# Patient Record
Sex: FEMALE | Race: WHITE | Employment: UNEMPLOYED | URBAN - METROPOLITAN AREA
[De-identification: names, ages, dates, MRNs, and addresses within clinical notes are randomized per-mention and may not be internally consistent; named-entity substitution may affect disease eponyms.]

---

## 2017-02-21 ENCOUNTER — OFFICE VISIT (OUTPATIENT)
Dept: FAMILY MEDICINE CLINIC | Age: 82
End: 2017-02-21

## 2017-02-21 VITALS
OXYGEN SATURATION: 97 % | HEART RATE: 76 BPM | WEIGHT: 184.8 LBS | HEIGHT: 59 IN | RESPIRATION RATE: 16 BRPM | SYSTOLIC BLOOD PRESSURE: 128 MMHG | BODY MASS INDEX: 37.25 KG/M2 | DIASTOLIC BLOOD PRESSURE: 76 MMHG

## 2017-02-21 DIAGNOSIS — N18.30 CHRONIC KIDNEY DISEASE, STAGE III (MODERATE) (HCC): Chronic | ICD-10-CM

## 2017-02-21 DIAGNOSIS — E78.00 PURE HYPERCHOLESTEROLEMIA: Chronic | ICD-10-CM

## 2017-02-21 DIAGNOSIS — E03.9 ACQUIRED HYPOTHYROIDISM: Chronic | ICD-10-CM

## 2017-02-21 DIAGNOSIS — M1A.0720 CHRONIC GOUT OF LEFT ANKLE, UNSPECIFIED CAUSE: Chronic | ICD-10-CM

## 2017-02-21 DIAGNOSIS — S06.5X0S: ICD-10-CM

## 2017-02-21 DIAGNOSIS — I10 HTN (HYPERTENSION), BENIGN: Primary | ICD-10-CM

## 2017-02-21 LAB
CHOLESTEROL, TOTAL: 143 MG/DL (ref 0–199)
CREATININE URINE: 80.1 MG/DL (ref 28–259)
HDLC SERPL-MCNC: 59 MG/DL (ref 40–60)
HOMOCYSTEINE: 14 UMOL/L (ref 0–10)
LDL CHOLESTEROL CALCULATED: 71 MG/DL
MICROALBUMIN UR-MCNC: <1.2 MG/DL
MICROALBUMIN/CREAT UR-RTO: NORMAL MG/G (ref 0–30)
T4 FREE: 1.2 NG/DL (ref 0.9–1.8)
TRIGL SERPL-MCNC: 67 MG/DL (ref 0–150)
TSH REFLEX: 4.91 UIU/ML (ref 0.27–4.2)
VLDLC SERPL CALC-MCNC: 13 MG/DL

## 2017-02-21 PROCEDURE — 36415 COLL VENOUS BLD VENIPUNCTURE: CPT | Performed by: FAMILY MEDICINE

## 2017-02-21 PROCEDURE — 99214 OFFICE O/P EST MOD 30 MIN: CPT | Performed by: FAMILY MEDICINE

## 2017-02-21 RX ORDER — LEVOTHYROXINE SODIUM 0.05 MG/1
TABLET ORAL
Qty: 90 TABLET | Refills: 3 | Status: SHIPPED | OUTPATIENT
Start: 2017-02-21 | End: 2018-02-27 | Stop reason: SDUPTHER

## 2017-04-06 RX ORDER — BUPROPION HYDROCHLORIDE 150 MG/1
TABLET, EXTENDED RELEASE ORAL
Qty: 180 TABLET | Refills: 3 | Status: SHIPPED | OUTPATIENT
Start: 2017-04-06 | End: 2018-02-27 | Stop reason: SDUPTHER

## 2017-05-10 ENCOUNTER — CARE COORDINATOR VISIT (OUTPATIENT)
Dept: CASE MANAGEMENT | Age: 82
End: 2017-05-10

## 2017-05-12 ENCOUNTER — OFFICE VISIT (OUTPATIENT)
Dept: ORTHOPEDIC SURGERY | Age: 82
End: 2017-05-12

## 2017-05-12 VITALS
SYSTOLIC BLOOD PRESSURE: 134 MMHG | BODY MASS INDEX: 35.28 KG/M2 | WEIGHT: 175 LBS | HEART RATE: 74 BPM | DIASTOLIC BLOOD PRESSURE: 72 MMHG | HEIGHT: 59 IN

## 2017-05-12 DIAGNOSIS — S83.92XA SPRAIN OF LEFT KNEE, UNSPECIFIED LIGAMENT, INITIAL ENCOUNTER: Primary | ICD-10-CM

## 2017-05-12 DIAGNOSIS — M70.62 GREATER TROCHANTERIC BURSITIS, LEFT: ICD-10-CM

## 2017-05-12 PROCEDURE — 99203 OFFICE O/P NEW LOW 30 MIN: CPT | Performed by: ORTHOPAEDIC SURGERY

## 2017-05-12 RX ORDER — METHYLPREDNISOLONE 4 MG/1
TABLET ORAL
Qty: 1 KIT | Refills: 0 | Status: SHIPPED | OUTPATIENT
Start: 2017-05-12 | End: 2017-05-18

## 2017-05-22 ENCOUNTER — TELEPHONE (OUTPATIENT)
Dept: ORTHOPEDIC SURGERY | Age: 82
End: 2017-05-22

## 2017-05-23 ENCOUNTER — TELEPHONE (OUTPATIENT)
Dept: FAMILY MEDICINE CLINIC | Age: 82
End: 2017-05-23

## 2017-05-24 ENCOUNTER — TELEPHONE (OUTPATIENT)
Dept: FAMILY MEDICINE CLINIC | Age: 82
End: 2017-05-24

## 2017-06-07 ENCOUNTER — OFFICE VISIT (OUTPATIENT)
Dept: FAMILY MEDICINE CLINIC | Age: 82
End: 2017-06-07

## 2017-06-07 ENCOUNTER — TELEPHONE (OUTPATIENT)
Dept: FAMILY MEDICINE CLINIC | Age: 82
End: 2017-06-07

## 2017-06-07 VITALS
DIASTOLIC BLOOD PRESSURE: 80 MMHG | HEART RATE: 75 BPM | BODY MASS INDEX: 37.13 KG/M2 | HEIGHT: 59 IN | OXYGEN SATURATION: 96 % | SYSTOLIC BLOOD PRESSURE: 120 MMHG | RESPIRATION RATE: 16 BRPM | WEIGHT: 184.2 LBS

## 2017-06-07 DIAGNOSIS — R26.81 GAIT INSTABILITY: Primary | ICD-10-CM

## 2017-06-07 DIAGNOSIS — M17.12 PRIMARY OSTEOARTHRITIS OF LEFT KNEE: Chronic | ICD-10-CM

## 2017-06-07 DIAGNOSIS — R26.81 UNSTEADY GAIT: Chronic | ICD-10-CM

## 2017-06-07 DIAGNOSIS — I10 HTN (HYPERTENSION), BENIGN: Primary | ICD-10-CM

## 2017-06-07 DIAGNOSIS — E03.9 ACQUIRED HYPOTHYROIDISM: Chronic | ICD-10-CM

## 2017-06-07 DIAGNOSIS — E78.00 PURE HYPERCHOLESTEROLEMIA: Chronic | ICD-10-CM

## 2017-06-07 DIAGNOSIS — Z91.81 HISTORY OF FALL: ICD-10-CM

## 2017-06-07 DIAGNOSIS — K14.9 TONGUE DYSPLASIA: Chronic | ICD-10-CM

## 2017-06-07 LAB — TSH REFLEX: 4.19 UIU/ML (ref 0.27–4.2)

## 2017-06-07 PROCEDURE — 99214 OFFICE O/P EST MOD 30 MIN: CPT | Performed by: FAMILY MEDICINE

## 2017-06-07 PROCEDURE — 36415 COLL VENOUS BLD VENIPUNCTURE: CPT | Performed by: FAMILY MEDICINE

## 2017-06-07 ASSESSMENT — PATIENT HEALTH QUESTIONNAIRE - PHQ9
1. LITTLE INTEREST OR PLEASURE IN DOING THINGS: 0
SUM OF ALL RESPONSES TO PHQ QUESTIONS 1-9: 0
2. FEELING DOWN, DEPRESSED OR HOPELESS: 0
SUM OF ALL RESPONSES TO PHQ9 QUESTIONS 1 & 2: 0

## 2017-06-07 ASSESSMENT — ENCOUNTER SYMPTOMS
CHEST TIGHTNESS: 0
CHOKING: 0
WHEEZING: 0
SHORTNESS OF BREATH: 0
COUGH: 0

## 2017-07-05 RX ORDER — ATORVASTATIN CALCIUM 80 MG/1
TABLET, FILM COATED ORAL
Qty: 90 TABLET | Refills: 2 | Status: SHIPPED | OUTPATIENT
Start: 2017-07-05 | End: 2017-12-29 | Stop reason: SDUPTHER

## 2017-09-19 ENCOUNTER — OFFICE VISIT (OUTPATIENT)
Dept: FAMILY MEDICINE CLINIC | Age: 82
End: 2017-09-19

## 2017-09-19 VITALS
WEIGHT: 189.4 LBS | HEART RATE: 72 BPM | RESPIRATION RATE: 16 BRPM | OXYGEN SATURATION: 97 % | HEIGHT: 59 IN | SYSTOLIC BLOOD PRESSURE: 108 MMHG | DIASTOLIC BLOOD PRESSURE: 56 MMHG | BODY MASS INDEX: 38.18 KG/M2

## 2017-09-19 DIAGNOSIS — I10 HTN (HYPERTENSION), BENIGN: Primary | Chronic | ICD-10-CM

## 2017-09-19 DIAGNOSIS — M1A.0720 CHRONIC GOUT OF LEFT ANKLE, UNSPECIFIED CAUSE: Chronic | ICD-10-CM

## 2017-09-19 DIAGNOSIS — D50.8 IRON DEFICIENCY ANEMIA SECONDARY TO INADEQUATE DIETARY IRON INTAKE: ICD-10-CM

## 2017-09-19 DIAGNOSIS — E78.00 PURE HYPERCHOLESTEROLEMIA: ICD-10-CM

## 2017-09-19 DIAGNOSIS — E03.9 ACQUIRED HYPOTHYROIDISM: Chronic | ICD-10-CM

## 2017-09-19 DIAGNOSIS — Z23 NEED FOR INFLUENZA VACCINATION: ICD-10-CM

## 2017-09-19 DIAGNOSIS — F33.42 RECURRENT MAJOR DEPRESSIVE EPISODES, IN FULL REMISSION (HCC): ICD-10-CM

## 2017-09-19 DIAGNOSIS — N18.30 CHRONIC KIDNEY DISEASE, STAGE III (MODERATE) (HCC): Chronic | ICD-10-CM

## 2017-09-19 LAB
A/G RATIO: 2.4 (ref 1.1–2.2)
ALBUMIN SERPL-MCNC: 4.3 G/DL (ref 3.4–5)
ALP BLD-CCNC: 111 U/L (ref 40–129)
ALT SERPL-CCNC: 18 U/L (ref 10–40)
ANION GAP SERPL CALCULATED.3IONS-SCNC: 16 MMOL/L (ref 3–16)
AST SERPL-CCNC: 23 U/L (ref 15–37)
BILIRUB SERPL-MCNC: 0.8 MG/DL (ref 0–1)
BUN BLDV-MCNC: 16 MG/DL (ref 7–20)
CALCIUM SERPL-MCNC: 9.4 MG/DL (ref 8.3–10.6)
CHLORIDE BLD-SCNC: 103 MMOL/L (ref 99–110)
CO2: 26 MMOL/L (ref 21–32)
CREAT SERPL-MCNC: 0.8 MG/DL (ref 0.6–1.2)
GFR AFRICAN AMERICAN: >60
GFR NON-AFRICAN AMERICAN: >60
GLOBULIN: 1.8 G/DL
GLUCOSE BLD-MCNC: 84 MG/DL (ref 70–99)
HCT VFR BLD CALC: 41.4 % (ref 36–48)
HEMOGLOBIN: 13.7 G/DL (ref 12–16)
MAGNESIUM: 2 MG/DL (ref 1.8–2.4)
MCH RBC QN AUTO: 32.4 PG (ref 26–34)
MCHC RBC AUTO-ENTMCNC: 33 G/DL (ref 31–36)
MCV RBC AUTO: 98.2 FL (ref 80–100)
PDW BLD-RTO: 13.5 % (ref 12.4–15.4)
PLATELET # BLD: 185 K/UL (ref 135–450)
PMV BLD AUTO: 9 FL (ref 5–10.5)
POTASSIUM SERPL-SCNC: 4.6 MMOL/L (ref 3.5–5.1)
RBC # BLD: 4.21 M/UL (ref 4–5.2)
SODIUM BLD-SCNC: 145 MMOL/L (ref 136–145)
TOTAL PROTEIN: 6.1 G/DL (ref 6.4–8.2)
TSH REFLEX: 4.15 UIU/ML (ref 0.27–4.2)
URIC ACID, SERUM: 3.6 MG/DL (ref 2.6–6)
WBC # BLD: 7 K/UL (ref 4–11)

## 2017-09-19 PROCEDURE — 99214 OFFICE O/P EST MOD 30 MIN: CPT | Performed by: FAMILY MEDICINE

## 2017-09-19 PROCEDURE — 36415 COLL VENOUS BLD VENIPUNCTURE: CPT | Performed by: FAMILY MEDICINE

## 2017-09-19 PROCEDURE — G0008 ADMIN INFLUENZA VIRUS VAC: HCPCS | Performed by: FAMILY MEDICINE

## 2017-09-19 PROCEDURE — 90662 IIV NO PRSV INCREASED AG IM: CPT | Performed by: FAMILY MEDICINE

## 2017-09-19 ASSESSMENT — ENCOUNTER SYMPTOMS
SHORTNESS OF BREATH: 1
COUGH: 0
CHOKING: 0
CHEST TIGHTNESS: 0
WHEEZING: 0

## 2017-09-21 DIAGNOSIS — E87.6 HYPOKALEMIA: ICD-10-CM

## 2017-09-21 RX ORDER — POTASSIUM CHLORIDE 750 MG/1
TABLET, EXTENDED RELEASE ORAL
Qty: 90 TABLET | Refills: 3 | Status: SHIPPED | OUTPATIENT
Start: 2017-09-21 | End: 2018-09-24 | Stop reason: SDUPTHER

## 2017-11-09 DIAGNOSIS — M1A.0720 CHRONIC GOUT OF LEFT ANKLE, UNSPECIFIED CAUSE: Chronic | ICD-10-CM

## 2017-11-09 RX ORDER — PROBENECID 500 MG/1
TABLET, FILM COATED ORAL
Qty: 90 TABLET | Refills: 0 | Status: SHIPPED | OUTPATIENT
Start: 2017-11-09 | End: 2018-02-27 | Stop reason: SDUPTHER

## 2017-11-30 DIAGNOSIS — I10 HTN (HYPERTENSION), BENIGN: ICD-10-CM

## 2017-11-30 DIAGNOSIS — N18.30 CHRONIC KIDNEY DISEASE, STAGE III (MODERATE) (HCC): Chronic | ICD-10-CM

## 2017-11-30 RX ORDER — LISINOPRIL 2.5 MG/1
TABLET ORAL
Qty: 90 TABLET | Refills: 1 | Status: SHIPPED | OUTPATIENT
Start: 2017-11-30 | End: 2018-05-21 | Stop reason: SDUPTHER

## 2017-12-05 ENCOUNTER — OFFICE VISIT (OUTPATIENT)
Dept: FAMILY MEDICINE CLINIC | Age: 82
End: 2017-12-05

## 2017-12-05 VITALS
RESPIRATION RATE: 16 BRPM | HEIGHT: 59 IN | BODY MASS INDEX: 38.38 KG/M2 | SYSTOLIC BLOOD PRESSURE: 124 MMHG | WEIGHT: 190.4 LBS | HEART RATE: 76 BPM | OXYGEN SATURATION: 97 % | DIASTOLIC BLOOD PRESSURE: 76 MMHG

## 2017-12-05 DIAGNOSIS — S70.12XS HEMATOMA OF LEFT THIGH, SEQUELA: Primary | ICD-10-CM
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
## 2017-12-05 DIAGNOSIS — J04.0 LARYNGITIS: ICD-10-CM

## 2017-12-05 PROCEDURE — 99214 OFFICE O/P EST MOD 30 MIN: CPT | Performed by: FAMILY MEDICINE

## 2017-12-05 PROCEDURE — 1123F ACP DISCUSS/DSCN MKR DOCD: CPT | Performed by: FAMILY MEDICINE

## 2017-12-05 PROCEDURE — G8417 CALC BMI ABV UP PARAM F/U: HCPCS | Performed by: FAMILY MEDICINE

## 2017-12-05 PROCEDURE — 4040F PNEUMOC VAC/ADMIN/RCVD: CPT | Performed by: FAMILY MEDICINE

## 2017-12-05 PROCEDURE — G8399 PT W/DXA RESULTS DOCUMENT: HCPCS | Performed by: FAMILY MEDICINE

## 2017-12-05 PROCEDURE — G8427 DOCREV CUR MEDS BY ELIG CLIN: HCPCS | Performed by: FAMILY MEDICINE

## 2017-12-05 PROCEDURE — 1090F PRES/ABSN URINE INCON ASSESS: CPT | Performed by: FAMILY MEDICINE

## 2017-12-05 PROCEDURE — G8484 FLU IMMUNIZE NO ADMIN: HCPCS | Performed by: FAMILY MEDICINE

## 2017-12-05 PROCEDURE — 1036F TOBACCO NON-USER: CPT | Performed by: FAMILY MEDICINE

## 2017-12-05 ASSESSMENT — ENCOUNTER SYMPTOMS
VOICE CHANGE: 1
RHINORRHEA: 1
TROUBLE SWALLOWING: 0
SORE THROAT: 0
SINUS PAIN: 0
SINUS PRESSURE: 1

## 2017-12-05 NOTE — PATIENT INSTRUCTIONS
Rebeka Wilcox was seen today for follow-up from hospital.    Diagnoses and all orders for this visit:    Hematoma of left thigh, sequela  Continue Tylenol as prior. Moist heat. More you move the better. Laryngitis                                         Instructions for Respiratory Infections (SAVE THIS SHEET)    For the first 7-14 days of symptoms follow instructions below, even before being seen in the office or even during treatment with antibiotics, until symptom free. 1. Water: Drink 1 ounce of water for every 2 pounds of body weight for adults,  86 Ounces of water per day. This will loosen mucus in the head and chest & improve the weak feeling of dehydration, allow the body to get germ fighting resources to the infection. Half can be juice or sugar free Crystal Light. Don't count drinks with caffeine or carbonation. Infants can have Pedialyte liquid or freezer pops. Avoid salt if you have high Blood Pressure, swelling in the feet or ankles or have heart problems. 2. Humidity: Humidify the air to 35-50% ( or until the windows fog over slightly). Can use a humidifier, vaporizer, boil water on the stove or put a coffee can full of water on the heater vents. This will loosen mucus from infections and allergies. 3. Sleep: Get 8-10 hours a night and rest during the evening after work or school. If you have trouble sleeping, adults can take Melatonin 5mg up to 2 tabs at bedtime ( not for children or pregnant women). If Mono is suspected then sleep during 9PM to 9AM time span (if possible.)  4. Cough: Take cough medicines with Guaifenesin ( to loosen chest or head congestion) and Dextromethorphan ( to decrease excess cough). Robitussin D.M. Syrup every 4-6 hrs or Mucinex D. M. pills twice a day. Use the pediatric formulations for children over 6 months making sure they are alcohol & sugar free for children, pregnant women, and diabetics. 5. Pain And Fevers:  Take Acetaminophen ( Tylenol) for fevers, aches, and headaches. 2-500 mg every 8 hours for adults. Appropriate doses at bedtime for children may help them sleep better. If pregnant take 1 -500 mg (Tylenol) every 8 hours as needed. Ibuprofen may be used if not pregnant, but should be given with food to avoid nausea. Avoid Ibuprofen if you have high blood pressure, CHF, or kidney problems. 6.Gargle: (DAY ONE OF SYMPTOMS) Gargle in the back of the throat with the head tilted back and to the sides with a strong mouthwash  ( Listerine or Scope) after meals and at bedtime at least 4 -5 times a day. This helps kill bacteria and viruses in the back of the throat and will shorten the duration and decrease the severity of your symptoms: sore throat, cough, ear popping,/ear pain, and possibly dizziness. 7. Smoking: Avoid smoking or exposure to second hand smoke. 8. Zinc: (DAY ONE OF SYMPTOMS)  Zinc lozenges such as Cold Mati (available most stores), or Basic ([employer] brand) will help shorten the duration and lessen symptoms such as sore throat, cough, nasal congestion, runny nose, and post nasal drip. Use 1 lozenge every 2-4 hours ( after meals if stomach is sensitive). Children can use 10-15 mg or less 3-4 times a day or Zinc lollypops. In pregnancy limit to 50-60 mg a day for 7 days as prenatals have Zinc also. With diarrhea use zinc pills 50 mg 1/2 to 1 pill 2x/day. 9. Vitamins: Vitamin C 500 mg with breakfast and dinner. Children and pregnant women should drink citrus juices. This speeds healing and strengthens immune system. 10. Chest Symptoms: Vicks Vapor rub to the chest at bedtime. 11. Decongestants: Avoid all decongestants and antihistamine cold preparations in children. Try all of the above starting with day 1 of symptoms. If Strep throat symptoms appear call to be seen in the office as soon as possible and don't gargle on that day. Newborns, infants, or anyone with earaches or influenza may need to be seen quickly.  Adults with fevers over 103 degrees or shortness of breath should call the office immediately.

## 2017-12-05 NOTE — PROGRESS NOTES
Subjective:      Patient ID: Roxana Delatorre is a 80 y.o. female. Chief Complaint   Patient presents with    Follow-Up from [address] [facility] /DX HEMATOMA     HPI  Fall /Hematoma of the right thigh  Was sitting in chair and went to get her I pad. Did not use her walker. She bent over to unplug the I pad. Had to turn around on a hardwood floor. Slipped and fell on her right side. Couldn't get up even with help of 2 others. Crawled to an automun and sat up till someone could help her up. Was able to walk. Had pain immediately. Was getting a lump that was harder and harder. Used ice for 2 days. Went to the ER had Xray then CT and no fx but had a large hematoma. No new meds. Only taking Tylenol 2 every 4 hours. Able to walk with a walker now. No pain unless she bumps it. She is comfortable if she is just sitting  Laryngitis/ sinus drainage x 5 -6days. No fever. Slight cough. Current Outpatient Prescriptions   Medication Sig Dispense Refill    lisinopril (PRINIVIL;ZESTRIL) 2.5 MG tablet TAKE 1 TABLET BY MOUTH  DAILY 90 tablet 1    probenecid (BENEMID) 500 MG tablet TAKE 1 TABLET BY MOUTH  DAILY TO PREVENT GOUT 90 tablet 0    potassium chloride (KLOR-CON M) 10 MEQ extended release tablet TAKE 1 TABLET BY MOUTH  DAILY FOR POTASSIUM  REPLACEMENT 90 tablet 3    atorvastatin (LIPITOR) 80 MG tablet TAKE ONE TABLET BY MOUTH EVERY EVENING FOR CHOLESTEROL 90 tablet 2    buPROPion (WELLBUTRIN SR) 150 MG extended release tablet Take 1 tablet by mouth  twice a day Indications:  Depression 180 tablet 3    levothyroxine (SYNTHROID) 50 MCG tablet Take 1 tablet by mouth  daily on an empty stomach  for thyroid 90 tablet 3    aspirin 81 MG tablet Take 81 mg by mouth daily      Cholecalciferol (VITAMIN D3) 2000 UNITS CAPS One daily for Vit D supplement 30 capsule     Melatonin 3 MG TABS Take 3 mg by mouth daily.  One at bedtime for sleep assist      ammonium lactate (AMLACTIN) 12 % cream Apply  topically as needed. Apply topically as needed.  magnesium chloride (MAG DELAY) 535 (64 MG) MG TBCR CR tablet Take 64 mg by mouth daily.  acetaminophen (TYLENOL) 500 MG tablet Take 500 mg by mouth daily.  Multiple Vitamin (MULTIVITAMIN PO) Take 1 tablet by mouth daily.  niacin 100 MG tablet 300 mg 2 times daily (with meals)        No current facility-administered medications for this visit. Review of Systems   HENT: Positive for congestion, ear discharge, ear pain, postnasal drip, rhinorrhea, sinus pressure and voice change. Negative for nosebleeds, sinus pain, sneezing, sore throat and trouble swallowing. Review of systems in history of present illness or scanned in under media tab. Objective:   Physical Exam   Constitutional: She appears well-developed. No distress. HENT:   Right Ear: Tympanic membrane, external ear and ear canal normal. Tympanic membrane is not injected, not erythematous, not retracted and not bulging. No middle ear effusion. Left Ear: External ear and ear canal normal. Tympanic membrane is not injected, not erythematous, not retracted and not bulging. No middle ear effusion. Nose: Mucosal edema present. No rhinorrhea. Right sinus exhibits no maxillary sinus tenderness and no frontal sinus tenderness. Left sinus exhibits no maxillary sinus tenderness and no frontal sinus tenderness. Mouth/Throat: Uvula is midline, oropharynx is clear and moist and mucous membranes are normal. Mucous membranes are not pale, not dry and not cyanotic. No uvula swelling. No oropharyngeal exudate, posterior oropharyngeal edema, posterior oropharyngeal erythema or tonsillar abscesses. Eyes: Conjunctivae are normal. Right eye exhibits no discharge and no exudate. Left eye exhibits no discharge and no exudate. Right conjunctiva is not injected. Left conjunctiva is not injected. No scleral icterus. Neck: Neck supple.  No thyroid mass and no thyromegaly present. Cardiovascular: Normal rate, regular rhythm and normal heart sounds. Exam reveals no gallop and no friction rub. No murmur heard. Pulmonary/Chest: Effort normal and breath sounds normal. No respiratory distress. She has no decreased breath sounds. She has no wheezes. She has no rhonchi. She has no rales. Musculoskeletal:        Legs:  Lymphadenopathy:        Head (right side): No submandibular and no tonsillar adenopathy present. Head (left side): No submandibular and no tonsillar adenopathy present. She has no cervical adenopathy. Right cervical: No superficial cervical, no deep cervical and no posterior cervical adenopathy present. Left cervical: No superficial cervical, no deep cervical and no posterior cervical adenopathy present. Skin: Skin is warm and dry. She is not diaphoretic. No pallor. BP Readings from Last 3 Encounters:   12/05/17 124/76   09/19/17 (!) 108/56   06/07/17 120/80     Pulse Readings from Last 3 Encounters:   12/05/17 76   09/19/17 72   06/07/17 75     Wt Readings from Last 3 Encounters:   12/05/17 190 lb 6.4 oz (86.4 kg)   09/19/17 189 lb 6.4 oz (85.9 kg)   06/07/17 184 lb 3.2 oz (83.6 kg)     Body mass index is 38.46 kg/m². 9/19/2017 11:41   Sodium 145   Potassium 4.6   Chloride 103   CO2 26   BUN 16   Creatinine 0.8   Anion Gap 16   GFR Non-African American >60   Magnesium 2.00   Glucose 84   Calcium 9.4   Total Protein 6.1 (L)   Uric Acid, Serum 3.6   Albumin 4.3   Globulin 1.8   Albumin/Globulin Ratio 2.4 (H)   Alk Phos 111   ALT 18   AST 23   Bilirubin 0.8   TSH 4.15   WBC 7.0   RBC 4.21   Hemoglobin Quant 13.7   Hematocrit 41.4   MCV 98.2   MCH 32.4   MCHC 33.0   MPV 9.0   RDW 13.5   Platelet Count 085     CT scan of the right leg 12/2/2017  Mild degenerative changes of the right hip. No fracture dislocation. Superficial hematoma along lateral aspect of right proximal by measuring 8.1 x 6.1 x 5.3 cm.   A few diverticula of the visualized colon. Assessment:      1. Hematoma of left thigh, sequela    2. Laryngitis    3. Fall with injury, sequela          Plan:      - Counseling More Than 50% of the 25 min Appointment Time     Rebeka Wilcox was seen today for follow-up from hospital.    Diagnoses and all orders for this visit:    Hematoma of left thigh, sequela  Continue Tylenol as prior. Moist heat. The more you move the better. It will help the hematoma to resolve. Explained that it could be 6-9 months for the hematoma resolves. 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 Increasing exercise will help tremendously to prevent future falls by strengthening the muscles of the core body and lower extremities. Use the muscle conditioning exercises couldn't previously swell as the materials given by physical and occupational therapy in the past as much as tolerated with the hematoma. Encouraged patient to do everything in a safe manner. Encouraged calf pumps to keep swelling down. Encouraged lifting off the chair with her hands on the seat of the chair and not the arms to build strength in the arms. Encouraged her to do these things every time she saw a commercial on television. Laryngitis                                         Instructions for Respiratory Infections (SAVE THIS SHEET)  For the first 7-14 days of symptoms follow instructions below, even before being seen in the office or even during treatment with antibiotics, until symptom free. 1. Water: Drink 1 ounce of water for every 2 pounds of body weight for adults,  86 Ounces of water per day. This will loosen mucus in the head and chest & improve the weak feeling of dehydration, allow the body to get germ fighting resources to the infection. Half can be juice or sugar free Crystal Light. Don't count drinks with caffeine or carbonation.  Infants can have Pedialyte liquid or

## 2017-12-12 ENCOUNTER — TELEPHONE (OUTPATIENT)
Dept: FAMILY MEDICINE CLINIC | Age: 82
End: 2017-12-12

## 2017-12-12 NOTE — TELEPHONE ENCOUNTER
[facility]ARE [facility]CTIONS  - CLAUDIA @   [phone]          SHE HAD A FALL - HOME CARE ORDERED      2X A WEEK FOR 4 WEEKS  - STRENGTHENING, BALANCE AND ENDURANCE     NEEDS VERBAL ORDERS

## 2017-12-19 ENCOUNTER — OFFICE VISIT (OUTPATIENT)
Dept: FAMILY MEDICINE CLINIC | Age: 82
End: 2017-12-19

## 2017-12-19 VITALS
WEIGHT: 194.2 LBS | SYSTOLIC BLOOD PRESSURE: 124 MMHG | DIASTOLIC BLOOD PRESSURE: 78 MMHG | RESPIRATION RATE: 16 BRPM | BODY MASS INDEX: 39.15 KG/M2 | OXYGEN SATURATION: 96 % | HEART RATE: 78 BPM | HEIGHT: 59 IN

## 2017-12-19 DIAGNOSIS — I10 HTN (HYPERTENSION), BENIGN: Primary | Chronic | ICD-10-CM

## 2017-12-19 DIAGNOSIS — R60.0 EDEMA OF BOTH LEGS: ICD-10-CM

## 2017-12-19 DIAGNOSIS — S70.11XS HEMATOMA OF RIGHT THIGH, SEQUELA: ICD-10-CM

## 2017-12-19 PROCEDURE — 99214 OFFICE O/P EST MOD 30 MIN: CPT | Performed by: FAMILY MEDICINE

## 2017-12-19 PROCEDURE — 1090F PRES/ABSN URINE INCON ASSESS: CPT | Performed by: FAMILY MEDICINE

## 2017-12-19 PROCEDURE — 4040F PNEUMOC VAC/ADMIN/RCVD: CPT | Performed by: FAMILY MEDICINE

## 2017-12-19 PROCEDURE — G8417 CALC BMI ABV UP PARAM F/U: HCPCS | Performed by: FAMILY MEDICINE

## 2017-12-19 PROCEDURE — 1123F ACP DISCUSS/DSCN MKR DOCD: CPT | Performed by: FAMILY MEDICINE

## 2017-12-19 PROCEDURE — G8484 FLU IMMUNIZE NO ADMIN: HCPCS | Performed by: FAMILY MEDICINE

## 2017-12-19 PROCEDURE — G8427 DOCREV CUR MEDS BY ELIG CLIN: HCPCS | Performed by: FAMILY MEDICINE

## 2017-12-19 PROCEDURE — G8399 PT W/DXA RESULTS DOCUMENT: HCPCS | Performed by: FAMILY MEDICINE

## 2017-12-19 PROCEDURE — 1036F TOBACCO NON-USER: CPT | Performed by: FAMILY MEDICINE

## 2017-12-19 ASSESSMENT — ENCOUNTER SYMPTOMS
SHORTNESS OF BREATH: 1
WHEEZING: 0
COUGH: 0
CHEST TIGHTNESS: 0
CHOKING: 0

## 2017-12-19 NOTE — PROGRESS NOTES
Subjective:      Patient ID: Hoa Le is a 80 y.o. female. Chief Complaint   Patient presents with    Hypertension     3 MONTH ROUTINE FOLLOW UP    Hematoma     RIGHT THIGH, STILL PAINFUL, CAN'T SLEEP ON THAT SIDE/STILL HARD     HPI    HTN (hypertension), benign  Is not checking at home. No cuff. Is watching salt. Occasional chips. Hematoma of right thigh, sequela  Feels like it is tight but not as tight. Still touchy. Still feels hard in 1 spot and cant sleep on that side. Edema of both legs  Swelling is worse since hematoma. Stockings tight at the top and hurts More on the right because of the hematoma. Tried stockings that went up to her knees in the past but were too painful to tolerate. Current Outpatient Prescriptions   Medication Sig Dispense Refill    Misc Natural Products (GLUCOSAMINE-CHONDROITIN SULF PO) Take 1 tablet by mouth daily      lisinopril (PRINIVIL;ZESTRIL) 2.5 MG tablet TAKE 1 TABLET BY MOUTH  DAILY 90 tablet 1    probenecid (BENEMID) 500 MG tablet TAKE 1 TABLET BY MOUTH  DAILY TO PREVENT GOUT 90 tablet 0    potassium chloride (KLOR-CON M) 10 MEQ extended release tablet TAKE 1 TABLET BY MOUTH  DAILY FOR POTASSIUM  REPLACEMENT 90 tablet 3    atorvastatin (LIPITOR) 80 MG tablet TAKE ONE TABLET BY MOUTH EVERY EVENING FOR CHOLESTEROL 90 tablet 2    buPROPion (WELLBUTRIN SR) 150 MG extended release tablet Take 1 tablet by mouth  twice a day Indications:  Depression 180 tablet 3    levothyroxine (SYNTHROID) 50 MCG tablet Take 1 tablet by mouth  daily on an empty stomach  for thyroid 90 tablet 3    aspirin 81 MG tablet Take 81 mg by mouth daily      Cholecalciferol (VITAMIN D3) 2000 UNITS CAPS One daily for Vit D supplement 30 capsule     Melatonin 3 MG TABS Take 3 mg by mouth daily. One at bedtime for sleep assist      ammonium lactate (AMLACTIN) 12 % cream Apply  topically as needed. Apply topically as needed.        magnesium chloride (MAG DELAY) 535 (64 MG) MG cervical: No superficial cervical, no deep cervical and no posterior cervical adenopathy present. Neurological: She is alert. Coordination and gait abnormal.   She is less unsteady with short transfers from the chair to the exam table. She feels more secure getting on table now that it is lower. Skin: Skin is warm and dry. She is not diaphoretic. No cyanosis. No pallor. Nails show no clubbing. Psychiatric: She has a normal mood and affect. Her speech is normal and behavior is normal. Judgment normal. Cognition and memory are normal.   Nursing note and vitals reviewed. BP Readings from Last 3 Encounters:   12/19/17 124/78   12/05/17 124/76   09/19/17 (!) 108/56     Pulse Readings from Last 3 Encounters:   12/19/17 78   12/05/17 76   09/19/17 72     Wt Readings from Last 3 Encounters:   12/19/17 194 lb 3.2 oz (88.1 kg)   12/05/17 190 lb 6.4 oz (86.4 kg)   09/19/17 189 lb 6.4 oz (85.9 kg)     Body mass index is 39.22 kg/m². Assessment:      1. HTN (hypertension), benign    2. Hematoma of right thigh, sequela    3. Edema of both legs          Plan:      Brianna Abernathy was seen today for hypertension and hematoma. Diagnoses and all orders for this visit:    HTN (hypertension), benign  -     Good control. Get a home cuff. -     Continue meds and lifestyle control. Hematoma of right thigh, sequela  Will take 6-9 months to resolve. Continue Tylenol as needed for pain. Moist heat to the area will speed resolution. Movement pressure and helps break up clots making up the hematoma so it will resolve more rapidly. Edema of both legs  Keep leg elevated when sitting. Wear compression stockings as much as possible. If feet are down while sitting press toes and front of foot down then lift them firmly with heel on the ground. When standing gently rock from heels to standing on toe tips.   These are calf pumps that move fluid out of the calf and back to the central circulation and prevent worsened swelling in

## 2017-12-19 NOTE — PATIENT INSTRUCTIONS
Rishi Stewart was seen today for hypertension and hematoma. Diagnoses and all orders for this visit:    HTN (hypertension), benign  -     Good control. Get a home cuff. -     Continue meds and lifestyle control. Hematoma of right thigh, sequela  Will take 6-9 months to resolve. Edema of both legs  Keep leg elevated when sitting. Wear compression stockings as much as possible. If feet are down while sitting press toes and front of foot down then lift them firmly with heel on the ground. When standing gently rock from heels to standing on toe tips. These are calf pumps that move fluid out of the calf and back to the central circulation and prevent worsened swelling in the foot and calf. Lay on couch and elevate feet on arm rest 30 min before dinner. Can try 10-15 mm pressure stocking.

## 2017-12-29 DIAGNOSIS — E78.00 PURE HYPERCHOLESTEROLEMIA: Primary | Chronic | ICD-10-CM

## 2017-12-29 RX ORDER — ATORVASTATIN CALCIUM 80 MG/1
80 TABLET, FILM COATED ORAL DAILY
Qty: 90 TABLET | Refills: 1 | Status: SHIPPED | OUTPATIENT
Start: 2017-12-29 | End: 2018-07-03 | Stop reason: SDUPTHER

## 2017-12-29 NOTE — TELEPHONE ENCOUNTER
Sheila Stevenson- she was only given one bottle of her lipitor from [place], and they won't fill again until her 90 pills would have been gone. She has 3 pills left. Please sign the rx to [facility] for her atorvastatin. Thanks!

## 2018-01-04 ENCOUNTER — OFFICE VISIT (OUTPATIENT)
Dept: FAMILY MEDICINE CLINIC | Age: 83
End: 2018-01-04

## 2018-01-04 VITALS
SYSTOLIC BLOOD PRESSURE: 116 MMHG | HEART RATE: 80 BPM | DIASTOLIC BLOOD PRESSURE: 76 MMHG | WEIGHT: 189 LBS | RESPIRATION RATE: 16 BRPM | HEIGHT: 59 IN | BODY MASS INDEX: 38.1 KG/M2

## 2018-01-04 DIAGNOSIS — B02.9 HERPES ZOSTER WITHOUT COMPLICATION: Primary | ICD-10-CM

## 2018-01-04 PROCEDURE — 99214 OFFICE O/P EST MOD 30 MIN: CPT | Performed by: NURSE PRACTITIONER

## 2018-01-04 RX ORDER — PREDNISONE 10 MG/1
TABLET ORAL
Qty: 30 TABLET | Refills: 0 | Status: SHIPPED | OUTPATIENT
Start: 2018-01-04 | End: 2018-01-14

## 2018-01-04 RX ORDER — VALACYCLOVIR HYDROCHLORIDE 500 MG/1
1000 TABLET, FILM COATED ORAL 3 TIMES DAILY
Qty: 30 TABLET | Refills: 0 | Status: SHIPPED | OUTPATIENT
Start: 2018-01-04 | End: 2018-01-14

## 2018-01-04 NOTE — PATIENT INSTRUCTIONS
Patient Education      if you develop fever greater that 102 please call the office  If you develop sever headache go to ED  Shingles: Care Instructions  Your Care Instructions    Shingles (herpes zoster) causes pain and a blistered rash. The rash can appear anywhere on the body but will be on only one side of the body, the left or right. It will be in a band, a strip, or a small area. The pain can be very severe. Shingles can also cause tingling or itching in the area of the rash. The blisters scab over after a few days and heal in 2 to 4 weeks. Medicines can help you feel better and may help prevent more serious problems caused by shingles. Shingles is caused by the same virus that causes chickenpox. When you have chickenpox, the virus gets into your nerve roots and stays there (becomes dormant) long after you get over the chickenpox. If the virus becomes active again, it can cause shingles. Follow-up care is a key part of your treatment and safety. Be sure to make and go to all appointments, and call your doctor if you are having problems. It's also a good idea to know your test results and keep a list of the medicines you take. How can you care for yourself at home? · Be safe with medicines. Take your medicines exactly as prescribed. Call your doctor if you think you are having a problem with your medicine. Antiviral medicine helps you get better faster. · Try not to scratch or pick at the blisters. They will crust over and fall off on their own if you leave them alone. · Put cool, wet cloths on the area to relieve pain and itching. You can also use calamine lotion. Try not to use so much lotion that it cakes and is hard to get off. · Put cornstarch or baking soda on the sores to help dry them out so they heal faster. · Do not use thick ointment, such as petroleum jelly, on the sores. This will keep them from drying and healing. · To help remove loose crusts, soak them in tap water.  This can help decrease oozing, and dry and soothe the skin. · Take an over-the-counter pain medicine, such as acetaminophen (Tylenol), ibuprofen (Advil, Motrin), or naproxen (Aleve). Read and follow all instructions on the label. · Avoid close contact with people until the blisters have healed. It is very important for you to avoid contact with anyone who has never had chickenpox or the chickenpox vaccine. Pregnant women, young babies, and anyone else who has a hard time fighting infection (such as someone with HIV, diabetes, or cancer) is especially at risk. When should you call for help? Call your doctor now or seek immediate medical care if:  ? · You have a new or higher fever. ? · You have a severe headache and a stiff neck. ? · You lose the ability to think clearly. ? · The rash spreads to your forehead, nose, eyes, or eyelids. ? · You have eye pain, or your vision gets worse. ? · You have new pain in your face, or you cannot move the muscles in your face. ? · Blisters spread to new parts of your body. ? Watch closely for changes in your health, and be sure to contact your doctor if:  ? · The rash has not healed after 2 to 4 weeks. ? · You still have pain after the rash has healed. Where can you learn more? Go to [email or web address] and sign in to your Present account. Shea Lang in the [facility] box to learn more about \"Shingles: Care Instructions. \"     If you do not have an account, please click on the \"Sign Up Now\" link. Current as of: March 3, 2017  Content Version: 11.5  © 9202-0576 [employer]. Care instructions adapted under license by [facility] ([facility]). If you have questions about a medical condition or this instruction, always ask your healthcare professional. [address] any warranty or liability for your use of this information.

## 2018-02-02 ENCOUNTER — TELEPHONE (OUTPATIENT)
Dept: FAMILY MEDICINE CLINIC | Age: 83
End: 2018-02-02

## 2018-02-27 DIAGNOSIS — E03.9 ACQUIRED HYPOTHYROIDISM: Chronic | ICD-10-CM

## 2018-02-27 DIAGNOSIS — M1A.0720 CHRONIC GOUT OF LEFT ANKLE, UNSPECIFIED CAUSE: Chronic | ICD-10-CM

## 2018-02-27 RX ORDER — BUPROPION HYDROCHLORIDE 150 MG/1
TABLET, EXTENDED RELEASE ORAL
Qty: 180 TABLET | Refills: 1 | Status: SHIPPED | OUTPATIENT
Start: 2018-02-27 | End: 2018-07-03 | Stop reason: SDUPTHER

## 2018-02-27 RX ORDER — PROBENECID 500 MG/1
TABLET, FILM COATED ORAL
Qty: 90 TABLET | Refills: 3 | Status: SHIPPED | OUTPATIENT
Start: 2018-02-27 | End: 2018-10-02 | Stop reason: SDUPTHER

## 2018-02-27 RX ORDER — LEVOTHYROXINE SODIUM 0.05 MG/1
TABLET ORAL
Qty: 90 TABLET | Refills: 1 | Status: SHIPPED | OUTPATIENT
Start: 2018-02-27 | End: 2018-07-03 | Stop reason: SDUPTHER

## 2018-04-03 ENCOUNTER — OFFICE VISIT (OUTPATIENT)
Dept: FAMILY MEDICINE CLINIC | Age: 83
End: 2018-04-03

## 2018-04-03 VITALS
WEIGHT: 187 LBS | OXYGEN SATURATION: 98 % | DIASTOLIC BLOOD PRESSURE: 78 MMHG | HEART RATE: 64 BPM | SYSTOLIC BLOOD PRESSURE: 126 MMHG | RESPIRATION RATE: 20 BRPM | HEIGHT: 59 IN | BODY MASS INDEX: 37.7 KG/M2

## 2018-04-03 DIAGNOSIS — M79.661 PAIN IN RIGHT SHIN: ICD-10-CM

## 2018-04-03 DIAGNOSIS — F33.41 MAJOR DEPRESSIVE DISORDER, RECURRENT, IN PARTIAL REMISSION (HCC): ICD-10-CM

## 2018-04-03 DIAGNOSIS — I10 HTN (HYPERTENSION), BENIGN: Primary | Chronic | ICD-10-CM

## 2018-04-03 DIAGNOSIS — N18.30 CHRONIC KIDNEY DISEASE, STAGE III (MODERATE) (HCC): Chronic | ICD-10-CM

## 2018-04-03 DIAGNOSIS — E03.9 ACQUIRED HYPOTHYROIDISM: Chronic | ICD-10-CM

## 2018-04-03 DIAGNOSIS — H91.93 BILATERAL HEARING LOSS, UNSPECIFIED HEARING LOSS TYPE: ICD-10-CM

## 2018-04-03 DIAGNOSIS — R26.81 UNSTEADY GAIT: Chronic | ICD-10-CM

## 2018-04-03 DIAGNOSIS — Z23 NEED FOR PROPHYLACTIC VACCINATION AGAINST DIPHTHERIA-TETANUS-PERTUSSIS (DTP): ICD-10-CM

## 2018-04-03 DIAGNOSIS — E78.00 PURE HYPERCHOLESTEROLEMIA: Chronic | ICD-10-CM

## 2018-04-03 LAB
A/G RATIO: 2.3 (ref 1.1–2.2)
ALBUMIN SERPL-MCNC: 4.1 G/DL (ref 3.4–5)
ALP BLD-CCNC: 110 U/L (ref 40–129)
ALT SERPL-CCNC: 15 U/L (ref 10–40)
ANION GAP SERPL CALCULATED.3IONS-SCNC: 12 MMOL/L (ref 3–16)
AST SERPL-CCNC: 24 U/L (ref 15–37)
BILIRUB SERPL-MCNC: 0.6 MG/DL (ref 0–1)
BUN BLDV-MCNC: 12 MG/DL (ref 7–20)
CALCIUM SERPL-MCNC: 9.2 MG/DL (ref 8.3–10.6)
CHLORIDE BLD-SCNC: 103 MMOL/L (ref 99–110)
CHOLESTEROL, TOTAL: 137 MG/DL (ref 0–199)
CO2: 29 MMOL/L (ref 21–32)
CREAT SERPL-MCNC: 0.7 MG/DL (ref 0.6–1.2)
GFR AFRICAN AMERICAN: >60
GFR NON-AFRICAN AMERICAN: >60
GLOBULIN: 1.8 G/DL
GLUCOSE BLD-MCNC: 79 MG/DL (ref 70–99)
HDLC SERPL-MCNC: 64 MG/DL (ref 40–60)
LDL CHOLESTEROL CALCULATED: 62 MG/DL
POTASSIUM SERPL-SCNC: 4.7 MMOL/L (ref 3.5–5.1)
SODIUM BLD-SCNC: 144 MMOL/L (ref 136–145)
TOTAL PROTEIN: 5.9 G/DL (ref 6.4–8.2)
TRIGL SERPL-MCNC: 56 MG/DL (ref 0–150)
TSH REFLEX: 3.82 UIU/ML (ref 0.27–4.2)
VLDLC SERPL CALC-MCNC: 11 MG/DL

## 2018-04-03 PROCEDURE — G8399 PT W/DXA RESULTS DOCUMENT: HCPCS | Performed by: FAMILY MEDICINE

## 2018-04-03 PROCEDURE — G8417 CALC BMI ABV UP PARAM F/U: HCPCS | Performed by: FAMILY MEDICINE

## 2018-04-03 PROCEDURE — 99215 OFFICE O/P EST HI 40 MIN: CPT | Performed by: FAMILY MEDICINE

## 2018-04-03 PROCEDURE — 1090F PRES/ABSN URINE INCON ASSESS: CPT | Performed by: FAMILY MEDICINE

## 2018-04-03 PROCEDURE — 4040F PNEUMOC VAC/ADMIN/RCVD: CPT | Performed by: FAMILY MEDICINE

## 2018-04-03 PROCEDURE — 1036F TOBACCO NON-USER: CPT | Performed by: FAMILY MEDICINE

## 2018-04-03 PROCEDURE — G8427 DOCREV CUR MEDS BY ELIG CLIN: HCPCS | Performed by: FAMILY MEDICINE

## 2018-04-03 PROCEDURE — 36415 COLL VENOUS BLD VENIPUNCTURE: CPT | Performed by: FAMILY MEDICINE

## 2018-04-03 PROCEDURE — 1123F ACP DISCUSS/DSCN MKR DOCD: CPT | Performed by: FAMILY MEDICINE

## 2018-05-21 DIAGNOSIS — I10 HTN (HYPERTENSION), BENIGN: ICD-10-CM

## 2018-05-21 DIAGNOSIS — N18.30 CHRONIC KIDNEY DISEASE, STAGE III (MODERATE) (HCC): Chronic | ICD-10-CM

## 2018-05-21 RX ORDER — LISINOPRIL 2.5 MG/1
TABLET ORAL
Qty: 90 TABLET | Refills: 0 | Status: SHIPPED | OUTPATIENT
Start: 2018-05-21 | End: 2018-07-03 | Stop reason: SDUPTHER

## 2018-06-13 ENCOUNTER — TELEPHONE (OUTPATIENT)
Dept: FAMILY MEDICINE CLINIC | Age: 83
End: 2018-06-13

## 2018-07-03 ENCOUNTER — OFFICE VISIT (OUTPATIENT)
Dept: FAMILY MEDICINE CLINIC | Age: 83
End: 2018-07-03

## 2018-07-03 VITALS
OXYGEN SATURATION: 98 % | BODY MASS INDEX: 37.78 KG/M2 | WEIGHT: 187.4 LBS | HEIGHT: 59 IN | DIASTOLIC BLOOD PRESSURE: 72 MMHG | SYSTOLIC BLOOD PRESSURE: 130 MMHG | RESPIRATION RATE: 20 BRPM | HEART RATE: 76 BPM

## 2018-07-03 DIAGNOSIS — I10 HTN (HYPERTENSION), BENIGN: Primary | ICD-10-CM

## 2018-07-03 DIAGNOSIS — E78.00 PURE HYPERCHOLESTEROLEMIA: Chronic | ICD-10-CM

## 2018-07-03 DIAGNOSIS — M19.031 PRIMARY OSTEOARTHRITIS OF RIGHT WRIST: ICD-10-CM

## 2018-07-03 DIAGNOSIS — L03.119 CELLULITIS OF WRIST: ICD-10-CM

## 2018-07-03 DIAGNOSIS — F33.41 RECURRENT MAJOR DEPRESSIVE DISORDER, IN PARTIAL REMISSION (HCC): Chronic | ICD-10-CM

## 2018-07-03 DIAGNOSIS — N18.30 CHRONIC KIDNEY DISEASE, STAGE III (MODERATE) (HCC): Chronic | ICD-10-CM

## 2018-07-03 DIAGNOSIS — E03.9 ACQUIRED HYPOTHYROIDISM: Chronic | ICD-10-CM

## 2018-07-03 PROCEDURE — 1090F PRES/ABSN URINE INCON ASSESS: CPT | Performed by: FAMILY MEDICINE

## 2018-07-03 PROCEDURE — 4040F PNEUMOC VAC/ADMIN/RCVD: CPT | Performed by: FAMILY MEDICINE

## 2018-07-03 PROCEDURE — 1123F ACP DISCUSS/DSCN MKR DOCD: CPT | Performed by: FAMILY MEDICINE

## 2018-07-03 PROCEDURE — G8417 CALC BMI ABV UP PARAM F/U: HCPCS | Performed by: FAMILY MEDICINE

## 2018-07-03 PROCEDURE — G8399 PT W/DXA RESULTS DOCUMENT: HCPCS | Performed by: FAMILY MEDICINE

## 2018-07-03 PROCEDURE — G8427 DOCREV CUR MEDS BY ELIG CLIN: HCPCS | Performed by: FAMILY MEDICINE

## 2018-07-03 PROCEDURE — 99215 OFFICE O/P EST HI 40 MIN: CPT | Performed by: FAMILY MEDICINE

## 2018-07-03 PROCEDURE — 1036F TOBACCO NON-USER: CPT | Performed by: FAMILY MEDICINE

## 2018-07-03 RX ORDER — LEVOTHYROXINE SODIUM 0.05 MG/1
TABLET ORAL
Qty: 90 TABLET | Refills: 1 | Status: SHIPPED | OUTPATIENT
Start: 2018-07-03 | End: 2019-01-08 | Stop reason: SDUPTHER

## 2018-07-03 RX ORDER — BUPROPION HYDROCHLORIDE 150 MG/1
TABLET, EXTENDED RELEASE ORAL
Qty: 180 TABLET | Refills: 3 | Status: SHIPPED | OUTPATIENT
Start: 2018-07-03 | End: 2019-08-09 | Stop reason: SDUPTHER

## 2018-07-03 RX ORDER — ATORVASTATIN CALCIUM 80 MG/1
80 TABLET, FILM COATED ORAL DAILY
Qty: 90 TABLET | Refills: 3 | Status: SHIPPED | OUTPATIENT
Start: 2018-07-03 | End: 2019-08-09 | Stop reason: SDUPTHER

## 2018-07-03 RX ORDER — LISINOPRIL 2.5 MG/1
TABLET ORAL
Qty: 90 TABLET | Refills: 3 | Status: SHIPPED | OUTPATIENT
Start: 2018-07-03 | End: 2019-05-30 | Stop reason: SDUPTHER

## 2018-07-03 ASSESSMENT — PATIENT HEALTH QUESTIONNAIRE - PHQ9
1. LITTLE INTEREST OR PLEASURE IN DOING THINGS: 0
2. FEELING DOWN, DEPRESSED OR HOPELESS: 0
SUM OF ALL RESPONSES TO PHQ QUESTIONS 1-9: 0
SUM OF ALL RESPONSES TO PHQ9 QUESTIONS 1 & 2: 0

## 2018-07-03 NOTE — PATIENT INSTRUCTIONS
[facility] was seen today for hypertension, follow-up from hospital and cough. Diagnoses and all orders for this visit:    HTN (hypertension), benign  -     lisinopril (PRINIVIL;ZESTRIL) 2.5 MG tablet; TAKE 1 TABLET BY MOUTH  DAILY  -     Good control. Your cuff reads about 15 high on the bottom and 5 high on the top.   -     Continue meds and lifestyle control. Acquired hypothyroidism  -     levothyroxine (SYNTHROID) 50 MCG tablet; TAKE 1 TABLET BY MOUTH  DAILY ON AN EMPTY STOMACH  FOR THYROID    Pure hypercholesterolemia  -     atorvastatin (LIPITOR) 80 MG tablet; Take 1 tablet by mouth daily    Chronic kidney disease, stage III (moderate)  -     lisinopril (PRINIVIL;ZESTRIL) 2.5 MG tablet; TAKE 1 TABLET BY MOUTH  DAILY    Cellulitis of wrist  Resolved. Recurrent major depressive disorder, in partial remission (HCC)  -     buPROPion (WELLBUTRIN SR) 150 MG extended release tablet; TAKE 1 TABLET BY MOUTH  TWICE A DAY                                           Instructions for Respiratory Infections (SAVE THIS SHEET)    For the first 7-14 days of symptoms follow instructions below, even before being seen in the office or even during treatment with antibiotics, until symptom free. 1. Water: Drink 1 ounce of water for every 2 pounds of body weight for adults, 85 Ounces of water/fluids per day. This will loosen mucus in the head and chest & improve the weak feeling of dehydration, allow the body to get germ fighting resources to the infection. Half can be juice or sugar free Crystal Light. Don't count drinks with caffeine or carbonation. Infants can have Pedialyte liquid or freezer pops. Avoid salt if you have high Blood Pressure, swelling in the feet or ankles or have heart problems. 2. Humidity: Humidify the air to 35-50% ( or until the windows fog over slightly). Can use a humidifier, vaporizer, boil water on the stove or put a coffee can full of water on the heater vents.  This will loosen mucus from infections and allergies. 3. Sleep: Get 8-10 hours a night and rest during the evening after work or school. If you have trouble sleeping, adults can take Melatonin 5mg up to 2 tabs at bedtime ( not for children or pregnant women). If Mono is suspected then sleep during 9PM to 9AM time span (if possible.)  4. Cough: Take cough medicines with Guaifenesin ( to loosen chest or head congestion) and Dextromethorphan ( to decrease excess cough). Robitussin D.M. Syrup every 4-6 hrs or Mucinex D. M. pills twice a day. Use the pediatric formulations for children over 6 months making sure they are alcohol & sugar free for children, pregnant women, and diabetics. 5. Pain And Fevers: Take Acetaminophen ( Tylenol) for fevers, aches, and headaches. 2-500 mg every 8 hours for adults. Appropriate doses at bedtime for children may help them sleep better. If pregnant take 1 -500 mg (Tylenol) every 8 hours as needed. Ibuprofen may be used if not pregnant, but should be given with food to avoid nausea. Avoid Ibuprofen if you have high blood pressure, CHF, or kidney problems. 6.Gargle: (DAY ONE OF SYMPTOMS) Gargle in the back of the throat with the head tilted back and to the sides with a strong mouthwash  ( Listerine or Scope) after meals and at bedtime at least 4 -5 times a day. This helps kill bacteria and viruses in the back of the throat and will shorten the duration and decrease the severity of your symptoms: sore throat, cough, ear popping,/ear pain, and possibly dizziness. 7. Smoking: Avoid smoking or exposure to second hand smoke. 8. Zinc: (DAY ONE OF SYMPTOMS)  Zinc lozenges such as Cold Mati (available most stores), or Basic ([employer] brand) will help shorten the duration and lessen symptoms such as sore throat, cough, nasal congestion, runny nose, and post nasal drip. Use 1 lozenge every 2-4 hours ( after meals if stomach is sensitive). Children can use 10-15 mg or less 3-4 times a day or Zinc lollypops.  In pregnancy limit

## 2018-07-03 NOTE — PROGRESS NOTES
Subjective:      Patient ID: Benny Scott is a 80 y.o. female. Chief Complaint   Patient presents with    Hypertension     3 MONTH ROUTINE FOLLOW UP    Follow-Up from Hospital     CELLULITIS OF WRIST    Cough     NOSE RUNNING, PRODUCTIVE COUGH X 3 DAYS     HPI    HTN (hypertension), benign  -     lisinopril (PRINIVIL;ZESTRIL) 2.5 MG tablet; TAKE 1 TABLET BY MOUTH  DAILY  Checking about 1x/wk when thinks of it. Not taking by sx. Is watching salt. Seldom eats salty snacks and no fries. BP range on home cuff:  156-125/76-99. Bottom number looks like 15 pts or more higher than ours. Acquired hypothyroidism  -     levothyroxine (SYNTHROID) 50 MCG tablet; TAKE 1 TABLET BY MOUTH  DAILY ON AN EMPTY STOMACH  FOR THYROID  Current symptoms include fatigue, swelling. Patient denies weight gain, feeling cold and cold intolerance, constipation, anxiousness, feeling excessive energy, tremulousness, palpitations, sweating, weight loss, diarrhea, change in skin,  nails, or hair, heat intolerance, depression, goiter, ocular symptoms. Pure hypercholesterolemia  -     atorvastatin (LIPITOR) 80 MG tablet; Take 1 tablet by mouth daily  No SE's with med. Is she watching dietary cholesterol? Not sure. Not eating TV dinners. No meals on wheels. Eats more chicken and salads. Chronic kidney disease, stage III (moderate)  -     lisinopril (PRINIVIL;ZESTRIL) 2.5 MG tablet; TAKE 1 TABLET BY MOUTH  DAILY  Drinking water. Keeps by her chair and drinks at meals. Cellulitis of wrist  6/6/18 - not sure how she got. No cuts/ no animal scratches/bites. Treated in the ER and sent home on antibiotics. White blood cell count, sedimentation rate, and CRP were all normal.  Blood cultures came back negative after 5 days. Recurrent major depressive disorder, in partial remission (HCC)  -     buPROPion (WELLBUTRIN SR) 150 MG extended release tablet; TAKE 1 TABLET BY MOUTH  TWICE A DAY  Better.   Right calf pain  Worse when stockings within the  radial carpal joint and to a lesser extent ulnar carpal joint with  chondrocalcinosis present.  No definite acute fracture.  Soft tissue  calcifications noted along the ulnar aspect of the wrist and distal forearm-  likely representing vascular calcifications. Impression   Soft tissue swelling with possible soft tissue gas formation ulnar aspect right wrist suggesting cellulitis and gas producing infection. Severe arthritic change within the carpal - 1st metacarpal joint     CT OF THE RIGHT WRIST WITH CONTRAST 6/6/2018 1:39 pm  COMPARISON:  Right wrist radiographs 06/06/2018. HISTORY  ORDERING PHYSICIAN PROVIDED HISTORY: possible gaseous infection/cellulitis  TECHNOLOGIST PROVIDED HISTORY:  Technologist Provided Reason for Exam: possible gaseous infection/cellulitis  Acuity: Unknown  Type of Encounter: Unknown  Initial encounter. FINDINGS:  Bones: Diffuse osseous demineralization.  No fracture or dislocation.  No suspicious lytic or blastic osseous lesion. Soft Tissue:  Subcutaneous fat stranding about the wrist most pronounced along the dorsal aspect.  No soft tissue gas or radiopaque foreign body.  No  drainable fluid collection.  The visualized musculature is unremarkable.  The visualized tendons are grossly intact. Faint calcifications associated with the extensor carpi ulnaris tendon.  The carpal tunnel is unremarkable.  The neurovascular structures are unremarkable. Joint:  Extensive chondrocalcinosis throughout the wrist.  The scapholunate interval is upper limits of normal.  Normal carpal alignment is grossly maintained.  Severe 1st carpometacarpal degenerative changes.  Moderate  triscaphe degenerative changes.  No definite osseous erosion. Impression   1. Subcutaneous fat stranding about the wrist most pronounced along the dorsum compatible with cellulitis.  No soft tissue gas or drainable fluid collection. 2. No acute osseous abnormality.   3. Extensive chondrocalcinosis throughout the wrist with degenerative changes most pronounced at the 1st [facility] joint to a severe degree. Assessment:      1. HTN (hypertension), benign    2. Acquired hypothyroidism    3. Pure hypercholesterolemia    4. Chronic kidney disease, stage III (moderate)    5. Cellulitis of wrist    6. Recurrent major depressive disorder, in partial remission ([facility]          Plan:      - Counseling More Than 50% of the 40 min Appointment Time     Gloria Madrigal was seen today for hypertension, follow-up from hospital and cough. Diagnoses and all orders for this visit:    HTN (hypertension), benign  -     lisinopril (PRINIVIL;ZESTRIL) 2.5 MG tablet; TAKE 1 TABLET BY MOUTH  DAILY  -     Good control. Your cuff reads about 15 high on the bottom and 5 high on the top.   -     Continue meds and lifestyle control. Acquired hypothyroidism  -     levothyroxine (SYNTHROID) 50 MCG tablet; TAKE 1 TABLET BY MOUTH  DAILY ON AN EMPTY STOMACH  FOR THYROID  -     Good control.  -     Continue meds and lifestyle control. Pure hypercholesterolemia  -     atorvastatin (LIPITOR) 80 MG tablet; Take 1 tablet by mouth daily  -     Good control.  -     Continue meds and lifestyle control. In the future we may want to decrease her medicine dose. She is treated for primary prevention and has some muscle weakness and aches and may have a lower risk of falls and injuries with a lower dose. Chronic kidney disease, stage III (moderate)         - now in stage II if age is not factored in.  -     lisinopril (PRINIVIL;ZESTRIL) 2.5 MG tablet; TAKE 1 TABLET BY MOUTH  DAILY  -     Good control. Continue good water intake. -     Continue meds and lifestyle control. Cellulitis of wrist  Resolved. Primary osteoarthritis of right wrist  Not having significant problems at this point.     Recurrent major depressive disorder, in partial remission ([facility])  -     buPROPion (WELLBUTRIN SR) 150 MG extended release tablet; TAKE 1 TABLET BY MOUTH  TWICE A DAY  Stable control. Instructions for Respiratory Infections (SAVE THIS SHEET)    For the first 7-14 days of symptoms follow instructions below, even before being seen in the office or even during treatment with antibiotics, until symptom free. 1. Water: Drink 1 ounce of water for every 2 pounds of body weight for adults, 85 Ounces of water/fluids per day. This will loosen mucus in the head and chest & improve the weak feeling of dehydration, allow the body to get germ fighting resources to the infection. Half can be juice or sugar free Crystal Light. Don't count drinks with caffeine or carbonation. Infants can have Pedialyte liquid or freezer pops. Avoid salt if you have high Blood Pressure, swelling in the feet or ankles or have heart problems. 2. Humidity: Humidify the air to 35-50% ( or until the windows fog over slightly). Can use a humidifier, vaporizer, boil water on the stove or put a coffee can full of water on the heater vents. This will loosen mucus from infections and allergies. 3. Sleep: Get 8-10 hours a night and rest during the evening after work or school. If you have trouble sleeping, adults can take Melatonin 5mg up to 2 tabs at bedtime ( not for children or pregnant women). If Mono is suspected then sleep during 9PM to 9AM time span (if possible.)  4. Cough: Take cough medicines with Guaifenesin ( to loosen chest or head congestion) and Dextromethorphan ( to decrease excess cough). Robitussin D.M. Syrup every 4-6 hrs or Mucinex D. M. pills twice a day. Use the pediatric formulations for children over 6 months making sure they are alcohol & sugar free for children, pregnant women, and diabetics. 5. Pain And Fevers: Take Acetaminophen ( Tylenol) for fevers, aches, and headaches. 2-500 mg every 8 hours for adults. Appropriate doses at bedtime for children may help them sleep better. If pregnant take 1 -500 mg (Tylenol) every 8 hours as needed.  Ibuprofen may be used if not pregnant, but should be given with food to avoid nausea. Avoid Ibuprofen if you have high blood pressure, CHF, or kidney problems. 6.Gargle: (DAY ONE OF SYMPTOMS) Gargle in the back of the throat with the head tilted back and to the sides with a strong mouthwash  ( Listerine or Scope) after meals and at bedtime at least 4 -5 times a day. This helps kill bacteria and viruses in the back of the throat and will shorten the duration and decrease the severity of your symptoms: sore throat, cough, ear popping,/ear pain, and possibly dizziness. 7. Smoking: Avoid smoking or exposure to second hand smoke. 8. Zinc: (DAY ONE OF SYMPTOMS)  Zinc lozenges such as Cold Mati (available most stores), or Basic ([employer] brand) will help shorten the duration and lessen symptoms such as sore throat, cough, nasal congestion, runny nose, and post nasal drip. Use 1 lozenge every 2-4 hours ( after meals if stomach is sensitive). Children can use 10-15 mg or less 3-4 times a day or Zinc lollypops. In pregnancy limit to 50-60 mg a day for 7 days as prenatals have Zinc also. With diarrhea use zinc pills 50 mg 1/2 to 1 pill 2x/day. 9. Vitamins: Vitamin C 500 mg with breakfast and dinner. Children and pregnant women should drink citrus juices. This speeds healing and strengthens immune system. 10. Chest Symptoms: Vicks Vapor rub to the chest at bedtime. 11. Decongestants: Avoid all decongestants and antihistamine cold preparations in children. Try all of the above starting with day 1 of symptoms. If Strep throat symptoms appear call to be seen in the office as soon as possible and don't gargle on that day. Newborns, infants, or anyone with earaches or influenza may need to be seen quickly. Adults with fevers over 103 degrees or shortness of breath should call the office immediately. Claritin 10 mg if persistent allergy symptoms.

## 2018-07-07 PROBLEM — F33.41 RECURRENT MAJOR DEPRESSIVE DISORDER, IN PARTIAL REMISSION (HCC): Chronic | Status: ACTIVE | Noted: 2018-07-07

## 2018-07-09 ENCOUNTER — TELEPHONE (OUTPATIENT)
Dept: FAMILY MEDICINE CLINIC | Age: 83
End: 2018-07-09

## 2018-07-09 DIAGNOSIS — J40 BRONCHITIS: Primary | ICD-10-CM

## 2018-07-09 RX ORDER — SULFAMETHOXAZOLE AND TRIMETHOPRIM 800; 160 MG/1; MG/1
1 TABLET ORAL 2 TIMES DAILY
Qty: 14 TABLET | Refills: 0 | Status: SHIPPED | OUTPATIENT
Start: 2018-07-09 | End: 2018-07-16

## 2018-09-24 DIAGNOSIS — E87.6 HYPOKALEMIA: ICD-10-CM

## 2018-09-27 ENCOUNTER — TELEPHONE (OUTPATIENT)
Dept: FAMILY MEDICINE CLINIC | Age: 83
End: 2018-09-27

## 2018-09-27 RX ORDER — POTASSIUM CHLORIDE 750 MG/1
TABLET, EXTENDED RELEASE ORAL
Qty: 90 TABLET | Refills: 0 | Status: SHIPPED | OUTPATIENT
Start: 2018-09-27 | End: 2018-10-02 | Stop reason: SDUPTHER

## 2018-10-02 ENCOUNTER — OFFICE VISIT (OUTPATIENT)
Dept: FAMILY MEDICINE CLINIC | Age: 83
End: 2018-10-02
Payer: MEDICARE

## 2018-10-02 VITALS
RESPIRATION RATE: 17 BRPM | BODY MASS INDEX: 37.54 KG/M2 | HEART RATE: 59 BPM | SYSTOLIC BLOOD PRESSURE: 130 MMHG | DIASTOLIC BLOOD PRESSURE: 72 MMHG | OXYGEN SATURATION: 97 % | WEIGHT: 186.2 LBS | HEIGHT: 59 IN

## 2018-10-02 DIAGNOSIS — I10 HTN (HYPERTENSION), BENIGN: Primary | Chronic | ICD-10-CM

## 2018-10-02 DIAGNOSIS — Z23 NEED FOR INFLUENZA VACCINATION: ICD-10-CM

## 2018-10-02 DIAGNOSIS — F33.41 RECURRENT MAJOR DEPRESSIVE DISORDER, IN PARTIAL REMISSION (HCC): Chronic | ICD-10-CM

## 2018-10-02 DIAGNOSIS — Z91.81 AT HIGH RISK FOR FALLS: ICD-10-CM

## 2018-10-02 DIAGNOSIS — E03.9 ACQUIRED HYPOTHYROIDISM: Chronic | ICD-10-CM

## 2018-10-02 DIAGNOSIS — M1A.0720 CHRONIC GOUT OF LEFT ANKLE, UNSPECIFIED CAUSE: Chronic | ICD-10-CM

## 2018-10-02 DIAGNOSIS — E87.6 HYPOKALEMIA: ICD-10-CM

## 2018-10-02 DIAGNOSIS — I49.9 IRREGULAR HEART RHYTHM: ICD-10-CM

## 2018-10-02 DIAGNOSIS — R26.81 UNSTEADY GAIT: Chronic | ICD-10-CM

## 2018-10-02 LAB
ANION GAP SERPL CALCULATED.3IONS-SCNC: 11 MMOL/L (ref 3–16)
BUN BLDV-MCNC: 18 MG/DL (ref 7–20)
CALCIUM SERPL-MCNC: 9.5 MG/DL (ref 8.3–10.6)
CHLORIDE BLD-SCNC: 104 MMOL/L (ref 99–110)
CO2: 29 MMOL/L (ref 21–32)
CREAT SERPL-MCNC: 0.8 MG/DL (ref 0.6–1.2)
GFR AFRICAN AMERICAN: >60
GFR NON-AFRICAN AMERICAN: >60
GLUCOSE BLD-MCNC: 90 MG/DL (ref 70–99)
MAGNESIUM: 1.9 MG/DL (ref 1.8–2.4)
POTASSIUM SERPL-SCNC: 5.1 MMOL/L (ref 3.5–5.1)
SODIUM BLD-SCNC: 144 MMOL/L (ref 136–145)
T4 FREE: 1.3 NG/DL (ref 0.9–1.8)
TSH REFLEX: 4.85 UIU/ML (ref 0.27–4.2)

## 2018-10-02 PROCEDURE — 36415 COLL VENOUS BLD VENIPUNCTURE: CPT | Performed by: FAMILY MEDICINE

## 2018-10-02 PROCEDURE — G8427 DOCREV CUR MEDS BY ELIG CLIN: HCPCS | Performed by: FAMILY MEDICINE

## 2018-10-02 PROCEDURE — G0008 ADMIN INFLUENZA VIRUS VAC: HCPCS | Performed by: FAMILY MEDICINE

## 2018-10-02 PROCEDURE — 4040F PNEUMOC VAC/ADMIN/RCVD: CPT | Performed by: FAMILY MEDICINE

## 2018-10-02 PROCEDURE — G8399 PT W/DXA RESULTS DOCUMENT: HCPCS | Performed by: FAMILY MEDICINE

## 2018-10-02 PROCEDURE — G8482 FLU IMMUNIZE ORDER/ADMIN: HCPCS | Performed by: FAMILY MEDICINE

## 2018-10-02 PROCEDURE — 1090F PRES/ABSN URINE INCON ASSESS: CPT | Performed by: FAMILY MEDICINE

## 2018-10-02 PROCEDURE — 99215 OFFICE O/P EST HI 40 MIN: CPT | Performed by: FAMILY MEDICINE

## 2018-10-02 PROCEDURE — 1123F ACP DISCUSS/DSCN MKR DOCD: CPT | Performed by: FAMILY MEDICINE

## 2018-10-02 PROCEDURE — G8417 CALC BMI ABV UP PARAM F/U: HCPCS | Performed by: FAMILY MEDICINE

## 2018-10-02 PROCEDURE — 1101F PT FALLS ASSESS-DOCD LE1/YR: CPT | Performed by: FAMILY MEDICINE

## 2018-10-02 PROCEDURE — 1036F TOBACCO NON-USER: CPT | Performed by: FAMILY MEDICINE

## 2018-10-02 PROCEDURE — 90662 IIV NO PRSV INCREASED AG IM: CPT | Performed by: FAMILY MEDICINE

## 2018-10-02 PROCEDURE — 93000 ELECTROCARDIOGRAM COMPLETE: CPT | Performed by: FAMILY MEDICINE

## 2018-10-02 RX ORDER — PROBENECID 500 MG/1
TABLET, FILM COATED ORAL
Qty: 90 TABLET | Refills: 3 | Status: SHIPPED | OUTPATIENT
Start: 2018-10-02 | End: 2019-01-08 | Stop reason: SDUPTHER

## 2018-10-02 RX ORDER — POTASSIUM CHLORIDE 750 MG/1
TABLET, EXTENDED RELEASE ORAL
Qty: 90 TABLET | Refills: 1 | Status: SHIPPED | OUTPATIENT
Start: 2018-10-02 | End: 2019-01-02 | Stop reason: SDUPTHER

## 2018-10-02 ASSESSMENT — ENCOUNTER SYMPTOMS
SHORTNESS OF BREATH: 0
VOMITING: 0
DIARRHEA: 0
ABDOMINAL PAIN: 0
CHEST TIGHTNESS: 0
COUGH: 0
CHOKING: 0
NAUSEA: 0
WHEEZING: 0
CONSTIPATION: 0

## 2018-10-02 NOTE — PROGRESS NOTES
Vaccine Information Sheet, \"Influenza - Inactivated\"  given to Herman Ivey, or parent/legal guardian of  Herman Ivey and verbalized understanding. Patient responses:    Have you ever had a reaction to a flu vaccine? No  Are you able to eat eggs without adverse effects? Yes  Do you have any current illness? No  Have you ever had [employer] Syndrome? No    Flu vaccine given per order. Please see immunization tab.
Objective:   Physical Exam   Constitutional: She appears well-developed. No distress. Eyes: Conjunctivae are normal. Right eye exhibits no discharge. Left eye exhibits no discharge. No scleral icterus. Neck: Trachea normal and phonation normal. Neck supple. No JVD present. Carotid bruit is not present. No tracheal deviation present. No thyroid mass and no thyromegaly present. Cardiovascular: Normal rate, S1 normal and S2 normal.  An irregular rhythm present. Frequent extrasystoles are present. Exam reveals no gallop, no S3, no S4, no distant heart sounds and no friction rub. Murmur heard. Systolic murmur is present with a grade of 1/6   Pulmonary/Chest: Effort normal and breath sounds normal. No respiratory distress. She has no decreased breath sounds. She has no wheezes. She has no rhonchi. She has no rales. Musculoskeletal: She exhibits no edema. Lymphadenopathy:        Head (right side): No submandibular and no tonsillar adenopathy present. Head (left side): No submandibular and no tonsillar adenopathy present. She has no cervical adenopathy. Right cervical: No superficial cervical, no deep cervical and no posterior cervical adenopathy present. Left cervical: No superficial cervical, no deep cervical and no posterior cervical adenopathy present. Neurological: She is alert. Reflex Scores:       Bicep reflexes are 2+ on the right side and 2+ on the left side. She is able to transfer on her own but is somewhat weak. She rocks slightly to get off the chair. Skin: Skin is warm and dry. She is not diaphoretic. No cyanosis. No pallor. Nails show no clubbing. Psychiatric: She has a normal mood and affect. Her speech is normal and behavior is normal. Judgment normal. Cognition and memory are normal.   Nursing note and vitals reviewed.      Vitals:    10/02/18 0927   BP: 130/72   Site: Left Upper Arm   Position: Sitting   Cuff Size: Large Adult   Pulse: 59   Resp: 17

## 2018-10-02 NOTE — PATIENT INSTRUCTIONS
side, with your legs extended. Keep your hips straight up and down during this exercise. Do not let your top hip rock toward the back. Support your head with your hand, and place the other hand on the floor near your waist.  2. Slowly raise your upper leg until it is about in line with your shoulder. Keep your toes pointed forward. 3. Slowly lower your leg to the starting position. 4. Repeat 8 to 12 times. 5. Rest for a minute, and repeat the exercise. 6. Turn to your other side and do the same exercise with your other leg. Shallow standing knee bends    1. Stand with your hands lightly resting on a counter or chair in front of you with your feet shoulder-width apart. 2. Slowly bend your knees so that you squat down just like you were going to sit in a chair. Make sure your knees do not go in front of your toes. 3. Lower yourself about 6 inches. Your heels should remain on the floor at all times. 4. Rise slowly to a standing position. 5. Repeat 8 to 12 times. 6. Rest for a minute, and repeat the exercise. Follow-up care is a key part of your treatment and safety. Be sure to make and go to all appointments, and call your doctor if you are having problems. It's also a good idea to know your test results and keep a list of the medicines you take. Where can you learn more? Go to [email or web address] and sign in to your [employer] account. Enter Q266 in the [facility] box to learn more about \"Muscle Conditioning: Exercises. \"     If you do not have an account, please click on the \"Sign Up Now\" link. Current as of: December 7, 2017  Content Version: 11.7  © 9256-7696 [employer]. Care instructions adapted under license by [facility] ([facility]). If you have questions about a medical condition or this instruction, always ask your healthcare professional. [address] any warranty or liability for your use of this information.

## 2019-01-02 DIAGNOSIS — E87.6 HYPOKALEMIA: ICD-10-CM

## 2019-01-02 RX ORDER — POTASSIUM CHLORIDE 750 MG/1
TABLET, EXTENDED RELEASE ORAL
Qty: 90 TABLET | Refills: 1 | Status: SHIPPED | OUTPATIENT
Start: 2019-01-02 | End: 2019-01-08 | Stop reason: SDUPTHER

## 2019-01-08 ENCOUNTER — OFFICE VISIT (OUTPATIENT)
Dept: FAMILY MEDICINE CLINIC | Age: 84
End: 2019-01-08
Payer: MEDICARE

## 2019-01-08 VITALS
HEIGHT: 59 IN | BODY MASS INDEX: 37.42 KG/M2 | RESPIRATION RATE: 20 BRPM | SYSTOLIC BLOOD PRESSURE: 132 MMHG | DIASTOLIC BLOOD PRESSURE: 84 MMHG | OXYGEN SATURATION: 97 % | HEART RATE: 72 BPM | WEIGHT: 185.6 LBS

## 2019-01-08 DIAGNOSIS — E03.9 ACQUIRED HYPOTHYROIDISM: Chronic | ICD-10-CM

## 2019-01-08 DIAGNOSIS — M1A.0720 CHRONIC GOUT OF LEFT ANKLE, UNSPECIFIED CAUSE: Chronic | ICD-10-CM

## 2019-01-08 DIAGNOSIS — E87.6 HYPOKALEMIA: ICD-10-CM

## 2019-01-08 DIAGNOSIS — E55.9 VITAMIN D DEFICIENCY: ICD-10-CM

## 2019-01-08 DIAGNOSIS — I10 HTN (HYPERTENSION), BENIGN: Primary | Chronic | ICD-10-CM

## 2019-01-08 DIAGNOSIS — F33.41 RECURRENT MAJOR DEPRESSIVE DISORDER, IN PARTIAL REMISSION (HCC): Chronic | ICD-10-CM

## 2019-01-08 PROBLEM — L03.119 CELLULITIS OF WRIST: Status: RESOLVED | Noted: 2018-06-06 | Resolved: 2019-01-08

## 2019-01-08 LAB
ANION GAP SERPL CALCULATED.3IONS-SCNC: 12 MMOL/L (ref 3–16)
BUN BLDV-MCNC: 15 MG/DL (ref 7–20)
CALCIUM SERPL-MCNC: 9.2 MG/DL (ref 8.3–10.6)
CHLORIDE BLD-SCNC: 107 MMOL/L (ref 99–110)
CO2: 27 MMOL/L (ref 21–32)
CREAT SERPL-MCNC: 0.7 MG/DL (ref 0.6–1.2)
GFR AFRICAN AMERICAN: >60
GFR NON-AFRICAN AMERICAN: >60
GLUCOSE BLD-MCNC: 101 MG/DL (ref 70–99)
POTASSIUM SERPL-SCNC: 4.2 MMOL/L (ref 3.5–5.1)
SODIUM BLD-SCNC: 146 MMOL/L (ref 136–145)
T4 FREE: 1.3 NG/DL (ref 0.9–1.8)
TSH REFLEX: 4.96 UIU/ML (ref 0.27–4.2)
URIC ACID, SERUM: 3.4 MG/DL (ref 2.6–6)
VITAMIN D 25-HYDROXY: 47.6 NG/ML

## 2019-01-08 PROCEDURE — G8427 DOCREV CUR MEDS BY ELIG CLIN: HCPCS | Performed by: FAMILY MEDICINE

## 2019-01-08 PROCEDURE — 1101F PT FALLS ASSESS-DOCD LE1/YR: CPT | Performed by: FAMILY MEDICINE

## 2019-01-08 PROCEDURE — G8417 CALC BMI ABV UP PARAM F/U: HCPCS | Performed by: FAMILY MEDICINE

## 2019-01-08 PROCEDURE — 1090F PRES/ABSN URINE INCON ASSESS: CPT | Performed by: FAMILY MEDICINE

## 2019-01-08 PROCEDURE — G8399 PT W/DXA RESULTS DOCUMENT: HCPCS | Performed by: FAMILY MEDICINE

## 2019-01-08 PROCEDURE — 4040F PNEUMOC VAC/ADMIN/RCVD: CPT | Performed by: FAMILY MEDICINE

## 2019-01-08 PROCEDURE — 1123F ACP DISCUSS/DSCN MKR DOCD: CPT | Performed by: FAMILY MEDICINE

## 2019-01-08 PROCEDURE — 99214 OFFICE O/P EST MOD 30 MIN: CPT | Performed by: FAMILY MEDICINE

## 2019-01-08 PROCEDURE — 36415 COLL VENOUS BLD VENIPUNCTURE: CPT | Performed by: FAMILY MEDICINE

## 2019-01-08 PROCEDURE — G8482 FLU IMMUNIZE ORDER/ADMIN: HCPCS | Performed by: FAMILY MEDICINE

## 2019-01-08 PROCEDURE — 1036F TOBACCO NON-USER: CPT | Performed by: FAMILY MEDICINE

## 2019-01-08 RX ORDER — POTASSIUM CHLORIDE 750 MG/1
TABLET, EXTENDED RELEASE ORAL
Qty: 90 TABLET | Refills: 1 | Status: SHIPPED | OUTPATIENT
Start: 2019-01-08 | End: 2019-05-30 | Stop reason: SDUPTHER

## 2019-01-08 RX ORDER — LEVOTHYROXINE SODIUM 0.05 MG/1
TABLET ORAL
Qty: 90 TABLET | Refills: 1 | Status: SHIPPED | OUTPATIENT
Start: 2019-01-08 | End: 2019-08-09 | Stop reason: SDUPTHER

## 2019-01-08 RX ORDER — PROBENECID 500 MG/1
TABLET, FILM COATED ORAL
Qty: 45 TABLET | Refills: 3 | Status: SHIPPED | OUTPATIENT
Start: 2019-01-08 | End: 2020-02-26

## 2019-01-08 ASSESSMENT — ENCOUNTER SYMPTOMS
COUGH: 0
CHOKING: 0
SHORTNESS OF BREATH: 0
WHEEZING: 0
CHEST TIGHTNESS: 0

## 2019-02-28 ENCOUNTER — OFFICE VISIT (OUTPATIENT)
Dept: FAMILY MEDICINE CLINIC | Age: 84
End: 2019-02-28
Payer: MEDICARE

## 2019-02-28 ENCOUNTER — TELEPHONE (OUTPATIENT)
Dept: FAMILY MEDICINE CLINIC | Age: 84
End: 2019-02-28

## 2019-02-28 VITALS
DIASTOLIC BLOOD PRESSURE: 88 MMHG | HEART RATE: 76 BPM | RESPIRATION RATE: 30 BRPM | TEMPERATURE: 98.3 F | SYSTOLIC BLOOD PRESSURE: 130 MMHG | OXYGEN SATURATION: 96 % | WEIGHT: 175.2 LBS | BODY MASS INDEX: 35.32 KG/M2 | HEIGHT: 59 IN

## 2019-02-28 DIAGNOSIS — R05.9 COUGH: ICD-10-CM

## 2019-02-28 DIAGNOSIS — N30.00 ACUTE CYSTITIS WITHOUT HEMATURIA: Primary | ICD-10-CM

## 2019-02-28 LAB
BILIRUBIN, POC: ABNORMAL
BLOOD URINE, POC: ABNORMAL
CLARITY, POC: ABNORMAL
COLOR, POC: YELLOW
GLUCOSE URINE, POC: ABNORMAL
KETONES, POC: ABNORMAL
LEUKOCYTE EST, POC: ABNORMAL
NITRITE, POC: ABNORMAL
PH, POC: 6.5
PROTEIN, POC: ABNORMAL
SPECIFIC GRAVITY, POC: 1.01
UROBILINOGEN, POC: 0.2

## 2019-02-28 PROCEDURE — G8417 CALC BMI ABV UP PARAM F/U: HCPCS | Performed by: REGISTERED NURSE

## 2019-02-28 PROCEDURE — 1036F TOBACCO NON-USER: CPT | Performed by: REGISTERED NURSE

## 2019-02-28 PROCEDURE — G8399 PT W/DXA RESULTS DOCUMENT: HCPCS | Performed by: REGISTERED NURSE

## 2019-02-28 PROCEDURE — 4040F PNEUMOC VAC/ADMIN/RCVD: CPT | Performed by: REGISTERED NURSE

## 2019-02-28 PROCEDURE — 1090F PRES/ABSN URINE INCON ASSESS: CPT | Performed by: REGISTERED NURSE

## 2019-02-28 PROCEDURE — 1123F ACP DISCUSS/DSCN MKR DOCD: CPT | Performed by: REGISTERED NURSE

## 2019-02-28 PROCEDURE — 81002 URINALYSIS NONAUTO W/O SCOPE: CPT | Performed by: REGISTERED NURSE

## 2019-02-28 PROCEDURE — 1101F PT FALLS ASSESS-DOCD LE1/YR: CPT | Performed by: REGISTERED NURSE

## 2019-02-28 PROCEDURE — G8427 DOCREV CUR MEDS BY ELIG CLIN: HCPCS | Performed by: REGISTERED NURSE

## 2019-02-28 PROCEDURE — G8482 FLU IMMUNIZE ORDER/ADMIN: HCPCS | Performed by: REGISTERED NURSE

## 2019-02-28 PROCEDURE — 99214 OFFICE O/P EST MOD 30 MIN: CPT | Performed by: REGISTERED NURSE

## 2019-02-28 RX ORDER — SULFAMETHOXAZOLE AND TRIMETHOPRIM 800; 160 MG/1; MG/1
1 TABLET ORAL 2 TIMES DAILY
Qty: 14 TABLET | Refills: 0 | Status: SHIPPED | OUTPATIENT
Start: 2019-02-28 | End: 2019-03-07

## 2019-02-28 RX ORDER — IPRATROPIUM BROMIDE 42 UG/1
2 SPRAY, METERED NASAL 4 TIMES DAILY
Qty: 1 BOTTLE | Refills: 3 | Status: ON HOLD | OUTPATIENT
Start: 2019-02-28 | End: 2022-07-22

## 2019-02-28 ASSESSMENT — ENCOUNTER SYMPTOMS
SORE THROAT: 0
SINUS PAIN: 0
ABDOMINAL PAIN: 0
ABDOMINAL DISTENTION: 0
DIARRHEA: 0
WHEEZING: 0
CHEST TIGHTNESS: 0
SINUS PRESSURE: 0
ANAL BLEEDING: 0
VOMITING: 0
SHORTNESS OF BREATH: 0
CONSTIPATION: 0
RHINORRHEA: 0
COUGH: 1
NAUSEA: 0
TROUBLE SWALLOWING: 0
RECTAL PAIN: 0
BLOOD IN STOOL: 0

## 2019-03-02 LAB — URINE CULTURE, ROUTINE: NORMAL

## 2019-04-16 ENCOUNTER — OFFICE VISIT (OUTPATIENT)
Dept: FAMILY MEDICINE CLINIC | Age: 84
End: 2019-04-16
Payer: MEDICARE

## 2019-04-16 VITALS
HEIGHT: 59 IN | SYSTOLIC BLOOD PRESSURE: 126 MMHG | WEIGHT: 177.8 LBS | RESPIRATION RATE: 16 BRPM | BODY MASS INDEX: 35.84 KG/M2 | HEART RATE: 65 BPM | DIASTOLIC BLOOD PRESSURE: 78 MMHG | OXYGEN SATURATION: 97 %

## 2019-04-16 DIAGNOSIS — I10 HTN (HYPERTENSION), BENIGN: Primary | Chronic | ICD-10-CM

## 2019-04-16 DIAGNOSIS — H91.93 BILATERAL HEARING LOSS, UNSPECIFIED HEARING LOSS TYPE: ICD-10-CM

## 2019-04-16 DIAGNOSIS — E03.9 ACQUIRED HYPOTHYROIDISM: Chronic | ICD-10-CM

## 2019-04-16 DIAGNOSIS — E78.00 PURE HYPERCHOLESTEROLEMIA: Chronic | ICD-10-CM

## 2019-04-16 DIAGNOSIS — F33.41 RECURRENT MAJOR DEPRESSIVE DISORDER, IN PARTIAL REMISSION (HCC): Chronic | ICD-10-CM

## 2019-04-16 PROCEDURE — 1090F PRES/ABSN URINE INCON ASSESS: CPT | Performed by: FAMILY MEDICINE

## 2019-04-16 PROCEDURE — 1123F ACP DISCUSS/DSCN MKR DOCD: CPT | Performed by: FAMILY MEDICINE

## 2019-04-16 PROCEDURE — 99214 OFFICE O/P EST MOD 30 MIN: CPT | Performed by: FAMILY MEDICINE

## 2019-04-16 PROCEDURE — G8427 DOCREV CUR MEDS BY ELIG CLIN: HCPCS | Performed by: FAMILY MEDICINE

## 2019-04-16 PROCEDURE — G8399 PT W/DXA RESULTS DOCUMENT: HCPCS | Performed by: FAMILY MEDICINE

## 2019-04-16 PROCEDURE — 4040F PNEUMOC VAC/ADMIN/RCVD: CPT | Performed by: FAMILY MEDICINE

## 2019-04-16 PROCEDURE — G8417 CALC BMI ABV UP PARAM F/U: HCPCS | Performed by: FAMILY MEDICINE

## 2019-04-16 PROCEDURE — 1036F TOBACCO NON-USER: CPT | Performed by: FAMILY MEDICINE

## 2019-04-16 SDOH — ECONOMIC STABILITY: TRANSPORTATION INSECURITY
IN THE PAST 12 MONTHS, HAS LACK OF TRANSPORTATION KEPT YOU FROM MEETINGS, WORK, OR FROM GETTING THINGS NEEDED FOR DAILY LIVING?: NO

## 2019-04-16 SDOH — ECONOMIC STABILITY: TRANSPORTATION INSECURITY
IN THE PAST 12 MONTHS, HAS THE LACK OF TRANSPORTATION KEPT YOU FROM MEDICAL APPOINTMENTS OR FROM GETTING MEDICATIONS?: NO

## 2019-04-16 ASSESSMENT — ENCOUNTER SYMPTOMS
COUGH: 0
WHEEZING: 0
CHEST TIGHTNESS: 0
SHORTNESS OF BREATH: 0
CHOKING: 0

## 2019-04-16 ASSESSMENT — PATIENT HEALTH QUESTIONNAIRE - PHQ9
1. LITTLE INTEREST OR PLEASURE IN DOING THINGS: 0
SUM OF ALL RESPONSES TO PHQ QUESTIONS 1-9: 0
SUM OF ALL RESPONSES TO PHQ9 QUESTIONS 1 & 2: 0
2. FEELING DOWN, DEPRESSED OR HOPELESS: 0
SUM OF ALL RESPONSES TO PHQ QUESTIONS 1-9: 0

## 2019-04-16 NOTE — PROGRESS NOTES
Subjective:      Patient ID: Mary Valentin is a 80 y.o. female. Chief Complaint   Patient presents with    Hypertension     PT HERE FOR ROUTINE FOLLOW UP ON HYPERTENSION   46  HPI    HTN (hypertension), benign  Not checking as often. Is watching salt. Not wearing compression stockings causing bunion deformity. No SEs with BP med. Acquired hypothyroidism  Current symptoms: Patient denies fatigue, weight gain, feeling cold and cold intolerance, constipation, swelling, anxiousness, feeling excessive energy, tremulousness, palpitations, sweating, weight loss, diarrhea, change in skin,  nails, or hair, heat intolerance, depression, goiter, ocular symptoms. Pure hypercholesterolemia  Is watching diet. No SE's with med. Still on Niacin. Recurrent major depressive disorder, in partial remission (HCC)  Feeling good. No SE with med. Sleeping good.   Bilateral hearing loss, unspecified hearing loss type    Current Outpatient Medications   Medication Sig Dispense Refill    ipratropium (ATROVENT) 0.06 % nasal spray 2 sprays by Nasal route 4 times daily 1 Bottle 3    potassium chloride (KLOR-CON M) 10 MEQ extended release tablet TAKE 1 TABLET BY MOUTH  DAILY FOR POTASSIUM  REPLACEMENT 90 tablet 1    probenecid (BENEMID) 500 MG tablet TAKE 1/2 TABLET BY MOUTH  DAILY TO PREVENT GOUT 45 tablet 3    levothyroxine (SYNTHROID) 50 MCG tablet TAKE 1 TABLET BY MOUTH  DAILY ON AN EMPTY STOMACH  FOR THYROID 90 tablet 1    lisinopril (PRINIVIL;ZESTRIL) 2.5 MG tablet TAKE 1 TABLET BY MOUTH  DAILY 90 tablet 3    buPROPion (WELLBUTRIN SR) 150 MG extended release tablet TAKE 1 TABLET BY MOUTH  TWICE A  tablet 3    atorvastatin (LIPITOR) 80 MG tablet Take 1 tablet by mouth daily 90 tablet 3    Misc Natural Products (GLUCOSAMINE-CHONDROITIN SULF PO) Take 1 tablet by mouth daily      aspirin 81 MG tablet Take 81 mg by mouth daily      Cholecalciferol (VITAMIN D3) 2000 UNITS CAPS One daily for Vit D supplement 30 capsule     Melatonin 3 MG TABS Take 3 mg by mouth daily. One at bedtime for sleep assist      ammonium lactate (AMLACTIN) 12 % cream Apply  topically as needed. Apply topically as needed.  magnesium chloride (MAG DELAY) 535 (64 MG) MG TBCR CR tablet Take 64 mg by mouth daily.  acetaminophen (TYLENOL) 500 MG tablet Take 500 mg by mouth daily.  Multiple Vitamin (MULTIVITAMIN PO) Take 1 tablet by mouth daily.  niacin 100 MG tablet 300 mg 2 times daily (with meals)        No current facility-administered medications for this visit. Review of Systems   Respiratory: Negative for cough, choking, chest tightness, shortness of breath and wheezing. Cardiovascular: Negative for chest pain, palpitations and leg swelling. Neurological: Negative for dizziness, light-headedness and headaches. Objective:   Physical Exam   Constitutional: She appears well-developed. No distress. Eyes: Conjunctivae are normal. Right eye exhibits no discharge. Left eye exhibits no discharge. No scleral icterus. Neck: Trachea normal and phonation normal. Neck supple. No JVD present. Carotid bruit is not present. No tracheal deviation present. No thyroid mass and no thyromegaly present. Cardiovascular: Normal rate, S1 normal and S2 normal. An irregular rhythm present. No extrasystoles are present. Exam reveals no gallop, no S3, no S4, no distant heart sounds and no friction rub. Murmur heard. Systolic murmur is present with a grade of 1/6. Pulmonary/Chest: Effort normal and breath sounds normal. No respiratory distress. She has no decreased breath sounds. She has no wheezes. She has no rhonchi. She has no rales. Musculoskeletal: She exhibits no edema. Lymphadenopathy:        Head (right side): No submandibular and no tonsillar adenopathy present. Head (left side): No submandibular and no tonsillar adenopathy present. She has no cervical adenopathy.         Right cervical: No superficial cervical, no deep cervical and no posterior cervical adenopathy present. Left cervical: No superficial cervical, no deep cervical and no posterior cervical adenopathy present. Neurological: She is alert. Reflex Scores:       Bicep reflexes are 2+ on the right side and 2+ on the left side. She is able to transfer on her own but is somewhat weak. She rocks slightly to get off the chair. Skin: Skin is warm and dry. She is not diaphoretic. No cyanosis. No pallor. Nails show no clubbing. Psychiatric: She has a normal mood and affect. Her speech is normal and behavior is normal. Judgment normal. Cognition and memory are normal.   Nursing note and vitals reviewed. Vitals:    04/16/19 0925   BP: 126/78   Site: Left Upper Arm   Position: Sitting   Pulse: 65   Resp: 16   SpO2: 97%   Weight: 177 lb 12.8 oz (80.6 kg)  Comment: SHOES ON   Height: 4' 11\" (1.499 m)     BP Readings from Last 3 Encounters:   04/16/19 126/78   02/28/19 130/88   01/08/19 132/84     Pulse Readings from Last 3 Encounters:   04/16/19 65   02/28/19 76   01/08/19 72     Wt Readings from Last 3 Encounters:   04/16/19 177 lb 12.8 oz (80.6 kg)   02/28/19 175 lb 3.2 oz (79.5 kg)   01/08/19 185 lb 9.6 oz (84.2 kg)     Body mass index is 35.91 kg/m². 1/8/2019 09:46   Sodium 146 (H)   Potassium 4.2   Chloride 107   CO2 27   BUN 15   Creatinine 0.7   Anion Gap 12   GFR Non-African American >60   Glucose 101 (H)   Calcium 9.2   Uric Acid, Serum 3.4   Vit D, 25-Hydroxy 47.6      8/17/2016 14:41 11/15/2016 12:38 2/21/2017 10:33 6/7/2017 16:29 9/19/2017 11:41 4/3/2018 09:58 10/2/2018 11:13 1/8/2019 09:46   TSH 5.18 (H) 5.34 (H) 4.91 (H) 4.19 4.15 3.82 4.85 (H) 4.96 (H)   T4 Free 1.0 1.1 1.2    1.3 1.3     Assessment:      1. HTN (hypertension), benign    2. Acquired hypothyroidism    3. Pure hypercholesterolemia    4. Recurrent major depressive disorder, in partial remission ([address]    5.  Bilateral hearing loss, unspecified hearing loss type Plan:      Araceli Mcdonnell was seen today for hypertension. Diagnoses and all orders for this visit:    HTN (hypertension), benign  -     Good control.  -     Continue meds and lifestyle control. Acquired hypothyroidism  -     TSH with Reflex; Future  Slightly off. Pure hypercholesterolemia  -     Hepatic Function Panel; Future  -     Homocysteine, Serum; Standing   Fasting lipid profile. Recurrent major depressive disorder, in partial remission (HCC)  -     Good control.  -     Continue meds and lifestyle control. Bilateral hearing loss, unspecified hearing loss type  Follow with audiology. Muscle strength  Do pedals. Build a step but don't use stairs.        Orval Bookbinder, DO

## 2019-04-16 NOTE — LETTER
[address] [facility] [address]  Phone: [phone]  Fax: [address] [facility],  [address], DO        April 16, 2019     Patient: Sultana Villanueva   YOB: 1933   Date of Visit: 4/16/2019       To Whom It May Concern: It is my medical opinion that Delon Staples requires a disability parking placard for the following reasons:  She cannot walk without assistance from another person or the use of an assistance device (cane, crutch, prosthetic device, wheelchair, etc.). Duration of need: 5 years    If you have any questions or concerns, please don't hesitate to call.     Sincerely,        Shannan Bang, DO

## 2019-04-16 NOTE — PATIENT INSTRUCTIONS
Saira Burris was seen today for hypertension. Diagnoses and all orders for this visit:    HTN (hypertension), benign  -     Good control.  -     Continue meds and lifestyle control. Acquired hypothyroidism  -     TSH with Reflex; Future  Slightly off. Pure hypercholesterolemia  -     Hepatic Function Panel; Future  -     Homocysteine, Serum; Standing   Fasting lipid profile. Recurrent major depressive disorder, in partial remission (HCC)  -     Good control.  -     Continue meds and lifestyle control. Bilateral hearing loss, unspecified hearing loss type  Follow with audiology. Muscle strength  Do pedals. Build a step but don't use stairs.

## 2019-04-23 ENCOUNTER — NURSE ONLY (OUTPATIENT)
Dept: FAMILY MEDICINE CLINIC | Age: 84
End: 2019-04-23
Payer: MEDICARE

## 2019-04-23 DIAGNOSIS — E78.00 PURE HYPERCHOLESTEROLEMIA: Chronic | ICD-10-CM

## 2019-04-23 DIAGNOSIS — E03.9 ACQUIRED HYPOTHYROIDISM: Chronic | ICD-10-CM

## 2019-04-23 LAB
ALBUMIN SERPL-MCNC: 4.2 G/DL (ref 3.4–5)
ALP BLD-CCNC: 106 U/L (ref 40–129)
ALT SERPL-CCNC: 16 U/L (ref 10–40)
AST SERPL-CCNC: 22 U/L (ref 15–37)
BILIRUB SERPL-MCNC: 0.8 MG/DL (ref 0–1)
BILIRUBIN DIRECT: <0.2 MG/DL (ref 0–0.3)
BILIRUBIN, INDIRECT: ABNORMAL MG/DL (ref 0–1)
CHOLESTEROL, TOTAL: 136 MG/DL (ref 0–199)
HDLC SERPL-MCNC: 49 MG/DL (ref 40–60)
HOMOCYSTEINE: 13 UMOL/L (ref 0–10)
LDL CHOLESTEROL CALCULATED: 75 MG/DL
TOTAL PROTEIN: 6 G/DL (ref 6.4–8.2)
TRIGL SERPL-MCNC: 61 MG/DL (ref 0–150)
TSH REFLEX: 3.78 UIU/ML (ref 0.27–4.2)
VLDLC SERPL CALC-MCNC: 12 MG/DL

## 2019-04-23 PROCEDURE — 36415 COLL VENOUS BLD VENIPUNCTURE: CPT | Performed by: FAMILY MEDICINE

## 2019-05-30 DIAGNOSIS — N18.30 CHRONIC KIDNEY DISEASE, STAGE III (MODERATE) (HCC): Chronic | ICD-10-CM

## 2019-05-30 DIAGNOSIS — E87.6 HYPOKALEMIA: ICD-10-CM

## 2019-05-30 DIAGNOSIS — I10 HTN (HYPERTENSION), BENIGN: ICD-10-CM

## 2019-05-30 RX ORDER — POTASSIUM CHLORIDE 750 MG/1
TABLET, EXTENDED RELEASE ORAL
Qty: 90 TABLET | Refills: 0 | Status: SHIPPED | OUTPATIENT
Start: 2019-05-30 | End: 2019-08-09 | Stop reason: SDUPTHER

## 2019-05-30 RX ORDER — LISINOPRIL 2.5 MG/1
TABLET ORAL
Qty: 90 TABLET | Refills: 3 | Status: SHIPPED | OUTPATIENT
Start: 2019-05-30 | End: 2020-05-26

## 2019-06-29 ENCOUNTER — TELEPHONE (OUTPATIENT)
Dept: FAMILY MEDICINE CLINIC | Age: 84
End: 2019-06-29

## 2019-06-29 RX ORDER — AMOXICILLIN AND CLAVULANATE POTASSIUM 875; 125 MG/1; MG/1
1 TABLET, FILM COATED ORAL 2 TIMES DAILY WITH MEALS
Qty: 20 TABLET | Refills: 0 | Status: SHIPPED | OUTPATIENT
Start: 2019-06-29 | End: 2019-09-09 | Stop reason: SDUPTHER

## 2019-06-29 NOTE — TELEPHONE ENCOUNTER
Weekend call   Patient coughing up brown phlegm - thick  Coughing for over 10 days and has been worsening in past few days. Concerned that it is worsening. Recommended mucinex DM and sent in augmentin. To com in this week if not improving for visit.

## 2019-07-16 ENCOUNTER — OFFICE VISIT (OUTPATIENT)
Dept: FAMILY MEDICINE CLINIC | Age: 84
End: 2019-07-16
Payer: MEDICARE

## 2019-07-16 VITALS
DIASTOLIC BLOOD PRESSURE: 80 MMHG | RESPIRATION RATE: 20 BRPM | SYSTOLIC BLOOD PRESSURE: 118 MMHG | HEART RATE: 68 BPM | BODY MASS INDEX: 35.28 KG/M2 | HEIGHT: 59 IN | OXYGEN SATURATION: 93 % | WEIGHT: 175 LBS

## 2019-07-16 DIAGNOSIS — E77.8 HYPOPROTEINEMIA (HCC): ICD-10-CM

## 2019-07-16 DIAGNOSIS — E78.00 PURE HYPERCHOLESTEROLEMIA: Chronic | ICD-10-CM

## 2019-07-16 DIAGNOSIS — N18.30 CHRONIC KIDNEY DISEASE, STAGE III (MODERATE) (HCC): Chronic | ICD-10-CM

## 2019-07-16 DIAGNOSIS — Z23 NEED FOR SHINGLES VACCINE: ICD-10-CM

## 2019-07-16 DIAGNOSIS — E03.9 ACQUIRED HYPOTHYROIDISM: Chronic | ICD-10-CM

## 2019-07-16 DIAGNOSIS — I10 HTN (HYPERTENSION), BENIGN: Chronic | ICD-10-CM

## 2019-07-16 DIAGNOSIS — R26.81 UNSTEADY GAIT: Chronic | ICD-10-CM

## 2019-07-16 DIAGNOSIS — Z00.00 ROUTINE GENERAL MEDICAL EXAMINATION AT A HEALTH CARE FACILITY: Primary | ICD-10-CM

## 2019-07-16 LAB
ALBUMIN SERPL-MCNC: 4.4 G/DL (ref 3.4–5)
TOTAL PROTEIN: 6.2 G/DL (ref 6.4–8.2)

## 2019-07-16 PROCEDURE — 36415 COLL VENOUS BLD VENIPUNCTURE: CPT | Performed by: FAMILY MEDICINE

## 2019-07-16 PROCEDURE — G0439 PPPS, SUBSEQ VISIT: HCPCS | Performed by: FAMILY MEDICINE

## 2019-07-16 PROCEDURE — 4040F PNEUMOC VAC/ADMIN/RCVD: CPT | Performed by: FAMILY MEDICINE

## 2019-07-16 PROCEDURE — 1123F ACP DISCUSS/DSCN MKR DOCD: CPT | Performed by: FAMILY MEDICINE

## 2019-07-16 SDOH — ECONOMIC STABILITY: FOOD INSECURITY: WITHIN THE PAST 12 MONTHS, YOU WORRIED THAT YOUR FOOD WOULD RUN OUT BEFORE YOU GOT MONEY TO BUY MORE.: NEVER TRUE

## 2019-07-16 SDOH — ECONOMIC STABILITY: FOOD INSECURITY: WITHIN THE PAST 12 MONTHS, THE FOOD YOU BOUGHT JUST DIDN'T LAST AND YOU DIDN'T HAVE MONEY TO GET MORE.: NEVER TRUE

## 2019-07-16 SDOH — ECONOMIC STABILITY: INCOME INSECURITY: HOW HARD IS IT FOR YOU TO PAY FOR THE VERY BASICS LIKE FOOD, HOUSING, MEDICAL CARE, AND HEATING?: NOT HARD AT ALL

## 2019-07-16 ASSESSMENT — LIFESTYLE VARIABLES: HOW OFTEN DO YOU HAVE A DRINK CONTAINING ALCOHOL: 0

## 2019-07-16 ASSESSMENT — PATIENT HEALTH QUESTIONNAIRE - PHQ9
SUM OF ALL RESPONSES TO PHQ QUESTIONS 1-9: 0
SUM OF ALL RESPONSES TO PHQ QUESTIONS 1-9: 0

## 2019-07-16 NOTE — PROGRESS NOTES
Provider, MD   ammonium lactate (AMLACTIN) 12 % cream Apply  topically as needed. Apply topically as needed. Yes Historical Provider, MD   magnesium chloride (MAG DELAY) 535 (64 MG) MG TBCR CR tablet Take 64 mg by mouth daily. Yes Historical Provider, MD   acetaminophen (TYLENOL) 500 MG tablet Take 500 mg by mouth daily. Yes Historical Provider, MD   Multiple Vitamin (MULTIVITAMIN PO) Take 1 tablet by mouth daily.    Yes Historical Provider, MD   niacin 100 MG tablet 300 mg 2 times daily (with meals)  Yes Historical Provider, MD     Past Medical History:   Diagnosis Date    Breast cancer ([facility] [address]     ductal vs lobular, not clear from pathologist    Cellulitis of left leg     Cellulitis of wrist 2018    RIGHT    Chronic kidney disease, stage III (moderate) ([facility] [address]     Depression     Diverticulitis of colon 1979    DVT (deep venous thrombosis) ([facility] [address]     after first knee surgery    Gout 2011    Hematoma of left thigh 2017    Herpes zoster 2018    right ant shoulder    Hypertension     Hypothyroidism 2011    Intervertebral lumbar disc disorder with myelopathy, lumbar region     OA (osteoarthritis)     severe in knees, left ankle, left >right hip    Primary osteoarthritis of right wrist     Severe arthritic change within carpal - 1st metacarpal joint, Extensive chondrocalcinosis throughout wrist    Pure hypercholesterolemia     Seborrheic dermatitis 2014    Subdural hematoma, post-traumatic ([facility] [address] 2016    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     Past Surgical History:   Procedure Laterality Date    BREAST SURGERY      right mastectomy     SECTION  1970    COLON SURGERY  1970    right partial colectomy for ruptured diverticulitis    INCISIONAL HERNIA REPAIR  2016    with mesh, laparoscopic, was incarcerated    JOINT REPLACEMENT      right knee and left knee in 2007    MOUTH SURGERY  7/8/2015    verrous hyperplasia left ventral tongue, [facility] [id number] [facility], Dr Hood Mendoza  07/01/1970     Family History   Problem Relation Age of Onset   Owen Mano Cancer Mother         bone    Heart Disease Father         CAD    Diabetes Father     Diabetes Sister     Cancer Sister 54        leukemia    Arrhythmia Brother         A fib    No Known Problems Son     No Known Problems Daughter     High Blood Pressure Daughter     High Blood Pressure Son        CareTeam (Including outside providers/suppliers regularly involved in providing care):   Patient Care Team:  Violeta Live DO as PCP - General  Violeta Live DO as PCP - [facility] Empaneled Provider  Georgie Nash MD as Surgeon (General Surgery)      Physical Exam   Constitutional: She is oriented to person, place, and time. Vital signs are normal. She appears well-developed. She is cooperative. Non-toxic appearance. She does not have a sickly appearance. She does not appear ill. No distress. Uses a walker or furniture walks. Obese. HENT:   Right Ear: External ear and ear canal normal. No drainage or swelling. Tympanic membrane is not retracted. No middle ear effusion. Left Ear: Tympanic membrane, external ear and ear canal normal. No drainage or swelling. Tympanic membrane is not retracted. No middle ear effusion. Nose: Nose normal. No mucosal edema or rhinorrhea. Eyes: Pupils are equal, round, and reactive to light. Conjunctivae and EOM are normal. Right eye exhibits no chemosis, no discharge and no exudate. Left eye exhibits no chemosis, no discharge and no exudate. Right conjunctiva has no hemorrhage. Left conjunctiva has no hemorrhage. No scleral icterus. Right eye exhibits normal extraocular motion and no nystagmus. Left eye exhibits normal extraocular motion and no nystagmus. Right pupil is round and reactive. Left pupil is round and reactive.  Pupils are equal.   Neck: Trachea

## 2019-07-16 NOTE — PATIENT INSTRUCTIONS
other eye disorders. · A preventive dental visit is recommended every 6 months. · Try to get at least 150 minutes of exercise per week or 10,000 steps per day on a pedometer . · Order or download the FREE \"Exercise & Physical Activity: Your Everyday Guide\" from [employer]. Call 5-380.580.2351 or search [employer] online. · You need 3033-7757 mg of calcium and 6931-7761 IU of vitamin D per day. It is possible to meet your calcium requirement with diet alone, but a vitamin D supplement is usually necessary to meet this goal.  · When exposed to the sun, use a sunscreen that protects against both UVA and UVB radiation with an SPF of 30 or greater. Reapply every 2 to 3 hours or after sweating, drying off with a towel, or swimming. · Always wear a seat belt when traveling in a car. Always wear a helmet when riding a bicycle or motorcycle. High-Fiber Diet     What Is Fiber? Dietary fiber is a form of carbohydrate found in plants that cannot be digested by humans. All plants contain fiber, including fruits, vegetables, grains, and legumes. Fiber is often classified into two categories: soluble and insoluble. Soluble fiber draws water into the bowel and can help slow digestion. Examples of foods that are high in soluble fiber include oatmeal, oat bran, barley, legumes (eg, beans and peas), apples, and strawberries. Insoluble fiber speeds digestion and can add bulk to the stool. Examples of foods that are high in insoluble fiber include whole-wheat products, wheat bran, cauliflower, green beans, and potatoes. Why Follow a High-Fiber Diet? A high-fiber diet is often recommended to prevent and treat constipation , hemorrhoids , diverticulitis , and irritable bowel syndrome . Eating a high-fiber diet can also help improve your cholesterol levels, lower your risk of coronary heart disease , reduce your risk of type 2 diabetes , and lower your weight.  For people with type sprouts, broccoli, Ignacio sprouts, cabbage, carrots, cauliflower, celery, corn, greens, green beans, green pepper, onions, peas, potatoes (with skin), snow peas, spinach, squash, sweet potatoes, tomatoes, zucchini   For maximum fiber intake, eat the peels of fruits and vegetablesjust be sure to wash them well first.   Fruits   All fruits, especially apples, berries, grapefruits, mangoes, nectarines, oranges, peaches, pears, dried fruits (figs, dates, prunes, raisins)   Choose raw fruits and vegetables over juice, cooked, or cannedraw fruit has more fiber. Dried fruit is also a good source of fiber. Milk   With the exception of yogurt containing inulin (a type of fiber), dairy foods provide little fiber. Add more fiber by topping your yogurt or cottage cheese with fresh fruit, whole grain or bran cereals, nuts, or seeds. Meats and Beans   All beans and peas, especially Garbanzo beans, kidney beans, lentils, lima beans, split peas, and saucedo beans All nuts and seeds, especially almonds, peanuts, [place] nuts, cashews, peanut butter, walnuts, sesame and sunflower seeds All meat, poultry, fish, and eggs   Increase fiber in meat dishes by adding saucedo beans, kidney beans, black-eyed peas, bran, or oatmeal. If you are following a low-fat diet, use nuts and seeds only in moderation. Fats and Oils   All in moderation   Fats and oils do not provide fiber   Snacks, Sweets, and Condiments   Fruit Nuts Popcorn, whole-wheat pretzels, or trail mix made with dried fruits, nuts, and seeds Cakes, breads, and cookies made with oatmeal or whole-wheat flour   Most snack foods do not provide much fiber. Choose snacks with at least 2 grams of fiber per serving. Last Reviewed: March 2011 Silverio Sen MS, MPH, RD   Updated: 3/29/2011   ·     Keep Your Memory [place]       Many factors can affect your ability to remembera hectic lifestyle, aging, stress, chronic disease, and certain medicines.  But, there are steps you can take to benefit? Researchers have investigated a range of natural remedies, such as ginkgo , ginseng , and the supplement phosphatidylserine (PS). So far, though, the evidence is inconsistent as to whether these products can improve memory or thinking. If you are interested in taking herbs and supplements, talk to your doctor first because they may interact with other medicines that you are taking. Exercise Regularly    Among the many benefits of regular exercise are increased blood flow to the brain and decreased risk of certain diseases that can interfere with memory function. One study found that even moderate exercise has a beneficial effect. Examples of \"moderate\" exercise include:   Playing 18 holes of golf once a week, without a cart   Playing tennis twice a week   Walking one mile per day   Manage Stress    It can be tough to remember what is important when your mind is cluttered. Make time for relaxation. Choose activities that calm you down, and make it routine. Manage Chronic Conditions    Side effects of high blood pressure , diabetes, and heart disease can interfere with mental function. Many of the lifestyle steps discussed here can help manage these conditions. Strive to eat a healthy diet, exercise regularly, get stress under control, and follow your doctor's advice for your condition. Minimize Medications    Talk to your doctor about the medicines that you take. Some may be unnecessary. Also, healthy lifestyle habits may lower the need for certain drugs. Last Reviewed: April 2010 Arthur Petty MD   Updated: 4/13/2010   ·   Smoking Cessation  This document explains the best ways for you to quit smoking and new treatments to help. It lists new medicines that can double or triple your chances of quitting and quitting for good. It also considers ways to avoid relapses and concerns you may have about quitting, including weight gain.   NICOTINE: A POWERFUL ADDICTION  If you have tried to quit smoking, you know how hard it can be. It is hard because nicotine is a very addictive drug. For some people, it can be as addictive as heroin or cocaine. Usually, people make 2 or 3 tries, or more, before finally being able to quit. Each time you try to quit, you can learn about what helps and what hurts. Quitting takes hard work and a lot of effort, but you can quit smoking. QUITTING SMOKING IS ONE OF THE MOST IMPORTANT THINGS YOU WILL EVER DO. You will live longer, feel better, and live better. The impact on your body of quitting smoking is felt almost immediately:  Within 20 minutes, blood pressure decreases. Pulse returns to its normal level. After 8 hours, carbon monoxide levels in the blood return to normal. Oxygen level increases. After 24 hours, chance of heart attack starts to decrease. Breath, hair, and body stop smelling like smoke. After 48 hours, damaged nerve endings begin to recover. Sense of taste and smell improve. After 72 hours, the body is virtually free of nicotine. Bronchial tubes relax and breathing becomes easier. After 2 to 12 weeks, lungs can hold more air. Exercise becomes easier and circulation improves. Quitting will reduce your risk of having a heart attack, stroke, cancer, or lung disease:  After 1 year, the risk of coronary heart disease is cut in half. After 5 years, the risk of stroke falls to the same as a nonsmoker. After 10 years, the risk of lung cancer is cut in half and the risk of other cancers decreases significantly. After 15 years, the risk of coronary heart disease drops, usually to the level of a nonsmoker. If you are pregnant, quitting smoking will improve your chances of having a healthy baby. The people you live with, especially your children, will be healthier. You will have extra money to spend on things other than cigarettes. FIVE KEYS TO QUITTING  Studies have shown that these 5 steps will help you quit smoking and quit for good.  You have the best chances of quitting if you use them together:  Get ready. Get support and encouragement. Learn new skills and behaviors. Get medicine to reduce your nicotine addiction and use it correctly. Be prepared for relapse or difficult situations. Be determined to continue trying to quit, even if you do not succeed at first.  1. GET READY  Set a quit date. Change your environment. Get rid of ALL cigarettes, ashtrays, matches, and lighters in your home, car, and place of work. Do not let people smoke in your home. Review your past attempts to quit. Think about what worked and what did not. Once you quit, do not smoke. NOT EVEN A PUFF! 2. GET SUPPORT AND ENCOURAGEMENT  Studies have shown that you have a better chance of being successful if you have help. You can get support in many ways. Tell your family, friends, and coworkers that you are going to quit and need their support. Ask them not to smoke around you. Talk to your caregivers (doctor, dentist, nurse, pharmacist, psychologist, and/or smoking counselor). Get individual, group, or telephone counseling and support. The more counseling you have, the better your chances are of quitting. Programs are available at [facility] [employer] [facility]. Call your local health department for information about programs in your area. Spiritual beliefs and practices may help some smokers quit. Quit meters are small computer programs online or downloadable that keep track of quit statistics, such as amount of \"quit-time,\" cigarettes not smoked, and money saved. Many smokers find one or more of the many self-help books available useful in helping them quit and stay off tobacco.  3. LEARN NEW SKILLS AND BEHAVIORS  Try to distract yourself from urges to smoke. Talk to someone, go for a walk, or occupy your time with a task. When you first try to quit, change your routine. Take a different route to work. Drink tea instead of coffee. Eat breakfast in a different place.   Do something to reduce your stress. Take a hot bath, exercise, or read a book. Plan something enjoyable to do every day. Reward yourself for not smoking. Explore interactive web-based programs that specialize in helping you quit. 4. GET MEDICINE AND USE IT CORRECTLY  Medicines can help you stop smoking and decrease the urge to smoke. Combining medicine with the above behavioral methods and support can quadruple your chances of successfully quitting smoking. The [place] Food and Drug Administration (FDA) has approved 7 medicines to help you quit smoking. These medicines fall into 3 categories. Nicotine replacement therapy (delivers nicotine to your body without the negative effects and risks of smoking):  Nicotine gum: Available over-the-counter. Nicotine lozenges: Available over-the-counter. Nicotine inhaler: Available by prescription. Nicotine nasal spray: Available by prescription. Nicotine skin patches (transdermal): Available by prescription and over-the-counter. Antidepressant medicine (helps people abstain from smoking, but how this works is unknown): Bupropion sustained-release (SR) tablets: Available by prescription. Nicotinic receptor partial agonist (simulates the effect of nicotine in your brain):  Varenicline tartrate tablets: Available by prescription. Ask your caregiver for advice about which medicines to use and how to use them. Carefully read the information on the package. Everyone who is trying to quit may benefit from using a medicine. If you are pregnant or trying to become pregnant, nursing an infant, you are under age 25, or you smoke fewer than 10 cigarettes per day, talk to your caregiver before taking any nicotine replacement medicines.   You should stop using a nicotine replacement product and call your caregiver if you experience nausea, dizziness, weakness, vomiting, fast or irregular heartbeat, mouth problems with the lozenge or gum, or redness or swelling of the skin around the patch that does not go away. Do not use any other product containing nicotine while using a nicotine replacement product. Talk to your caregiver before using these products if you have diabetes, heart disease, asthma, stomach ulcers, you had a recent heart attack, you have high blood pressure that is not controlled with medicine, a history of irregular heartbeat, or you have been prescribed medicine to help you quit smoking. 5. BE PREPARED FOR RELAPSE OR DIFFICULT SITUATIONS  Most relapses occur within the first 3 months after quitting. Do not be discouraged if you start smoking again. Remember, most people try several times before they finally quit. You may have symptoms of withdrawal because your body is used to nicotine. You may crave cigarettes, be irritable, feel very hungry, cough often, get headaches, or have difficulty concentrating. The withdrawal symptoms are only temporary. They are strongest when you first quit, but they will go away within 10 to 14 days. Here are some difficult situations to watch for:  Alcohol. Avoid drinking alcohol. Drinking lowers your chances of successfully quitting. Caffeine. Try to reduce the amount of caffeine you consume. It also lowers your chances of successfully quitting. Other smokers. Being around smoking can make you want to smoke. Avoid smokers. Weight gain. Many smokers will gain weight when they quit, usually less than 10 pounds. Eat a healthy diet and stay active. Do not let weight gain distract you from your main goal, quitting smoking. Some medicines that help you quit smoking may also help delay weight gain. You can always lose the weight gained after you quit. Bad mood or depression. There are a lot of ways to improve your mood other than smoking. If you are having problems with any of these situations, talk to your caregiver. SPECIAL SITUATIONS AND CONDITIONS  Studies suggest that everyone can quit smoking.  Your situation or condition can give you a special reason to quit. Pregnant women/new mothers: By quitting, you protect your baby's health and your own. Hospitalized patients: By quitting, you reduce health problems and help healing. Heart attack patients: By quitting, you reduce your risk of a second heart attack. Lung, head, and neck cancer patients: By quitting, you reduce your chance of a second cancer. Parents of children and adolescents: By quitting, you protect your children from illnesses caused by secondhand smoke. QUESTIONS TO THINK ABOUT  Think about the following questions before you try to stop smoking. You may want to talk about your answers with your caregiver. Why do you want to quit? If you tried to quit in the past, what helped and what did not? What will be the most difficult situations for you after you quit? How will you plan to handle them? Who can help you through the tough times? Your family? Friends? Caregiver? What pleasures do you get from smoking? What ways can you still get pleasure if you quit? Here are some questions to ask your caregiver: How can you help me to be successful at quitting? What medicine do you think would be best for me and how should I take it? What should I do if I need more help? What is smoking withdrawal like? How can I get information on withdrawal?  Quitting takes hard work and a lot of effort, but you can quit smoking. FOR MORE INFORMATION   Smokefree. gov ([employer]) provides free, accurate, evidence-based information and professional assistance to help support the immediate and long-term needs of people trying to quit smoking. Document Released: 12/12/2002 Document Revised: 12/06/2012 Document Reviewed: 10/04/2010  ARELIS FISHER [facility] Patient Information ©2012 Jane Santos.     ·

## 2019-07-27 PROBLEM — E77.8 HYPOPROTEINEMIA (HCC): Status: ACTIVE | Noted: 2019-07-27

## 2019-08-09 DIAGNOSIS — E78.00 PURE HYPERCHOLESTEROLEMIA: Chronic | ICD-10-CM

## 2019-08-09 DIAGNOSIS — E03.9 ACQUIRED HYPOTHYROIDISM: Chronic | ICD-10-CM

## 2019-08-09 DIAGNOSIS — E87.6 HYPOKALEMIA: ICD-10-CM

## 2019-08-09 DIAGNOSIS — F33.41 RECURRENT MAJOR DEPRESSIVE DISORDER, IN PARTIAL REMISSION (HCC): Chronic | ICD-10-CM

## 2019-08-09 RX ORDER — BUPROPION HYDROCHLORIDE 150 MG/1
TABLET, EXTENDED RELEASE ORAL
Qty: 180 TABLET | Refills: 3 | Status: SHIPPED | OUTPATIENT
Start: 2019-08-09 | End: 2020-09-21

## 2019-08-09 RX ORDER — LEVOTHYROXINE SODIUM 0.05 MG/1
TABLET ORAL
Qty: 90 TABLET | Refills: 1 | Status: SHIPPED | OUTPATIENT
Start: 2019-08-09 | End: 2019-10-31 | Stop reason: SDUPTHER

## 2019-08-09 RX ORDER — ATORVASTATIN CALCIUM 80 MG/1
80 TABLET, FILM COATED ORAL DAILY
Qty: 90 TABLET | Refills: 2 | Status: SHIPPED | OUTPATIENT
Start: 2019-08-09 | End: 2020-07-27

## 2019-08-09 RX ORDER — POTASSIUM CHLORIDE 750 MG/1
TABLET, EXTENDED RELEASE ORAL
Qty: 90 TABLET | Refills: 0 | Status: SHIPPED | OUTPATIENT
Start: 2019-08-09 | End: 2019-10-22 | Stop reason: SDUPTHER

## 2019-09-03 ENCOUNTER — TELEPHONE (OUTPATIENT)
Dept: FAMILY MEDICINE CLINIC | Age: 84
End: 2019-09-03

## 2019-09-09 ENCOUNTER — TELEPHONE (OUTPATIENT)
Dept: FAMILY MEDICINE CLINIC | Age: 84
End: 2019-09-09

## 2019-09-09 RX ORDER — AMOXICILLIN AND CLAVULANATE POTASSIUM 875; 125 MG/1; MG/1
1 TABLET, FILM COATED ORAL 2 TIMES DAILY WITH MEALS
Qty: 20 TABLET | Refills: 0 | Status: SHIPPED | OUTPATIENT
Start: 2019-09-09 | End: 2019-09-19

## 2019-10-22 ENCOUNTER — OFFICE VISIT (OUTPATIENT)
Dept: FAMILY MEDICINE CLINIC | Age: 84
End: 2019-10-22
Payer: MEDICARE

## 2019-10-22 VITALS
WEIGHT: 171 LBS | SYSTOLIC BLOOD PRESSURE: 128 MMHG | DIASTOLIC BLOOD PRESSURE: 82 MMHG | OXYGEN SATURATION: 96 % | BODY MASS INDEX: 34.47 KG/M2 | HEIGHT: 59 IN | HEART RATE: 72 BPM | RESPIRATION RATE: 16 BRPM

## 2019-10-22 DIAGNOSIS — E77.8 HYPOPROTEINEMIA (HCC): ICD-10-CM

## 2019-10-22 DIAGNOSIS — R26.81 UNSTEADY GAIT: Chronic | ICD-10-CM

## 2019-10-22 DIAGNOSIS — E03.9 ACQUIRED HYPOTHYROIDISM: Chronic | ICD-10-CM

## 2019-10-22 DIAGNOSIS — I10 HTN (HYPERTENSION), BENIGN: Primary | Chronic | ICD-10-CM

## 2019-10-22 DIAGNOSIS — E87.6 HYPOKALEMIA: ICD-10-CM

## 2019-10-22 DIAGNOSIS — Z23 NEEDS FLU SHOT: ICD-10-CM

## 2019-10-22 DIAGNOSIS — F33.41 RECURRENT MAJOR DEPRESSIVE DISORDER, IN PARTIAL REMISSION (HCC): Chronic | ICD-10-CM

## 2019-10-22 LAB
MAGNESIUM: 1.8 MG/DL (ref 1.8–2.4)
POTASSIUM SERPL-SCNC: 4.2 MMOL/L (ref 3.5–5.1)
T4 FREE: 1.1 NG/DL (ref 0.9–1.8)
TOTAL PROTEIN: 5.8 G/DL (ref 6.4–8.2)
TSH REFLEX: 5.66 UIU/ML (ref 0.27–4.2)

## 2019-10-22 PROCEDURE — G0008 ADMIN INFLUENZA VIRUS VAC: HCPCS | Performed by: FAMILY MEDICINE

## 2019-10-22 PROCEDURE — 1123F ACP DISCUSS/DSCN MKR DOCD: CPT | Performed by: FAMILY MEDICINE

## 2019-10-22 PROCEDURE — G8482 FLU IMMUNIZE ORDER/ADMIN: HCPCS | Performed by: FAMILY MEDICINE

## 2019-10-22 PROCEDURE — G8417 CALC BMI ABV UP PARAM F/U: HCPCS | Performed by: FAMILY MEDICINE

## 2019-10-22 PROCEDURE — 36415 COLL VENOUS BLD VENIPUNCTURE: CPT | Performed by: FAMILY MEDICINE

## 2019-10-22 PROCEDURE — 99214 OFFICE O/P EST MOD 30 MIN: CPT | Performed by: FAMILY MEDICINE

## 2019-10-22 PROCEDURE — 90653 IIV ADJUVANT VACCINE IM: CPT | Performed by: FAMILY MEDICINE

## 2019-10-22 PROCEDURE — 1090F PRES/ABSN URINE INCON ASSESS: CPT | Performed by: FAMILY MEDICINE

## 2019-10-22 PROCEDURE — 1036F TOBACCO NON-USER: CPT | Performed by: FAMILY MEDICINE

## 2019-10-22 PROCEDURE — 4040F PNEUMOC VAC/ADMIN/RCVD: CPT | Performed by: FAMILY MEDICINE

## 2019-10-22 PROCEDURE — G8427 DOCREV CUR MEDS BY ELIG CLIN: HCPCS | Performed by: FAMILY MEDICINE

## 2019-10-22 RX ORDER — POTASSIUM CHLORIDE 750 MG/1
TABLET, EXTENDED RELEASE ORAL
Qty: 90 TABLET | Refills: 0 | Status: SHIPPED | OUTPATIENT
Start: 2019-10-22 | End: 2019-12-10 | Stop reason: SDUPTHER

## 2019-10-22 ASSESSMENT — ENCOUNTER SYMPTOMS
WHEEZING: 0
CHOKING: 0
COUGH: 1
CHEST TIGHTNESS: 0
SHORTNESS OF BREATH: 0

## 2019-10-31 ENCOUNTER — TELEPHONE (OUTPATIENT)
Dept: FAMILY MEDICINE CLINIC | Age: 84
End: 2019-10-31

## 2019-10-31 DIAGNOSIS — E03.9 ACQUIRED HYPOTHYROIDISM: Chronic | ICD-10-CM

## 2019-10-31 RX ORDER — LEVOTHYROXINE SODIUM 0.05 MG/1
TABLET ORAL
Qty: 90 TABLET | Refills: 0 | Status: SHIPPED | OUTPATIENT
Start: 2019-10-31 | End: 2020-02-26

## 2019-11-01 ENCOUNTER — TELEPHONE (OUTPATIENT)
Dept: FAMILY MEDICINE CLINIC | Age: 84
End: 2019-11-01

## 2019-12-04 ENCOUNTER — OFFICE VISIT (OUTPATIENT)
Dept: FAMILY MEDICINE CLINIC | Age: 84
End: 2019-12-04
Payer: MEDICARE

## 2019-12-04 ENCOUNTER — TELEPHONE (OUTPATIENT)
Dept: FAMILY MEDICINE CLINIC | Age: 84
End: 2019-12-04

## 2019-12-04 VITALS
OXYGEN SATURATION: 99 % | TEMPERATURE: 98.4 F | HEIGHT: 59 IN | BODY MASS INDEX: 34.54 KG/M2 | DIASTOLIC BLOOD PRESSURE: 82 MMHG | RESPIRATION RATE: 20 BRPM | SYSTOLIC BLOOD PRESSURE: 158 MMHG | HEART RATE: 80 BPM

## 2019-12-04 DIAGNOSIS — R35.0 URINARY FREQUENCY: ICD-10-CM

## 2019-12-04 DIAGNOSIS — N30.01 ACUTE CYSTITIS WITH HEMATURIA: Primary | ICD-10-CM

## 2019-12-04 LAB
BILIRUBIN, POC: ABNORMAL
BLOOD URINE, POC: ABNORMAL
CLARITY, POC: CLEAR
COLOR, POC: YELLOW
GLUCOSE URINE, POC: ABNORMAL
KETONES, POC: ABNORMAL
LEUKOCYTE EST, POC: ABNORMAL
NITRITE, POC: ABNORMAL
PH, POC: 7
PROTEIN, POC: ABNORMAL
SPECIFIC GRAVITY, POC: 1.02
UROBILINOGEN, POC: 0.2

## 2019-12-04 PROCEDURE — 81002 URINALYSIS NONAUTO W/O SCOPE: CPT | Performed by: NURSE PRACTITIONER

## 2019-12-04 PROCEDURE — 1090F PRES/ABSN URINE INCON ASSESS: CPT | Performed by: NURSE PRACTITIONER

## 2019-12-04 PROCEDURE — 1123F ACP DISCUSS/DSCN MKR DOCD: CPT | Performed by: NURSE PRACTITIONER

## 2019-12-04 PROCEDURE — 4040F PNEUMOC VAC/ADMIN/RCVD: CPT | Performed by: NURSE PRACTITIONER

## 2019-12-04 PROCEDURE — G8427 DOCREV CUR MEDS BY ELIG CLIN: HCPCS | Performed by: NURSE PRACTITIONER

## 2019-12-04 PROCEDURE — 1036F TOBACCO NON-USER: CPT | Performed by: NURSE PRACTITIONER

## 2019-12-04 PROCEDURE — 99213 OFFICE O/P EST LOW 20 MIN: CPT | Performed by: NURSE PRACTITIONER

## 2019-12-04 PROCEDURE — G8417 CALC BMI ABV UP PARAM F/U: HCPCS | Performed by: NURSE PRACTITIONER

## 2019-12-04 PROCEDURE — G8482 FLU IMMUNIZE ORDER/ADMIN: HCPCS | Performed by: NURSE PRACTITIONER

## 2019-12-04 RX ORDER — SULFAMETHOXAZOLE AND TRIMETHOPRIM 800; 160 MG/1; MG/1
1 TABLET ORAL 2 TIMES DAILY
Qty: 14 TABLET | Refills: 0 | Status: SHIPPED | OUTPATIENT
Start: 2019-12-04 | End: 2019-12-11

## 2019-12-06 LAB — URINE CULTURE, ROUTINE: NORMAL

## 2019-12-10 DIAGNOSIS — E87.6 HYPOKALEMIA: ICD-10-CM

## 2019-12-10 RX ORDER — POTASSIUM CHLORIDE 750 MG/1
TABLET, EXTENDED RELEASE ORAL
Qty: 90 TABLET | Refills: 0 | Status: SHIPPED | OUTPATIENT
Start: 2019-12-10 | End: 2020-01-21 | Stop reason: SDUPTHER

## 2020-01-05 ENCOUNTER — APPOINTMENT (OUTPATIENT)
Dept: CT IMAGING | Age: 85
End: 2020-01-05
Payer: MEDICARE

## 2020-01-05 ENCOUNTER — HOSPITAL ENCOUNTER (EMERGENCY)
Age: 85
Discharge: HOME | End: 2020-01-05
Payer: MEDICARE

## 2020-01-05 VITALS
TEMPERATURE: 98.1 F | DIASTOLIC BLOOD PRESSURE: 74 MMHG | SYSTOLIC BLOOD PRESSURE: 116 MMHG | WEIGHT: 171 LBS | RESPIRATION RATE: 16 BRPM | BODY MASS INDEX: 34.54 KG/M2 | OXYGEN SATURATION: 93 % | HEART RATE: 64 BPM

## 2020-01-05 PROCEDURE — 72125 CT NECK SPINE W/O DYE: CPT

## 2020-01-05 PROCEDURE — 70486 CT MAXILLOFACIAL W/O DYE: CPT

## 2020-01-05 PROCEDURE — 4500000022 HC ED LEVEL 2 PROCEDURE

## 2020-01-05 PROCEDURE — 70450 CT HEAD/BRAIN W/O DYE: CPT

## 2020-01-05 PROCEDURE — 99283 EMERGENCY DEPT VISIT LOW MDM: CPT

## 2020-01-05 ASSESSMENT — ENCOUNTER SYMPTOMS
DIARRHEA: 0
SHORTNESS OF BREATH: 0
ABDOMINAL PAIN: 0
NAUSEA: 0
CHEST TIGHTNESS: 0
VOMITING: 0

## 2020-01-05 NOTE — ED NOTES
Nursing Discharge Notes:    -Patient discharged at this time in no acute distress after verbalizing understanding of discharge instructions and need for follow up with PCP and/or specialist if one was referred.  -A copy of the AVS was reviewed with pt and/or family.  -Pt received applicable scripts which were reviewed with pt and/or family by this RN. -Pt was given the opportunity to ask questions before signing for discharge. Patient Mobility at Discharge:    -Pt left ambulatory to lobby / discharge area. Patient Education:    Learner - Patient and/or family / caregiver. Motivation and Readiness To Learn - Medium to High  Barriers To Learning - None  Learning Preference / Provided Instructions - Both written and verbal discharge instructions.      ARTURO Moser  01/05/20 [id number]

## 2020-01-05 NOTE — ED PROVIDER NOTES
[address] [facility]        Pt Name: Margarete Cowden  MRN: [id number]  [place] 9/14/1933  Date of evaluation: 1/5/2020  Provider: SANYA Bell CNP  PCP: Meredith Caicedo,     This patient was not seen and evaluated by the attending physician No att. providers found. CHIEF COMPLAINT       Chief Complaint   Patient presents with    Fall     mechanical fall at home. glasses caused laceration on nose. denies LOC or blood thinnners. Denies additional injuries       HISTORY OF PRESENT ILLNESS   (Location/Symptom, Timing/Onset, Context/Setting, Quality, Duration, Modifying Factors, Severity)  Note limiting factors. Margarete Cowden is a 80 y.o. female presents to the emergency department with right-sided nasal laceration. The patient reports that she had a mechanical fall outside of her car today, striking her glasses on her nose, causing laceration. She does remember the entire event. She denies any other injury. She is ambulatory with walker at baseline. Denies any fever, lightheadedness, dizziness, visual disturbances. No chest pain or pressure. No neck or back pain. No shortness of breath, cough, or congestion. No abdominal pain, nausea, vomiting, diarrhea, constipation, or dysuria. No rash. Nursing Notes were all reviewed and agreed with or any disagreements were addressed in the HPI. REVIEW OF SYSTEMS    (2-9 systems for level 4, 10 or more for level 5)     Review of Systems   Constitutional: Negative for activity change, chills and fever. Respiratory: Negative for chest tightness and shortness of breath. Cardiovascular: Negative for chest pain. Gastrointestinal: Negative for abdominal pain, diarrhea, nausea and vomiting. Genitourinary: Negative for dysuria. Skin: Positive for wound. All other systems reviewed and are negative. Positives and Pertinent negatives as per HPI.   Except as noted above in the ROS, all other systems were reviewed and negative.        PAST MEDICAL HISTORY     Past Medical History:   Diagnosis Date    Breast cancer ([facility] 2004    ductal vs lobular, not clear from pathologist    Cellulitis of left leg     Cellulitis of wrist 2018    RIGHT    Chronic kidney disease, stage III (moderate) ([address]     Depression     Diverticulitis of colon 1979    DVT (deep venous thrombosis) ([facility] [address] [facility]     after first knee surgery    Gout 2011    Hematoma of left thigh 2017    Herpes zoster 2018    right ant shoulder    Hypertension     Hypothyroidism 2011    Intervertebral lumbar disc disorder with myelopathy, lumbar region     OA (osteoarthritis)     severe in knees, left ankle, left >right hip    Primary osteoarthritis of right wrist     Severe arthritic change within carpal - 1st metacarpal joint, Extensive chondrocalcinosis throughout wrist    Pure hypercholesterolemia     Seborrheic dermatitis 2014    Subdural hematoma, post-traumatic ([facility] [address] 2016    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         SURGICAL HISTORY     Past Surgical History:   Procedure Laterality Date    BREAST SURGERY      right mastectomy     SECTION  1970    COLON SURGERY  1970    right partial colectomy for ruptured diverticulitis    INCISIONAL HERNIA REPAIR  2016    with mesh, laparoscopic, was incarcerated    JOINT REPLACEMENT      right knee and left knee in    [address]  2015    verrous hyperplasia left ventral tongue, [facility]LE [id number] [facility], Dr Josie Lennox  1970         [address]       Discharge Medication List as of 2020  3:20 PM      CONTINUE these medications which have NOT CHANGED    Details   potassium chloride (KLOR-CON M) 10 MEQ extended release tablet TAKE 1 TABLET BY MOUTH  DAILY, Disp-90 1/5/2020  EXAMINATION: CT OF THE CERVICAL SPINE WITHOUT CONTRAST 1/5/2020 11:51 am TECHNIQUE: CT of the cervical spine was performed without the administration of intravenous contrast. Multiplanar reformatted images are provided for review. Dose modulation, iterative reconstruction, and/or weight based adjustment of the mA/kV was utilized to reduce the radiation dose to as low as reasonably achievable. COMPARISON: None. HISTORY: ORDERING SYSTEM PROVIDED HISTORY: injury TECHNOLOGIST PROVIDED HISTORY: Reason for exam:->injury Reason for Exam: fall Acuity: Unknown Type of Exam: Unknown Mechanism of Injury: fall FINDINGS: BONES/ALIGNMENT: There is no acute fracture or traumatic malalignment. Mild anterolisthesis C on C3 and C3 on C4 appear degenerative in etiology. Mild anterolisthesis of C5 on C6 and C6 on C7 also appear degenerative in etiology. DEGENERATIVE CHANGES: Multilevel degenerative changes. SOFT TISSUES: There is no prevertebral soft tissue swelling. No acute abnormality of the cervical spine. PROCEDURES   Unless otherwise noted below, none     Lac Repair  Date/Time: 1/5/2020 7:01 PM  Performed by: SANYA Go CNP  Authorized by: SANYA Go CNP     Consent:     Consent obtained:  Verbal    Consent given by:  Patient    Risks discussed:  Infection, pain, retained foreign body, tendon damage, vascular damage, poor wound healing, poor cosmetic result, need for additional repair and nerve damage  Anesthesia (see MAR for exact dosages):      Anesthesia method:  Local infiltration    Local anesthetic:  Lidocaine 2% w/o epi  Laceration details:     Location:  Face    Face location:  Nose    Length (cm):  1  Repair type:     Repair type:  Simple  Treatment:     Area cleansed with:  Hibiclens    Amount of cleaning:  Extensive    Irrigation solution:  Sterile saline  Skin repair:     Repair method:  Sutures    Suture size:  6-0    Suture material:  Prolene    Suture technique:  Simple interrupted    Number of sutures:  5  Approximation:     Approximation:  Close  Post-procedure details:     Dressing:  Open (no dressing)    Patient tolerance of procedure: Tolerated well, no immediate complications        CRITICAL CARE TIME   N/A    CONSULTS:  None      EMERGENCY DEPARTMENT COURSE and DIFFERENTIAL DIAGNOSIS/MDM:   Vitals:    Vitals:    01/05/20 1043 01/05/20 1309   BP: (!) 151/74 116/74   Pulse: 77 64   Resp: 18 16   Temp: 97 °F (36.1 °C) 98.1 °F (36.7 °C)   TempSrc: Infrared Oral   SpO2: 100% 93%   Weight: 171 lb (77.6 kg)        Patient was given thefollowing medications:  Medications - No data to display    Briefly, this is a 80year old female that  presents to the emergency department with right-sided nasal laceration. The patient reports that she had a mechanical fall outside of her car today, striking her glasses on her nose, causing laceration. She does remember the entire event. She denies any other injury. She is ambulatory with walker at baseline. CT CERVICAL SPINE WO CONTRAST (Final result)   Result time 01/05/20 12:38:45   Final result by Naz Diego MD (01/05/20 12:38:45)                Impression:    No acute abnormality of the cervical spine. CT FACIAL BONES WO CONTRAST (Final result)   Result time 01/05/20 12:41:15   Final result by Alen Gastelum MD (01/05/20 12:41:15)                Impression:    No acute traumatic injury of the facial bones. Mucosal thickening and fluid within the left maxillary sinus. CT Head WO Contrast (Final result)   Result time 01/05/20 12:34:56   Final result by Alen Gastelum MD (01/05/20 12:34:56)                Impression:    No acute intracranial abnormality. Mucosal thickening left maxillary sinus which may be chronic. Laceration was repaired as described above. Please remove 5 sutures in 6 to 7 days. Apply ice.     I estimate there is LOW risk for SKULL FRACTURE, INTRACRANIAL HEMORRHAGE, CERVICAL SPINE INJURY, SUBDURAL OR EPIDURAL HEMATOMA,  thus I consider the discharge disposition reasonable. FINAL IMPRESSION      1. Facial laceration, initial encounter    2.  Closed head injury, initial encounter          DISPOSITION/PLAN   DISPOSITION Decision To Discharge 01/05/2020 03:14:28 PM      PATIENT REFERREDTO:  Sangeeta Billy, [place] [address]  [phone]    Schedule an appointment as soon as possible for a visit in 1 week  For suture removal    [facility] Emergency Department  [place] [address]  [phone]    If symptoms worsen      DISCHARGE MEDICATIONS:  Discharge Medication List as of 1/5/2020  3:20 PM          DISCONTINUED MEDICATIONS:  Discharge Medication List as of 1/5/2020  3:20 PM                 (Please note that portions of this note were completed with a voice recognition program.  Efforts were made to edit the dictations but occasionally words are mis-transcribed.)    SANYA Garcias CNP (electronically signed)           SANYA Garcias CNP  01/05/20 2363

## 2020-01-10 ENCOUNTER — OFFICE VISIT (OUTPATIENT)
Dept: FAMILY MEDICINE CLINIC | Age: 85
End: 2020-01-10
Payer: MEDICARE

## 2020-01-10 VITALS
HEART RATE: 72 BPM | SYSTOLIC BLOOD PRESSURE: 120 MMHG | DIASTOLIC BLOOD PRESSURE: 78 MMHG | WEIGHT: 174 LBS | BODY MASS INDEX: 32.02 KG/M2 | RESPIRATION RATE: 18 BRPM | TEMPERATURE: 98.6 F | HEIGHT: 62 IN

## 2020-01-10 PROCEDURE — 1036F TOBACCO NON-USER: CPT | Performed by: NURSE PRACTITIONER

## 2020-01-10 PROCEDURE — G8427 DOCREV CUR MEDS BY ELIG CLIN: HCPCS | Performed by: NURSE PRACTITIONER

## 2020-01-10 PROCEDURE — 4040F PNEUMOC VAC/ADMIN/RCVD: CPT | Performed by: NURSE PRACTITIONER

## 2020-01-10 PROCEDURE — G8417 CALC BMI ABV UP PARAM F/U: HCPCS | Performed by: NURSE PRACTITIONER

## 2020-01-10 PROCEDURE — 99212 OFFICE O/P EST SF 10 MIN: CPT | Performed by: NURSE PRACTITIONER

## 2020-01-10 PROCEDURE — 1123F ACP DISCUSS/DSCN MKR DOCD: CPT | Performed by: NURSE PRACTITIONER

## 2020-01-10 PROCEDURE — 1090F PRES/ABSN URINE INCON ASSESS: CPT | Performed by: NURSE PRACTITIONER

## 2020-01-10 PROCEDURE — G8482 FLU IMMUNIZE ORDER/ADMIN: HCPCS | Performed by: NURSE PRACTITIONER

## 2020-01-10 NOTE — PROGRESS NOTES
SUBJECTIVE:    Patient ID: Caryn López is a 80 y.o. y.o. female. HPI  Pt fell getting in to car 1/5 went to ER CT - normal - 5 sutures placed in ER- she has not had any issues the  Site looks good just some bruising    Review of Systems   Skin:        Laceration to right side of nose        OBJECTIVE:    Vitals:    01/10/20 1534   BP: 120/78   Pulse: 72   Resp: 18   Temp: 98.6 °F (37 °C)       Physical Exam  Constitutional:       General: She is awake. Appearance: She is not ill-appearing, toxic-appearing or diaphoretic. HENT:      Head:      Comments: Bruising noted to right eye - suture intact ( 5)  Neurological:      Mental Status: She is alert. Psychiatric:         Behavior: Behavior is cooperative. ASSESSMENT:   Diagnosis Orders   1. Laceration of nose, subsequent encounter     2.  Visit for suture removal           PLAN:  Kenneth was seen today for suture / staple removal.    Diagnoses and all orders for this visit:    Laceration of nose, subsequent encounter    Visit for suture removal  5 sutures removed without difficulty

## 2020-01-21 ENCOUNTER — OFFICE VISIT (OUTPATIENT)
Dept: FAMILY MEDICINE CLINIC | Age: 85
End: 2020-01-21
Payer: MEDICARE

## 2020-01-21 VITALS
HEIGHT: 62 IN | DIASTOLIC BLOOD PRESSURE: 78 MMHG | RESPIRATION RATE: 16 BRPM | HEART RATE: 80 BPM | WEIGHT: 171 LBS | BODY MASS INDEX: 31.47 KG/M2 | SYSTOLIC BLOOD PRESSURE: 124 MMHG | OXYGEN SATURATION: 96 %

## 2020-01-21 LAB — TOTAL PROTEIN: 6.1 G/DL (ref 6.4–8.2)

## 2020-01-21 PROCEDURE — 1123F ACP DISCUSS/DSCN MKR DOCD: CPT | Performed by: FAMILY MEDICINE

## 2020-01-21 PROCEDURE — 99214 OFFICE O/P EST MOD 30 MIN: CPT | Performed by: FAMILY MEDICINE

## 2020-01-21 PROCEDURE — G8417 CALC BMI ABV UP PARAM F/U: HCPCS | Performed by: FAMILY MEDICINE

## 2020-01-21 PROCEDURE — G8482 FLU IMMUNIZE ORDER/ADMIN: HCPCS | Performed by: FAMILY MEDICINE

## 2020-01-21 PROCEDURE — 1090F PRES/ABSN URINE INCON ASSESS: CPT | Performed by: FAMILY MEDICINE

## 2020-01-21 PROCEDURE — 1036F TOBACCO NON-USER: CPT | Performed by: FAMILY MEDICINE

## 2020-01-21 PROCEDURE — G8427 DOCREV CUR MEDS BY ELIG CLIN: HCPCS | Performed by: FAMILY MEDICINE

## 2020-01-21 PROCEDURE — 36415 COLL VENOUS BLD VENIPUNCTURE: CPT | Performed by: FAMILY MEDICINE

## 2020-01-21 PROCEDURE — 4040F PNEUMOC VAC/ADMIN/RCVD: CPT | Performed by: FAMILY MEDICINE

## 2020-01-21 RX ORDER — POTASSIUM CHLORIDE 750 MG/1
TABLET, EXTENDED RELEASE ORAL
Qty: 90 TABLET | Refills: 0 | Status: SHIPPED | OUTPATIENT
Start: 2020-01-21 | End: 2020-03-23

## 2020-01-21 ASSESSMENT — ENCOUNTER SYMPTOMS
COUGH: 1
WHEEZING: 0
CHEST TIGHTNESS: 0
SHORTNESS OF BREATH: 0
CHOKING: 0

## 2020-01-21 NOTE — PROGRESS NOTES
Subjective:      Patient ID: Kari Vela is a 80 y.o. female. Chief Complaint   Patient presents with    Hypertension    Hypothyroidism   0  HPI     Hypoproteinemia ([address]  To get protein up: does not recall. Cereal breakfast. Lunch - if sleeps late skips. Dinner some prepackaged TV dinner or scrambled eggs. HTN (hypertension), benign  HAS A CUFF BUT NOT CHECKING. No SE's with BP. Not light headed when fell. Bent over to  keys. Acquired hypothyroidism  Current symptoms include denies fatigue, weight changes, heat/cold intolerance, bowel/skin changes or CVS symptoms. Patient denies anxiousness, feeling excessive energy, tremulousness, sweating, depression, goiter, ocular symptoms. Unsteady gait  Using walker in the house and outdoors. Puts the walker in back seat of car and uses door and walker. Has a grabber to pick things up. Recurrent major depressive disorder, in partial remission ([address]  Not feeling depressed. Appetite good for the most part. Skips lunch at times. Sleeps well for most part. Trouble if something on her mind. Gets up and reads if she cannot sleep.      Current Outpatient Medications   Medication Sig Dispense Refill    potassium chloride (KLOR-CON M) 10 MEQ extended release tablet TAKE 1 TABLET BY MOUTH  DAILY 90 tablet 0    levothyroxine (SYNTHROID) 50 MCG tablet TAKE 1 TABLET BY MOUTH DAILY ON AN EMPTY STOMACH FOR THYROID 90 tablet 0    atorvastatin (LIPITOR) 80 MG tablet TAKE 1 TABLET BY MOUTH  DAILY 90 tablet 2    buPROPion (WELLBUTRIN SR) 150 MG extended release tablet TAKE 1 TABLET BY MOUTH  TWICE A  tablet 3    zoster recombinant adjuvanted vaccine (SHINGRIX) 50 MCG/0.5ML SUSR injection Inject 0.5 mLs into the muscle See Admin Instructions 1 dose now and repeat in 2-6 months 0.5 mL 0    lisinopril (PRINIVIL;ZESTRIL) 2.5 MG tablet TAKE 1 TABLET BY MOUTH  DAILY 90 tablet 3    ipratropium (ATROVENT) 0.06 % nasal spray 2 sprays by Nasal route 4 times 5. Recurrent major depressive disorder, in partial remission ([facility] [address]    6. Hypokalemia          Plan:    65    Virgil Torres was seen today for hypertension and hypothyroidism. Diagnoses and all orders for this visit:    Hypoproteinemia ([facility] [address]  -     PROTEIN, TOTAL; Future  You can increase your protein using egg substitutes, eating small portions of red meat and using more skinless poultry and fish to meet your protein needs. Cottage cheese and greek yogurt are also good sources of protein. Try to get some of your proteins from plant sources such as beans, nuts, peas and / or soy products such as tofu or soy milk. Whey protein powders 1 scoop daily can add 15-25% of your daily need when mixed in with food or as a smoothie blended with vegetable or fruit juice, yogurt or milk. You can also substitute soy or alfalfa powder which is cholesterol free. HTN (hypertension), benign  -     Good control. Check if feeling light headed or just \"not feeling right\". -     Continue meds and lifestyle control. Acquired hypothyroidism  -     Good control.  -     Continue meds and lifestyle control. Unsteady gait  Continue to stand up during TV commercials and breaks and do a few deep knee bends. The more active you stay the more independent you stay. Recurrent major depressive disorder, in partial remission (HCC)  -     Good control.  -     Continue meds and lifestyle control.      Hypokalemia  -     potassium chloride (KLOR-CON M) 10 MEQ extended release tablet; TAKE 1 TABLET BY MOUTH DAILY    Patient Education   Movement Disorders: Exercises      Khushboo Larsen, DO

## 2020-01-21 NOTE — PATIENT INSTRUCTIONS
Kenneth was seen today for hypertension and hypothyroidism. Diagnoses and all orders for this visit:    Hypoproteinemia ([address]  -     PROTEIN, TOTAL; Future  You can increase your protein using egg substitutes, eating small portions of red meat and using more skinless poultry and fish to meet your protein needs. Cottage cheese and greek yogurt are also good sources of protein. Try to get some of your proteins from plant sources such as beans, nuts, peas and / or soy products such as tofu or soy milk. Whey protein powders 1 scoop daily can add 15-25% of your daily need when mixed in with food or as a smoothie blended with vegetable or fruit juice, yogurt or milk. You can also substitute soy or alfalfa powder which is cholesterol free. HTN (hypertension), benign  -     Good control. Check if feeling light headed or just \"not feeling right\". -     Continue meds and lifestyle control. Acquired hypothyroidism  -     Good control.  -     Continue meds and lifestyle control. Unsteady gait  Continue to stand up during TV commercials and breaks and do a few deep knee bends. The more active you stay the more independent you stay. Recurrent major depressive disorder, in partial remission (HCC)  -     Good control.  -     Continue meds and lifestyle control. Hypokalemia  -     potassium chloride (KLOR-CON M) 10 MEQ extended release tablet; TAKE 1 TABLET BY MOUTH DAILY        Patient Education        Movement Disorders: Exercises  Introduction  Here are some examples of exercises for problems with movement. Your doctor or physical therapist will tell you when you can start these exercises and which ones will work best for you. Problems with movement can happen along with many conditions, such as multiple sclerosis, Parkinson's disease, or damage from a stroke. No matter what is making movement hard for you, these exercises can help you be more flexible, strong, and steady on your feet.   Start each Single knee-to-chest stretch   1. Lie on your back with your knees bent and your feet flat on the floor. You can put a small pillow under your head and neck if it is more comfortable. 2. Bring one knee to your chest, keeping the other foot flat on the floor. 3. Keep your lower back pressed to the floor. Hold for 15 to 30 seconds. 4. Relax, and lower the knee to the starting position. 5. Repeat with the other leg. Repeat 2 to 4 times with each leg. 6. To get more stretch, keep your other leg flat on the floor while pulling your knee to your chest.    Hip rotator stretch   1. Lie on your back with both knees bent and your feet flat on the floor. 2. Put the ankle of one leg on your opposite thigh near your knee. 3. Use your hand to gently push the raised knee away from your body until you feel a gentle stretch around your hip. 4. Hold the stretch for 15 to 30 seconds. 5. Repeat 2 to 4 times with each leg. Hamstring wall stretch   1. Lie on your back in a doorway, with your lower legs through the open door. 2. Slide the leg next to the doorway up the wall to straighten your knee. You should feel a gentle stretch down the back of your leg. 1. Do not arch your back. 2. Do not bend either knee. 3. Keep one heel touching the floor and the other heel touching the wall. Do not point your toes. 3. Hold the stretch for at least 1 minute to start. As you get used to it, work toward holding the stretch for as long as 6 minutes. 4. Do this exercise 2 to 4 times with one leg. Then move to the other side of the doorway to stretch the other leg. Hand flips for coordination   1. While seated, place your forearm and wrist on your thigh, palm down. 2. Flip your hand over so the back of your hand rests on your thigh and your palm is up. Alternate between palm up and palm down while keeping your forearm on your thigh. 3. Repeat 8 to 12 times with each hand. Finger opposition for coordination   1.  With one hand, point your fingers and thumb straight up. Your wrist should be relaxed, following the line of your fingers and thumb. 2. Touch your thumb to each finger, one finger at a time. This will look like an \"okay\" sign, but try to keep your other fingers straight and pointing upward as much as you can. 3. Repeat 8 to 12 times with each hand. Finger extension for coordination   1. Place your hand flat on a table. 2. Lift and then lower one finger at a time off the table. 3. Repeat 8 to 12 times with each hand. Shoulder blade squeeze for strength   1. Stand with your arms at your sides, and squeeze your shoulder blades together. Do not raise your shoulders up as you squeeze. 2. Hold 6 seconds. 3. Repeat 8 to 12 times. Bridging for strength   1. Lie on your back with both knees bent. Your knees should be bent about 90 degrees. 2. Then push your feet into the floor, squeeze your buttocks, and lift your hips off the floor until your shoulders, hips, and knees are all in a straight line. 3. Hold for about 6 seconds as you continue to breathe normally. 4. Slowly lower your hips back down to the floor. Rest for up to 10 seconds. 5. Repeat 8 to 12 times. Single-leg balance   1. Stand on a flat surface with your arms stretched out to your sides like you are making the letter \"T. \" Then lift one leg off the floor, bending it at the knee. If you are not steady on your feet, use one hand to hold on to a chair, counter, or wall. 2. Keep your standing knee straight. Try to balance on that leg for up to 30 seconds. Then rest for up to 10 seconds. 3. Repeat 6 to 8 times with each leg. 4. When you can balance on one leg for 30 seconds with your eyes open, try to balance on it with your eyes closed. 5. When you can do this exercise with your eyes closed for 30 seconds and with ease and no pain, try it while standing on a pillow or piece of foam.    Alternate arm and leg (bird dog) balance   1.  Start on the floor, on license by [facility] ([facility] If you have questions about a medical condition or this instruction, always ask your healthcare professional. [place] [address] any warranty or liability for your use of this information.

## 2020-02-26 RX ORDER — LEVOTHYROXINE SODIUM 0.05 MG/1
TABLET ORAL
Qty: 90 TABLET | Refills: 0 | Status: SHIPPED | OUTPATIENT
Start: 2020-02-26 | End: 2020-06-02

## 2020-02-26 RX ORDER — PROBENECID 500 MG/1
TABLET, FILM COATED ORAL
Qty: 45 TABLET | Refills: 0 | Status: SHIPPED | OUTPATIENT
Start: 2020-02-26 | End: 2020-05-26

## 2020-03-23 RX ORDER — POTASSIUM CHLORIDE 750 MG/1
TABLET, EXTENDED RELEASE ORAL
Qty: 90 TABLET | Refills: 0 | Status: SHIPPED | OUTPATIENT
Start: 2020-03-23 | End: 2020-04-27

## 2020-04-21 ENCOUNTER — VIRTUAL VISIT (OUTPATIENT)
Dept: FAMILY MEDICINE CLINIC | Age: 85
End: 2020-04-21
Payer: MEDICARE

## 2020-04-21 VITALS — BODY MASS INDEX: 31.47 KG/M2 | WEIGHT: 171 LBS | HEIGHT: 62 IN

## 2020-04-21 PROCEDURE — G8427 DOCREV CUR MEDS BY ELIG CLIN: HCPCS | Performed by: FAMILY MEDICINE

## 2020-04-21 PROCEDURE — 1123F ACP DISCUSS/DSCN MKR DOCD: CPT | Performed by: FAMILY MEDICINE

## 2020-04-21 PROCEDURE — G8417 CALC BMI ABV UP PARAM F/U: HCPCS | Performed by: FAMILY MEDICINE

## 2020-04-21 PROCEDURE — 4040F PNEUMOC VAC/ADMIN/RCVD: CPT | Performed by: FAMILY MEDICINE

## 2020-04-21 PROCEDURE — 1090F PRES/ABSN URINE INCON ASSESS: CPT | Performed by: FAMILY MEDICINE

## 2020-04-21 PROCEDURE — 99443 PR PHYS/QHP TELEPHONE EVALUATION 21-30 MIN: CPT | Performed by: FAMILY MEDICINE

## 2020-04-21 PROCEDURE — 1036F TOBACCO NON-USER: CPT | Performed by: FAMILY MEDICINE

## 2020-04-21 ASSESSMENT — PATIENT HEALTH QUESTIONNAIRE - PHQ9
2. FEELING DOWN, DEPRESSED OR HOPELESS: 0
1. LITTLE INTEREST OR PLEASURE IN DOING THINGS: 0
SUM OF ALL RESPONSES TO PHQ9 QUESTIONS 1 & 2: 0
SUM OF ALL RESPONSES TO PHQ QUESTIONS 1-9: 0
SUM OF ALL RESPONSES TO PHQ QUESTIONS 1-9: 0

## 2020-04-21 ASSESSMENT — ENCOUNTER SYMPTOMS
COUGH: 0
SHORTNESS OF BREATH: 0
CHEST TIGHTNESS: 0
CHOKING: 0
WHEEZING: 0

## 2020-04-21 NOTE — PATIENT INSTRUCTIONS
sick, wash your hands before and after you interact with pets and wear a facemask. Call ahead before visiting your doctor  If you have a medical appointment, call the healthcare provider and tell them that you have or may have COVID-19. This will help the healthcare providers office take steps to keep other people from getting infected or exposed. Wear a facemask  You should wear a facemask when you are around other people (e.g., sharing a room or vehicle) or pets and before you enter a healthcare providers office. If you are not able to wear a facemask (for example, because it causes trouble breathing), then people who live with you should not stay in the same room with you, or they should wear a facemask if they enter your room. Cover your coughs and sneezes  Cover your mouth and nose with a tissue when you cough or sneeze. Throw used tissues in a lined trash can. Immediately wash your hands with soap and water for at least 20 seconds or, if soap and water are not available, clean your hands with an alcohol-based hand  that contains at least 60% alcohol. Clean your hands often  Wash your hands often with soap and water for at least 20 seconds, especially after blowing your nose, coughing, or sneezing; going to the bathroom; and before eating or preparing food. If soap and water are not readily available, use an alcohol-based hand  with at least 60% alcohol, covering all surfaces of your hands and rubbing them together until they feel dry. Soap and water are the best option if hands are visibly dirty. Avoid touching your eyes, nose, and mouth with unwashed hands. Avoid sharing personal household items  You should not share dishes, drinking glasses, cups, eating utensils, towels, or bedding with other people or pets in your home. After using these items, they should be washed thoroughly with soap and water.   Clean all high-touch surfaces everyday  High touch surfaces include counters, to fight the virus when you are infected if you are on NSAIDs. The FDA has said that this information is not yet proven. The newest evidence suggest that wearing a mask in public may be beneficial if you remember that the outside of it is contaminated and treat it accordingly. It also helps prevent spread if someone has an asymptomatic or presymptomatic case and does not know it yet. Even cloth masks can be beneficial.    Vitamin D and vitamin C are being used as treatment for people with COVID-19 in some hospitals. Make sure you do not run out of your vitamin D 2000 IU daily. Would also recommend taking at least vitamin C 500 mg once or twice daily to strengthen your immune system and prevent the COVID-19 infection during the moderate to high risk duration of the pandemic which is likely to be approximately 1 year. Also keeping your proteins up will help keep your immune system strong to help prevent or recover from a COVID-19 infection. IF you develop ANY respiratory infection symptoms (not just allergy symptoms) suggestive of COVID-19 start the recommendations below: Instructions for Respiratory Infections (SAVE THIS SHEET)    For the first 7-14 days of symptoms follow instructions below, even before being seen in the office or even during treatment with antibiotics, until symptom free. 1. Water: Drink 1 ounce of water for every 2 pounds of body weight for adults, you need 78 ounces of water/fluids per day. This will loosen mucus in the head and chest & improve the weak feeling of dehydration, allow the body to get germ fighting resources to the infection. Half of fluids can be juice or sugar free Crystal Light. Don't count drinks with caffeine, alcohol or carbonation. Infants can have Pedialyte liquid or freezer pops. Avoid salt and sports drinks if you have high Blood Pressure, swelling in the feet or ankles or have heart problems.    2. Humidity: the duration and lessen symptoms such as sore throat, cough, nasal congestion, runny nose, and post nasal drip. Use 1 lozenge every 2-4 hours ( after meals if stomach is sensitive). Children can use 10-15 mg or less 3-4 times a day or Zinc lollypops. In pregnancy limit to 50-60 mg a day for 7 days as prenatals have Zinc also. With diarrhea use zinc pills 50 mg 1/2 to 1 pill 2x/day. 9. Vitamins: Vitamin C 500 mg with breakfast and dinner (with suspected or diagnosed COVID-19 infection take vitamin C 1000 mg 2-3 times a day). Children and pregnant women should drink citrus juices. This speeds healing and strengthens immune system. 10. Chest Symptoms: Vicks Vapor rub to the chest at bedtime. 11. Decongestants: Avoid all decongestants and antihistamine cold preparations in children. Decongestants should always be avoided in people with high blood pressure, palpitations, heart disease and those on stimulant medications. Antihistamines may impede the body's ability to fight COVID-19.  12. Frequent hand washing and/or hand gel especially after coughing or sneezing into the hands or blowing the nose to help prevent spreading to others. Use kleenex and NOT handkerchiefs. Try all of the above starting with day 1 of symptoms. If Strep throat symptoms appear call to be seen in the office as soon as possible and don't gargle on that day. Newborns, infants, or anyone with earaches or influenza may need to be seen quickly. Adults with fevers over 103 degrees or shortness of breath should call the office immediately. Patient Education        Learning About High-Protein Foods  What foods are high in protein? The foods you eat contain nutrients, such as vitamins and minerals. Protein is a nutrient. Your body needs the right amount to stay healthy and work as it should. You can use the list below to help you make choices about which foods to eat. Here are some examples of foods that are high in protein.   Dairy and that most adults eat 5 to 7 ounces of protein foods a day. Sometimes you may need to eat more protein. Your doctor may advise you on the right amount of protein you need. What foods contain protein? Protein is found in a variety of foods. High-protein foods include lean meat, poultry, and fish. A serving of these foods is 2 to 3 ounces. (3 ounces is about the size and thickness of a deck of cards.)  Protein isn't just found in meat. If you are looking for other types of protein, the following foods are equal to about 1 ounce of meat:  · ¼ cup of cooked beans, peas, or lentils  · ¼ cup of tofu (about 2 ounces)  · 2 Tbsp of hummus  · ½ ounce of nuts or seeds (for example, 12 almonds or 7 walnut halves)  · 1 egg  · 1 Tbsp of peanut butter or other nut or seed butter  Other sources of protein include cheese, milk, and other milk products. You can also buy protein bars, drinks, and powders. Check the nutrition label for the amount of protein in each serving. What are some tips for getting more protein? You can get more protein in your food by adding high-protein ingredients. For example, you can:  · Add powdered milk to other foods, such as pudding or soups. · Add powdered protein to fruit smoothies and cooked cereal.  · Add beans to soup and chili. · Add nuts, seeds, or wheat germ to yogurt. You can also:  · Spread peanut butter onto a banana. · Mix cottage cheese into noodle dishes or casseroles. · Sprinkle hard-boiled eggs on a salad. · Grate cheese over vegetables and soups. Where can you learn more? Go to https://Brainomixpepicewisabella.Ulabox. org and sign in to your [facility] account. Enter C142 in the [employer] box to learn more about \"Learning About Getting More Protein. \"     If you do not have an account, please click on the \"Sign Up Now\" link. Current as of: August 21, 2019Content Version: 12.4  © 3190-2529 [employer].   Care instructions adapted under license by Hugo Purvis

## 2020-04-21 NOTE — PROGRESS NOTES
Subjective:      Patient ID: Phan Dey is a 80 y.o. female. Chief Complaint   Patient presents with    Hypertension     ROUTINE FOLLOW UP FOLLOW FOR HTN    Hyperlipidemia     ROUTINE FOLLOW UP CHOL    Thyroid Problem     ROUTINE FOLLOW UP FOR THYROID     HPI    Hypoproteinemia ([facility] [address]  Has protein bars and drinks and has one or the other every day. Does a banana have protein? HTN (hypertension), benign  She is not checking. Her cuff works. She is not lightheaded. No SE's with med. Acquired hypothyroidism  Patient denies fatigue, weight gain, feeling cold and cold intolerance, constipation, swelling, anxiousness, feeling excessive energy, tremulousness, palpitations, sweating, weight loss, diarrhea, change in skin,  nails, or hair, heat intolerance, depression, goiter, ocular symptoms. Takes med 1st thing in am with a full glass of water and waits 30 min to eat or meds. Unsteady gait  Using walker consistently. No falls since last visit. Is using pedals on the floor for 15 min (was 10). Not lifting up out of chair. Still has exercises from last visit. Recurrent major depressive disorder, in partial remission ([facility] [address]  Feels ok. Not crying at times. Talks longer on the telephone. Misses seeing family. She used to work on the computer. COVID-19  Is self isolating. Has a mask. Is staying at home. Prior to Visit Medications    Medication Sig Taking?  Authorizing Provider   potassium chloride (KLOR-CON M) 10 MEQ extended release tablet TAKE 1 TABLET BY MOUTH  DAILY Yes May Antu, DO   levothyroxine (SYNTHROID) 50 MCG tablet TAKE 1 TABLET BY MOUTH  DAILY ON AN EMPTY STOMACH  FOR THYROID Yes May Antu, DO   probenecid (BENEMID) 500 MG tablet TAKE 1/2 TABLET BY MOUTH  DAILY TO PREVENT GOUT Yes Terry Mendoza, DO   atorvastatin (LIPITOR) 80 MG tablet TAKE 1 TABLET BY MOUTH  DAILY Yes Walt Mendoza, DO   buPROPion (WELLBUTRIN SR) 150 MG extended release tablet TAKE 1 TABLET BY MOUTH  TWICE A DAY Yes Yanci Pierce, DO   zoster recombinant adjuvanted vaccine [facility]) 50 MCG/0.5ML SUSR injection Inject 0.5 mLs into the muscle See Admin Instructions 1 dose now and repeat in 2-6 months Yes Yanci Pierce, DO   lisinopril (PRINIVIL;ZESTRIL) 2.5 MG tablet TAKE 1 TABLET BY MOUTH  DAILY Yes Walt Mendoza,    ipratropium (ATROVENT) 0.06 % nasal spray 2 sprays by Nasal route 4 times daily Yes Yanci Pierce, DO   Misc Natural Products (GLUCOSAMINE-CHONDROITIN SULF PO) Take 1 tablet by mouth daily Yes Historical Provider, MD   aspirin 81 MG tablet Take 81 mg by mouth daily Yes Historical Provider, MD   Cholecalciferol (VITAMIN D3) 2000 UNITS CAPS One daily for Vit D supplement Yes Promise Guerrero MD   Melatonin 3 MG TABS Take 3 mg by mouth daily. One at bedtime for sleep assist Yes Historical Provider, MD   ammonium lactate (AMLACTIN) 12 % cream Apply  topically as needed. Apply topically as needed. Yes Historical Provider, MD   magnesium chloride (MAG DELAY) 535 (64 MG) MG TBCR CR tablet Take 64 mg by mouth daily. Yes Historical Provider, MD   acetaminophen (TYLENOL) 500 MG tablet Take 500 mg by mouth daily. Yes Historical Provider, MD   Multiple Vitamin (MULTIVITAMIN PO) Take 1 tablet by mouth daily. Yes Historical Provider, MD     Review of Systems   Constitutional: Negative for appetite change, chills, diaphoresis, fever and unexpected weight change. Respiratory: Negative for cough, choking, chest tightness, shortness of breath and wheezing. Cardiovascular: Negative for chest pain, palpitations and leg swelling. Neurological: Negative for dizziness, light-headedness and headaches.      Objective:   Physical Exam  Vitals:    04/21/20 0822   Weight: 171 lb (77.6 kg)  Comment: FROM LAST VISIT   Height: 5' 2\" (1.575 m)     BP Readings from Last 3 Encounters:   01/21/20 124/78   01/10/20 120/78   01/05/20 116/74     Pulse Readings from Last 3 Encounters:   01/21/20 80   01/10/20 72   01/05/20 64     Wt Readings COVID-19, just like you would around other people. Although there have not been reports of pets or other animals becoming sick with COVID-19, it is still recommended that people sick with COVID-19 limit contact with animals until more information is known about the virus. When possible, have another member of your household care for your animals while you are sick. If you are sick with COVID-19, avoid contact with your pet, including petting, snuggling, being kissed or licked, and sharing food. If you must care for your pet or be around animals while you are sick, wash your hands before and after you interact with pets and wear a facemask. Call ahead before visiting your doctor  If you have a medical appointment, call the healthcare provider and tell them that you have or may have COVID-19. This will help the healthcare providers office take steps to keep other people from getting infected or exposed. Wear a facemask  You should wear a facemask when you are around other people (e.g., sharing a room or vehicle) or pets and before you enter a healthcare providers office. If you are not able to wear a facemask (for example, because it causes trouble breathing), then people who live with you should not stay in the same room with you, or they should wear a facemask if they enter your room. Cover your coughs and sneezes  Cover your mouth and nose with a tissue when you cough or sneeze. Throw used tissues in a lined trash can. Immediately wash your hands with soap and water for at least 20 seconds or, if soap and water are not available, clean your hands with an alcohol-based hand  that contains at least 60% alcohol. Clean your hands often  Wash your hands often with soap and water for at least 20 seconds, especially after blowing your nose, coughing, or sneezing; going to the bathroom; and before eating or preparing food.  If soap and water are not readily available, use an alcohol-based hand  with at least 60% alcohol, covering all surfaces of your hands and rubbing them together until they feel dry. Soap and water are the best option if hands are visibly dirty. Avoid touching your eyes, nose, and mouth with unwashed hands. Avoid sharing personal household items  You should not share dishes, drinking glasses, cups, eating utensils, towels, or bedding with other people or pets in your home. After using these items, they should be washed thoroughly with soap and water. Clean all high-touch surfaces everyday  High touch surfaces include counters, tabletops, doorknobs, bathroom fixtures, toilets, phones, keyboards, tablets, and bedside tables. Also, clean any surfaces that may have blood, stool, or body fluids on them. Use a household cleaning spray or wipe, according to the label instructions. Labels contain instructions for safe and effective use of the cleaning product including precautions you should take when applying the product, such as wearing gloves and making sure you have good ventilation during use of the product. Monitor your symptoms  Seek prompt medical attention if your illness is worsening (e.g., difficulty breathing). Before seeking care, call your healthcare provider and tell them that you have, or are being evaluated for, COVID-19. Put on a facemask before you enter the facility. These steps will help the healthcare providers office to keep other people in the office or waiting room from getting infected or exposed. Ask your healthcare provider to call the local or state health department. Persons who are placed under active monitoring or facilitated self-monitoring should follow instructions provided by their local health department or occupational health professionals, as appropriate. When working with your local health department check their available hours.   If you have a medical emergency and need to call 911, notify the dispatch personnel that you have, or are being evaluated for COVID-19. If possible, put on a facemask before emergency medical services arrive. Discontinuing home isolation  Patients with confirmed COVID-19 should remain under home isolation precautions until the risk of secondary transmission to others is thought to be low. The decision to discontinue home isolation precautions should be made on a case-by-case basis, in consultation with healthcare providers and state and local health departments. Use Tylenol NOT Ibuprofen/Aleve/aspirin for pain or fevers. There is a theoretical decrease in the body's ability to fight the virus when you are infected if you are on NSAIDs. The FDA has said that this information is not yet proven. The newest evidence suggest that wearing a mask in public may be beneficial if you remember that the outside of it is contaminated and treat it accordingly. It also helps prevent spread if someone has an asymptomatic or presymptomatic case and does not know it yet. Even cloth masks can be beneficial.    Vitamin D and vitamin C are being used as treatment for people with COVID-19 in some hospitals. Make sure you do not run out of your vitamin D 2000 IU daily. Would also recommend taking at least vitamin C 500 mg once or twice daily to strengthen your immune system and prevent the COVID-19 infection during the moderate to high risk duration of the pandemic which is likely to be approximately 1 year. Also keeping your proteins up will help keep your immune system strong to help prevent or recover from a COVID-19 infection. IF you develop ANY respiratory infection symptoms (not just allergy symptoms) suggestive of COVID-19 start the recommendations below: Instructions for Respiratory Infections (SAVE THIS SHEET)    For the first 7-14 days of symptoms follow instructions below, even before being seen in the office or even during treatment with antibiotics, until symptom free.     1. Water: Drink 1 ounce of water for every 2 pounds of body weight for adults, you need 78 ounces of water/fluids per day. This will loosen mucus in the head and chest & improve the weak feeling of dehydration, allow the body to get germ fighting resources to the infection. Half of fluids can be juice or sugar free Crystal Light. Don't count drinks with caffeine, alcohol or carbonation. Infants can have Pedialyte liquid or freezer pops. Avoid salt and sports drinks if you have high Blood Pressure, swelling in the feet or ankles or have heart problems. 2. Humidity: Humidify the air to 35-50% ( or until the windows fog over slightly). Can use a humidifier, vaporizer, boil water on the stove or put a coffee can full of water on the heater vents. This will loosen mucus from infections and allergies. 3. Sleep: Get 8-10 hours a night and rest during the evening after work or school. If you have trouble sleeping, adults can take Melatonin 5mg up to 2 tabs at bedtime ( not for children or pregnant women). If Mono is suspected then sleep during 9PM to 9AM time span (if possible.)  4. Cough: Take cough medicines with Guaifenesin ( to loosen chest or head congestion) and Dextromethorphan ( to decrease excess cough). Robitussin D.M. Syrup every 4-6 hrs OR Mucinex D. M. pills OR Delsym DM syrup twice a day. Use the pediatric formulations for children over 6 months making sure they are alcohol & sugar free for children, pregnant women, and diabetics. 5. Pain And Fevers: Take Acetaminophen ( Tylenol) for fevers, aches, and headaches. 2-500 mg every 8 hours for adults. Appropriate doses at bedtime for children may help them sleep better. If pregnant take 1 -500 mg (Tylenol) every 8 hours as needed. Ibuprofen/Aleve/aspirin for pain and fevers SHOULD NOT BE USED IN THE SETTING OF POSSIBLE COVID-19 viral infection NOR if pregnant, if you have acid reflux, high blood pressure, CHF, or kidney problems.   6.Gargle: (DAY ONE OF SYMPTOMS) Gargle in the

## 2020-04-27 RX ORDER — POTASSIUM CHLORIDE 750 MG/1
TABLET, EXTENDED RELEASE ORAL
Qty: 90 TABLET | Refills: 0 | Status: SHIPPED | OUTPATIENT
Start: 2020-04-27 | End: 2020-07-17

## 2020-05-26 RX ORDER — LISINOPRIL 2.5 MG/1
TABLET ORAL
Qty: 90 TABLET | Refills: 3 | Status: SHIPPED | OUTPATIENT
Start: 2020-05-26 | End: 2021-06-22

## 2020-05-26 RX ORDER — PROBENECID 500 MG/1
TABLET, FILM COATED ORAL
Qty: 45 TABLET | Refills: 0 | Status: SHIPPED | OUTPATIENT
Start: 2020-05-26 | End: 2020-08-11

## 2020-06-02 RX ORDER — LEVOTHYROXINE SODIUM 0.05 MG/1
TABLET ORAL
Qty: 90 TABLET | Refills: 0 | Status: SHIPPED | OUTPATIENT
Start: 2020-06-02 | End: 2020-09-04

## 2020-07-17 ENCOUNTER — NURSE ONLY (OUTPATIENT)
Dept: FAMILY MEDICINE CLINIC | Age: 85
End: 2020-07-17
Payer: MEDICARE

## 2020-07-17 VITALS — TEMPERATURE: 97.1 F

## 2020-07-17 LAB
CHOLESTEROL, TOTAL: 119 MG/DL (ref 0–199)
HDLC SERPL-MCNC: 50 MG/DL (ref 40–60)
HOMOCYSTEINE: 12 UMOL/L (ref 0–10)
LDL CHOLESTEROL CALCULATED: 57 MG/DL
TRIGL SERPL-MCNC: 59 MG/DL (ref 0–150)
VLDLC SERPL CALC-MCNC: 12 MG/DL

## 2020-07-17 PROCEDURE — 36415 COLL VENOUS BLD VENIPUNCTURE: CPT | Performed by: FAMILY MEDICINE

## 2020-07-17 RX ORDER — POTASSIUM CHLORIDE 750 MG/1
TABLET, EXTENDED RELEASE ORAL
Qty: 90 TABLET | Refills: 0 | Status: SHIPPED | OUTPATIENT
Start: 2020-07-17 | End: 2020-09-23

## 2020-07-21 ENCOUNTER — OFFICE VISIT (OUTPATIENT)
Dept: FAMILY MEDICINE CLINIC | Age: 85
End: 2020-07-21
Payer: MEDICARE

## 2020-07-21 VITALS
DIASTOLIC BLOOD PRESSURE: 82 MMHG | SYSTOLIC BLOOD PRESSURE: 126 MMHG | WEIGHT: 169 LBS | RESPIRATION RATE: 16 BRPM | OXYGEN SATURATION: 98 % | HEART RATE: 50 BPM | TEMPERATURE: 97.2 F | HEIGHT: 62 IN | BODY MASS INDEX: 31.1 KG/M2

## 2020-07-21 DIAGNOSIS — E77.8 HYPOPROTEINEMIA (HCC): ICD-10-CM

## 2020-07-21 LAB
ALBUMIN SERPL-MCNC: 4 G/DL (ref 3.4–5)
ALP BLD-CCNC: 101 U/L (ref 40–129)
ALT SERPL-CCNC: 16 U/L (ref 10–40)
AST SERPL-CCNC: 23 U/L (ref 15–37)
BILIRUB SERPL-MCNC: 0.5 MG/DL (ref 0–1)
BILIRUBIN DIRECT: <0.2 MG/DL (ref 0–0.3)
BILIRUBIN, INDIRECT: ABNORMAL MG/DL (ref 0–1)
TOTAL PROTEIN: 5.8 G/DL (ref 6.4–8.2)

## 2020-07-21 PROCEDURE — 1090F PRES/ABSN URINE INCON ASSESS: CPT | Performed by: FAMILY MEDICINE

## 2020-07-21 PROCEDURE — 99214 OFFICE O/P EST MOD 30 MIN: CPT | Performed by: FAMILY MEDICINE

## 2020-07-21 PROCEDURE — G8427 DOCREV CUR MEDS BY ELIG CLIN: HCPCS | Performed by: FAMILY MEDICINE

## 2020-07-21 PROCEDURE — G8417 CALC BMI ABV UP PARAM F/U: HCPCS | Performed by: FAMILY MEDICINE

## 2020-07-21 PROCEDURE — 4040F PNEUMOC VAC/ADMIN/RCVD: CPT | Performed by: FAMILY MEDICINE

## 2020-07-21 PROCEDURE — 1123F ACP DISCUSS/DSCN MKR DOCD: CPT | Performed by: FAMILY MEDICINE

## 2020-07-21 PROCEDURE — 1036F TOBACCO NON-USER: CPT | Performed by: FAMILY MEDICINE

## 2020-07-21 ASSESSMENT — ENCOUNTER SYMPTOMS
SHORTNESS OF BREATH: 0
WHEEZING: 0
CHEST TIGHTNESS: 0
COUGH: 0
CHOKING: 0

## 2020-07-21 NOTE — PATIENT INSTRUCTIONS
Raúl Busch was seen today for hypertension. Diagnoses and all orders for this visit:    Hypoproteinemia ([address]  -     Hepatic Function Panel; Future  Likely improved will try to add lab otherwise will recheck in 3 months. Acquired hypothyroidism  -     Good control.  -     Continue meds and lifestyle control. HTN (hypertension), benign  -     Good control.  -     Continue meds and lifestyle control. Unsteady gait  -     Good control.  -     Continue exercises. Recurrent major depressive disorder, in partial remission (HCC)  -     Good control.  -     Continue lifestyle control. Pure hypercholesterolemia  -     Good control.  -     Continue meds and lifestyle control. Homocysteine level above reference range  Improving.

## 2020-07-21 NOTE — PROGRESS NOTES
Subjective:      Patient ID: Rossi Page is a 80 y.o. female. Chief Complaint   Patient presents with    Hypertension     PT HERE FOR ROUTINE FOLLOW UP ON HYPERTENSION   1006  HPI    Hypoproteinemia ([address]  Has increased protein: by 1/2 protein bar (Rodriguez 21 gm) and 1/2 protein drink (premier high protein 30 gm). Taken at variable times. Not replacing a meal. Drinks are done with meal. Bar mid afternoon or after dinner. Has not changed other eating habits. Does healthy choice meal at dinner. Acquired hypothyroidism  Current symptoms include swelling, weight loss. Patient denies fatigue, weight gain, feeling cold and cold intolerance, constipation, anxiousness, feeling excessive energy, tremulousness, palpitations, sweating, diarrhea, change in skin,  nails, or hair, heat intolerance, depression, goiter, ocular symptoms. Takes med 1st thing in am with a full glass of water and waits 30 min to eat or meds. HTN (hypertension), benign  Is not checking routinely. Is watching salt. Seldom adds at table. Eats some crackers. Unsteady gait  Using walker consistently. No falls since last visit. Is using pedals on the floor for 15 min (was 10). She is sewing so getting up and down more. Recurrent major depressive disorder, in partial remission ([address]  Feels ok. Not crying at times. Talks longer on the telephone with son and daughter nightly. Talks with out of town family 1-2x/wk. Pure hypercholesterolemia  All numbers on goal.  No SE's with med. Diet above.   (May need decrease in dose)    Past Medical History:   Diagnosis Date    Breast cancer ([address] 2004    ductal vs lobular, not clear from pathologist    Cellulitis of left leg     Cellulitis of wrist 06/06/2018    RIGHT    Chronic kidney disease, stage III (moderate) ([address] 2005    Depression     Diverticulitis of colon 01/01/1979    DVT (deep venous thrombosis) ([address] 2004    after first knee surgery    Gout 5/13/2011    Hematoma of left thigh 2017    Herpes zoster 2018    right ant shoulder    Hypertension     Hypothyroidism 2011    Intervertebral lumbar disc disorder with myelopathy, lumbar region     OA (osteoarthritis)     severe in knees, left ankle, left >right hip    Primary osteoarthritis of right wrist     Severe arthritic change within carpal - 1st metacarpal joint, Extensive chondrocalcinosis throughout wrist    Pure hypercholesterolemia     Seborrheic dermatitis 2014    Subdural hematoma, post-traumatic ([facility] 2016    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       Past Surgical History:   Procedure Laterality Date    BREAST SURGERY      right mastectomy     SECTION  1970    COLON SURGERY  1970    right partial colectomy for ruptured diverticulitis    INCISIONAL HERNIA REPAIR  2016    with mesh, laparoscopic, was incarcerated    JOINT REPLACEMENT      right knee and left knee in    [address]  2015    verrous hyperplasia left ventral tongue, [facility] [id number] [facility], Dr Carmelo Maldonado  1970       Social History     Socioeconomic History    Marital status:       Spouse name: Benjy Linear -      Number of children: 3    Years of education: 15    Highest education level: Not on file   Occupational History    Occupation: SECRETARIAL WORK - RETIRED    Social Needs    Financial resource strain: Not hard at all   Betsey-Oma insecurity     Worry: Never true     Inability: Never true   [place] needs     Medical: No     Non-medical: No   Tobacco Use    Smoking status: Never Smoker    Smokeless tobacco: Never Used    Tobacco comment: avoid tobacco   Substance and Sexual Activity    Alcohol use: No     Alcohol/week: 0.0 standard drinks    Drug use: No    Sexual activity: Not Currently     Birth control/protection: Post-menopausal   Lifestyle    Physical activity     Days per week: Not on file     Minutes per session: Not on file    Stress: Not on file   Relationships    Social connections     Talks on phone: Not on file     Gets together: Not on file     Attends Jewish service: Not on file     Active member of club or organization: Not on file     Attends meetings of clubs or organizations: Not on file     Relationship status: Not on file    Intimate partner violence     Fear of current or ex partner: Not on file     Emotionally abused: Not on file     Physically abused: Not on file     Forced sexual activity: Not on file   Other Topics Concern    Not on file   Social History Narrative    Lives by herself. Using her walker routinely 5/2016. NO CURRENT EXERCISE PROGRAM BEYOND STRETCH. 11/15/16. 2/20/17. None. Has left knee pain. 6/7/17. Does HEP from PT, calf pumps, wall push ups. 9/19/17. No exercise except leg. 12/5/17 Does leg exercises with pedals. No muscle conditioning. 4/3/18. 7/3/18. Does leg lifts when sitting. Uses floor pedals 3-4 x/wk for 3-5 min. No problems when does. Patient has a health aide who comes for 3 hours 2 times a week. 10/2/18. Floor pedals 3-5 min qd and occasional bid. 1/8/19. 5-10 min qd and occasional 5 min bid. 4/16/19. 5x/wk 1-2x/day. 7/16/19. 10/22/19. 5x/wk 5-10 min. 1/21/20. 7/21/20.        Family History   Problem Relation Age of Onset   Katharine Christyze Cancer Mother         bone    Heart Disease Father         CAD    Diabetes Father     Diabetes Sister     Cancer Sister 54        leukemia    Arrhythmia Brother         A fib    Other Son         Spinal fusion with rods/screws    No Known Problems Daughter     High Blood Pressure Daughter     High Blood Pressure Son      No Known Allergies    Current Outpatient Medications   Medication Sig Dispense Refill    potassium chloride (KLOR-CON M) 10 MEQ extended release tablet TAKE 1 TABLET BY MOUTH  DAILY 90 tablet 0    levothyroxine (SYNTHROID) 50 MCG tablet TAKE 1 TABLET BY MOUTH  DAILY ON AN EMPTY STOMACH  FOR THYROID 90 tablet 0    lisinopril (PRINIVIL;ZESTRIL) 2.5 MG tablet TAKE 1 TABLET BY MOUTH  DAILY 90 tablet 3    probenecid (BENEMID) 500 MG tablet TAKE ONE-HALF TABLET BY  MOUTH DAILY TO PREVENT GOUT 45 tablet 0    atorvastatin (LIPITOR) 80 MG tablet TAKE 1 TABLET BY MOUTH  DAILY 90 tablet 2    buPROPion (WELLBUTRIN SR) 150 MG extended release tablet TAKE 1 TABLET BY MOUTH  TWICE A  tablet 3    zoster recombinant adjuvanted vaccine (SHINGRIX) 50 MCG/0.5ML SUSR injection Inject 0.5 mLs into the muscle See Admin Instructions 1 dose now and repeat in 2-6 months 0.5 mL 0    ipratropium (ATROVENT) 0.06 % nasal spray 2 sprays by Nasal route 4 times daily 1 Bottle 3    Misc Natural Products (GLUCOSAMINE-CHONDROITIN SULF PO) Take 1 tablet by mouth daily      aspirin 81 MG tablet Take 81 mg by mouth daily      Cholecalciferol (VITAMIN D3) 2000 UNITS CAPS One daily for Vit D supplement 30 capsule     Melatonin 3 MG TABS Take 3 mg by mouth daily. One at bedtime for sleep assist      ammonium lactate (AMLACTIN) 12 % cream Apply  topically as needed. Apply topically as needed.  magnesium chloride (MAG DELAY) 535 (64 MG) MG TBCR CR tablet Take 64 mg by mouth daily.  acetaminophen (TYLENOL) 500 MG tablet Take 500 mg by mouth daily.  Multiple Vitamin (MULTIVITAMIN PO) Take 1 tablet by mouth daily.  niacin 100 MG tablet 300 mg 2 times daily (with meals)        No current facility-administered medications for this visit. Review of Systems   Constitutional: Negative for chills, fatigue and fever. Respiratory: Negative for cough, choking, chest tightness, shortness of breath and wheezing. Cardiovascular: Negative for chest pain, palpitations and leg swelling. Neurological: Positive for numbness (finger tips holding iPad. ). Negative for dizziness, light-headedness and headaches.        Objective:   Physical Exam  Vitals signs and nursing note reviewed. Constitutional:       General: She is not in acute distress. Appearance: Normal appearance. She is well-developed. She is not diaphoretic. Eyes:      General: No scleral icterus. Right eye: No discharge. Left eye: No discharge. Conjunctiva/sclera: Conjunctivae normal.   Neck:      Musculoskeletal: Neck supple. Thyroid: No thyroid mass or thyromegaly. Vascular: No carotid bruit or JVD. Trachea: Trachea and phonation normal. No tracheal deviation. Cardiovascular:      Rate and Rhythm: Normal rate and regular rhythm. No extrasystoles are present. Heart sounds: S1 normal and S2 normal. Heart sounds not distant. Murmur present. Systolic murmur present with a grade of 1/6. No friction rub. No gallop. No S3 or S4 sounds. Pulmonary:      Effort: Pulmonary effort is normal. No respiratory distress. Breath sounds: Normal breath sounds. No decreased breath sounds, wheezing, rhonchi or rales. Lymphadenopathy:      Head:      Right side of head: No submandibular or tonsillar adenopathy. Left side of head: No submandibular or tonsillar adenopathy. Cervical: No cervical adenopathy. Right cervical: No superficial, deep or posterior cervical adenopathy. Left cervical: No superficial, deep or posterior cervical adenopathy. Skin:     General: Skin is warm and dry. Coloration: Skin is not pale. Nails: There is no clubbing. Neurological:      Mental Status: She is alert. Deep Tendon Reflexes:      Reflex Scores:       Bicep reflexes are 2+ on the right side and 2+ on the left side. Comments: She is able to transfer on her own but is somewhat weak. She rocks slightly to get off the chair.    Psychiatric:         Speech: Speech normal.         Behavior: Behavior normal.         Judgment: Judgment normal.       Vitals:    07/21/20 0954   BP: 126/82   Site: Left Upper Arm   Position: Sitting   Cuff Size: Large Adult Pulse: 50   Resp: 16   Temp: 97.2 °F (36.2 °C)   TempSrc: Infrared   SpO2: 98%   Weight: 169 lb (76.7 kg)  Comment: shoes on   Height: 5' 2\" (1.575 m)     BP Readings from Last 3 Encounters:   07/21/20 126/82   01/21/20 124/78   01/10/20 120/78     Pulse Readings from Last 3 Encounters:   07/21/20 50   01/21/20 80   01/10/20 72     Wt Readings from Last 3 Encounters:   07/21/20 169 lb (76.7 kg)   04/21/20 171 lb (77.6 kg)   01/21/20 171 lb (77.6 kg)     Body mass index is 30.91 kg/m². 7/17/2020 08:47   Total Protein 5.8 (L)   Cholesterol, Total 119   HDL Cholesterol 50   LDL Calculated 57   Triglycerides 59   VLDL Cholesterol Calculated 12   Homocysteine 12 (H)   Albumin 4.0   Alk Phos 101   ALT 16   AST 23   Bilirubin 0.5   Bilirubin, Direct <0.2   Bilirubin, Indirect see below     Assessment:      1. Hypoproteinemia ([address]    2. Acquired hypothyroidism    3. HTN (hypertension), benign    4. Unsteady gait    5. Recurrent major depressive disorder, in partial remission ([address]    6. Pure hypercholesterolemia    7. Homocysteine level above reference range          Plan:    Asa Mccarty was seen today for hypertension. Diagnoses and all orders for this visit:    Hypoproteinemia ([address]  -     Hepatic Function Panel; Future  Likely improved will try to add lab otherwise will recheck in 3 months. Acquired hypothyroidism  -     Good control.  -     Continue meds and lifestyle control. HTN (hypertension), benign  -     Good control.  -     Continue meds and lifestyle control. Unsteady gait  -     Good control.  -     Continue exercises. Recurrent major depressive disorder, in partial remission (HCC)  -     Good control.  -     Continue lifestyle control. Pure hypercholesterolemia  -     Good control.  -     Continue meds and lifestyle control. Homocysteine level above reference range  Improving.        Jorge Aragon DO

## 2020-07-27 RX ORDER — ATORVASTATIN CALCIUM 80 MG/1
80 TABLET, FILM COATED ORAL DAILY
Qty: 90 TABLET | Refills: 3 | Status: SHIPPED | OUTPATIENT
Start: 2020-07-27 | End: 2021-08-18 | Stop reason: SDUPTHER

## 2020-08-11 RX ORDER — PROBENECID 500 MG/1
TABLET, FILM COATED ORAL
Qty: 45 TABLET | Refills: 3 | Status: SHIPPED | OUTPATIENT
Start: 2020-08-11 | End: 2021-07-26

## 2020-08-19 ENCOUNTER — TELEPHONE (OUTPATIENT)
Dept: FAMILY MEDICINE CLINIC | Age: 85
End: 2020-08-19

## 2020-08-19 NOTE — TELEPHONE ENCOUNTER
Patient is requesting that we not send messages to her via my chart.  She would prefer a phone call please

## 2020-09-04 RX ORDER — LEVOTHYROXINE SODIUM 0.05 MG/1
TABLET ORAL
Qty: 90 TABLET | Refills: 0 | Status: SHIPPED | OUTPATIENT
Start: 2020-09-04 | End: 2020-10-27 | Stop reason: SDUPTHER

## 2020-09-21 RX ORDER — BUPROPION HYDROCHLORIDE 150 MG/1
TABLET, EXTENDED RELEASE ORAL
Qty: 180 TABLET | Refills: 3 | Status: SHIPPED | OUTPATIENT
Start: 2020-09-21 | End: 2020-10-27 | Stop reason: SDUPTHER

## 2020-09-23 RX ORDER — POTASSIUM CHLORIDE 750 MG/1
TABLET, EXTENDED RELEASE ORAL
Qty: 90 TABLET | Refills: 0 | Status: SHIPPED | OUTPATIENT
Start: 2020-09-23 | End: 2020-11-23

## 2020-10-27 ENCOUNTER — OFFICE VISIT (OUTPATIENT)
Dept: FAMILY MEDICINE CLINIC | Age: 85
End: 2020-10-27
Payer: MEDICARE

## 2020-10-27 VITALS
HEIGHT: 62 IN | SYSTOLIC BLOOD PRESSURE: 122 MMHG | TEMPERATURE: 96.8 F | HEART RATE: 65 BPM | WEIGHT: 167.4 LBS | DIASTOLIC BLOOD PRESSURE: 78 MMHG | BODY MASS INDEX: 30.8 KG/M2 | OXYGEN SATURATION: 97 % | RESPIRATION RATE: 16 BRPM

## 2020-10-27 LAB
ALBUMIN SERPL-MCNC: 4.1 G/DL (ref 3.4–5)
ANION GAP SERPL CALCULATED.3IONS-SCNC: 8 MMOL/L (ref 3–16)
BUN BLDV-MCNC: 22 MG/DL (ref 7–20)
CALCIUM SERPL-MCNC: 9.6 MG/DL (ref 8.3–10.6)
CHLORIDE BLD-SCNC: 105 MMOL/L (ref 99–110)
CO2: 28 MMOL/L (ref 21–32)
CREAT SERPL-MCNC: 0.7 MG/DL (ref 0.6–1.2)
GFR AFRICAN AMERICAN: >60
GFR NON-AFRICAN AMERICAN: >60
GLUCOSE BLD-MCNC: 100 MG/DL (ref 70–99)
MAGNESIUM: 1.8 MG/DL (ref 1.8–2.4)
POTASSIUM SERPL-SCNC: 4.3 MMOL/L (ref 3.5–5.1)
SODIUM BLD-SCNC: 141 MMOL/L (ref 136–145)
T4 FREE: 1.1 NG/DL (ref 0.9–1.8)
TOTAL PROTEIN: 5.6 G/DL (ref 6.4–8.2)
TSH REFLEX: 5.82 UIU/ML (ref 0.27–4.2)

## 2020-10-27 PROCEDURE — 1036F TOBACCO NON-USER: CPT | Performed by: FAMILY MEDICINE

## 2020-10-27 PROCEDURE — 1123F ACP DISCUSS/DSCN MKR DOCD: CPT | Performed by: FAMILY MEDICINE

## 2020-10-27 PROCEDURE — G8417 CALC BMI ABV UP PARAM F/U: HCPCS | Performed by: FAMILY MEDICINE

## 2020-10-27 PROCEDURE — 36415 COLL VENOUS BLD VENIPUNCTURE: CPT | Performed by: FAMILY MEDICINE

## 2020-10-27 PROCEDURE — 1090F PRES/ABSN URINE INCON ASSESS: CPT | Performed by: FAMILY MEDICINE

## 2020-10-27 PROCEDURE — G8484 FLU IMMUNIZE NO ADMIN: HCPCS | Performed by: FAMILY MEDICINE

## 2020-10-27 PROCEDURE — 99214 OFFICE O/P EST MOD 30 MIN: CPT | Performed by: FAMILY MEDICINE

## 2020-10-27 PROCEDURE — G8427 DOCREV CUR MEDS BY ELIG CLIN: HCPCS | Performed by: FAMILY MEDICINE

## 2020-10-27 PROCEDURE — 4040F PNEUMOC VAC/ADMIN/RCVD: CPT | Performed by: FAMILY MEDICINE

## 2020-10-27 RX ORDER — LEVOTHYROXINE SODIUM 0.05 MG/1
TABLET ORAL
Qty: 90 TABLET | Refills: 0 | Status: SHIPPED | OUTPATIENT
Start: 2020-10-27 | End: 2020-12-15

## 2020-10-27 RX ORDER — LANOLIN ALCOHOL/MO/W.PET/CERES
500 CREAM (GRAM) TOPICAL NIGHTLY
COMMUNITY

## 2020-10-27 RX ORDER — BUPROPION HYDROCHLORIDE 150 MG/1
TABLET, EXTENDED RELEASE ORAL
Qty: 180 TABLET | Refills: 3 | Status: SHIPPED | OUTPATIENT
Start: 2020-10-27 | End: 2021-10-11

## 2020-10-27 ASSESSMENT — ENCOUNTER SYMPTOMS
CHEST TIGHTNESS: 0
COUGH: 0
WHEEZING: 0
CHOKING: 0
SHORTNESS OF BREATH: 0

## 2020-10-27 NOTE — PATIENT INSTRUCTIONS
Maryanne Can was seen today for hypertension, thyroid problem, other and gait problem. Diagnoses and all orders for this visit:    Hypoproteinemia ([address]  -     ALBUMIN; Future  -     PROTEIN, TOTAL; Future  Likely much improved. HTN (hypertension), benign  -     Basic Metabolic Panel; Future  -     Magnesium; Future  -     Good control. Check BP every 2 weeks or so to make sure cuff is working if you need it. Pure hypercholesterolemia  Recheck next visit. Recurrent major depressive disorder, in partial remission (HCC)  -     buPROPion (WELLBUTRIN SR) 150 MG extended release tablet; TAKE 1 TABLET BY MOUTH TWICE DAILY  -     Good control.  -     Continue meds and lifestyle control. Acquired hypothyroidism  -     levothyroxine (SYNTHROID) 50 MCG tablet; TAKE 1 TABLET BY MOUTH DAILY ON EMPTY STOMACH FOR THYROID  -     TSH with Reflex; Future  -     Good control.  -     Continue meds and lifestyle control. Stage 3a chronic kidney disease  -     Basic Metabolic Panel; Future  -     Magnesium; Future  Water goal 76 oz/day. Insomnia  Melatonin 1/2 tab at bedtime and 1/2 when you get up. Try 5 HTP 50 mg at bedtime.

## 2020-10-27 NOTE — PROGRESS NOTES
Subjective:      Patient ID: Ilan Gomez is a 80 y.o. female. Chief Complaint   Patient presents with    Hypertension     FOLLOW UP ON HTN    Thyroid Problem     FOLLOW UP ON THYROID    Other     FOLLOW UP ON LOW PROTEIN    Gait Problem     FOLLOW UP ON UNSTEADY GAIT   846  HPI    Hypoproteinemia (HCC)  Is drinking a protein drink with 30 grams of protein with dinner 11 oz. Eats meat or chicken or fish daily qd. Has cottage cheese 3-4 days/wk. Greek yogurt 3-4days/wk. HTN (hypertension), benign  Is not checking at home. Her cuff is ~ same as ours. Is watching salt. No falls nor close calls. Pure hypercholesterolemia  Atorvastatin 80 mg with no SE's. Takes with dinner. Low proteins so likely controlled cholesterol. Recurrent major depressive disorder, in partial remission (HCC)  -     buPROPion (WELLBUTRIN SR) 150 MG extended release tablet; TAKE 1 TABLET BY MOUTH TWICE DAILY  Not feeling depressed except because she can't go anywhere due to the pandemic. Sleeps ok usually. Acquired hypothyroidism  -     levothyroxine (SYNTHROID) 50 MCG tablet; TAKE 1 TABLET BY MOUTH DAILY ON EMPTY STOMACH FOR THYROID  Current symptoms include weight loss. Patient denies fatigue, weight gain, feeling cold and cold intolerance, constipation, swelling, anxiousness, feeling excessive energy, tremulousness, palpitations, sweating, diarrhea, change in skin,  nails, or hair, heat intolerance, depression, goiter, ocular symptoms. Takes 1 tab oral prior to breakfast with 8 oz water on empty stomach. Doesn't take food/drinks/meds/supplements for 30 min. Stage 3a chronic kidney disease  2x 16.9.  Milk or protein drink with a meal.      Current Outpatient Medications   Medication Sig Dispense Refill    buPROPion (WELLBUTRIN SR) 150 MG extended release tablet TAKE 1 TABLET BY MOUTH TWICE DAILY 180 tablet 3    levothyroxine (SYNTHROID) 50 MCG tablet TAKE 1 TABLET BY MOUTH DAILY ON EMPTY STOMACH FOR THYROID 90 tablet Appearance: Normal appearance. She is well-developed. She is obese. She is not diaphoretic. Eyes:      General: No scleral icterus. Right eye: No discharge. Left eye: No discharge. Conjunctiva/sclera: Conjunctivae normal.   Neck:      Musculoskeletal: Neck supple. Thyroid: No thyroid mass or thyromegaly. Vascular: No carotid bruit or JVD. Trachea: Trachea and phonation normal. No tracheal deviation. Cardiovascular:      Rate and Rhythm: Normal rate and regular rhythm. No extrasystoles are present. Heart sounds: S1 normal and S2 normal. Heart sounds not distant. Murmur present. Systolic murmur present with a grade of 1/6. No friction rub. No gallop. No S3 or S4 sounds. Pulmonary:      Effort: Pulmonary effort is normal. No respiratory distress. Breath sounds: Normal breath sounds. No decreased breath sounds, wheezing, rhonchi or rales. Lymphadenopathy:      Head:      Right side of head: No submandibular or tonsillar adenopathy. Left side of head: No submandibular or tonsillar adenopathy. Cervical: No cervical adenopathy. Right cervical: No superficial, deep or posterior cervical adenopathy. Left cervical: No superficial, deep or posterior cervical adenopathy. Skin:     General: Skin is warm and dry. Coloration: Skin is not pale. Nails: There is no clubbing. Neurological:      Mental Status: She is alert. Deep Tendon Reflexes:      Reflex Scores:       Bicep reflexes are 2+ on the right side and 2+ on the left side. Comments: She is able to transfer on her own but is somewhat weak. She rocks slightly to get off the chair.    Psychiatric:         Speech: Speech normal.         Behavior: Behavior normal.         Judgment: Judgment normal.       Vitals:    10/27/20 0838   BP: 122/78   Site: Left Upper Arm   Position: Sitting   Cuff Size: Large Adult   Pulse: 65   Resp: 16   Temp: 96.8 °F (36 °C)   TempSrc: Infrared   SpO2: past.  Otherwise it is due 7/17/2021)    Recurrent major depressive disorder, in partial remission (HCC)  -     buPROPion (WELLBUTRIN SR) 150 MG extended release tablet; TAKE 1 TABLET BY MOUTH TWICE DAILY  -     Good control.  -     Continue meds and lifestyle control. Acquired hypothyroidism  -     levothyroxine (SYNTHROID) 50 MCG tablet; TAKE 1 TABLET BY MOUTH DAILY ON EMPTY STOMACH FOR THYROID  -     TSH with Reflex; Future  -     Good control.  -     Continue meds and lifestyle control. Stage 3a chronic kidney disease  -     Basic Metabolic Panel; Future  -     Magnesium; Future  Water goal 76 oz/day. Will be hard to drink more as temperature school. Insomnia  Melatonin 1/2 tab at bedtime and 1/2 when you get up. Try 5 HTP 50 mg at bedtime.        Irena Downey, DO

## 2020-11-23 RX ORDER — POTASSIUM CHLORIDE 750 MG/1
TABLET, EXTENDED RELEASE ORAL
Qty: 90 TABLET | Refills: 0 | Status: SHIPPED | OUTPATIENT
Start: 2020-11-23 | End: 2021-02-22

## 2020-12-15 RX ORDER — LEVOTHYROXINE SODIUM 0.05 MG/1
TABLET ORAL
Qty: 90 TABLET | Refills: 0 | Status: SHIPPED | OUTPATIENT
Start: 2020-12-15 | End: 2021-04-05

## 2021-01-11 ENCOUNTER — TELEPHONE (OUTPATIENT)
Dept: FAMILY MEDICINE CLINIC | Age: 86
End: 2021-01-11

## 2021-01-11 NOTE — TELEPHONE ENCOUNTER
Patient would like to get covid vaccine . Wants to make fatemeh Dr Susanna Mulligan is good with this and also should she be concerned about which one she gets?   Please advise

## 2021-01-19 NOTE — TELEPHONE ENCOUNTER
Left message will call back later. Visit pending 1/26/21 at 8:30 AM needs to be changed to afternoon video or telephone visit. Called again 11:02 AM.  Left message will try to call back once more before we close the message.

## 2021-01-26 ENCOUNTER — VIRTUAL VISIT (OUTPATIENT)
Dept: FAMILY MEDICINE CLINIC | Age: 86
End: 2021-01-26
Payer: MEDICARE

## 2021-01-26 DIAGNOSIS — K14.9 TONGUE DYSPLASIA: ICD-10-CM

## 2021-01-26 DIAGNOSIS — N18.31 STAGE 3A CHRONIC KIDNEY DISEASE (HCC): ICD-10-CM

## 2021-01-26 DIAGNOSIS — I10 HTN (HYPERTENSION), BENIGN: Primary | ICD-10-CM

## 2021-01-26 DIAGNOSIS — E77.8 HYPOPROTEINEMIA (HCC): ICD-10-CM

## 2021-01-26 DIAGNOSIS — Z71.89 EDUCATED ABOUT COVID-19 VIRUS INFECTION: ICD-10-CM

## 2021-01-26 DIAGNOSIS — E03.9 ACQUIRED HYPOTHYROIDISM: ICD-10-CM

## 2021-01-26 DIAGNOSIS — E78.00 PURE HYPERCHOLESTEROLEMIA: ICD-10-CM

## 2021-01-26 PROCEDURE — 1123F ACP DISCUSS/DSCN MKR DOCD: CPT | Performed by: FAMILY MEDICINE

## 2021-01-26 PROCEDURE — 1090F PRES/ABSN URINE INCON ASSESS: CPT | Performed by: FAMILY MEDICINE

## 2021-01-26 PROCEDURE — 4040F PNEUMOC VAC/ADMIN/RCVD: CPT | Performed by: FAMILY MEDICINE

## 2021-01-26 PROCEDURE — G8427 DOCREV CUR MEDS BY ELIG CLIN: HCPCS | Performed by: FAMILY MEDICINE

## 2021-01-26 PROCEDURE — 99213 OFFICE O/P EST LOW 20 MIN: CPT | Performed by: FAMILY MEDICINE

## 2021-01-26 ASSESSMENT — ENCOUNTER SYMPTOMS
COUGH: 1
CHEST TIGHTNESS: 0
CHOKING: 0
SHORTNESS OF BREATH: 0
WHEEZING: 0

## 2021-01-26 NOTE — PROGRESS NOTES
Linwood Favre (:  1933) is a 80 y.o. female,Established patient, here for evaluation of the following chief complaint(s): Hyperlipidemia (ROUTINE CHOLESTEROL FOLLOW UP) and Hypothyroidism (ROUTINE THYROID FOLLOW UP  )    ASSESSMENT/PLAN:  243    Patient Instructions     James Mcdermott was seen today for hyperlipidemia and hypothyroidism. Diagnoses and all orders for this visit:    HTN (hypertension), benign  -     Good control at last check in the office. Monitor more frequently at home until follow-up can be in the office. Hopefully that will be at your next visit because by April most people should have their second shot of vaccine. If you get your second shot by then you will have a 95% protection and will be safe to follow-up in the clinic.  -     Continue meds and lifestyle control. Acquired hypothyroidism  -     TSH with Reflex; Future   Ref. Range 2017 11:41 4/3/2018 09:58 10/2/2018 11:13 2019 09:46 2019 09:02 10/22/2019 09:18 10/27/2020 09:25   TSH  0.27 - 4.20 uIU/mL 4.15 3.82 4.85 (H) 4.96 (H) 3.78 5.66 (H) 5.82 (H)   T4 Free 0.9 - 1.8 ng/dL   1.3 1.3  1.1 1.1   Highlighted numbers are too high. This suggests that your thyroid dose may be slightly too low. We may need to increase your dosage to 50 mcg 1 day and 75 mcg the next alternating. If this does not improve. Pure hypercholesterolemia  -     Good control.  -     Continue meds and lifestyle control. Hypoproteinemia (HCC)  -     PROTEIN, TOTAL; Future  -     ALBUMIN; Future  Total protein was still low. We will recheck. This will likely improve with your increase in protein supplements. Stage 3a chronic kidney disease  Continue to drink as much during the day as you can. If urinary urgency is a problem you can try:    Omega 3 fatty acids such as are found in fish oil can decrease urinary urgency and frequency.  Each capsule of fish oil should have 800 mg or more of a combination of EPA & DHA - the active omega 3's- per capsule and you take 1 capsule 3 times per day. The oil lines the bladder decreasing irritation of the bladder wall and helping prevent bacteria from sticking to the bladder wall so fewer UTI's occur. Fish oil also lowers triglycerides, helps with dry eyes and helps lubricate joints to lower arthritis pain. By the type of fish oil labeled \"Burpless\" and take it just before meal.  That way its underneath all the food as oil tends to float on water and can cause you to belch up a fishy taste. Tongue dysplasia  Resolved. Follow-up with specialist at the 5-year interval recommended by the specialist.    Educated about COVID-19 virus infection  COVID-19 vaccine  Get the vaccine as soon as possible. If you had COVID-19 you should not need the vaccination for 90 days afterwards. This is because you already have antibodies to the virus. The first shot usually is only associated with soreness in the arm unless you have already had COVID-19. Then your reaction may be similar to getting the second shot as below. There is a risk of allergic reaction so everybody is supposed to stay at the vaccination site for 30 minutes after getting the vaccine. It would be a great idea to have Benadryl 25 g with you to take IF you have a milder allergic reaction after you leave the site. If you use an EpiPen take it with you. Using an EpiPen is not a contraindication to the vaccine only a precaution. Notify them that you use an EpiPen when they give you the shot. The second shot can be associated with flulike symptoms - but you CANNOT get COVID-19 from the vaccination. The flulike symptoms are your body's response to the RNA injected which has already produced antibodies after the first shot. It is better to have a few days of flulike symptoms than a few weeks on a ventilator. COVID-19 vaccine  Get the vaccine as soon as possible.   If you had COVID-19 you should not need the vaccination for 90 days afterwards. This is because you already have antibodies to the virus. The first shot usually is only associated with soreness in the arm unless you have already had COVID-19. Then your reaction may be similar to getting the second shot as below. There is a risk of allergic reaction so everybody is supposed to stay at the vaccination site for 30 minutes after getting the vaccine. It would be a great idea to have Benadryl 25 g with you to take IF you have a milder allergic reaction after you leave the site. If you use an EpiPen take it with you. Using an EpiPen is not a contraindication to the vaccine only a precaution. Notify them that you use an EpiPen when they give you the shot. The second shot can be associated with flulike symptoms - but you CANNOT get COVID-19 from the vaccination. The flulike symptoms are your body's response to the RNA injected which has already produced antibodies after the first shot. It is better to have a few days of flulike symptoms than a few weeks on a ventilator. Return in about 3 months (around 4/27/2021) for Hypertension, Thyroid, Cholesterol, low protein, CKD. SUBJECTIVE/OBJECTIVE:s  Chief Complaint   Patient presents with    Hyperlipidemia     ROUTINE CHOLESTEROL FOLLOW UP    Hypothyroidism     ROUTINE THYROID FOLLOW UP     65  HPI    HTN (hypertension), benign  Cuff works. Not measuring very often. No falls in the last 3 months. Trying to drink more. When she does she has to get up more at night and has a hard time getting to the bathroom in time. Acquired hypothyroidism  TakeS 1 tab oral prior to breakfast with 8 oz water on empty stomach. Doesn't take food/drinks/meds/supplements for 20-30 min. Energy level stable. Bowels move every day. Pure hypercholesterolemia  Good in the past.  Hypoproteinemia ([address]  Is drinking a full protein drink with dinner. Premier protein with 30 g protein. She eats a greek yogurt with a meal 4 days a week.   She eats a protein bar sometimes. It has 21 g of protein Ajit Alarcon). Stage 3a chronic kidney disease  Trying to drink more. When she does she has to get up more at night and has a hard time getting to the bathroom in time. Tongue dysplasia  Seen by Dr. Brittani Mosher twice. The last time given a clear bill of health. She does not have to go back anymore for 5 years. She has not felt any lumps or bumps on the tongue since last seen by the doctor. Review of Systems   Constitutional: Negative for chills, diaphoresis and fever. Respiratory: Positive for cough ( Occasional with some phlegm). Negative for choking, chest tightness, shortness of breath and wheezing. Cardiovascular: Negative for chest pain, palpitations and leg swelling. Neurological: Negative for dizziness, light-headedness and headaches. Physical Exam     BP Readings from Last 3 Encounters:   10/27/20 122/78   07/21/20 126/82   01/21/20 124/78     Pulse Readings from Last 3 Encounters:   10/27/20 65   07/21/20 50   01/21/20 80     Wt Readings from Last 3 Encounters:   10/27/20 167 lb 6.4 oz (75.9 kg)   07/21/20 169 lb (76.7 kg)   04/21/20 171 lb (77.6 kg)     [INSTRUCTIONS:  \"[x]\" Indicates a positive item  \"[]\" Indicates a negative item  -- DELETE ALL ITEMS NOT EXAMINED]    Constitutional: [x] Appears well-developed and well-nourished [x] No apparent distress      [] Abnormal -     Mental status: [x] Alert and awake  [x] Oriented to person/place/time [x] Able to follow commands    [] Abnormal -     Eyes:   EOM    [x]  Normal    [] Abnormal -   Sclera  [x]  Normal    [] Abnormal -          Discharge [x]  None visible   [] Abnormal -     HENT: [x] Normocephalic, atraumatic  [] Abnormal -   [x] Mouth/Throat: Mucous membranes are moist    External Ears [x] Normal  [] Abnormal -    Neck: [x] No visualized mass [] Abnormal -   No lumps bumps or tenderness to patient palpation.     Pulmonary/Chest: [x] Respiratory effort normal   [x] No visualized signs of the Coronavirus Preparedness and Response Supplemental Appropriations Act, this Virtual Visit was conducted with patient's (and/or legal guardian's) consent, to reduce the patient's risk of exposure to COVID-19 and provide necessary medical care. The patient (and/or legal guardian) has also been advised to contact this office for worsening conditions or problems, and seek emergency medical treatment and/or call 911 if deemed necessary. Patient identification was verified at the start of the visit: Yes    Services were provided through a video synchronous discussion virtually to substitute for in-person clinic visit. Patient was located at home and provider was located in office or at home. An electronic signature was used to authenticate this note.     --Marcus Lechuga, DO

## 2021-02-02 ENCOUNTER — NURSE ONLY (OUTPATIENT)
Dept: FAMILY MEDICINE CLINIC | Age: 86
End: 2021-02-02
Payer: MEDICARE

## 2021-02-02 DIAGNOSIS — E03.9 ACQUIRED HYPOTHYROIDISM: ICD-10-CM

## 2021-02-02 DIAGNOSIS — E77.8 HYPOPROTEINEMIA (HCC): ICD-10-CM

## 2021-02-02 LAB
ALBUMIN SERPL-MCNC: 4.3 G/DL (ref 3.4–5)
T4 FREE: 1.2 NG/DL (ref 0.9–1.8)
TOTAL PROTEIN: 6.2 G/DL (ref 6.4–8.2)
TSH REFLEX: 4.72 UIU/ML (ref 0.27–4.2)

## 2021-02-02 PROCEDURE — 36415 COLL VENOUS BLD VENIPUNCTURE: CPT | Performed by: FAMILY MEDICINE

## 2021-02-15 NOTE — PATIENT INSTRUCTIONS
Alvarado Medina was seen today for hyperlipidemia and hypothyroidism. Diagnoses and all orders for this visit:    HTN (hypertension), benign  -     Good control at last check in the office. Monitor more frequently at home until follow-up can be in the office. Hopefully that will be at your next visit because by April most people should have their second shot of vaccine. If you get your second shot by then you will have a 95% protection and will be safe to follow-up in the clinic.  -     Continue meds and lifestyle control. Acquired hypothyroidism  -     TSH with Reflex; Future   Ref. Range 9/19/2017 11:41 4/3/2018 09:58 10/2/2018 11:13 1/8/2019 09:46 4/23/2019 09:02 10/22/2019 09:18 10/27/2020 09:25   TSH  0.27 - 4.20 uIU/mL 4.15 3.82 4.85 (H) 4.96 (H) 3.78 5.66 (H) 5.82 (H)   T4 Free 0.9 - 1.8 ng/dL   1.3 1.3  1.1 1.1   Highlighted numbers are too high. This suggests that your thyroid dose may be slightly too low. We may need to increase your dosage to 50 mcg 1 day and 75 mcg the next alternating. If this does not improve. Pure hypercholesterolemia  -     Good control.  -     Continue meds and lifestyle control. Hypoproteinemia (HCC)  -     PROTEIN, TOTAL; Future  -     ALBUMIN; Future  Total protein was still low. We will recheck. This will likely improve with your increase in protein supplements. Stage 3a chronic kidney disease  Continue to drink as much during the day as you can.   If urinary urgency is a problem you can try: Omega 3 fatty acids such as are found in fish oil can decrease urinary urgency and frequency. Each capsule of fish oil should have 800 mg or more of a combination of EPA & DHA - the active omega 3's- per capsule and you take 1 capsule 3 times per day. The oil lines the bladder decreasing irritation of the bladder wall and helping prevent bacteria from sticking to the bladder wall so fewer UTI's occur. Fish oil also lowers triglycerides, helps with dry eyes and helps lubricate joints to lower arthritis pain. By the type of fish oil labeled \"Burpless\" and take it just before meal.  That way its underneath all the food as oil tends to float on water and can cause you to belch up a fishy taste. Tongue dysplasia  Resolved.   Follow-up with specialist at the 5-year interval recommended by the specialist.    Educated about COVID-19 virus infection  COVID-19 vaccine Get the vaccine as soon as possible. If you had COVID-19 you should not need the vaccination for 90 days afterwards. This is because you already have antibodies to the virus. The first shot usually is only associated with soreness in the arm unless you have already had COVID-19. Then your reaction may be similar to getting the second shot as below. There is a risk of allergic reaction so everybody is supposed to stay at the vaccination site for 30 minutes after getting the vaccine. It would be a great idea to have Benadryl 25 g with you to take IF you have a milder allergic reaction after you leave the site. If you use an EpiPen take it with you. Using an EpiPen is not a contraindication to the vaccine only a precaution. Notify them that you use an EpiPen when they give you the shot. The second shot can be associated with flulike symptoms - but you CANNOT get COVID-19 from the vaccination. The flulike symptoms are your body's response to the RNA injected which has already produced antibodies after the first shot. It is better to have a few days of flulike symptoms than a few weeks on a ventilator.     COVID-19 vaccine

## 2021-02-20 DIAGNOSIS — E87.6 HYPOKALEMIA: ICD-10-CM

## 2021-02-22 RX ORDER — POTASSIUM CHLORIDE 750 MG/1
TABLET, EXTENDED RELEASE ORAL
Qty: 90 TABLET | Refills: 0 | Status: SHIPPED | OUTPATIENT
Start: 2021-02-22 | End: 2021-09-07

## 2021-04-05 DIAGNOSIS — E03.9 ACQUIRED HYPOTHYROIDISM: Chronic | ICD-10-CM

## 2021-04-05 RX ORDER — LEVOTHYROXINE SODIUM 0.05 MG/1
TABLET ORAL
Qty: 90 TABLET | Refills: 0 | Status: SHIPPED | OUTPATIENT
Start: 2021-04-05 | End: 2021-07-20

## 2021-04-27 ENCOUNTER — OFFICE VISIT (OUTPATIENT)
Dept: FAMILY MEDICINE CLINIC | Age: 86
End: 2021-04-27
Payer: MEDICARE

## 2021-04-27 VITALS
DIASTOLIC BLOOD PRESSURE: 82 MMHG | OXYGEN SATURATION: 97 % | HEART RATE: 65 BPM | BODY MASS INDEX: 31.76 KG/M2 | RESPIRATION RATE: 16 BRPM | WEIGHT: 172.6 LBS | HEIGHT: 62 IN | SYSTOLIC BLOOD PRESSURE: 126 MMHG

## 2021-04-27 DIAGNOSIS — M1A.0720 CHRONIC GOUT OF LEFT ANKLE, UNSPECIFIED CAUSE: ICD-10-CM

## 2021-04-27 DIAGNOSIS — E03.9 ACQUIRED HYPOTHYROIDISM: ICD-10-CM

## 2021-04-27 DIAGNOSIS — I10 HTN (HYPERTENSION), BENIGN: Primary | ICD-10-CM

## 2021-04-27 DIAGNOSIS — F33.41 RECURRENT MAJOR DEPRESSIVE DISORDER, IN PARTIAL REMISSION (HCC): ICD-10-CM

## 2021-04-27 DIAGNOSIS — E55.9 VITAMIN D DEFICIENCY: ICD-10-CM

## 2021-04-27 DIAGNOSIS — E77.8 HYPOPROTEINEMIA (HCC): ICD-10-CM

## 2021-04-27 LAB
ALBUMIN SERPL-MCNC: 3.3 G/DL (ref 3.4–5)
TOTAL PROTEIN: 5.7 G/DL (ref 6.4–8.2)
TSH REFLEX: 4.09 UIU/ML (ref 0.27–4.2)
URIC ACID, SERUM: 2.7 MG/DL (ref 2.6–6)
VITAMIN D 25-HYDROXY: 75.7 NG/ML

## 2021-04-27 PROCEDURE — 4040F PNEUMOC VAC/ADMIN/RCVD: CPT | Performed by: FAMILY MEDICINE

## 2021-04-27 PROCEDURE — G8427 DOCREV CUR MEDS BY ELIG CLIN: HCPCS | Performed by: FAMILY MEDICINE

## 2021-04-27 PROCEDURE — 1090F PRES/ABSN URINE INCON ASSESS: CPT | Performed by: FAMILY MEDICINE

## 2021-04-27 PROCEDURE — G8417 CALC BMI ABV UP PARAM F/U: HCPCS | Performed by: FAMILY MEDICINE

## 2021-04-27 PROCEDURE — 99214 OFFICE O/P EST MOD 30 MIN: CPT | Performed by: FAMILY MEDICINE

## 2021-04-27 PROCEDURE — 1123F ACP DISCUSS/DSCN MKR DOCD: CPT | Performed by: FAMILY MEDICINE

## 2021-04-27 PROCEDURE — 36415 COLL VENOUS BLD VENIPUNCTURE: CPT | Performed by: FAMILY MEDICINE

## 2021-04-27 PROCEDURE — 1036F TOBACCO NON-USER: CPT | Performed by: FAMILY MEDICINE

## 2021-04-27 ASSESSMENT — ENCOUNTER SYMPTOMS
CHOKING: 0
WHEEZING: 0
CHEST TIGHTNESS: 0
COUGH: 1
SHORTNESS OF BREATH: 0

## 2021-04-27 ASSESSMENT — PATIENT HEALTH QUESTIONNAIRE - PHQ9
SUM OF ALL RESPONSES TO PHQ QUESTIONS 1-9: 0
SUM OF ALL RESPONSES TO PHQ QUESTIONS 1-9: 0

## 2021-04-27 NOTE — PROGRESS NOTES
click on the \"Sign Up Now\" link. Current as of: November 16, 2020               Content Version: 12.8  © 2006-2021 Healthwise, [employer]. Care instructions adapted under license by [facility] [employer] [facility] ([facility]). If you have questions about a medical condition or this instruction, always ask your healthcare professional. [address] any warranty or liability for your use of this information. Patient Education   Carpal Tunnel Syndrome: Exercises  Introduction  Here are some examples of exercises for you to try. The exercises may be suggested for a condition or for rehabilitation. Start each exercise slowly. Ease off the exercises if you start to have pain. You will be told when to start these exercises and which ones will work best for you. Warm-up stretches  When you no longer have pain or numbness, you can do exercises to help prevent carpal tunnel syndrome from coming back. Do not do any stretch or movement that is uncomfortable or painful. 1. Rotate your wrist up, down, and from side to side. Repeat 4 times. 2. Stretch your fingers far apart. Relax them, and then stretch them again. Repeat 4 times. 3. Stretch your thumb by pulling it back gently, holding it, and then releasing it. Repeat 4 times. How to do the exercises  Prayer stretch   1. Start with your palms together in front of your chest just below your chin. 2. Slowly lower your hands toward your waistline, keeping your hands close to your stomach and your palms together until you feel a mild to moderate stretch under your forearms. 3. Hold for at least 15 to 30 seconds. Repeat 2 to 4 times. Wrist flexor stretch   1. Extend your arm in front of you with your palm up. 2. Bend your wrist, pointing your hand toward the floor. 3. With your other hand, gently bend your wrist farther until you feel a mild to moderate stretch in your forearm. 4. Hold for at least 15 to 30 seconds. Repeat 2 to 4 times.     Wrist extensor stretch 1. Repeat steps 1 through 4 of the stretch above, but begin with your extended hand palm down. Follow-up care is a key part of your treatment and safety. Be sure to make and go to all appointments, and call your doctor if you are having problems. It's also a good idea to know your test results and keep a list of the medicines you take. Where can you learn more? Go to https://[employer] org and sign in to your [employer] account. Enter I640 in the [employer] box to learn more about \"Carpal Tunnel Syndrome: Exercises. \"     If you do not have an account, please click on the \"Sign Up Now\" link. Current as of: November 16, 2020               Content Version: 12.8  © 9708-5183 Healthwise, [employer]. Care instructions adapted under license by [facility] ([facility]). If you have questions about a medical condition or this instruction, always ask your healthcare professional. [place] [address] any warranty or liability for your use of this information. Return in about 3 months (around 7/27/2021) for Hypertension, Cholesterol, Thyroid. SUBJECTIVE/OBJECTIVE:  Chief Complaint   Patient presents with    Hypertension     FOLLOW UP ON HYPERTENSION    1  HPI    HTN (hypertension), benign  Not checking at home. Is watching salt. Has a slight cough worse with allergies. Recurrent major depressive disorder, in partial remission ([address]  Well controlled. Sleeps ok. Occasionally has trouble going back to sleep after up to bathroom. Still enjoys recreational activities. Is sick of being home. Acquired hypothyroidism  Current symptoms include none. Patient denies fatigue, weight gain, feeling cold and cold intolerance, constipation, swelling, anxiousness, feeling excessive energy, tremulousness, palpitations, sweating, weight loss, diarrhea, change in skin,  nails, or hair, heat intolerance, depression, goiter, ocular symptoms.    Chronic gout of left ankle, unspecified by Nasal route 4 times daily 1 Bottle 3    aspirin 81 MG tablet Take 81 mg by mouth daily      Cholecalciferol (VITAMIN D3) 2000 UNITS CAPS One daily for Vit D supplement 30 capsule     Melatonin 3 MG TABS Take 3 mg by mouth daily. One at bedtime for sleep assist      ammonium lactate (AMLACTIN) 12 % cream Apply  topically as needed. Apply topically as needed.  magnesium chloride (MAG DELAY) 535 (64 MG) MG TBCR CR tablet Take 64 mg by mouth 2 times daily       acetaminophen (TYLENOL) 500 MG tablet Take 500 mg by mouth daily.  Multiple Vitamin (MULTIVITAMIN PO) Take 1 tablet by mouth daily. No current facility-administered medications for this visit. Physical Exam  Vitals signs and nursing note reviewed. Constitutional:       General: She is not in acute distress. Appearance: Normal appearance. She is well-developed. She is obese. She is not diaphoretic. Eyes:      General: No scleral icterus. Right eye: No discharge. Left eye: No discharge. Conjunctiva/sclera: Conjunctivae normal.   Neck:      Musculoskeletal: Neck supple. Thyroid: No thyroid mass or thyromegaly. Vascular: No carotid bruit or JVD. Trachea: Trachea and phonation normal. No tracheal deviation. Cardiovascular:      Rate and Rhythm: Normal rate and regular rhythm. Occasional extrasystoles are present. Heart sounds: S1 normal and S2 normal. Heart sounds not distant. Murmur present. Systolic murmur present with a grade of 1/6. No friction rub. No gallop. No S3 or S4 sounds. Comments: Occasional trigeminal beat. Pulmonary:      Effort: Pulmonary effort is normal. No respiratory distress. Breath sounds: Normal breath sounds. No decreased breath sounds, wheezing, rhonchi or rales. Lymphadenopathy:      Head:      Right side of head: No submandibular or tonsillar adenopathy. Left side of head: No submandibular or tonsillar adenopathy.       Cervical: No cervical

## 2021-04-27 NOTE — PATIENT INSTRUCTIONS
Kenneth was seen today for hypertension. Diagnoses and all orders for this visit:    HTN (hypertension), benign  -     Good control.  -     Continue meds and lifestyle control. Recurrent major depressive disorder, in partial remission (HCC)  -     Good control.  -     Continue meds and lifestyle control. Acquired hypothyroidism  -     TSH with Reflex; Future    Chronic gout of left ankle, unspecified cause  -     Uric Acid; Future  -     Good control.  -     Continue meds and lifestyle control. Hypoproteinemia (HCC)  -     PROTEIN, TOTAL; Future  -     ALBUMIN; Future     Vitamin D deficiency  -     Vitamin D 25 Hydroxy; Future  Recheck. Numbness in bilateral hands when sews    Patient Education        Carpal Tunnel Syndrome: Care Instructions  Overview     Carpal tunnel syndrome is numbness, tingling, weakness, and pain in your hand, wrist, and sometimes forearm. It is caused by pressure on the median nerve. This nerve and several tough tissues called tendons run through a space in the wrist. This space is called the carpal tunnel. The repeated hand motions used in work and some hobbies and sports can put pressure on the median nerve. Pregnancy can cause carpal tunnel syndrome. Several conditions, such as diabetes, arthritis, and an underactive thyroid, can also cause it. You may be able to limit an activity or change the way you do it to reduce your symptoms. You also can take other steps to feel better. If your symptoms are mild, 1 to 2 weeks of home treatment are likely to ease your pain. Surgery is needed only if other treatments do not work. Follow-up care is a key part of your treatment and safety. Be sure to make and go to all appointments, and call your doctor if you are having problems. It's also a good idea to know your test results and keep a list of the medicines you take. How can you care for yourself at home? · If possible, stop or reduce the activity that causes your symptoms. stretch under your forearms. Hold for 10 to 20 seconds. Repeat 4 times. ? Wrist flexor stretch: Hold your arm in front of you with your palm up. Bend your wrist, pointing your hand toward the floor. With your other hand, gently bend your wrist further until you feel a mild to moderate stretch in your forearm. Hold for 10 to 20 seconds. Repeat 4 times. ? Wrist extensor stretch: Repeat the steps for the wrist flexor stretch, but begin with your extended hand palm down. · Squeeze a rubber exercise ball several times a day to keep your hands and fingers strong. · Avoid holding objects (such as a book) in one position for a long time. When possible, use your whole hand to grasp an object. Using just the thumb and index finger can put stress on the wrist.  · Do not smoke. It can make this condition worse by reducing blood flow to the median nerve. If you need help quitting, talk to your doctor about stop-smoking programs and medicines. These can increase your chances of quitting for good. When should you call for help? Watch closely for changes in your health, and be sure to contact your doctor if:    · Your pain or other problems do not get better with home care.     · You want more information about physical or occupational therapy.     · You have side effects of your corticosteroid medicine, such as:  ? Weight gain. ? Mood changes. ? Trouble sleeping. ? Bruising easily.     · You have any other problems with your medicine. Where can you learn more? Go to [email or web address] and sign in to your [employer] account. Enter R432 in the [facility] box to learn more about \"Carpal Tunnel Syndrome: Care Instructions. \"     If you do not have an account, please click on the \"Sign Up Now\" link. Current as of: November 16, 2020               Content Version: 12.8  © 7160-5370 Healthwise, [employer]. Care instructions adapted under license by [facility] ([facility]).  If you have questions about a medical condition or this instruction, always ask your healthcare professional. [address] any warranty or liability for your use of this information. Patient Education        Carpal Tunnel Syndrome: Exercises  Introduction  Here are some examples of exercises for you to try. The exercises may be suggested for a condition or for rehabilitation. Start each exercise slowly. Ease off the exercises if you start to have pain. You will be told when to start these exercises and which ones will work best for you. Warm-up stretches  When you no longer have pain or numbness, you can do exercises to help prevent carpal tunnel syndrome from coming back. Do not do any stretch or movement that is uncomfortable or painful. 1. Rotate your wrist up, down, and from side to side. Repeat 4 times. 2. Stretch your fingers far apart. Relax them, and then stretch them again. Repeat 4 times. 3. Stretch your thumb by pulling it back gently, holding it, and then releasing it. Repeat 4 times. How to do the exercises  Prayer stretch   1. Start with your palms together in front of your chest just below your chin. 2. Slowly lower your hands toward your waistline, keeping your hands close to your stomach and your palms together until you feel a mild to moderate stretch under your forearms. 3. Hold for at least 15 to 30 seconds. Repeat 2 to 4 times. Wrist flexor stretch   1. Extend your arm in front of you with your palm up. 2. Bend your wrist, pointing your hand toward the floor. 3. With your other hand, gently bend your wrist farther until you feel a mild to moderate stretch in your forearm. 4. Hold for at least 15 to 30 seconds. Repeat 2 to 4 times. Wrist extensor stretch   1. Repeat steps 1 through 4 of the stretch above, but begin with your extended hand palm down. Follow-up care is a key part of your treatment and safety.  Be sure to make and go to all appointments, and call your doctor if you are having problems. It's also a good idea to know your test results and keep a list of the medicines you take. Where can you learn more? Go to https://chpepiceweb.[email or web address] org and sign in to your [employer] account. Enter I481 in the [facility] box to learn more about \"Carpal Tunnel Syndrome: Exercises. \"     If you do not have an account, please click on the \"Sign Up Now\" link. Current as of: November 16, 2020               Content Version: 12.8  © 2428-7023 Healthwise, Incorporated. Care instructions adapted under license by [facility] ([facility]). If you have questions about a medical condition or this instruction, always ask your healthcare professional. [address] any warranty or liability for your use of this information.

## 2021-06-22 DIAGNOSIS — N18.30 CHRONIC KIDNEY DISEASE, STAGE III (MODERATE) (HCC): Chronic | ICD-10-CM

## 2021-06-22 DIAGNOSIS — I10 HTN (HYPERTENSION), BENIGN: ICD-10-CM

## 2021-06-22 RX ORDER — LISINOPRIL 2.5 MG/1
TABLET ORAL
Qty: 90 TABLET | Refills: 3 | Status: SHIPPED | OUTPATIENT
Start: 2021-06-22 | End: 2022-07-18 | Stop reason: SDUPTHER

## 2021-07-20 DIAGNOSIS — E03.9 ACQUIRED HYPOTHYROIDISM: Chronic | ICD-10-CM

## 2021-07-20 RX ORDER — LEVOTHYROXINE SODIUM 0.05 MG/1
TABLET ORAL
Qty: 90 TABLET | Refills: 0 | Status: SHIPPED | OUTPATIENT
Start: 2021-07-20 | End: 2021-10-11

## 2021-07-24 DIAGNOSIS — M1A.0720 CHRONIC GOUT OF LEFT ANKLE, UNSPECIFIED CAUSE: Chronic | ICD-10-CM

## 2021-07-26 RX ORDER — PROBENECID 500 MG/1
TABLET, FILM COATED ORAL
Qty: 45 TABLET | Refills: 3 | Status: SHIPPED | OUTPATIENT
Start: 2021-07-26 | End: 2022-06-13

## 2021-07-27 ENCOUNTER — OFFICE VISIT (OUTPATIENT)
Dept: FAMILY MEDICINE CLINIC | Age: 86
End: 2021-07-27
Payer: MEDICARE

## 2021-07-27 VITALS
WEIGHT: 178.2 LBS | OXYGEN SATURATION: 98 % | BODY MASS INDEX: 32.79 KG/M2 | SYSTOLIC BLOOD PRESSURE: 120 MMHG | DIASTOLIC BLOOD PRESSURE: 78 MMHG | HEIGHT: 62 IN | RESPIRATION RATE: 16 BRPM | HEART RATE: 78 BPM

## 2021-07-27 DIAGNOSIS — M1A.0720 CHRONIC GOUT OF LEFT ANKLE, UNSPECIFIED CAUSE: ICD-10-CM

## 2021-07-27 DIAGNOSIS — I10 HTN (HYPERTENSION), BENIGN: Primary | ICD-10-CM

## 2021-07-27 DIAGNOSIS — E78.00 PURE HYPERCHOLESTEROLEMIA: ICD-10-CM

## 2021-07-27 DIAGNOSIS — E03.9 ACQUIRED HYPOTHYROIDISM: ICD-10-CM

## 2021-07-27 DIAGNOSIS — N18.31 STAGE 3A CHRONIC KIDNEY DISEASE (HCC): ICD-10-CM

## 2021-07-27 DIAGNOSIS — E87.6 HYPOKALEMIA: ICD-10-CM

## 2021-07-27 DIAGNOSIS — E77.8 HYPOPROTEINEMIA (HCC): ICD-10-CM

## 2021-07-27 PROCEDURE — G8427 DOCREV CUR MEDS BY ELIG CLIN: HCPCS | Performed by: FAMILY MEDICINE

## 2021-07-27 PROCEDURE — G8417 CALC BMI ABV UP PARAM F/U: HCPCS | Performed by: FAMILY MEDICINE

## 2021-07-27 PROCEDURE — 1036F TOBACCO NON-USER: CPT | Performed by: FAMILY MEDICINE

## 2021-07-27 PROCEDURE — 1123F ACP DISCUSS/DSCN MKR DOCD: CPT | Performed by: FAMILY MEDICINE

## 2021-07-27 PROCEDURE — 1090F PRES/ABSN URINE INCON ASSESS: CPT | Performed by: FAMILY MEDICINE

## 2021-07-27 PROCEDURE — 4040F PNEUMOC VAC/ADMIN/RCVD: CPT | Performed by: FAMILY MEDICINE

## 2021-07-27 PROCEDURE — 99214 OFFICE O/P EST MOD 30 MIN: CPT | Performed by: FAMILY MEDICINE

## 2021-07-27 NOTE — PROGRESS NOTES
active ie going to the grocery store. Hypoproteinemia (HCC)  Still does Premier Protein shake daily. Rare use of bars. No falls. Acquired hypothyroidism  Current symptoms include swelling. Patient denies fatigue, weight gain, feeling cold and cold intolerance, constipation, anxiousness, feeling excessive energy, tremulousness, palpitations, sweating, weight loss, diarrhea, change in skin,  nails, or hair, heat intolerance, depression, goiter, ocular symptoms. Stage 3a chronic kidney disease (HCC)  Water 1 x 16.9, coffee 8 oz, milk 10, 12 oz Protein shake. Hypokalemia  No leg cramps. Has fruit every day. Chronic gout of left ankle, unspecified cause  No gout sx. Pure hypercholesterolemia  No SEs with med. Takes in am.  Foods high: Not    Review of Systems   Neurological: Negative for weakness.       Past Medical History:   Diagnosis Date    Breast cancer ([address] 2004    ductal vs lobular, not clear from pathologist    Cellulitis of left leg     Cellulitis of wrist 06/06/2018    RIGHT    Chronic kidney disease, stage III (moderate) ([address] 2005    Depression     Diverticulitis of colon 01/01/1979    DVT (deep venous thrombosis) ([address] 2004    after first knee surgery    Gout 5/13/2011    Hematoma of left thigh 12/02/2017    Herpes zoster 01/04/2018    right ant shoulder    Hypertension     Hypothyroidism Nov, 2011    Intervertebral lumbar disc disorder with myelopathy, lumbar region     OA (osteoarthritis) 2000    severe in knees, left ankle, left >right hip    Primary osteoarthritis of right wrist     Severe arthritic change within carpal - 1st metacarpal joint, Extensive chondrocalcinosis throughout wrist    Pure hypercholesterolemia     Seborrheic dermatitis 11/2014    Subdural hematoma, post-traumatic ([address] 5/26/2016    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       Past Surgical History:   Procedure Laterality Date    BREAST SURGERY      right mastectomy     SECTION  1970    COLON SURGERY  1970    right partial colectomy for ruptured diverticulitis    INCISIONAL HERNIA REPAIR  2016    with mesh, laparoscopic, was incarcerated    JOINT REPLACEMENT      right knee and left knee in    [address]  2015    verrous hyperplasia left ventral tongue, WLE [id number] [facility], Dr Jolly Mohr  1970       Social History     Socioeconomic History    Marital status:      Spouse name: Rajan Rivas -      Number of children: 3    Years of education: 15    Highest education level: Not on file   Occupational History    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 Using her walker routinely 2016. NO CURRENT EXERCISE PROGRAM BEYOND STRETCH. 11/15/16. 17. None. Has left knee pain. 17. Does HEP from PT, calf pumps, wall push ups. 17. No exercise except leg. 17 Does leg exercises with pedals. No muscle conditioning. 4/3/18. 7/3/18. Does leg lifts when sitting. Uses floor pedals 3-4 x/wk for 3-5 min. No problems when does. Patient has a health aide who comes for 3 hours 2 times a week. 10/2/18. Floor pedals 3-5 min qd and occasional bid. 19. 5-10 min qd and occasional 5 min bid. 19. 5x/wk 1-2x/day. 19. 10/22/19. 5x/wk 5-10 min. 20. 20 Now at 15 min w/floor pedals. 10/27/20. 20 min. On pedals >/= 5 day/wk. 20. 21. With left ankle pain 10 min 4x/wk. 21.       Social Determinants of Health     Financial Resource Strain:     Difficulty of Paying Living Expenses:    Food Insecurity:     Worried About Running Out of Food in the Last Year:     [address] in the Last Year:    Transportation Needs:     Lack of Transportation (Medical):  Lack of Transportation (Non-Medical):    Physical Activity:     Days of Exercise per Week:     Minutes of Exercise per Session:    Stress:     Feeling of Stress :    Social Connections:     Frequency of Communication with Friends and Family:     Frequency of Social Gatherings with Friends and Family:     Attends Sabianist Services:     Active Member of Clubs or Organizations:     Attends Club or Organization Meetings:     Marital Status:    Intimate Partner Violence:     Fear of Current or Ex-Partner:     Emotionally Abused:     Physically Abused:     Sexually Abused:        Family History   Problem Relation Age of Onset    Cancer Mother         bone    Heart Disease Father         CAD    Diabetes Father     Diabetes Sister     Cancer Sister 54        leukemia    Arrhythmia Brother         A fib    Stroke Brother 80        ?     Other Brother         THR after fx.  bilateral pneumonia, better    Other Son         Spinal fusion with rods/screws    No Known Problems Daughter     High Blood Pressure Daughter     High Blood Pressure Son        No Known Allergies    Current Outpatient Medications   Medication Sig Dispense Refill    probenecid (BENEMID) 500 MG tablet TAKE ONE-HALF TABLET BY  MOUTH DAILY TO PREVENT GOUT 45 tablet 3    levothyroxine (SYNTHROID) 50 MCG tablet TAKE 1 TABLET BY MOUTH  DAILY ON EMPTY STOMACH FOR  THYROID 90 tablet 0    lisinopril (PRINIVIL;ZESTRIL) 2.5 MG tablet TAKE 1 TABLET BY MOUTH  DAILY 90 tablet 3    potassium chloride (KLOR-CON M) 10 MEQ extended release tablet TAKE 1 TABLET BY MOUTH  DAILY 90 tablet 0    buPROPion (WELLBUTRIN SR) 150 MG extended release tablet TAKE 1 TABLET BY MOUTH TWICE DAILY 180 tablet 3    niacin (SLO-NIACIN) 500 MG extended release tablet Take 500 mg by mouth nightly      atorvastatin (LIPITOR) 80 MG tablet TAKE 1 TABLET BY MOUTH  DAILY 90 tablet 3    ipratropium (ATROVENT) 0.06 % nasal spray 2 sprays by Nasal route 4 times daily 1 Bottle 3    aspirin 81 MG tablet Take 81 mg by mouth daily      Cholecalciferol (VITAMIN D3) 2000 UNITS CAPS One daily for Vit D supplement 30 capsule     Melatonin 3 MG TABS Take 3 mg by mouth daily. One at bedtime for sleep assist      ammonium lactate (AMLACTIN) 12 % cream Apply  topically as needed. Apply topically as needed.  magnesium chloride (MAG DELAY) 535 (64 MG) MG TBCR CR tablet Take 64 mg by mouth 2 times daily       acetaminophen (TYLENOL) 500 MG tablet Take 500 mg by mouth daily.  Multiple Vitamin (MULTIVITAMIN PO) Take 1 tablet by mouth daily. No current facility-administered medications for this visit. Objective   Physical Exam  Vitals and nursing note reviewed. Constitutional:       General: She is not in acute distress. Appearance: Normal appearance. She is well-developed. She is obese. She is not diaphoretic. Eyes:      General: No scleral icterus. Right eye: No discharge. Left eye: No discharge. Conjunctiva/sclera: Conjunctivae normal.   Neck:      Thyroid: No thyroid mass or thyromegaly. Vascular: No carotid bruit or JVD. Trachea: Trachea and phonation normal. No tracheal deviation. Cardiovascular:      Rate and Rhythm: Normal rate and regular rhythm. Occasional extrasystoles are present. Heart sounds: S1 normal and S2 normal. Heart sounds not distant. Murmur heard. Systolic murmur is present with a grade of 1/6. No friction rub. No gallop. No S3 or S4 sounds. Comments: Occasional trigeminal beat. Pulmonary:      Effort: Pulmonary effort is normal. No respiratory distress. Breath sounds: Normal breath sounds.  No decreased breath sounds, wheezing, rhonchi or rales. Musculoskeletal:      Cervical back: Neck supple. Lymphadenopathy:      Head:      Right side of head: No submandibular or tonsillar adenopathy. Left side of head: No submandibular or tonsillar adenopathy. Cervical: No cervical adenopathy. Right cervical: No superficial, deep or posterior cervical adenopathy. Left cervical: No superficial, deep or posterior cervical adenopathy. Skin:     General: Skin is warm and dry. Coloration: Skin is not pale. Nails: There is no clubbing. Neurological:      Mental Status: She is alert. Deep Tendon Reflexes:      Reflex Scores:       Bicep reflexes are 2+ on the right side and 2+ on the left side. Comments: She is able to transfer on her own. Uses walker. Psychiatric:         Attention and Perception: Attention and perception normal.         Mood and Affect: Mood and affect normal.         Speech: Speech normal.         Behavior: Behavior normal. Behavior is cooperative. Cognition and Memory: Cognition and memory normal.         Judgment: Judgment normal.       Vitals:    07/27/21 0833   BP: 120/78   Site: Right Upper Arm   Position: Sitting   Cuff Size: Large Adult   Pulse: 78   Resp: 16   SpO2: 98%   Weight: 178 lb 3.2 oz (80.8 kg)   Height: 5' 2\" (1.575 m)     BP Readings from Last 3 Encounters:   07/27/21 120/78   04/27/21 126/82   10/27/20 122/78     Pulse Readings from Last 3 Encounters:   07/27/21 78   04/27/21 65   10/27/20 65     Wt Readings from Last 3 Encounters:   07/27/21 178 lb 3.2 oz (80.8 kg)   04/27/21 172 lb 9.6 oz (78.3 kg)   10/27/20 167 lb 6.4 oz (75.9 kg)     Body mass index is 32.59 kg/m².       4/27/2021 09:24   Total Protein 5.7 (L)   Uric Acid, Serum 2.7   Albumin 3.3 (L)   TSH 4.09   Vit D, 25-Hydroxy 75.7        On this date 7/27/2021 I have spent 31 minutes reviewing previous notes, test results and face to face with the patient discussing the diagnosis and importance of compliance with the treatment plan as well as documenting on the day of the visit. An electronic signature was used to authenticate this note.     --Michelle Gallegos, DO

## 2021-07-27 NOTE — PATIENT INSTRUCTIONS
Ana Durand was seen today for hypertension, hyperlipidemia, hypothyroidism, other and other. Diagnoses and all orders for this visit:    HTN (hypertension), benign  -     Good control.  -     Continue meds and lifestyle control. Hypoproteinemia ([address]  Continue to work on high protein diet. Acquired hypothyroidism  -     Good control at last check. -     Continue meds and lifestyle control. Stage 3a chronic kidney disease (HCC)  -     Good control. Continue good water intake. Hypokalemia  -     Good control at last check. -     Continue meds and lifestyle control. Chronic gout of left ankle, unspecified cause  Uric acid level. Pure hypercholesterolemia  -     Good control at last check. -     Continue meds and lifestyle control.

## 2021-07-28 ENCOUNTER — TELEPHONE (OUTPATIENT)
Dept: FAMILY MEDICINE CLINIC | Age: 86
End: 2021-07-28

## 2021-07-28 NOTE — TELEPHONE ENCOUNTER
PT DISCUSSED AT YESTERDAYS VISIT -- SPOT ON HER FACE -- DR. HADDAD SAID IT SHOULD BE REMOVED - SHE DOESN'T KNOW WHAT TO DO NOW. WHO TO CALL?       PT @  [phone] - CALL AFTER 11:30

## 2021-07-30 ENCOUNTER — TELEPHONE (OUTPATIENT)
Dept: FAMILY MEDICINE CLINIC | Age: 86
End: 2021-07-30

## 2021-08-06 ENCOUNTER — NURSE ONLY (OUTPATIENT)
Dept: FAMILY MEDICINE CLINIC | Age: 86
End: 2021-08-06
Payer: MEDICARE

## 2021-08-06 DIAGNOSIS — E78.00 PURE HYPERCHOLESTEROLEMIA: Chronic | ICD-10-CM

## 2021-08-06 LAB
CHOLESTEROL, TOTAL: 121 MG/DL (ref 0–199)
HDLC SERPL-MCNC: 39 MG/DL (ref 40–60)
HOMOCYSTEINE: 11 UMOL/L (ref 0–10)
LDL CHOLESTEROL CALCULATED: 69 MG/DL
TRIGL SERPL-MCNC: 65 MG/DL (ref 0–150)
VLDLC SERPL CALC-MCNC: 13 MG/DL

## 2021-08-06 PROCEDURE — 36415 COLL VENOUS BLD VENIPUNCTURE: CPT | Performed by: FAMILY MEDICINE

## 2021-08-09 ENCOUNTER — OFFICE VISIT (OUTPATIENT)
Dept: FAMILY MEDICINE CLINIC | Age: 86
End: 2021-08-09
Payer: MEDICARE

## 2021-08-09 VITALS
BODY MASS INDEX: 32.65 KG/M2 | SYSTOLIC BLOOD PRESSURE: 122 MMHG | OXYGEN SATURATION: 91 % | HEART RATE: 69 BPM | HEIGHT: 62 IN | DIASTOLIC BLOOD PRESSURE: 72 MMHG | RESPIRATION RATE: 16 BRPM | WEIGHT: 177.4 LBS

## 2021-08-09 DIAGNOSIS — L57.0 ACTINIC KERATOSIS: Primary | ICD-10-CM

## 2021-08-09 PROCEDURE — 17000 DESTRUCT PREMALG LESION: CPT | Performed by: FAMILY MEDICINE

## 2021-08-09 NOTE — PROGRESS NOTES
Renetta Chicas (:  1933) is a 80 y.o. female,Established patient, here for evaluation of the following chief complaint(s): Other (PT HERE TO GET A SPOT REMOVED FROM HER FACE NEAR HER LEFT EYE)       ASSESSMENT/PLAN:  1040     Patient Instructions   Michael Aggarwal was seen today for other. Diagnoses and all orders for this visit:    Actinic keratosis  FROZEN  Can ignore until breaks open then can use a round band aid. Call if not coming off. Return for scheduled/requested routine visit. Subjective   SUBJECTIVE/OBJECTIVE:  Chief Complaint   Patient presents with    Other     PT HERE TO GET A SPOT REMOVED FROM HER FACE NEAR HER LEFT EYE   1014  HPI    Actinic keratoses  Seen at visit on 2021. Here today to attempt freezing. Review of Systems        Objective   Physical Exam  Vitals and nursing note reviewed. Constitutional:       General: She is not in acute distress. Appearance: Normal appearance. She is obese. She is not ill-appearing, toxic-appearing or diaphoretic. HENT:      Head: Normocephalic and atraumatic. Skin:     General: Skin is warm and dry. Comments: Skin: See photo. Compressed gas pen used to freeze a day circular area around the actinic keratosis on the left side of the nose cheek interface just below the eye. The eye was carefully covered with gauze and tape. 2 freezing sprays were used. Patient experienced minor to moderate pain. Area was only slightly red at the freezing process. Process discontinued due to discomfort. Neurological:      Mental Status: She is alert. Psychiatric:         Attention and Perception: Attention and perception normal.         Mood and Affect: Mood and affect normal.         Speech: Speech normal.         Behavior: Behavior normal. Behavior is cooperative.          Cognition and Memory: Cognition and memory normal.         Judgment: Judgment normal.         Dermatology Photo   Presumed actinic keratosis below left eye 08/09/2021 10:23     Vitals:    08/09/21 0929   BP: 122/72   Site: Left Upper Arm   Position: Sitting   Cuff Size: Medium Adult   Pulse: 69   Resp: 16   SpO2: 91%   Weight: 177 lb 6.4 oz (80.5 kg)   Height: 5' 2\" (1.575 m)     BP Readings from Last 3 Encounters:   08/09/21 122/72   07/27/21 120/78   04/27/21 126/82     Pulse Readings from Last 3 Encounters:   08/09/21 69   07/27/21 78   04/27/21 65     Wt Readings from Last 3 Encounters:   08/09/21 177 lb 6.4 oz (80.5 kg)   07/27/21 178 lb 3.2 oz (80.8 kg)   04/27/21 172 lb 9.6 oz (78.3 kg)     Body mass index is 32.45 kg/m². On this date 8/9/2021 I have spent 36 minutes reviewing previous notes, test results and face to face with the patient discussing the diagnosis and importance of compliance with the treatment plan as well as documenting on the day of the visit. An electronic signature was used to authenticate this note.     --Virgen Choe DO

## 2021-08-12 ENCOUNTER — TELEPHONE (OUTPATIENT)
Dept: FAMILY MEDICINE CLINIC | Age: 86
End: 2021-08-12

## 2021-08-12 NOTE — TELEPHONE ENCOUNTER
SPOKE TO PT SHE STATED THAT SHE WAS TOLD THIS WOULD TURN INTO A BLISTER. IT HAS BEEN 3 DAYS AND IT HAS NOT DONE THAT YET. IT IS STILL WHITE AND MORE FLAT. SHE WANTS TO KNOW WHEN IT WILL BLISTER. THERE IS SOME REDNESS AROUND THE SPOT SO SHE WANTS TO BE SURE IT IS OKAY.  I TOLD HER THAT I THOUGHT IT MAY NOT ALWAYS BLISTER BUT THAT I WOULD DOUBLE CHECK WITH DR Jasmin Pavon

## 2021-08-12 NOTE — TELEPHONE ENCOUNTER
----- Message from Ella Ramirez sent at 8/12/2021 11:23 AM EDT -----  Subject: Message to Provider    QUESTIONS  Information for Provider? pt has a question about the procedure that was   done on her face on 8/9. Please call back today if possible.  ---------------------------------------------------------------------------  --------------  CALL BACK INFO  What is the best way for the office to contact you? OK to leave message on   voicemail  Preferred Call Back Phone Number? [phone]  ---------------------------------------------------------------------------  --------------  SCRIPT ANSWERS  Relationship to Patient?  Self

## 2021-08-18 ENCOUNTER — TELEPHONE (OUTPATIENT)
Dept: FAMILY MEDICINE CLINIC | Age: 86
End: 2021-08-18

## 2021-08-18 DIAGNOSIS — E78.00 PURE HYPERCHOLESTEROLEMIA: Chronic | ICD-10-CM

## 2021-08-18 NOTE — TELEPHONE ENCOUNTER
[facility] CLINICAL PHARMACY REVIEW: ADHERENCE REVIEW  Identified care gap per Srikanthon; fills at [facility] Order Pharmacy: Statin adherence    Last Visit: 21 (21 for acute concern)    Patient also appears to be prescribed: ACEi     Patient not found in Outcomes MTM    ASSESSMENT  ACE/ARB ADHERENCE    Per Insurance Records through 21 (YTD Jim Ralph = 95%; Potential Fail Date: 10/5/21):   Lisinopril 2.5mg daily last filled on 21 for 90 day supply. Next refill due: 21    Per Reconciled Dispense Report:last filled on 21 for 90 day supply. Per chart, should have refills remaining    BP Readings from Last 3 Encounters:   21 122/72   21 120/78   21 126/82     CrCl cannot be calculated (Patient's most recent lab result is older than the maximum 120 days allowed. ). STATIN ADHERENCE    Per Insurance Records through 21 (LUCILLE Jim Loree = Filled only once; Potential Fail Date: 21): Atorvastatin 80mg daily last filled on 21 for 90 day supply. Next refill due: 21    Per chart, statin rx likely  @21 and needs reordered? Lab Results   Component Value Date    CHOL 121 2021    TRIG 65 2021    HDL 39 (L) 2021    LDLCALC 69 2021     ALT   Date Value Ref Range Status   2020 16 10 - 40 U/L Final     AST   Date Value Ref Range Status   2020 23 15 - 37 U/L Final     The ASCVD Risk score (Syl Pope et al., 2013) failed to calculate for the following reasons: The 2013 ASCVD risk score is only valid for ages 36 to 78     PLAN  The following are interventions that have been identified:   - Patient overdue refilling atorvastatin (refill was due  - appears rx likely now ?) and active on home medication list.     Attempting to reach patient to review.  Left message asking for return call. Will pend refill request to PCP.     Future Appointments   Date Time Provider Parviz Rubin   2021  9:00 AM Tressa Walls

## 2021-08-20 RX ORDER — ATORVASTATIN CALCIUM 80 MG/1
80 TABLET, FILM COATED ORAL DAILY
Qty: 90 TABLET | Refills: 3 | Status: SHIPPED | OUTPATIENT
Start: 2021-08-20 | End: 2022-02-15 | Stop reason: SDUPTHER

## 2021-08-20 NOTE — TELEPHONE ENCOUNTER
Vandana Banerjee, , patient out of refills.  Rx pended for your signature/modification as appropriate    LOV: 7/27/21  Next: 11/16/21    Thank you,  Rebekah Gutiérrez, PharmD, [facility]  Department, toll free: [phone], option 1

## 2021-08-23 NOTE — TELEPHONE ENCOUNTER
Patient returned call, I discuss adherene with her about Atorvastatin and she stated she is still taking her medication and she tried to call [facility] but they never requested a new rx. I let her know a new rx was sent to [address] [place] [address] Marsha on 8/20/21.  She will call them to see if her order will be processed

## 2021-09-07 DIAGNOSIS — E87.6 HYPOKALEMIA: ICD-10-CM

## 2021-09-07 RX ORDER — POTASSIUM CHLORIDE 750 MG/1
TABLET, EXTENDED RELEASE ORAL
Qty: 90 TABLET | Refills: 3 | Status: SHIPPED | OUTPATIENT
Start: 2021-09-07 | End: 2022-09-20

## 2021-09-27 ENCOUNTER — TELEPHONE (OUTPATIENT)
Dept: FAMILY MEDICINE CLINIC | Age: 86
End: 2021-09-27

## 2021-09-27 NOTE — TELEPHONE ENCOUNTER
----- Message from Radha Allison sent at 9/27/2021  3:05 PM EDT -----  Subject: Appointment Request    Reason for Call: Flu Shot    QUESTIONS  Type of Appointment? Established Patient  Reason for appointment request? Other - Sorry, the appointment cannot be   booked with the [facility] ([facility]) for this provider. Please send a message to the   provider. Additional Information for Provider? Pt is needing to schedule for a Flu   shot, pls call to schedule   ---------------------------------------------------------------------------  --------------  CALL BACK INFO  What is the best way for the office to contact you? OK to leave message on   voicemail  Preferred Call Back Phone Number? [phone]  ---------------------------------------------------------------------------  --------------  SCRIPT ANSWERS  Relationship to Patient? Self   Is the Adult patient requesting a Flu Shot? Yes  Is the parent/patient requesting a Flu Shot for a child older than 6   months? No  Does the patient have a question for their provider regarding the flu   shot? No  Have you been diagnosed with, awaiting test results for, or told that you   are suspected of having COVID-19 (Coronavirus)? (If patient has tested   negative or was tested as a requirement for work, school, or travel and   not based on symptoms, answer no)? No  Within the past two weeks have you developed any of the following symptoms   (answer no if symptoms have been present longer than 2 weeks or began   more than 2 weeks ago)? Fever or Chills, Cough, Shortness of breath or   difficulty breathing, Loss of taste or smell, Sore throat, Nasal   congestion, Sneezing or runny nose, Fatigue or generalized body aches   (answer no if pain is specific to a body part e.g. back pain), Diarrhea,   Headache? No  Have you had close contact with someone with COVID-19 in the last 14 days? No  (Service Expert  click yes below to proceed with Miira As Usual   Scheduling)?  Yes

## 2021-10-05 ENCOUNTER — IMMUNIZATION (OUTPATIENT)
Dept: FAMILY MEDICINE CLINIC | Age: 86
End: 2021-10-05
Payer: MEDICARE

## 2021-10-05 DIAGNOSIS — Z23 NEED FOR IMMUNIZATION AGAINST INFLUENZA: Primary | ICD-10-CM

## 2021-10-05 PROCEDURE — 90694 VACC AIIV4 NO PRSRV 0.5ML IM: CPT | Performed by: FAMILY MEDICINE

## 2021-10-05 PROCEDURE — G0008 ADMIN INFLUENZA VIRUS VAC: HCPCS | Performed by: FAMILY MEDICINE

## 2021-10-05 NOTE — PROGRESS NOTES
Vaccine Information Sheet, \"Influenza - Inactivated\"  given to Kathy Aguila, or parent/legal guardian of  Kathy Aguila and verbalized understanding. Patient responses:    Have you ever had a reaction to a flu vaccine? No  Do you have any current illness? No  Have you ever had Gu[place] [employer]thren Syndrome? No  Do you have a serious allergy to any of the follow: Neomycin, Polymyxin, Thimerosal, eggs or egg products? No    Flu vaccine given per order. Please see immunization tab. Risks and benefits explained. Current VIS given.       Immunizations Administered     Name Date Dose Route    Influenza, Quadv, adjuvanted, 65 yrs +, IM, PF (Fluad) 10/5/2021 0.5 mL Intramuscular    Site: Deltoid- Left    Lot: [id number]    NDC: 70191-374-15

## 2021-11-16 ENCOUNTER — OFFICE VISIT (OUTPATIENT)
Dept: FAMILY MEDICINE CLINIC | Age: 86
End: 2021-11-16
Payer: MEDICARE

## 2021-11-16 VITALS
WEIGHT: 172 LBS | DIASTOLIC BLOOD PRESSURE: 72 MMHG | HEART RATE: 60 BPM | SYSTOLIC BLOOD PRESSURE: 118 MMHG | OXYGEN SATURATION: 99 % | RESPIRATION RATE: 20 BRPM | BODY MASS INDEX: 31.65 KG/M2 | HEIGHT: 62 IN

## 2021-11-16 DIAGNOSIS — Z00.00 ROUTINE GENERAL MEDICAL EXAMINATION AT A HEALTH CARE FACILITY: Primary | ICD-10-CM

## 2021-11-16 DIAGNOSIS — Z85.3 HISTORY OF BREAST CANCER: ICD-10-CM

## 2021-11-16 DIAGNOSIS — E77.8 HYPOPROTEINEMIA (HCC): ICD-10-CM

## 2021-11-16 DIAGNOSIS — R26.81 UNSTEADY GAIT: ICD-10-CM

## 2021-11-16 DIAGNOSIS — K14.9 TONGUE DYSPLASIA: ICD-10-CM

## 2021-11-16 DIAGNOSIS — I10 HTN (HYPERTENSION), BENIGN: ICD-10-CM

## 2021-11-16 DIAGNOSIS — I49.9 IRREGULAR CARDIAC RHYTHM: ICD-10-CM

## 2021-11-16 DIAGNOSIS — E78.00 PURE HYPERCHOLESTEROLEMIA: ICD-10-CM

## 2021-11-16 DIAGNOSIS — E03.9 ACQUIRED HYPOTHYROIDISM: ICD-10-CM

## 2021-11-16 DIAGNOSIS — N18.31 STAGE 3A CHRONIC KIDNEY DISEASE (HCC): ICD-10-CM

## 2021-11-16 DIAGNOSIS — Z71.89 EDUCATED ABOUT COVID-19 VIRUS INFECTION: ICD-10-CM

## 2021-11-16 LAB
ALBUMIN SERPL-MCNC: 4.3 G/DL (ref 3.4–5)
TOTAL PROTEIN: 6 G/DL (ref 6.4–8.2)
TSH REFLEX: 4.01 UIU/ML (ref 0.27–4.2)

## 2021-11-16 PROCEDURE — 36415 COLL VENOUS BLD VENIPUNCTURE: CPT | Performed by: FAMILY MEDICINE

## 2021-11-16 PROCEDURE — 1123F ACP DISCUSS/DSCN MKR DOCD: CPT | Performed by: FAMILY MEDICINE

## 2021-11-16 PROCEDURE — G0439 PPPS, SUBSEQ VISIT: HCPCS | Performed by: FAMILY MEDICINE

## 2021-11-16 PROCEDURE — G8484 FLU IMMUNIZE NO ADMIN: HCPCS | Performed by: FAMILY MEDICINE

## 2021-11-16 PROCEDURE — 4040F PNEUMOC VAC/ADMIN/RCVD: CPT | Performed by: FAMILY MEDICINE

## 2021-11-16 SDOH — ECONOMIC STABILITY: FOOD INSECURITY: WITHIN THE PAST 12 MONTHS, THE FOOD YOU BOUGHT JUST DIDN'T LAST AND YOU DIDN'T HAVE MONEY TO GET MORE.: NEVER TRUE

## 2021-11-16 SDOH — ECONOMIC STABILITY: FOOD INSECURITY: WITHIN THE PAST 12 MONTHS, YOU WORRIED THAT YOUR FOOD WOULD RUN OUT BEFORE YOU GOT MONEY TO BUY MORE.: NEVER TRUE

## 2021-11-16 ASSESSMENT — PATIENT HEALTH QUESTIONNAIRE - PHQ9
SUM OF ALL RESPONSES TO PHQ QUESTIONS 1-9: 0
SUM OF ALL RESPONSES TO PHQ9 QUESTIONS 1 & 2: 0
SUM OF ALL RESPONSES TO PHQ QUESTIONS 1-9: 0
1. LITTLE INTEREST OR PLEASURE IN DOING THINGS: 0
2. FEELING DOWN, DEPRESSED OR HOPELESS: 0
SUM OF ALL RESPONSES TO PHQ QUESTIONS 1-9: 0

## 2021-11-16 ASSESSMENT — SOCIAL DETERMINANTS OF HEALTH (SDOH): HOW HARD IS IT FOR YOU TO PAY FOR THE VERY BASICS LIKE FOOD, HOUSING, MEDICAL CARE, AND HEATING?: NOT HARD AT ALL

## 2021-11-16 ASSESSMENT — LIFESTYLE VARIABLES: HOW OFTEN DO YOU HAVE A DRINK CONTAINING ALCOHOL: 0

## 2021-11-16 NOTE — PROGRESS NOTES
18  Medicare Annual Wellness Visit  Name: Sheela Chaney Date: 2021   MRN: [id number] Sex: Female   Age: 80 y.o. Ethnicity: Non- / Non    : 1933 Race: White (non-)      Ralf Bueno is here for Medicare AWV (MEDICARE AWV, HAD FLU SHOT ALREADY)    Screenings for behavioral, psychosocial and functional/safety risks, and cognitive dysfunction are all negative except as indicated below. These results, as well as other patient data from the [address] form, are documented in Flowsheets linked to this Encounter. COVID-19  Should she get the booster. Same moderna? She had flu shot. Ear wax  How to get out? No Known Allergies       Prior to Visit Medications    Medication Sig Taking?  Authorizing Provider   levothyroxine (SYNTHROID) 50 MCG tablet TAKE 1 TABLET BY MOUTH  DAILY ON EMPTY STOMACH FOR  THYROID Yes Narciso Sanches DO   buPROPion (WELLBUTRIN SR) 150 MG extended release tablet TAKE 1 TABLET BY MOUTH  TWICE DAILY Yes Narciso Sanches DO   potassium chloride (KLOR-CON M) 10 MEQ extended release tablet TAKE 1 TABLET BY MOUTH  DAILY Yes Narciso Sanches DO   atorvastatin (LIPITOR) 80 MG tablet Take 1 tablet by mouth daily Yes SANYA Fuentes - CNP   probenecid (BENEMID) 500 MG tablet TAKE ONE-HALF TABLET BY  MOUTH DAILY TO PREVENT GOUT Yes Walt Mendoza,    lisinopril (PRINIVIL;ZESTRIL) 2.5 MG tablet TAKE 1 TABLET BY MOUTH  DAILY Yes Narciso Sanches DO   niacin (SLO-NIACIN) 500 MG extended release tablet Take 500 mg by mouth nightly Yes Historical Provider, MD   ipratropium (ATROVENT) 0.06 % nasal spray 2 sprays by Nasal route 4 times daily Yes Narciso Sanches DO   aspirin 81 MG tablet Take 81 mg by mouth daily Yes Historical Provider, MD   Cholecalciferol (VITAMIN D3) 2000 UNITS CAPS One daily for Vit D supplement Yes Humera Freed MD   melatonin 5 MG TABS tablet Take 3 mg by mouth nightly One at bedtime for sleep assist  Yes Historical Provider, ventral tongue, [facility] [id number] [facility], Dr Amanuel Mujica  07/01/1970       Family History   Problem Relation Age of Onset   Siva Petties Cancer Mother         bone    Heart Disease Father         CAD    Diabetes Father     Diabetes Sister     Cancer Sister 54        leukemia    Arrhythmia Brother         A fib    Stroke Brother 80        ?  Other Brother         THR after fx.  bilateral pneumonia, better    Other Son         Spinal fusion with rods/screws    No Known Problems Daughter     High Blood Pressure Daughter     High Blood Pressure Son      CareTeam (Including outside providers/suppliers regularly involved in providing care):   Patient Care Team:  Narciso Sanches DO as PCP - General  Narciso Sanches DO as PCP - [facility] Empaneled Provider  Gallo Russo MD as Surgeon (General Surgery)     Physical Exam  Vitals and nursing note reviewed. Constitutional:       General: She is not in acute distress. Appearance: Normal appearance. She is well-developed. She is not ill-appearing, toxic-appearing or diaphoretic. Comments: Uses a walker or furniture walks. Obese. HENT:      Right Ear: External ear normal. No drainage or swelling. No middle ear effusion. There is no impacted cerumen. Tympanic membrane is not retracted. Left Ear: External ear normal. No drainage or swelling. No middle ear effusion. There is no impacted cerumen. Tympanic membrane is not retracted. Ears:      Comments: Mild bilateral cerumen not interfering with hearing. Nose: Nose normal. No mucosal edema or rhinorrhea. Eyes:      General: No scleral icterus. Right eye: No discharge. Left eye: No discharge. Extraocular Movements:      Right eye: Normal extraocular motion and no nystagmus. Left eye: Normal extraocular motion and no nystagmus. Conjunctiva/sclera: Conjunctivae normal.      Right eye: No chemosis, exudate or hemorrhage.      Left eye: No chemosis, exudate or hemorrhage. Pupils: Pupils are equal, round, and reactive to light. Pupils are equal.      Right eye: Pupil is round and reactive. Left eye: Pupil is round and reactive. Neck:      Thyroid: No thyroid mass or thyromegaly. Vascular: Normal carotid pulses. No carotid bruit. Trachea: Trachea and phonation normal. No tracheal tenderness or tracheal deviation. Cardiovascular:      Rate and Rhythm: Normal rate. Rhythm irregularly irregular. Frequent extrasystoles are present. Pulses: No midsystolic click and no opening snap. Heart sounds: S1 normal and S2 normal. Heart sounds not distant. Murmur heard. Low-pitched crescendo holosystolic murmur is present with a grade of 1/6 at the upper right sternal border. No friction rub. No gallop. No S3 or S4 sounds. Pulmonary:      Effort: Pulmonary effort is normal. No accessory muscle usage or respiratory distress. Breath sounds: No decreased breath sounds, wheezing, rhonchi or rales. Chest:   Breasts:      Right: No supraclavicular adenopathy. Left: No supraclavicular adenopathy. Abdominal:      General: There is no distension. Palpations: Abdomen is soft. Abdomen is not rigid. There is no mass or pulsatile mass. Tenderness: There is no abdominal tenderness. There is no guarding or rebound. Negative signs include Lawton's sign and McBurney's sign. Musculoskeletal:         General: No tenderness. Cervical back: Neck supple. Right foot: Swelling and bunion present. No deformity or foot drop. Left foot: Swelling and bunion present. No deformity or foot drop. Comments: Mild to mod kyphosis. Right foot: severe onychomycosis. Mild bunion. Left foot: mod onychomycosis. Mod bunion under riding 2nd hammer toe. Left AFO brace. Lymphadenopathy:      Head:      Right side of head: No tonsillar adenopathy. Left side of head: No tonsillar adenopathy. Cervical: No cervical adenopathy. Right cervical: No superficial, deep or posterior cervical adenopathy. Left cervical: No superficial, deep or posterior cervical adenopathy. Upper Body:      Right upper body: No supraclavicular or pectoral adenopathy. Left upper body: No supraclavicular or pectoral adenopathy. Skin:     General: Skin is warm and dry. Coloration: Skin is not pale. Neurological:      Mental Status: She is alert and oriented to person, place, and time. Motor: No atrophy or abnormal muscle tone. Coordination: Coordination normal.      Gait: Gait abnormal ( Uses walker). Deep Tendon Reflexes:      Reflex Scores:       Bicep reflexes are 2+ on the right side and 2+ on the left side. Comments: Not able to do get up and go test.  Has a prolonged time to stand as well as transfer with her walker. Psychiatric:         Attention and Perception: Attention and perception normal.         Mood and Affect: Mood and affect normal.         Speech: Speech normal.         Behavior: Behavior normal. Behavior is cooperative. Cognition and Memory: Cognition and memory normal.         Judgment: Judgment normal.       Based upon direct observation of the patient, evaluation of cognition reveals recent and remote memory intact. Vitals:    11/16/21 0858   BP: 118/72   Site: Right Upper Arm   Position: Sitting   Cuff Size: Medium Adult   Pulse: 60   Resp: 20   SpO2: 99%   Weight: 172 lb (78 kg)   Height: 5' 2\" (1.575 m)     BP Readings from Last 3 Encounters:   11/16/21 118/72   08/09/21 122/72   07/27/21 120/78     Pulse Readings from Last 3 Encounters:   11/16/21 60   08/09/21 69   07/27/21 78     Wt Readings from Last 3 Encounters:   11/16/21 172 lb (78 kg)   08/09/21 177 lb 6.4 oz (80.5 kg)   07/27/21 178 lb 3.2 oz (80.8 kg)     Body mass index is 31.46 kg/m². Patient's complete Health Risk Assessment and screening values have been reviewed and are found in Flowsheets.  The following problems were reviewed today and where indicated follow up appointments were made and/or referrals ordered. Positive Risk Factor Screenings with Interventions:          General Health and ACP:  General  In general, how would you say your health is?: Very Good  In the past 7 days, have you experienced any of the following? New or Increased Pain, New or Increased Fatigue, Loneliness, Social Isolation, Stress or Anger?: None of These  Do you get the social and emotional support that you need?: Yes  Do you have a Living Will?: Yes  Advance Directives     Power of [facility] Will ACP-Advance Directive ACP-Power of     Not on File Not on File Not on File Not on File      General Health Risk Interventions:  · No Living Will: ACP documents already completed- patient asked to provide copy to the office    Health Habits/Nutrition:  Health Habits/Nutrition  Do you exercise for at least 20 minutes 2-3 times per week?: Yes  Have you lost any weight without trying in the past 3 months?: No  Do you eat only one meal per day?: No  Have you seen the dentist within the past year?: Yes  Body mass index: (!) 31.46  Health Habits/Nutrition Interventions:  · Inadequate physical activity:  educational materials provided to promote increased physical activity  · Nutritional issues:  educational materials for healthy, well-balanced diet provided     Safety:  Safety  Do you have working smoke detectors?: Yes  Have all throw rugs been removed or fastened?: (!) No  Do you have non-slip mats or surfaces in all bathtubs/showers?: (!) No  Do all of your stairways have a railing or banister?: Yes  Do you always fasten your seatbelt when you are in a car?: Yes  Safety Interventions:  · Home safety tips provided    ADL:  ADLs  In the past 7 days, did you need help from others to perform any of the following everyday activities?  Eating, dressing, grooming, bathing, toileting, or walking/balance?: None  In the past 7 days, did you need help from instructions/AVS.    Recommended screening schedule for the next 5-10 years is provided to the patient in written form: see Patient Instructions/AVS.       ASSESSMENT/PLAN  1038    Patient Instructions     Abel Melchor was seen today for medicare awv. Diagnoses and all orders for this visit:    Routine general medical examination at a health care facility  Health Maintenance Due   Topic Date Due    COVID-19 Vaccine (3 - Booster for Moderna series) 09/02/2021    Potassium monitoring  10/27/2021    Creatinine monitoring  10/27/2021     Hypoproteinemia (HCC)  -     PROTEIN, TOTAL; Future  -     ALBUMIN; Future  You can increase your protein using egg substitutes, eating small portions of red meat and using more skinless poultry and fish to meet your protein needs. Cottage cheese and greek yogurt are also good sources of protein. Try to get some of your proteins from plant sources such as beans, nuts, peas and / or soy products such as tofu or soy milk. Whey protein powders 1 scoop daily can add 15-25% of your daily need when mixed in with food or as a smoothie blended with vegetable or fruit juice, yogurt or milk. You can also substitute soy or alfalfa powder which is cholesterol free. Stage 3a chronic kidney disease (HCC)  -     Good control.  -     Continue meds and lifestyle control. History of breast cancer  Can get mammograms. HTN (hypertension), benign  -     Good control.  -     Continue meds and lifestyle control. Acquired hypothyroidism  -     TSH with Reflex; Future  -     Good control.  -     Continue meds and lifestyle control. Irregular cardiac rhythm  -     Stable. -     Continue meds and lifestyle control. Pure hypercholesterolemia  -     Good control.  -     Continue meds and lifestyle control. Tongue dysplasia  Resolved. Unsteady gait  Continue walker. Home exercises.     Educated about COVID-19 virus infection  COVID-19 VACCINE REACTION TREATMENT OF FLULIKE SYMPTOMS  Often after the second shot with the [address] vaccines or after the first Vanesa Ana María vaccine shot there is a strong antibody reaction causing flulike symptoms. Not everybody experiences this. It occurs very often in people who have already had COVID-19 who get the first shot. It sometimes also occurs in people who have never had symptoms of COVID-19 following the first shot with the Chowdary Peter and Moderna vaccines if they had a prior asymptomatic COVID-19 infection. Symptoms can include: Fatigue, lightheadedness due to low blood pressure, headache, muscle aches, enlargement of lymph nodes in the armpit on the same side as the vaccine was given, or for some people nausea vomiting and diarrhea. It is wise to have some sports drink like Gatorade at the house to keep the blood pressures up if your blood pressure drops and you feel lightheaded. This can also be used if you are getting dehydrated from nausea, vomiting or diarrhea. If you have nausea and vomiting call the office and we will call in medication to prevent dehydration which will worsen and prolong your flulike symptoms. If you have diarrhea but no nausea or vomiting you can take a zinc pill 50 mg over-the-counter during or immediately after a meal for the first dose of zinc and then decrease to 1/2 pill (25 mg) with a meal as needed for diarrhea or loose stools. The usual maximum dose for zinc is 50 mg twice a day. Watch for constipation when taking zinc.  If you are prone to diarrhea taking a probiotic several days before getting the vaccine and until symptoms resolve after the vaccine may help lessen risk of diarrhea and loose stools. Taking vitamin D before and after your shot for a few weeks will help the immune system and decrease flulike symptoms. The dosage depends on your previous vitamin D level. Anywhere from 400 IU (10 mcg) to 4000 IU (100 mcg) may be needed.   It will also help your immune system to take vitamin C 500 mg once or twice a day. Personalized Preventive Plan for Alexandro Linda - 11/16/2021  Medicare offers a range of preventive health benefits. Some of the tests and screenings are paid in full while other may be subject to a deductible, co-insurance, and/or copay. Some of these benefits include a comprehensive review of your medical history including lifestyle, illnesses that may run in your family, and various assessments and screenings as appropriate. After reviewing your medical record and screening and assessments performed today your provider may have ordered immunizations, labs, imaging, and/or referrals for you. A list of these orders (if applicable) as well as your Preventive Care list are included within your After Visit Summary for your review. Other Preventive Recommendations:    · A preventive eye exam performed by an eye specialist is recommended every 1-2 years to screen for glaucoma; cataracts, macular degeneration, and other eye disorders. · A preventive dental visit is recommended every 6 months. · Try to get at least 150 minutes of exercise per week or 10,000 steps per day on a pedometer . · Order or download the FREE \"Exercise & Physical Activity: Your Everyday Guide\" from [employer]. Call 0-576.178.9051 or search [employer] online. · You need 3535-8282 mg of calcium and 6519-2844 IU of vitamin D per day. It is possible to meet your calcium requirement with diet alone, but a vitamin D supplement is usually necessary to meet this goal.  · When exposed to the sun, use a sunscreen that protects against both UVA and UVB radiation with an SPF of 30 or greater. Reapply every 2 to 3 hours or after sweating, drying off with a towel, or swimming. · Always wear a seat belt when traveling in a car. Always wear a helmet when riding a bicycle or motorcycle.     Heart-Healthy Diet   Sodium, Fat, and Cholesterol Controlled Diet       What Is a Heart Healthy Diet? A heart-healthy diet is one that limits sodium , certain types of fat , and cholesterol . This type of diet is recommended for:   People with any form of cardiovascular disease (eg, coronary heart disease , peripheral vascular disease , previous heart attack , previous stroke )   People with risk factors for cardiovascular disease, such as high blood pressure , high cholesterol , or diabetes   Anyone who wants to lower their risk of developing cardiovascular disease   Sodium    Sodium is a mineral found in many foods. In general, most people consume much more sodium than they need. Diets high in sodium can increase blood pressure and lead to edema (water retention). On a heart-healthy diet, you should consume no more than 2,300 mg (milligrams) of sodium per dayabout the amount in one teaspoon of table salt. The foods highest in sodium include table salt (about 50% sodium), processed foods, convenience foods, and preserved foods. Cholesterol    Cholesterol is a fat-like, waxy substance in your blood. Our bodies make some cholesterol. It is also found in animal products, with the highest amounts in fatty meat, egg yolks, whole milk, cheese, shellfish, and organ meats. On a heart-healthy diet, you should limit your cholesterol intake to less than 200 mg per day. It is normal and important to have some cholesterol in your bloodstream. But too much cholesterol can cause plaque to build up within your arteries, which can eventually lead to a heart attack or stroke. The two types of cholesterol that are most commonly referred to are:   Low-density lipoprotein (LDL) cholesterol  Also known as bad cholesterol, this is the cholesterol that tends to build up along your arteries. Bad cholesterol levels are increased by eating fats that are saturated or hydrogenated. Optimal level of this cholesterol is less than 100. Over 130 starts to get risky for heart disease.    High-density lipoprotein (HDL) cholesterol  Also known as good cholesterol, this type of cholesterol actually carries cholesterol away from your arteries and may, therefore, help lower your risk of having a heart attack. You want this level to be high (ideally greater than 60). It is a risk to have a level less than 40. You can raise this good cholesterol by eating olive oil, canola oil, avocados, or nuts. Exercise raises this level, too. Fat    Fat is calorie dense and packs a lot of calories into a small amount of food. Even though fats should be limited due to their high calorie content, not all fats are bad. In fact, some fats are quite healthful. Fat can be broken down into four main types. The good-for-you fats are:   Monounsaturated fat  found in oils such as olive and canola, avocados, and nuts and natural nut butters; can decrease cholesterol levels, while keeping levels of HDL cholesterol high   Polyunsaturated fat  found in oils such as safflower, sunflower, soybean, corn, and sesame; can decrease total cholesterol and LDL cholesterol   Omega-3 fatty acids  particularly those found in fatty fish (such as salmon, trout, tuna, mackerel, herring, and sardines); can decrease risk of arrhythmias, decrease triglyceride levels, and slightly lower blood pressure   The fats that you want to limit are:   Saturated fat  found in animal products, many fast foods, and a few vegetables; increases total blood cholesterol, including LDL levels   Animal fats that are saturated include: butter, lard, whole-milk dairy products, meat fat, and poultry skin   Vegetable fats that are saturated include: hydrogenated shortening, palm oil, coconut oil, cocoa butter   Hydrogenated or trans fat  found in margarine and vegetable shortening, most shelf stable snack foods, and fried foods; increases LDL and decreases HDL     It is generally recommended that you limit your total fat for the day to less than 30% of your total calories.  If you follow an 1800-calorie heart healthy diet, for example, this would mean 60 grams of fat or less per day. Saturated fat and trans fat in your diet raises your blood cholesterol the most, much more than dietary cholesterol does. For this reason, on a heart-healthy diet, less than 7% of your calories should come from saturated fat and ideally 0% from trans fat. On an 1800-calorie diet, this translates into less than 14 grams of saturated fat per day, leaving 46 grams of fat to come from mono- and polyunsaturated fats.    Food Choices on a Heart Healthy Diet   Food Category   Foods Recommended   Foods to Avoid   Grains   Breads and rolls without salted tops Most dry and cooked cereals Unsalted crackers and breadsticks Low-sodium or homemade breadcrumbs or stuffing All rice and pastas   Breads, rolls, and crackers with salted tops High-fat baked goods (eg, muffins, donuts, pastries) Quick breads, self-rising flour, and biscuit mixes Regular bread crumbs Instant hot cereals Commercially prepared rice, pasta, or stuffing mixes   Vegetables   Most fresh, frozen, and low-sodium canned vegetables Low-sodium and salt-free vegetable juices Canned vegetables if unsalted or rinsed   Regular canned vegetables and juices, including sauerkraut and pickled vegetables Frozen vegetables with sauces Commercially prepared potato and vegetable mixes   Fruits   Most fresh, frozen, and canned fruits All fruit juices   Fruits processed with salt or sodium   Milk   Nonfat or low-fat (1%) milk Nonfat or low-fat yogurt Cottage cheese, low-fat ricotta, cheeses labeled as low-fat and low-sodium   Whole milk Reduced-fat (2%) milk Malted and chocolate milk Full fat yogurt Most cheeses (unless low-fat and low salt) Buttermilk (no more than 1 cup per week)   Meats and Beans   Lean cuts of fresh or frozen beef, veal, lamb, or pork (look for the word loin) Fresh or frozen poultry without the skin Fresh or frozen fish and some shellfish Egg whites and egg substitutes (Limit whole eggs to three per week) Tofu Nuts or seeds (unsalted, dry-roasted), low-sodium peanut butter Dried peas, beans, and lentils   Any smoked, cured, salted, or canned meat, fish, or poultry (including hernandez, chipped beef, cold cuts, hot dogs, sausages, sardines, and anchovies) Poultry skins Breaded and/or fried fish or meats Canned peas, beans, and lentils Salted nuts   Fats and Oils   Olive oil and canola oil Low-sodium, low-fat salad dressings and mayonnaise   Butter, margarine, coconut and palm oils, hernandez fat   Snacks, Sweets, and Condiments   Low-sodium or unsalted versions of broths, soups, soy sauce, and condiments Pepper, herbs, and spices; vinegar, lemon, or lime juice Low-fat frozen desserts (yogurt, sherbet, fruit bars) Sugar, cocoa powder, honey, syrup, jam, and preserves Low-fat, trans-fat free cookies, cakes, and pies Tawanda and animal crackers, fig bars, sania snaps   High-fat desserts Broth, soups, gravies, and sauces, made from instant mixes or other high-sodium ingredients Salted snack foods Canned olives Meat tenderizers, seasoning salt, and most flavored vinegars   Beverages   Low-sodium carbonated beverages Tea and coffee in moderation Soy milk   Commercially softened water   Suggestions   Make whole grains, fruits, and vegetables the base of your diet. Choose heart-healthy fats such as canola, olive, and flaxseed oil, and foods high in heart-healthy fats, such as nuts, seeds, soybeans, tofu, and fish. Eat fish at least twice per week; the fish highest in omega-3 fatty acids and lowest in mercury include salmon, herring, mackerel, sardines, and canned chunk light tuna. If you eat fish less than twice per week or have high triglycerides, talk to your doctor about taking fish oil supplements. Read food labels.    For products low in fat and cholesterol, look for fat free, low-fat, cholesterol free, saturated fat free, and trans fat freeAlso scan the Nutrition Facts Label, which lists saturated fat, trans fat, and cholesterol amounts. For products low in sodium, look for sodium free, very low sodium, low sodium, no added salt, and unsalted   Skip the salt when cooking or at the table; if food needs more flavor, get creative and try out different herbs and spices. Garlic and onion also add substantial flavor to foods. Trim any visible fat off meat and poultry before cooking, and drain the fat off after gottlieb. Use cooking methods that require little or no added fat, such as grilling, boiling, baking, poaching, broiling, roasting, steaming, stir-frying, and sauting. Avoid fast food and convenience food. They tend to be high in saturated and trans fat and have a lot of added salt. Talk to a registered dietitian for individualized diet advice. Last Reviewed: March 2011 Nellie Shepherd MS, MPH, RD   Updated: 3/29/2011   ·   Preventing Osteoporosis: After Your Visit  Your Care Instructions  Osteoporosis means the bones are weak and thin enough that they can break easily. The older you are, the more likely you are to get osteoporosis. But with plenty of calcium, vitamin D, and exercise, you can help prevent osteoporosis. The preteen and teen years are a key time for bone building. With the help of calcium, vitamin D, and exercise in those early years and beyond, the bones reach their peak density and strength by age 27. After age 27, your bones naturally start to thin and weaken. The stronger your bones are at around age 27, the lower your risk for osteoporosis. But no matter what your age and risk are, your bones still need calcium, vitamin D, and exercise to stay strong. Also avoid smoking, and limit alcohol. Smoking and heavy alcohol use can make your bones thinner. Talk to your doctor about any special risks you might have, such as having a close relative with osteoporosis or taking a medicine that can weaken bones.  Your doctor can tell you the best ways to protect your bones from thinning. Follow-up care is a key part of your treatment and safety. Be sure to make and go to all appointments, and call your doctor if you are having problems. It's also a good idea to know your test results and keep a list of the medicines you take. How can you care for yourself at home? Get enough calcium and vitamin D. The [employer] recommends adults younger than age 46 need 1,000 mg of calcium and 600 IU of vitamin D each day. Women ages 46 to 79 need 1,200 mg of calcium and 600 IU of vitamin D each day. Men ages 46 to 79 need 1,000 mg of calcium and 600 IU of vitamin D each day. Adults 71 and older need 1,200 mg of calcium and 800 IU of vitamin D each day. Eat foods rich in calcium, like yogurt, cheese, milk, and dark green vegetables. Eat foods rich in vitamin D, like eggs, fatty fish, cereal, and fortified milk. Get some sunshine. Your body uses sunshine to make its own vitamin D. The safest time to be out in the sun is before 10 a.m. or after 3 p.m. Avoid getting sunburned. Sunburn can increase your risk of skin cancer. Talk to your doctor about taking a calcium plus vitamin D supplement. Ask about what type of calcium is right for you, and how much to take at a time. Adults ages 23 to 48 should not get more than 2,500 mg of calcium and 4,000 IU of vitamin D each day, whether it is from supplements and/or food. Adults ages 46 and older should not get more than 2,000 mg of calcium and 4,000 IU of vitamin D each day from supplements and/or food. Get regular bone-building exercise. Weight-bearing and resistance exercises keep bones healthy by working the muscles and bones against gravity. Start out at an exercise level that feels right for you. Add a little at a time until you can do the following:  Do 30 minutes of weight-bearing exercise on most days of the week. Walking, jogging, stair climbing, and dancing are good choices.   Do resistance exercises with weights or elastic bands 2 to 3 days a week. Limit alcohol. Drink no more than 1 alcohol drink a day if you are a woman. Drink no more than 2 alcohol drinks a day if you are a man. Do not smoke. Smoking can make bones thin faster. If you need help quitting, talk to your doctor about stop-smoking programs and medicines. These can increase your chances of quitting for good. When should you call for help? Watch closely for changes in your health, and be sure to contact your doctor if:  You need help with a healthy eating plan. You need help with an exercise plan    © 9695-9305 Sally Parra. Care instructions adapted under license by [facility]. This care instruction is for use with your licensed healthcare professional. If you have questions about a medical condition or this instruction, always ask your healthcare professional. [address] any warranty or liability for your use of this information. Content Version: 9.4.56911; Last Revised: June 20, 2011    DASH Diet: After Your Visit  Your Care Instructions  The DASH diet is an eating plan that can help lower your blood pressure. DASH stands for Dietary Approaches to Stop Hypertension. Hypertension is high blood pressure. The DASH diet focuses on eating foods that are high in calcium, potassium, and magnesium. These nutrients can lower blood pressure. The foods that are highest in these nutrients are fruits, vegetables, low-fat dairy products, nuts, seeds, and legumes. But taking calcium, potassium, and magnesium supplements instead of eating foods that are high in those nutrients does not have the same effect. The DASH diet also includes whole grains, fish, and poultry. The DASH diet is one of several lifestyle changes your doctor may recommend to lower your high blood pressure. Your doctor may also want you to decrease the amount of sodium in your diet.  Lowering sodium while following the DASH diet can lower blood pressure even further than just the DASH diet alone. Follow-up care is a key part of your treatment and safety. Be sure to make and go to all appointments, and call your doctor if you are having problems. Its also a good idea to know your test results and keep a list of the medicines you take. How can you care for yourself at home? Following the DASH diet  Eat 4 to 5 servings of fruit each day. A serving is 1 medium-sized piece of fruit, ½ cup chopped or canned fruit, 1/4 cup dried fruit, or 4 ounces (½ cup) of fruit juice. Choose fruit more often than fruit juice. Eat 4 to 5 servings of vegetables each day. A serving is 1 cup of lettuce or raw leafy vegetables, ½ cup of chopped or cooked vegetables, or 4 ounces (½ cup) of vegetable juice. Choose vegetables more often than vegetable juice. Get 2 to 3 servings of low-fat and fat-free dairy each day. A serving is 8 ounces of milk, 1 cup of yogurt, or 1 ½ ounces of cheese. Eat 7 to 8 servings of grains each day. A serving is 1 slice of bread, 1 ounce of dry cereal, or ½ cup of cooked rice, pasta, or cooked cereal. Try to choose whole-grain products as much as possible. Limit lean meat, poultry, and fish to 6 ounces each day. Six ounces is about the size of two decks of cards. Eat 4 to 5 servings of nuts, seeds, and legumes (cooked dried beans, lentils, and split peas) each week. A serving is 1/3 cup of nuts, 2 tablespoons of seeds, or ½ cup cooked dried beans or peas. Limit sweets and added sugars to 5 servings or less a week. A serving is 1 tablespoon jelly or jam, ½ cup sorbet, or 1 cup of lemonade. Tips for success  Start small. Do not try to make dramatic changes to your diet all at once. You might feel that you are missing out on your favorite foods and then be more likely to not follow the plan. Make small changes, and stick with them. Once those changes become habit, add a few more changes.   Try some of the following:  Make it a goal to eat a fruit or vegetable at every meal and at snacks. This will make it easy to get the recommended amount of fruits and vegetables each day. Try yogurt topped with fruit and nuts for a snack or healthy dessert. Add lettuce, tomato, cucumber, and onion to sandwiches. Combine a ready-made pizza crust with low-fat mozzarella cheese and lots of vegetable toppings. Try using tomatoes, squash, spinach, broccoli, carrots, cauliflower, and onions. Have a variety of cut-up vegetables with a low-fat dip as an appetizer instead of chips and dip. Sprinkle sunflower seeds or chopped almonds over salads. Or try adding chopped walnuts or almonds to cooked vegetables. Try some vegetarian meals using beans and peas. Add garbanzo or kidney beans to salads. Make burritos and tacos with mashed saucedo beans or black beans    © 9012-0756 [employer]. Care instructions adapted under license by [facility]. This care instruction is for use with your licensed healthcare professional. If you have questions about a medical condition or this instruction, always ask your healthcare professional. [place] [address] any warranty or liability for your use of this information. Content Version: 9.4.29080; Last Revised: March 15, 2012    High-Fiber Diet     What Is Fiber? Dietary fiber is a form of carbohydrate found in plants that cannot be digested by humans. All plants contain fiber, including fruits, vegetables, grains, and legumes. Fiber is often classified into two categories: soluble and insoluble. Soluble fiber draws water into the bowel and can help slow digestion. Examples of foods that are high in soluble fiber include oatmeal, oat bran, barley, legumes (eg, beans and peas), apples, and strawberries. Insoluble fiber speeds digestion and can add bulk to the stool. Examples of foods that are high in insoluble fiber include whole-wheat products, wheat bran, cauliflower, green beans, and potatoes.    Why Follow a High-Fiber Diet? A high-fiber diet is often recommended to prevent and treat constipation , hemorrhoids , diverticulitis , and irritable bowel syndrome . Eating a high-fiber diet can also help improve your cholesterol levels, lower your risk of coronary heart disease , reduce your risk of type 2 diabetes , and lower your weight. For people with type 1 or 2 diabetes, a high-fiber diet can also help stabilize blood sugar levels. How Much Fiber Should I Eat? A high-fiber diet should contain  20-35 grams  of fiber a day. This is actually the amount recommended for the general adult population; however, most Americans eat only 15 grams of fiber per day. Digestion of Fiber   Eating a higher fiber diet than usual can take some getting used to by your body's digestive system. To avoid the side effects of sudden increases in dietary fiber (eg, gas, cramping, bloating, and diarrhea), increase fiber gradually and be sure to drink plenty of fluids every day. Tips for Increasing Fiber Intake   Whenever possible, choose whole grains over refined grains (eg, brown rice instead of white rice, whole-wheat bread instead of white bread). Include a variety of grains in your diet, such as wheat, rye, barley, oats, quinoa, and bulgur. Eat more vegetarian-based meals. Here are some ideas: black bean burgers, eggplant lasagna, and veggie tofu stir-reed. Choose high-fiber snacks, such as fruits, popcorn, whole-grain crackers, and nuts. Make whole-grain cereal or whole-grain toast part of your daily breakfast regime. When eating out, whether ordering a sandwich or dinner, ask for extra vegetables. When baking, replace part of the white flour with whole-wheat flour. Whole-wheat flour is particularly easy to incorporate into a recipe.     High-Fiber Diet Eating Guide   Food Category   Foods Recommended   Notes   Grains   Whole-grain breads, muffins, bagels, or ayla bread Rye bread Whole-wheat crackers or crisp breads Whole-grain or bran cereals Oatmeal, oat bran, or grits Wheat germ Whole-wheat pasta and brown rice   Read the ingredients list on food labels. Look for products that list \"whole\" as the first ingredient (eg, whole-wheat, whole oats). Choose cereals with at least 2 grams of fiber per serving. Vegetables   All vegetables, especially asparagus, bean sprouts, broccoli, [place] sprouts, cabbage, carrots, cauliflower, celery, corn, greens, green beans, green pepper, onions, peas, potatoes (with skin), snow peas, spinach, squash, sweet potatoes, tomatoes, zucchini   For maximum fiber intake, eat the peels of fruits and vegetablesjust be sure to wash them well first.   Fruits   All fruits, especially apples, berries, grapefruits, mangoes, nectarines, oranges, peaches, pears, dried fruits (figs, dates, prunes, raisins)   Choose raw fruits and vegetables over juice, cooked, or cannedraw fruit has more fiber. Dried fruit is also a good source of fiber. Milk   With the exception of yogurt containing inulin (a type of fiber), dairy foods provide little fiber. Add more fiber by topping your yogurt or cottage cheese with fresh fruit, whole grain or bran cereals, nuts, or seeds. Meats and Beans   All beans and peas, especially Garbanzo beans, kidney beans, lentils, lima beans, split peas, and saucedo beans All nuts and seeds, especially almonds, peanuts, [place] nuts, cashews, peanut butter, walnuts, sesame and sunflower seeds All meat, poultry, fish, and eggs   Increase fiber in meat dishes by adding saucedo beans, kidney beans, black-eyed peas, bran, or oatmeal. If you are following a low-fat diet, use nuts and seeds only in moderation.    Fats and Oils   All in moderation   Fats and oils do not provide fiber   Snacks, Sweets, and Condiments   Fruit Nuts Popcorn, whole-wheat pretzels, or trail mix made with dried fruits, nuts, and seeds Cakes, breads, and cookies made with oatmeal or whole-wheat flour   Most snack foods do not provide much fiber. Choose snacks with at least 2 grams of fiber per serving. Last Reviewed: March 2011 Fely Mas MS, MPH, RD   Updated: 3/29/2011   ·   Keep Your Memory [place]     Many factors can affect your ability to remembera hectic lifestyle, aging, stress, chronic disease, and certain medicines. But, there are steps you can take to sharpen your mind and help preserve your memory. Challenge Your Brain   Regularly challenging your mind may help keeps it in top shape. Good mental exercises include:   Crossword puzzlesUse a dictionary if you need it; you will learn more that way. Brainteasers Try some! Crafts, such as wood working and sewing   Hobbies, such as gardening and building model airplanes   SocializingVisit old friends or join groups to meet new ones. Reading   Learning a new language   Taking a class, whether it be art history or aayush chi   TravelingExperience the food, history, and culture of your destination   Learning to use a computer   Going to museums, the theater, or thought-provoking movies   Changing things in your daily life, such as reversing your pattern in the grocery store or brushing your teeth using your nondominant hand   Use Memory Aids   There is no need to remember every detail on your own. These memory aids can help:   Calendars and day planners   Electronic organizers to store all sorts of helpful informationThese devices can \"beep\" to remind you of appointments. A book of days to record birthdays, anniversaries, and other occasions that occur on the same date every year   Detailed \"to-do\" lists and strategically placed sticky notes   Quick \"study\" sessionsBefore a gathering, review who will be there so their names will be fresh in your mind.    Establish routinesFor example, keep your keys, wallet, and umbrella in the same place all the time or take medicine with your 8:00 AM glass of juice   Live a Healthy Life   Many actions that will keep your body strong will do the same for your mind. For example:   Talk to Your Doctor About Herbs and Supplements    Malnutrition and vitamin deficiencies can impair your mental function. For example, vitamin B12 deficiency can cause a range of symptoms, including confusion. But, what if your nutritional needs are being met? Can herbs and supplements still offer a benefit? Researchers have investigated a range of natural remedies, such as ginkgo , ginseng , and the supplement phosphatidylserine (PS). So far, though, the evidence is inconsistent as to whether these products can improve memory or thinking. If you are interested in taking herbs and supplements, talk to your doctor first because they may interact with other medicines that you are taking. Exercise Regularly    Among the many benefits of regular exercise are increased blood flow to the brain and decreased risk of certain diseases that can interfere with memory function. One study found that even moderate exercise has a beneficial effect. Examples of \"moderate\" exercise include:   Playing 18 holes of golf once a week, without a cart   Playing tennis twice a week   Walking one mile per day   Manage Stress    It can be tough to remember what is important when your mind is cluttered. Make time for relaxation. Choose activities that calm you down, and make it routine. Manage Chronic Conditions    Side effects of high blood pressure , diabetes, and heart disease can interfere with mental function. Many of the lifestyle steps discussed here can help manage these conditions. Strive to eat a healthy diet, exercise regularly, get stress under control, and follow your doctor's advice for your condition. Minimize Medications    Talk to your doctor about the medicines that you take. Some may be unnecessary. Also, healthy lifestyle habits may lower the need for certain drugs.      Last Reviewed: April 2010 Haylee Fu MD   Updated: 4/13/2010   ·   [facility] [address] [facility] [place]     As we get older, changes in balance, gait, strength, vision, hearing, and cognition make even the most youthful senior more prone to accidents. Falls are one of the leading health risks for older people. This increased risk of falling is related to:   Aging process (eg, decreased muscle strength, slowed reflexes)   Higher incidence of chronic health problems (eg, arthritis, diabetes) that may limit mobility, agility or sensory awareness   Side effects of medicine (eg, dizziness, blurred vision)especially medicines like prescription pain medicines and drugs used to treat mental health conditions   Depending on the brittleness of your bones, the consequences of a fall can be serious and long lasting. Home Life   Research by the Association of Aging Merged with [facility]) shows that some home accidents among older adults can be prevented by making simple lifestyle changes and basic modifications and repairs to the home environment. Here are some lifestyle changes that experts recommend:   Have your hearing and vision checked regularly. Be sure to wear prescription glasses that are right for you. Speak to your doctor or pharmacist about the possible side effects of your medicines. A number of medicines can cause dizziness. If you have problems with sleep, talk to your doctor. Limit your intake of alcohol. If necessary, use a cane or walker to help maintain your balance. Wear supportive, rubber-soled shoes, even at home. If you live in a region that gets wintry weather, you may want to put special cleats on your shoes to prevent you from slipping on the snow and ice. Exercise regularly to help maintain muscle tone, agility, and balance. Always hold the banister when going up or down stairs. Also, use  bars when getting in or out of the bath or shower, or using the toilet. To avoid dizziness, get up slowly from a lying down position.  Sit up first, dangling your legs for a minute or two before rising to a standing position. Overall Home Safety Check   According to the Consumer Product Safety Commision's \"Older Consumer Home Safety Checklist,\" it is important to check for potential hazards in each room. And remember, proper lighting is an essential factor in home safety. If you cannot see clearly, you are more likely to fall. Important questions to ask yourself include:   Are lamp, electric, extension, and telephone cords placed out of the flow of traffic and maintained in good condition? Have frayed cords been replaced? Are all small rugs and runners slip resistant? If not, you can secure them to the floor with a special double-sided carpet tape. Are smoke detectors properly locatedone on every floor of your home and one outside of every sleeping area? Are they in good working order? Are batteries replaced at least once a year? Do you have a well-maintained carbon monoxide detector outside every sleeping are in your home? Does your furniture layout leave plenty of space to maneuver between and around chairs, tables, beds, and sofas? Are hallways, stairs and passages between rooms well lit? Can you reach a lamp without getting out of bed? Are floor surfaces well maintained? Shag rugs, high-pile carpeting, tile floors, and polished wood floors can be particularly slippery. Stairs should always have handrails and be carpeted or fitted with a non-skid tread. Is your telephone easily reachable. Is the cord safely tucked away? Room by Room   According to the Association of Aging, bathrooms and elen are the two most potentially hazardous rooms in your home. In the Kitchen    Be sure your stove is in proper working order and always make sure burners and the oven are off before you go out or go to sleep. Keep pots on the back burners, turn handles away from the front of the stove, and keep stove clean and free of grease build-up.     Kitchen ventilation systems and range exhausts should be service. Typically the system includes a small pendant that connects directly to an emergency medical voice-response system. You should also make arrangements to stay in contact with someonefriend, neighbor, family memberon a regular schedule. Fire Prevention   According to the [employer] (Smoke Alarms for Every) [facility], senior citizens are one of the two highest risk groups for death and serious injuries due to residential fires. When cooking, wear short-sleeved items, never a bulky long-sleeved robe. The [facility]'s Safety Checklist for Older Consumers emphasizes the importance of checking basements, garages, workshops and storage areas for fire hazards, such as volatile liquids, piles of old rags or clothing and overloaded circuits. Never smoke in bed or when lying down on a couch or recliner chair. Small portable electric or kerosene heaters are responsible for many home fires and should be used cautiously if at all. If you do use one, be sure to keep them away from flammable materials. In case of fire, make sure you have a pre-established emergency exit plan. Have a professional check your fireplace and other fuel-burning appliances yearly. Helping Hands   Baby boomers entering the kaur years will continue to see the development of new products to help older adults live safely and independently in spite of age-related changes. Making Life More Livable  , by [employer], lists over 1,000 products for \"living well in the mature years,\" such as bathing and mobility aids, household security devices, ergonomically designed knives and peelers, and faucet valves and knobs for temperature control. Medical supply stores and organizations are good sources of information about products that improve your quality of life and insure your safety.      Last Reviewed: November 2009 Dakotah Olivo MD   Updated: 3/7/2011     Patient Education   Well Visit, Over 72: Care Instructions  Overview     Well visits can help you stay healthy. Your doctor has checked your overall health and may have suggested ways to take good care of yourself. Your doctor also may have recommended tests. At home, you can help prevent illness with healthy eating, regular exercise, and other steps. Follow-up care is a key part of your treatment and safety. Be sure to make and go to all appointments, and call your doctor if you are having problems. It's also a good idea to know your test results and keep a list of the medicines you take. How can you care for yourself at home? · Get screening tests that you and your doctor decide on. Screening helps find diseases before any symptoms appear. · Eat healthy foods. Choose fruits, vegetables, whole grains, protein, and low-fat dairy foods. Limit fat, especially saturated fat. Reduce salt in your diet. · Limit alcohol. If you are a man, have no more than 2 drinks a day or 14 drinks a week. If you are a woman, have no more than 1 drink a day or 7 drinks a week. Since alcohol affects older adults differently, you may want to limit alcohol even more. Or you may not want to drink at all. · Get at least 30 minutes of exercise on most days of the week. Walking is a good choice. You also may want to do other activities, such as running, swimming, cycling, or playing tennis or team sports. · Reach and stay at a healthy weight. This will lower your risk for many problems, such as obesity, diabetes, heart disease, and high blood pressure. · Do not smoke. Smoking can make health problems worse. If you need help quitting, talk to your doctor about stop-smoking programs and medicines. These can increase your chances of quitting for good. · Care for your mental health. It is easy to get weighed down by worry and stress. Learn strategies to manage stress, like deep breathing and mindfulness, and stay connected with your family and community.  If you find you often feel sad liability for your use of this information.     Nereida Ernandez, DO

## 2021-11-16 NOTE — PATIENT INSTRUCTIONS
Jhoana Landeros was seen today for medicare awv. Diagnoses and all orders for this visit:    Routine general medical examination at a health care facility  Health Maintenance Due   Topic Date Due    COVID-19 Vaccine (3 - Booster for Moderna series) 09/02/2021    Potassium monitoring  10/27/2021    Creatinine monitoring  10/27/2021     Hypoproteinemia (HCC)  -     PROTEIN, TOTAL; Future  -     ALBUMIN; Future  You can increase your protein using egg substitutes, eating small portions of red meat and using more skinless poultry and fish to meet your protein needs. Cottage cheese and greek yogurt are also good sources of protein. Try to get some of your proteins from plant sources such as beans, nuts, peas and / or soy products such as tofu or soy milk. Whey protein powders 1 scoop daily can add 15-25% of your daily need when mixed in with food or as a smoothie blended with vegetable or fruit juice, yogurt or milk. You can also substitute soy or alfalfa powder which is cholesterol free. Stage 3a chronic kidney disease (HCC)  -     Good control.  -     Continue meds and lifestyle control. History of breast cancer  Can get mammograms. HTN (hypertension), benign  -     Good control.  -     Continue meds and lifestyle control. Acquired hypothyroidism  -     TSH with Reflex; Future  -     Good control.  -     Continue meds and lifestyle control. Irregular cardiac rhythm  -     Stable. -     Continue meds and lifestyle control. Pure hypercholesterolemia  -     Good control.  -     Continue meds and lifestyle control. Tongue dysplasia  Resolved. Unsteady gait  Continue walker. Home exercises. Educated about COVID-19 virus infection  COVID-19 VACCINE REACTION TREATMENT OF FLULIKE SYMPTOMS  Often after the second shot with the [email or web address] vaccines or after the first [employer] vaccine shot there is a strong antibody reaction causing flulike symptoms.   Not everybody experiences this.  It occurs very often in people who have already had COVID-19 who get the first shot. It sometimes also occurs in people who have never had symptoms of COVID-19 following the first shot with the [employer] and Moderna vaccines if they had a prior asymptomatic COVID-19 infection. Symptoms can include: Fatigue, lightheadedness due to low blood pressure, headache, muscle aches, enlargement of lymph nodes in the armpit on the same side as the vaccine was given, or for some people nausea vomiting and diarrhea. It is wise to have some sports drink like Gatorade at the house to keep the blood pressures up if your blood pressure drops and you feel lightheaded. This can also be used if you are getting dehydrated from nausea, vomiting or diarrhea. If you have nausea and vomiting call the office and we will call in medication to prevent dehydration which will worsen and prolong your flulike symptoms. If you have diarrhea but no nausea or vomiting you can take a zinc pill 50 mg over-the-counter during or immediately after a meal for the first dose of zinc and then decrease to 1/2 pill (25 mg) with a meal as needed for diarrhea or loose stools. The usual maximum dose for zinc is 50 mg twice a day. Watch for constipation when taking zinc.  If you are prone to diarrhea taking a probiotic several days before getting the vaccine and until symptoms resolve after the vaccine may help lessen risk of diarrhea and loose stools. Taking vitamin D before and after your shot for a few weeks will help the immune system and decrease flulike symptoms. The dosage depends on your previous vitamin D level. Anywhere from 400 IU (10 mcg) to 4000 IU (100 mcg) may be needed. It will also help your immune system to take vitamin C 500 mg once or twice a day. Personalized Preventive Plan for Ping Ahumada - 11/16/2021  Medicare offers a range of preventive health benefits.  Some of the tests and screenings are paid in full while other may be subject to a deductible, co-insurance, and/or copay. Some of these benefits include a comprehensive review of your medical history including lifestyle, illnesses that may run in your family, and various assessments and screenings as appropriate. After reviewing your medical record and screening and assessments performed today your provider may have ordered immunizations, labs, imaging, and/or referrals for you. A list of these orders (if applicable) as well as your Preventive Care list are included within your After Visit Summary for your review. Other Preventive Recommendations:    · A preventive eye exam performed by an eye specialist is recommended every 1-2 years to screen for glaucoma; cataracts, macular degeneration, and other eye disorders. · A preventive dental visit is recommended every 6 months. · Try to get at least 150 minutes of exercise per week or 10,000 steps per day on a pedometer . · Order or download the FREE \"Exercise & Physical Activity: Your Everyday Guide\" from [employer]. Call 4-336.715.2025 or search [employer] online. · You need 0769-8734 mg of calcium and 0937-6746 IU of vitamin D per day. It is possible to meet your calcium requirement with diet alone, but a vitamin D supplement is usually necessary to meet this goal.  · When exposed to the sun, use a sunscreen that protects against both UVA and UVB radiation with an SPF of 30 or greater. Reapply every 2 to 3 hours or after sweating, drying off with a towel, or swimming. · Always wear a seat belt when traveling in a car. Always wear a helmet when riding a bicycle or motorcycle. Heart-Healthy Diet   Sodium, Fat, and Cholesterol Controlled Diet       What Is a Heart Healthy Diet? A heart-healthy diet is one that limits sodium , certain types of fat , and cholesterol .  This type of diet is recommended for:   People with any form of cardiovascular disease (eg, coronary heart disease , peripheral vascular disease , previous heart attack , previous stroke )   People with risk factors for cardiovascular disease, such as high blood pressure , high cholesterol , or diabetes   Anyone who wants to lower their risk of developing cardiovascular disease   Sodium    Sodium is a mineral found in many foods. In general, most people consume much more sodium than they need. Diets high in sodium can increase blood pressure and lead to edema (water retention). On a heart-healthy diet, you should consume no more than 2,300 mg (milligrams) of sodium per dayabout the amount in one teaspoon of table salt. The foods highest in sodium include table salt (about 50% sodium), processed foods, convenience foods, and preserved foods. Cholesterol    Cholesterol is a fat-like, waxy substance in your blood. Our bodies make some cholesterol. It is also found in animal products, with the highest amounts in fatty meat, egg yolks, whole milk, cheese, shellfish, and organ meats. On a heart-healthy diet, you should limit your cholesterol intake to less than 200 mg per day. It is normal and important to have some cholesterol in your bloodstream. But too much cholesterol can cause plaque to build up within your arteries, which can eventually lead to a heart attack or stroke. The two types of cholesterol that are most commonly referred to are:   Low-density lipoprotein (LDL) cholesterol  Also known as bad cholesterol, this is the cholesterol that tends to build up along your arteries. Bad cholesterol levels are increased by eating fats that are saturated or hydrogenated. Optimal level of this cholesterol is less than 100. Over 130 starts to get risky for heart disease. High-density lipoprotein (HDL) cholesterol  Also known as good cholesterol, this type of cholesterol actually carries cholesterol away from your arteries and may, therefore, help lower your risk of having a heart attack.  You want this level to be high (ideally greater than 60). It is a risk to have a level less than 40. You can raise this good cholesterol by eating olive oil, canola oil, avocados, or nuts. Exercise raises this level, too. Fat    Fat is calorie dense and packs a lot of calories into a small amount of food. Even though fats should be limited due to their high calorie content, not all fats are bad. In fact, some fats are quite healthful. Fat can be broken down into four main types. The good-for-you fats are:   Monounsaturated fat  found in oils such as olive and canola, avocados, and nuts and natural nut butters; can decrease cholesterol levels, while keeping levels of HDL cholesterol high   Polyunsaturated fat  found in oils such as safflower, sunflower, soybean, corn, and sesame; can decrease total cholesterol and LDL cholesterol   Omega-3 fatty acids  particularly those found in fatty fish (such as salmon, trout, tuna, mackerel, herring, and sardines); can decrease risk of arrhythmias, decrease triglyceride levels, and slightly lower blood pressure   The fats that you want to limit are:   Saturated fat  found in animal products, many fast foods, and a few vegetables; increases total blood cholesterol, including LDL levels   Animal fats that are saturated include: butter, lard, whole-milk dairy products, meat fat, and poultry skin   Vegetable fats that are saturated include: hydrogenated shortening, palm oil, coconut oil, cocoa butter   Hydrogenated or trans fat  found in margarine and vegetable shortening, most shelf stable snack foods, and fried foods; increases LDL and decreases HDL     It is generally recommended that you limit your total fat for the day to less than 30% of your total calories. If you follow an 1800-calorie heart healthy diet, for example, this would mean 60 grams of fat or less per day. Saturated fat and trans fat in your diet raises your blood cholesterol the most, much more than dietary cholesterol does.  For this reason, on a heart-healthy diet, less than 7% of your calories should come from saturated fat and ideally 0% from trans fat. On an 1800-calorie diet, this translates into less than 14 grams of saturated fat per day, leaving 46 grams of fat to come from mono- and polyunsaturated fats.    Food Choices on a Heart Healthy Diet   Food Category   Foods Recommended   Foods to Avoid   Grains   Breads and rolls without salted tops Most dry and cooked cereals Unsalted crackers and breadsticks Low-sodium or homemade breadcrumbs or stuffing All rice and pastas   Breads, rolls, and crackers with salted tops High-fat baked goods (eg, muffins, donuts, pastries) Quick breads, self-rising flour, and biscuit mixes Regular bread crumbs Instant hot cereals Commercially prepared rice, pasta, or stuffing mixes   Vegetables   Most fresh, frozen, and low-sodium canned vegetables Low-sodium and salt-free vegetable juices Canned vegetables if unsalted or rinsed   Regular canned vegetables and juices, including sauerkraut and pickled vegetables Frozen vegetables with sauces Commercially prepared potato and vegetable mixes   Fruits   Most fresh, frozen, and canned fruits All fruit juices   Fruits processed with salt or sodium   Milk   Nonfat or low-fat (1%) milk Nonfat or low-fat yogurt Cottage cheese, low-fat ricotta, cheeses labeled as low-fat and low-sodium   Whole milk Reduced-fat (2%) milk Malted and chocolate milk Full fat yogurt Most cheeses (unless low-fat and low salt) Buttermilk (no more than 1 cup per week)   Meats and Beans   Lean cuts of fresh or frozen beef, veal, lamb, or pork (look for the word loin) Fresh or frozen poultry without the skin Fresh or frozen fish and some shellfish Egg whites and egg substitutes (Limit whole eggs to three per week) Tofu Nuts or seeds (unsalted, dry-roasted), low-sodium peanut butter Dried peas, beans, and lentils   Any smoked, cured, salted, or canned meat, fish, or poultry (including hernandez, chipped beef, cold cuts, hot dogs, sausages, sardines, and anchovies) Poultry skins Breaded and/or fried fish or meats Canned peas, beans, and lentils Salted nuts   Fats and Oils   Olive oil and canola oil Low-sodium, low-fat salad dressings and mayonnaise   Butter, margarine, coconut and palm oils, hernandez fat   Snacks, Sweets, and Condiments   Low-sodium or unsalted versions of broths, soups, soy sauce, and condiments Pepper, herbs, and spices; vinegar, lemon, or lime juice Low-fat frozen desserts (yogurt, sherbet, fruit bars) Sugar, cocoa powder, honey, syrup, jam, and preserves Low-fat, trans-fat free cookies, cakes, and pies Tawanda and animal crackers, fig bars, sania snaps   High-fat desserts Broth, soups, gravies, and sauces, made from instant mixes or other high-sodium ingredients Salted snack foods Canned olives Meat tenderizers, seasoning salt, and most flavored vinegars   Beverages   Low-sodium carbonated beverages Tea and coffee in moderation Soy milk   Commercially softened water   Suggestions   Make whole grains, fruits, and vegetables the base of your diet. Choose heart-healthy fats such as canola, olive, and flaxseed oil, and foods high in heart-healthy fats, such as nuts, seeds, soybeans, tofu, and fish. Eat fish at least twice per week; the fish highest in omega-3 fatty acids and lowest in mercury include salmon, herring, mackerel, sardines, and canned chunk light tuna. If you eat fish less than twice per week or have high triglycerides, talk to your doctor about taking fish oil supplements. Read food labels. For products low in fat and cholesterol, look for fat free, low-fat, cholesterol free, saturated fat free, and trans fat freeAlso scan the Nutrition Facts Label, which lists saturated fat, trans fat, and cholesterol amounts.    For products low in sodium, look for sodium free, very low sodium, low sodium, no added salt, and unsalted   Skip the salt when cooking or at the table; if food needs more flavor, get creative and try out different herbs and spices. Garlic and onion also add substantial flavor to foods. Trim any visible fat off meat and poultry before cooking, and drain the fat off after gottlieb. Use cooking methods that require little or no added fat, such as grilling, boiling, baking, poaching, broiling, roasting, steaming, stir-frying, and sauting. Avoid fast food and convenience food. They tend to be high in saturated and trans fat and have a lot of added salt. Talk to a registered dietitian for individualized diet advice. Last Reviewed: March 2011 Erika Wayne MS, MPH, RD   Updated: 3/29/2011   ·     Preventing Osteoporosis: After Your Visit  Your Care Instructions  Osteoporosis means the bones are weak and thin enough that they can break easily. The older you are, the more likely you are to get osteoporosis. But with plenty of calcium, vitamin D, and exercise, you can help prevent osteoporosis. The preteen and teen years are a key time for bone building. With the help of calcium, vitamin D, and exercise in those early years and beyond, the bones reach their peak density and strength by age 27. After age 27, your bones naturally start to thin and weaken. The stronger your bones are at around age 27, the lower your risk for osteoporosis. But no matter what your age and risk are, your bones still need calcium, vitamin D, and exercise to stay strong. Also avoid smoking, and limit alcohol. Smoking and heavy alcohol use can make your bones thinner. Talk to your doctor about any special risks you might have, such as having a close relative with osteoporosis or taking a medicine that can weaken bones. Your doctor can tell you the best ways to protect your bones from thinning. Follow-up care is a key part of your treatment and safety. Be sure to make and go to all appointments, and call your doctor if you are having problems.  It's also a good idea to know your test results and keep a list of the medicines you take. How can you care for yourself at home? Get enough calcium and vitamin D. The [place] [employer] recommends adults younger than age 46 need 1,000 mg of calcium and 600 IU of vitamin D each day. Women ages 46 to 79 need 1,200 mg of calcium and 600 IU of vitamin D each day. Men ages 46 to 79 need 1,000 mg of calcium and 600 IU of vitamin D each day. Adults 71 and older need 1,200 mg of calcium and 800 IU of vitamin D each day. Eat foods rich in calcium, like yogurt, cheese, milk, and dark green vegetables. Eat foods rich in vitamin D, like eggs, fatty fish, cereal, and fortified milk. Get some sunshine. Your body uses sunshine to make its own vitamin D. The safest time to be out in the sun is before 10 a.m. or after 3 p.m. Avoid getting sunburned. Sunburn can increase your risk of skin cancer. Talk to your doctor about taking a calcium plus vitamin D supplement. Ask about what type of calcium is right for you, and how much to take at a time. Adults ages 23 to 48 should not get more than 2,500 mg of calcium and 4,000 IU of vitamin D each day, whether it is from supplements and/or food. Adults ages 46 and older should not get more than 2,000 mg of calcium and 4,000 IU of vitamin D each day from supplements and/or food. Get regular bone-building exercise. Weight-bearing and resistance exercises keep bones healthy by working the muscles and bones against gravity. Start out at an exercise level that feels right for you. Add a little at a time until you can do the following:  Do 30 minutes of weight-bearing exercise on most days of the week. Walking, jogging, stair climbing, and dancing are good choices. Do resistance exercises with weights or elastic bands 2 to 3 days a week. Limit alcohol. Drink no more than 1 alcohol drink a day if you are a woman. Drink no more than 2 alcohol drinks a day if you are a man. Do not smoke. Smoking can make bones thin faster.  If you need help quitting, talk to your doctor about stop-smoking programs and medicines. These can increase your chances of quitting for good. When should you call for help? Watch closely for changes in your health, and be sure to contact your doctor if:  You need help with a healthy eating plan. You need help with an exercise plan    © 0499-4599 [employer]. Care instructions adapted under license by [facility]. This care instruction is for use with your licensed healthcare professional. If you have questions about a medical condition or this instruction, always ask your healthcare professional. [address] any warranty or liability for your use of this information. Content Version: 9.4.20701; Last Revised: June 20, 2011              ·     DASH Diet: After Your Visit  Your Care Instructions  The DASH diet is an eating plan that can help lower your blood pressure. DASH stands for Dietary Approaches to Stop Hypertension. Hypertension is high blood pressure. The DASH diet focuses on eating foods that are high in calcium, potassium, and magnesium. These nutrients can lower blood pressure. The foods that are highest in these nutrients are fruits, vegetables, low-fat dairy products, nuts, seeds, and legumes. But taking calcium, potassium, and magnesium supplements instead of eating foods that are high in those nutrients does not have the same effect. The DASH diet also includes whole grains, fish, and poultry. The DASH diet is one of several lifestyle changes your doctor may recommend to lower your high blood pressure. Your doctor may also want you to decrease the amount of sodium in your diet. Lowering sodium while following the DASH diet can lower blood pressure even further than just the DASH diet alone. Follow-up care is a key part of your treatment and safety. Be sure to make and go to all appointments, and call your doctor if you are having problems.  Its also a good idea to know your test results and keep a list of the medicines you take. How can you care for yourself at home? Following the DASH diet  Eat 4 to 5 servings of fruit each day. A serving is 1 medium-sized piece of fruit, ½ cup chopped or canned fruit, 1/4 cup dried fruit, or 4 ounces (½ cup) of fruit juice. Choose fruit more often than fruit juice. Eat 4 to 5 servings of vegetables each day. A serving is 1 cup of lettuce or raw leafy vegetables, ½ cup of chopped or cooked vegetables, or 4 ounces (½ cup) of vegetable juice. Choose vegetables more often than vegetable juice. Get 2 to 3 servings of low-fat and fat-free dairy each day. A serving is 8 ounces of milk, 1 cup of yogurt, or 1 ½ ounces of cheese. Eat 7 to 8 servings of grains each day. A serving is 1 slice of bread, 1 ounce of dry cereal, or ½ cup of cooked rice, pasta, or cooked cereal. Try to choose whole-grain products as much as possible. Limit lean meat, poultry, and fish to 6 ounces each day. Six ounces is about the size of two decks of cards. Eat 4 to 5 servings of nuts, seeds, and legumes (cooked dried beans, lentils, and split peas) each week. A serving is 1/3 cup of nuts, 2 tablespoons of seeds, or ½ cup cooked dried beans or peas. Limit sweets and added sugars to 5 servings or less a week. A serving is 1 tablespoon jelly or jam, ½ cup sorbet, or 1 cup of lemonade. Tips for success  Start small. Do not try to make dramatic changes to your diet all at once. You might feel that you are missing out on your favorite foods and then be more likely to not follow the plan. Make small changes, and stick with them. Once those changes become habit, add a few more changes. Try some of the following:  Make it a goal to eat a fruit or vegetable at every meal and at snacks. This will make it easy to get the recommended amount of fruits and vegetables each day. Try yogurt topped with fruit and nuts for a snack or healthy dessert.   Add lettuce, tomato, cucumber, and onion to sandwiches. Combine a ready-made pizza crust with low-fat mozzarella cheese and lots of vegetable toppings. Try using tomatoes, squash, spinach, broccoli, carrots, cauliflower, and onions. Have a variety of cut-up vegetables with a low-fat dip as an appetizer instead of chips and dip. Sprinkle sunflower seeds or chopped almonds over salads. Or try adding chopped walnuts or almonds to cooked vegetables. Try some vegetarian meals using beans and peas. Add garbanzo or kidney beans to salads. Make burritos and tacos with mashed saucedo beans or black beans    © 5137-4451 [employer]. Care instructions adapted under license by [facility]. This care instruction is for use with your licensed healthcare professional. If you have questions about a medical condition or this instruction, always ask your healthcare professional. [place] [address] any warranty or liability for your use of this information. Content Version: 9.4.07549; Last Revised: March 15, 2012              ·     High-Fiber Diet     What Is Fiber? Dietary fiber is a form of carbohydrate found in plants that cannot be digested by humans. All plants contain fiber, including fruits, vegetables, grains, and legumes. Fiber is often classified into two categories: soluble and insoluble. Soluble fiber draws water into the bowel and can help slow digestion. Examples of foods that are high in soluble fiber include oatmeal, oat bran, barley, legumes (eg, beans and peas), apples, and strawberries. Insoluble fiber speeds digestion and can add bulk to the stool. Examples of foods that are high in insoluble fiber include whole-wheat products, wheat bran, cauliflower, green beans, and potatoes. Why Follow a High-Fiber Diet? A high-fiber diet is often recommended to prevent and treat constipation , hemorrhoids , diverticulitis , and irritable bowel syndrome .  Eating a high-fiber diet can also help improve your cholesterol levels, lower your risk of coronary heart disease , reduce your risk of type 2 diabetes , and lower your weight. For people with type 1 or 2 diabetes, a high-fiber diet can also help stabilize blood sugar levels. How Much Fiber Should I Eat? A high-fiber diet should contain  20-35 grams  of fiber a day. This is actually the amount recommended for the general adult population; however, most Americans eat only 15 grams of fiber per day. Digestion of Fiber   Eating a higher fiber diet than usual can take some getting used to by your body's digestive system. To avoid the side effects of sudden increases in dietary fiber (eg, gas, cramping, bloating, and diarrhea), increase fiber gradually and be sure to drink plenty of fluids every day. Tips for Increasing Fiber Intake   Whenever possible, choose whole grains over refined grains (eg, brown rice instead of white rice, whole-wheat bread instead of white bread). Include a variety of grains in your diet, such as wheat, rye, barley, oats, quinoa, and bulgur. Eat more vegetarian-based meals. Here are some ideas: black bean burgers, eggplant lasagna, and veggie tofu stir-reed. Choose high-fiber snacks, such as fruits, popcorn, whole-grain crackers, and nuts. Make whole-grain cereal or whole-grain toast part of your daily breakfast regime. When eating out, whether ordering a sandwich or dinner, ask for extra vegetables. When baking, replace part of the white flour with whole-wheat flour. Whole-wheat flour is particularly easy to incorporate into a recipe. High-Fiber Diet Eating Guide   Food Category   Foods Recommended   Notes   Grains   Whole-grain breads, muffins, bagels, or ayla bread Rye bread Whole-wheat crackers or crisp breads Whole-grain or bran cereals Oatmeal, oat bran, or grits Wheat germ Whole-wheat pasta and brown rice   Read the ingredients list on food labels.  Look for products that list \"whole\" as the first ingredient (eg, whole-wheat, whole oats). Choose cereals with at least 2 grams of fiber per serving. Vegetables   All vegetables, especially asparagus, bean sprouts, broccoli, [place] sprouts, cabbage, carrots, cauliflower, celery, corn, greens, green beans, green pepper, onions, peas, potatoes (with skin), snow peas, spinach, squash, sweet potatoes, tomatoes, zucchini   For maximum fiber intake, eat the peels of fruits and vegetablesjust be sure to wash them well first.   Fruits   All fruits, especially apples, berries, grapefruits, mangoes, nectarines, oranges, peaches, pears, dried fruits (figs, dates, prunes, raisins)   Choose raw fruits and vegetables over juice, cooked, or cannedraw fruit has more fiber. Dried fruit is also a good source of fiber. Milk   With the exception of yogurt containing inulin (a type of fiber), dairy foods provide little fiber. Add more fiber by topping your yogurt or cottage cheese with fresh fruit, whole grain or bran cereals, nuts, or seeds. Meats and Beans   All beans and peas, especially Garbanzo beans, kidney beans, lentils, lima beans, split peas, and saucedo beans All nuts and seeds, especially almonds, peanuts, [place] nuts, cashews, peanut butter, walnuts, sesame and sunflower seeds All meat, poultry, fish, and eggs   Increase fiber in meat dishes by adding saucedo beans, kidney beans, black-eyed peas, bran, or oatmeal. If you are following a low-fat diet, use nuts and seeds only in moderation. Fats and Oils   All in moderation   Fats and oils do not provide fiber   Snacks, Sweets, and Condiments   Fruit Nuts Popcorn, whole-wheat pretzels, or trail mix made with dried fruits, nuts, and seeds Cakes, breads, and cookies made with oatmeal or whole-wheat flour   Most snack foods do not provide much fiber. Choose snacks with at least 2 grams of fiber per serving.      Last Reviewed: March 2011 Koby Almanza MS, MPH, RD   Updated: 3/29/2011   ·     Keep Your Memory Sharp       Many factors can affect your ability to remembera hectic lifestyle, aging, stress, chronic disease, and certain medicines. But, there are steps you can take to sharpen your mind and help preserve your memory. Challenge Your Brain   Regularly challenging your mind may help keeps it in top shape. Good mental exercises include:   Crossword puzzlesUse a dictionary if you need it; you will learn more that way. Brainteasers Try some! Crafts, such as wood working and sewing   Hobbies, such as gardening and building model airplanes   SocializingVisit old friends or join groups to meet new ones. Reading   Learning a new language   Taking a class, whether it be art history or aayush chi   TravelingExperience the food, history, and culture of your destination   Learning to use a computer   Going to museums, the theater, or thought-provoking movies   Changing things in your daily life, such as reversing your pattern in the grocery store or brushing your teeth using your nondominant hand   Use Memory Aids   There is no need to remember every detail on your own. These memory aids can help:   Calendars and day planners   Electronic organizers to store all sorts of helpful informationThese devices can \"beep\" to remind you of appointments. A book of days to record birthdays, anniversaries, and other occasions that occur on the same date every year   Detailed \"to-do\" lists and strategically placed sticky notes   Quick \"study\" sessionsBefore a gathering, review who will be there so their names will be fresh in your mind. Establish routinesFor example, keep your keys, wallet, and umbrella in the same place all the time or take medicine with your 8:00 AM glass of juice   Live a Healthy Life   Many actions that will keep your body strong will do the same for your mind. For example:   Talk to Your Doctor About Herbs and Supplements    Malnutrition and vitamin deficiencies can impair your mental function.  For example, vitamin B12 deficiency can cause a range of symptoms, including confusion. But, what if your nutritional needs are being met? Can herbs and supplements still offer a benefit? Researchers have investigated a range of natural remedies, such as ginkgo , ginseng , and the supplement phosphatidylserine (PS). So far, though, the evidence is inconsistent as to whether these products can improve memory or thinking. If you are interested in taking herbs and supplements, talk to your doctor first because they may interact with other medicines that you are taking. Exercise Regularly    Among the many benefits of regular exercise are increased blood flow to the brain and decreased risk of certain diseases that can interfere with memory function. One study found that even moderate exercise has a beneficial effect. Examples of \"moderate\" exercise include:   Playing 18 holes of golf once a week, without a cart   Playing tennis twice a week   Walking one mile per day   Manage Stress    It can be tough to remember what is important when your mind is cluttered. Make time for relaxation. Choose activities that calm you down, and make it routine. Manage Chronic Conditions    Side effects of high blood pressure , diabetes, and heart disease can interfere with mental function. Many of the lifestyle steps discussed here can help manage these conditions. Strive to eat a healthy diet, exercise regularly, get stress under control, and follow your doctor's advice for your condition. Minimize Medications    Talk to your doctor about the medicines that you take. Some may be unnecessary. Also, healthy lifestyle habits may lower the need for certain drugs. Last Reviewed: April 2010 Ja Jimenez MD   Updated: 4/13/2010   ·     [address] [facility]       As we get older, changes in balance, gait, strength, vision, hearing, and cognition make even the most youthful senior more prone to accidents.  Falls are one of the leading health risks for older people. This increased risk of falling is related to:   Aging process (eg, decreased muscle strength, slowed reflexes)   Higher incidence of chronic health problems (eg, arthritis, diabetes) that may limit mobility, agility or sensory awareness   Side effects of medicine (eg, dizziness, blurred vision)especially medicines like prescription pain medicines and drugs used to treat mental health conditions   Depending on the brittleness of your bones, the consequences of a fall can be serious and long lasting. Home Life   Research by the Association of Aging [facility]) shows that some home accidents among older adults can be prevented by making simple lifestyle changes and basic modifications and repairs to the home environment. Here are some lifestyle changes that experts recommend:   Have your hearing and vision checked regularly. Be sure to wear prescription glasses that are right for you. Speak to your doctor or pharmacist about the possible side effects of your medicines. A number of medicines can cause dizziness. If you have problems with sleep, talk to your doctor. Limit your intake of alcohol. If necessary, use a cane or walker to help maintain your balance. Wear supportive, rubber-soled shoes, even at home. If you live in a region that gets wintry weather, you may want to put special cleats on your shoes to prevent you from slipping on the snow and ice. Exercise regularly to help maintain muscle tone, agility, and balance. Always hold the banister when going up or down stairs. Also, use  bars when getting in or out of the bath or shower, or using the toilet. To avoid dizziness, get up slowly from a lying down position. Sit up first, dangling your legs for a minute or two before rising to a standing position.    Overall Home Safety Check   According to the Consumer Product Safety Commision's \"Older Consumer Home Safety Checklist,\" it is important to check for potential hazards in each room. And remember, proper lighting is an essential factor in home safety. If you cannot see clearly, you are more likely to fall. Important questions to ask yourself include:   Are lamp, electric, extension, and telephone cords placed out of the flow of traffic and maintained in good condition? Have frayed cords been replaced? Are all small rugs and runners slip resistant? If not, you can secure them to the floor with a special double-sided carpet tape. Are smoke detectors properly locatedone on every floor of your home and one outside of every sleeping area? Are they in good working order? Are batteries replaced at least once a year? Do you have a well-maintained carbon monoxide detector outside every sleeping are in your home? Does your furniture layout leave plenty of space to maneuver between and around chairs, tables, beds, and sofas? Are hallways, stairs and passages between rooms well lit? Can you reach a lamp without getting out of bed? Are floor surfaces well maintained? Shag rugs, high-pile carpeting, tile floors, and polished wood floors can be particularly slippery. Stairs should always have handrails and be carpeted or fitted with a non-skid tread. Is your telephone easily reachable. Is the cord safely tucked away? Room by Room   According to the Association of Aging, bathrooms and elen are the two most potentially hazardous rooms in your home. In the Kitchen    Be sure your stove is in proper working order and always make sure burners and the oven are off before you go out or go to sleep. Keep pots on the back burners, turn handles away from the front of the stove, and keep stove clean and free of grease build-up. Kitchen ventilation systems and range exhausts should be working properly. Keep flammable objects such as towels and pot holders away from the cooking area except when in use. Make sure kitchen curtains are tied back.     Move cords and appliances away from the sink and hot surfaces. If extension cords are needed, install wiring guides so they do not hang over the sink, range, or working areas. Look for coffee pots, kettles and toaster ovens with automatic shut-offs. Keep a mop handy in the kitchen so you can wipe up spills instantly. You should also have a small fire extinguisher. Arrange your kitchen with frequently used items on lower shelves to avoid the need to stand on a stepstool to reach them. Make sure countertops are well-lit to avoid injuries while cutting and preparing food. In the Bathroom    Use a non-slip mat or decals in the tub and shower, since wet, soapy tile or porcelain surfaces are extremely slippery. Make sure bathroom rugs are non-skid or tape them firmly to the floor. Bathtubs should have at least one, preferably two, grab bars, firmly attached to structural supports in the wall. (Do not use built-in soap holders or glass shower doors as grab bars.)    Tub seats fitted with non-slip material on the legs allow you to wash sitting down. For people with limited mobility, bathtub transfer benches allow you to slide safely into the tub. Raised toilet seats and toilet safety rails are helpful for those with knee or hip problems. In the [facility]    Make sure you use a nightlight and that the area around your bed is clear of potential obstacles. Be careful with electric blankets and never go to sleep with a heating pad, which can cause serious burns even if on a low setting. Use fire-resistant mattress covers and pillows, and NEVER smoke in bed. Keep a phone next to the bed that is programmed to dial 911 at the push of a button. If you have a chronic condition, you may want to sign on with an automatic call-in service. Typically the system includes a small pendant that connects directly to an emergency medical voice-response system.  You should also make arrangements to stay in contact with someonefriend, neighbor, family memberon a regular schedule. Fire Prevention   According to the [employer]. (Smoke Alarms for Every) [facility] [facility], senior citizens are one of the two highest risk groups for death and serious injuries due to residential fires. When cooking, wear short-sleeved items, never a bulky long-sleeved robe. The [facility]'s Safety Checklist for Older Consumers emphasizes the importance of checking basements, garages, workshops and storage areas for fire hazards, such as volatile liquids, piles of old rags or clothing and overloaded circuits. Never smoke in bed or when lying down on a couch or recliner chair. Small portable electric or kerosene heaters are responsible for many home fires and should be used cautiously if at all. If you do use one, be sure to keep them away from flammable materials. In case of fire, make sure you have a pre-established emergency exit plan. Have a professional check your fireplace and other fuel-burning appliances yearly. Helping Hands   Baby boomers entering the kaur years will continue to see the development of new products to help older adults live safely and independently in spite of age-related changes. Making Life More Livable  , by Antoni Jacobs, lists over 1,000 products for \"living well in the mature years,\" such as bathing and mobility aids, household security devices, ergonomically designed knives and peelers, and faucet valves and knobs for temperature control. Medical supply stores and organizations are good sources of information about products that improve your quality of life and insure your safety. Last Reviewed: November 2009 John Hyatt MD   Updated: 3/7/2011     Patient Education        Well Visit, Over 72: Care Instructions  Overview     Well visits can help you stay healthy. Your doctor has checked your overall health and may have suggested ways to take good care of yourself.  Your doctor also may have recommended tests. At home, you can help prevent illness with healthy eating, regular exercise, and other steps. Follow-up care is a key part of your treatment and safety. Be sure to make and go to all appointments, and call your doctor if you are having problems. It's also a good idea to know your test results and keep a list of the medicines you take. How can you care for yourself at home? · Get screening tests that you and your doctor decide on. Screening helps find diseases before any symptoms appear. · Eat healthy foods. Choose fruits, vegetables, whole grains, protein, and low-fat dairy foods. Limit fat, especially saturated fat. Reduce salt in your diet. · Limit alcohol. If you are a man, have no more than 2 drinks a day or 14 drinks a week. If you are a woman, have no more than 1 drink a day or 7 drinks a week. Since alcohol affects older adults differently, you may want to limit alcohol even more. Or you may not want to drink at all. · Get at least 30 minutes of exercise on most days of the week. Walking is a good choice. You also may want to do other activities, such as running, swimming, cycling, or playing tennis or team sports. · Reach and stay at a healthy weight. This will lower your risk for many problems, such as obesity, diabetes, heart disease, and high blood pressure. · Do not smoke. Smoking can make health problems worse. If you need help quitting, talk to your doctor about stop-smoking programs and medicines. These can increase your chances of quitting for good. · Care for your mental health. It is easy to get weighed down by worry and stress. Learn strategies to manage stress, like deep breathing and mindfulness, and stay connected with your family and community. If you find you often feel sad or hopeless, talk with your doctor. Treatment can help. · Talk to your doctor about whether you have any risk factors for sexually transmitted infections (STIs).  You can help prevent STIs if you wait to have sex with a new partner (or partners) until you've each been tested for STIs. It also helps if you use condoms (male or female condoms) and if you limit your sex partners to one person who only has sex with you. Vaccines are available for some STIs. · If you think you may have a problem with alcohol or drug use, talk to your doctor. This includes prescription medicines (such as amphetamines and opioids) and illegal drugs (such as cocaine and methamphetamine). Your doctor can help you figure out what type of treatment is best for you. · Protect your skin from too much sun. When you're outdoors from 10 a.m. to 4 p.m., stay in the shade or cover up with clothing and a hat with a wide brim. Wear sunglasses that block UV rays. Even when it's cloudy, put broad-spectrum sunscreen (SPF 30 or higher) on any exposed skin. · See a dentist one or two times a year for checkups and to have your teeth cleaned. · Wear a seat belt in the car. When should you call for help? Watch closely for changes in your health, and be sure to contact your doctor if you have any problems or symptoms that concern you. Where can you learn more? Go to [email or web address]://[email or web address]. org and sign in to your Growish account. Enter K194 in the LibraryThing box to learn more about \"Well Visit, Over 65: Care Instructions. \"     If you do not have an account, please click on the \"Sign Up Now\" link. Current as of: February 11, 2021               Content Version: 13.0  © 2006-2021 Healthwise, Incorporated. Care instructions adapted under license by [facility] ([facility]). If you have questions about a medical condition or this instruction, always ask your healthcare professional. [place] [address] any warranty or liability for your use of this information.

## 2021-12-27 ENCOUNTER — TELEPHONE (OUTPATIENT)
Dept: FAMILY MEDICINE CLINIC | Age: 86
End: 2021-12-27

## 2021-12-27 DIAGNOSIS — Z20.822 CLOSE EXPOSURE TO COVID-19 VIRUS: Primary | ICD-10-CM

## 2021-12-27 NOTE — TELEPHONE ENCOUNTER
Tell her to start symptomatic treatment with the protocol given immediately if she has any symptoms whatsoever.   Order written for lab testing as of tomorrow or thereafter at any [facility].  Call to schedule

## 2021-12-27 NOTE — TELEPHONE ENCOUNTER
----- Message from [employer] sent at 12/27/2021  4:34 PM EST -----  Subject: Message to Provider    QUESTIONS  Information for Provider? Eduardo Justice called because he tested positive for covid   and he was around his mother for [place]. He would like to know if she   needs to be tested due to her age.  ---------------------------------------------------------------------------  --------------  CALL BACK INFO  What is the best way for the office to contact you? OK to leave message on   voicemail  Preferred Call Back Phone Number? [phone]  ---------------------------------------------------------------------------  --------------  SCRIPT ANSWERS  Relationship to Patient? Other  Representative Name? Eduardowilly Justice  Is the Representative on the appropriate HIPAA document in Epic?  Yes

## 2022-01-21 DIAGNOSIS — E03.9 ACQUIRED HYPOTHYROIDISM: Chronic | ICD-10-CM

## 2022-01-21 DIAGNOSIS — F33.41 RECURRENT MAJOR DEPRESSIVE DISORDER, IN PARTIAL REMISSION (HCC): Chronic | ICD-10-CM

## 2022-01-21 RX ORDER — LEVOTHYROXINE SODIUM 0.05 MG/1
TABLET ORAL
Qty: 90 TABLET | Refills: 0 | Status: SHIPPED | OUTPATIENT
Start: 2022-01-21 | End: 2022-02-15 | Stop reason: SDUPTHER

## 2022-01-21 RX ORDER — BUPROPION HYDROCHLORIDE 150 MG/1
TABLET, EXTENDED RELEASE ORAL
Qty: 180 TABLET | Refills: 0 | Status: SHIPPED | OUTPATIENT
Start: 2022-01-21 | End: 2022-02-15 | Stop reason: SDUPTHER

## 2022-01-21 NOTE — TELEPHONE ENCOUNTER
LV 11/16/21 WITH  [facility] FOR AWV [place] 2/15/22  Component Ref Range & Units 11/16/21 1035 4/27/21 0924 2/2/21 1413 10/27/20 0925 10/22/19 0918 4/23/19 0902 1/8/19 0946   TSH 0.27 - 4.20 uIU/mL 4.01  4.09  4.72 High   5.82 High   5.66 High   3.78  4.96 High

## 2022-02-15 ENCOUNTER — OFFICE VISIT (OUTPATIENT)
Dept: FAMILY MEDICINE CLINIC | Age: 87
End: 2022-02-15
Payer: MEDICARE

## 2022-02-15 VITALS
RESPIRATION RATE: 16 BRPM | SYSTOLIC BLOOD PRESSURE: 122 MMHG | TEMPERATURE: 97 F | OXYGEN SATURATION: 97 % | HEART RATE: 60 BPM | WEIGHT: 174.6 LBS | BODY MASS INDEX: 32.13 KG/M2 | HEIGHT: 62 IN | DIASTOLIC BLOOD PRESSURE: 80 MMHG

## 2022-02-15 DIAGNOSIS — E78.00 PURE HYPERCHOLESTEROLEMIA: ICD-10-CM

## 2022-02-15 DIAGNOSIS — N18.31 STAGE 3A CHRONIC KIDNEY DISEASE (HCC): ICD-10-CM

## 2022-02-15 DIAGNOSIS — E03.9 ACQUIRED HYPOTHYROIDISM: Chronic | ICD-10-CM

## 2022-02-15 DIAGNOSIS — M1A.0720 CHRONIC GOUT OF LEFT ANKLE, UNSPECIFIED CAUSE: Chronic | ICD-10-CM

## 2022-02-15 DIAGNOSIS — F33.41 RECURRENT MAJOR DEPRESSIVE DISORDER, IN PARTIAL REMISSION (HCC): Chronic | ICD-10-CM

## 2022-02-15 DIAGNOSIS — I10 HTN (HYPERTENSION), BENIGN: Primary | ICD-10-CM

## 2022-02-15 DIAGNOSIS — E77.8 HYPOPROTEINEMIA (HCC): Chronic | ICD-10-CM

## 2022-02-15 PROBLEM — H91.93 BILATERAL HEARING LOSS: Chronic | Status: ACTIVE | Noted: 2019-04-15

## 2022-02-15 LAB
A/G RATIO: 2.1 (ref 1.1–2.2)
ALBUMIN SERPL-MCNC: 4 G/DL (ref 3.4–5)
ALP BLD-CCNC: 110 U/L (ref 40–129)
ALT SERPL-CCNC: 19 U/L (ref 10–40)
ANION GAP SERPL CALCULATED.3IONS-SCNC: 11 MMOL/L (ref 3–16)
AST SERPL-CCNC: 23 U/L (ref 15–37)
BILIRUB SERPL-MCNC: 0.5 MG/DL (ref 0–1)
BUN BLDV-MCNC: 27 MG/DL (ref 7–20)
CALCIUM SERPL-MCNC: 9.5 MG/DL (ref 8.3–10.6)
CHLORIDE BLD-SCNC: 107 MMOL/L (ref 99–110)
CO2: 25 MMOL/L (ref 21–32)
CREAT SERPL-MCNC: 0.6 MG/DL (ref 0.6–1.2)
GFR AFRICAN AMERICAN: >60
GFR NON-AFRICAN AMERICAN: >60
GLUCOSE BLD-MCNC: 97 MG/DL (ref 70–99)
MAGNESIUM: 1.9 MG/DL (ref 1.8–2.4)
POTASSIUM SERPL-SCNC: 4.5 MMOL/L (ref 3.5–5.1)
SODIUM BLD-SCNC: 143 MMOL/L (ref 136–145)
TOTAL PROTEIN: 5.9 G/DL (ref 6.4–8.2)
URIC ACID, SERUM: 2.5 MG/DL (ref 2.6–6)

## 2022-02-15 PROCEDURE — 1123F ACP DISCUSS/DSCN MKR DOCD: CPT | Performed by: FAMILY MEDICINE

## 2022-02-15 PROCEDURE — G8427 DOCREV CUR MEDS BY ELIG CLIN: HCPCS | Performed by: FAMILY MEDICINE

## 2022-02-15 PROCEDURE — G8484 FLU IMMUNIZE NO ADMIN: HCPCS | Performed by: FAMILY MEDICINE

## 2022-02-15 PROCEDURE — 1036F TOBACCO NON-USER: CPT | Performed by: FAMILY MEDICINE

## 2022-02-15 PROCEDURE — G8417 CALC BMI ABV UP PARAM F/U: HCPCS | Performed by: FAMILY MEDICINE

## 2022-02-15 PROCEDURE — 1090F PRES/ABSN URINE INCON ASSESS: CPT | Performed by: FAMILY MEDICINE

## 2022-02-15 PROCEDURE — 36415 COLL VENOUS BLD VENIPUNCTURE: CPT | Performed by: FAMILY MEDICINE

## 2022-02-15 PROCEDURE — 4040F PNEUMOC VAC/ADMIN/RCVD: CPT | Performed by: FAMILY MEDICINE

## 2022-02-15 PROCEDURE — 99215 OFFICE O/P EST HI 40 MIN: CPT | Performed by: FAMILY MEDICINE

## 2022-02-15 RX ORDER — PROBENECID 500 MG/1
TABLET, FILM COATED ORAL
Qty: 45 TABLET | Refills: 3 | Status: CANCELLED | OUTPATIENT
Start: 2022-02-15

## 2022-02-15 RX ORDER — BUPROPION HYDROCHLORIDE 150 MG/1
TABLET, EXTENDED RELEASE ORAL
Qty: 180 TABLET | Refills: 0 | Status: SHIPPED | OUTPATIENT
Start: 2022-02-15 | End: 2022-04-19

## 2022-02-15 RX ORDER — ATORVASTATIN CALCIUM 40 MG/1
40 TABLET, FILM COATED ORAL NIGHTLY
Qty: 90 TABLET | Refills: 1 | Status: SHIPPED | OUTPATIENT
Start: 2022-02-15 | End: 2022-07-18 | Stop reason: SDUPTHER

## 2022-02-15 RX ORDER — LEVOTHYROXINE SODIUM 0.05 MG/1
TABLET ORAL
Qty: 90 TABLET | Refills: 0 | Status: SHIPPED | OUTPATIENT
Start: 2022-02-15 | End: 2022-04-19

## 2022-02-15 ASSESSMENT — PATIENT HEALTH QUESTIONNAIRE - PHQ9
SUM OF ALL RESPONSES TO PHQ9 QUESTIONS 1 & 2: 0
SUM OF ALL RESPONSES TO PHQ QUESTIONS 1-9: 0
2. FEELING DOWN, DEPRESSED OR HOPELESS: 0
SUM OF ALL RESPONSES TO PHQ QUESTIONS 1-9: 0
SUM OF ALL RESPONSES TO PHQ QUESTIONS 1-9: 0
1. LITTLE INTEREST OR PLEASURE IN DOING THINGS: 0
SUM OF ALL RESPONSES TO PHQ QUESTIONS 1-9: 0

## 2022-02-15 ASSESSMENT — LIFESTYLE VARIABLES: HOW OFTEN DO YOU HAVE A DRINK CONTAINING ALCOHOL: 0

## 2022-02-15 NOTE — PATIENT INSTRUCTIONS
Herman Munguia was seen today for medicare awv, hypertension and hypothyroidism. Diagnoses and all orders for this visit:    HTN (hypertension), benign  -     Good control.  -     Continue meds and lifestyle control. Acquired hypothyroidism  -     levothyroxine (SYNTHROID) 50 MCG tablet; Take 1 tablet by mouth daily on empty stomach for thyroid  -     Good control at last check. Call bout tier of this generic.  -     Continue meds and lifestyle control. Stage 3a chronic kidney disease (HCC)  -     Uric Acid; Future  -     Comprehensive Metabolic Panel; Future  -     Magnesium; Future  Call bout tier of generic potassium. Hypoproteinemia ([address]  Eat more proteins. Pure hypercholesterolemia  -     atorvastatin (LIPITOR) 40 MG tablet; Take 1 tablet by mouth nightly  Cut current 80 mg in 1/2 till runs out the whole 40 mg when arrives. Recurrent major depressive disorder, in partial remission (HCC)  -     buPROPion (WELLBUTRIN SR) 150 MG extended release tablet; Take 1 tablet by mouth twice daily  Can reduce to less expensive. Chronic gout of left ankle, unspecified cause  -     Uric Acid; Future  No probenecid. Will see if you need a medicine and can change to allopurinol which should be cheaper.

## 2022-02-15 NOTE — PROGRESS NOTES
[address] [place] (:  1933) is a 80 y.o. female,Established patient, here for evaluation of the following chief complaint(s):  Medicare AWV ([employer] [employer]), Hypertension (FOLLOW UP ON HTN), and Hypothyroidism (FOLLOW UP ON HYPOTHYROIDISM)       ASSESSMENT/PLAN:  916    Patient Instructions   Princess Christopher was seen today for medicare awv, hypertension and hypothyroidism. Diagnoses and all orders for this visit:    HTN (hypertension), benign  -     Good control.  -     Continue meds and lifestyle control. Acquired hypothyroidism  -     levothyroxine (SYNTHROID) 50 MCG tablet; Take 1 tablet by mouth daily on empty stomach for thyroid  -     Good control at last check. Call bout tier of this generic.  -     Continue meds and lifestyle control. Stage 3a chronic kidney disease (HCC)  -     Uric Acid; Future  -     Comprehensive Metabolic Panel; Future  -     Magnesium; Future  Call about tier of generic potassium. Hypoproteinemia ([address]  Eat more proteins. Pure hypercholesterolemia  -     atorvastatin (LIPITOR) 40 MG tablet; Take 1 tablet by mouth nightly  Cut current 80 mg in 1/2 till runs out and change to the whole 40 mg when arrives. Recurrent major depressive disorder, in partial remission (HCC)  -     buPROPion (WELLBUTRIN SR) 150 MG extended release tablet; Take 1 tablet by mouth twice daily  Can reduce to less expensive if need to. Chronic gout of left ankle, unspecified cause  -     Uric Acid; Future  No probenecid. Will see if you need a medicine and can change to allopurinol which should be cheaper. Return in about 3 months (around 5/15/2022) for Hypertension, Thyroid, Cholesterol, Depression, Gout.      Subjective   SUBJECTIVE/OBJECTIVE:  Chief Complaint   Patient presents with    Medicare AWV     MEDICARE ANNUAL WELLNESS VISIT    Hypertension     FOLLOW UP ON HTN    Hypothyroidism     FOLLOW UP ON HYPOTHYROIDISM   5  HPI  Son [place]    HTN (hypertension), benign  Is watching salt. No SE's with BP med. Not checking at home. Acquired hypothyroidism  Current symptoms include fatigue, anxiousness. Patient denies weight gain, feeling cold and cold intolerance, constipation, swelling, feeling excessive energy, tremulousness, palpitations, sweating, weight loss, diarrhea, change in skin,  nails, or hair, heat intolerance, depression, goiter, ocular symptoms. Takes 1 tab oral prior to breakfast with 8 oz water on empty stomach. Doesn't take food/drinks/meds/supplements for 30 min. Stage 3a chronic kidney disease (HCC)   Drinks about coffee 8, milk 4 oz, 16 oz water 1-2x, lunch 6 oz milk, protein drink 12 oz, Water just sips with meds. Hypoproteinemia (HCC)  Drinks protein drink 12 oz. She is eating the same. Pure hypercholesterolemia  Has been good. Proteins are low. Pt okay to decrease her Atorvastatin from 80 mg to 40 mg.   Recurrent major depressive disorder, in partial remission (HCC)  Bupropion is now tier 2. Enjoys life. Sometimes has trouble getting back to sleep after up to the bathroom. She takes melatonin 3 mg at bedtime and IF does she falls asleep okay. Chronic gout of left ankle, unspecified cause  Now Probenecid is tier 3. No sx. She does not recall using Allopurinol in the past. Drinks about coffee 8, milk 4 oz, 16 oz water 1-2x, lunch 6 oz milk, protein drink 12 oz, Water just sips with med. Will check level and consider no med vs allopurinol. Review of Systems   Neurological: Negative for dizziness and light-headedness. Review of systems in history of present illness or scanned in under media tab.     Past Medical History:   Diagnosis Date    Breast cancer ([address] 2004    ductal vs lobular, not clear from pathologist    Cellulitis of left leg     Cellulitis of wrist 06/06/2018    RIGHT    Chronic kidney disease, stage III (moderate) ([address] 2005    Depression     Diverticulitis of colon 01/01/1979    DVT (deep venous thrombosis) ([address] 2004 after first knee surgery    Gout 2011    Hematoma of left thigh 2017    Herpes zoster 2018    right ant shoulder    Hypertension     Hypoproteinemia ([facility] 2019    Hypothyroidism 2011    Intervertebral lumbar disc disorder with myelopathy, lumbar region     OA (osteoarthritis) 2000    severe in knees, left ankle, left >right hip    Primary osteoarthritis of right wrist     Severe arthritic change within carpal - 1st metacarpal joint, Extensive chondrocalcinosis throughout wrist    Pure hypercholesterolemia     Seborrheic dermatitis 2014    Subdural hematoma, post-traumatic ([facility] 2016    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       Past Surgical History:   Procedure Laterality Date    BREAST SURGERY      right mastectomy for cancer     SECTION  1970    COLON SURGERY  1970    right partial colectomy for ruptured diverticulitis    INCISIONAL HERNIA REPAIR  2016    with mesh, laparoscopic, was incarcerated    JOINT REPLACEMENT      right knee and left knee in    [address]  2015    verrous hyperplasia left ventral tongue, WLE [id number] [facility], Dr Uriah Armas  1970     Social History     Socioeconomic History    Marital status:       Spouse name: Sia Forth -  2016    Number of children: 3    Years of education: 15    Highest education level: Not on file   Occupational History    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 Using her walker routinely 5/2016. NO CURRENT EXERCISE PROGRAM BEYOND STRETCH. 11/15/16. 2/20/17. None. Has left knee pain. 6/7/17. Does HEP from PT, calf pumps, wall push ups. 9/19/17. No exercise except leg. 12/5/17 Does leg exercises with pedals. No muscle conditioning. 4/3/18. 7/3/18. Does leg lifts when sitting. Uses floor pedals 3-4 x/wk for 3-5 min. No problems when does. Patient has a health aide who comes for 3 hours 2 times a week. 10/2/18. Floor pedals 3-5 min qd and occasional bid. 1/8/19. 5-10 min qd and occasional 5 min bid. 4/16/19. 5x/wk 1-2x/day. 7/16/19. 10/22/19. 5x/wk 5-10 min. 1/21/20. 7/21/20 Now at 15 min w/floor pedals. 10/27/20. 20 min. On pedals >/= 5 day/wk. 1/26/20. 4/27/21. With left ankle pain 10 min 4x/wk. 7/27/21. Sometimes does more exercise. Still driving. 11/16/21. Does pedals 3x/wk for 20 min. 2/15/22. Social Determinants of Health     Financial Resource Strain: Low Risk     Difficulty of Paying Living Expenses: Not hard at all   Food Insecurity: No Food Insecurity    Worried About Running Out of Food in the Last Year: Never true    Alex of Food in the Last Year: Never true   Transportation Needs: No Transportation Needs    Lack of Transportation (Medical): No    Lack of Transportation (Non-Medical):  No   Physical Activity:     Days of Exercise per Week: Not on file    Minutes of Exercise per Session: Not on file   Stress:     Feeling of Stress : Not on file   Social Connections:     Frequency of Communication with Friends and Family: Not on file    Frequency of Social Gatherings with Friends and Family: Not on file    Attends Roman Catholic Services: Not on file    Active Member of Clubs or Organizations: Not on file    Attends Club or Organization Meetings: Not on file    Marital Status: Not on file   Intimate Partner Violence:     Fear of Current or Ex-Partner: Not on file    Emotionally Abused: Not on file   Jasper Love Physically Abused: Not on file    Sexually Abused: Not on file   Housing Stability:     Unable to Pay for Housing in the Last Year: Not on file    Number of Places Lived in the Last Year: Not on file    Unstable Housing in the Last Year: Not on file       Family History   Problem Relation Age of Onset    Cancer Mother         bone    Heart Disease Father         CAD    Diabetes Father     Diabetes Sister     Cancer Sister 54        leukemia    Arrhythmia Brother         A fib    Stroke Brother 80        ?  Other Brother         THR after fx.  bilateral pneumonia, better    Other Son         Spinal fusion with rods/screws    No Known Problems Daughter     High Blood Pressure Daughter     High Blood Pressure Son        No Known Allergies    Current Outpatient Medications   Medication Sig Dispense Refill    buPROPion (WELLBUTRIN SR) 150 MG extended release tablet TAKE 1 TABLET BY MOUTH  TWICE DAILY 180 tablet 0    levothyroxine (SYNTHROID) 50 MCG tablet TAKE 1 TABLET BY MOUTH  DAILY ON EMPTY STOMACH FOR  THYROID 90 tablet 0    potassium chloride (KLOR-CON M) 10 MEQ extended release tablet TAKE 1 TABLET BY MOUTH  DAILY 90 tablet 3    atorvastatin (LIPITOR) 80 MG tablet Take 1 tablet by mouth daily 90 tablet 3    probenecid (BENEMID) 500 MG tablet TAKE ONE-HALF TABLET BY  MOUTH DAILY TO PREVENT GOUT 45 tablet 3    lisinopril (PRINIVIL;ZESTRIL) 2.5 MG tablet TAKE 1 TABLET BY MOUTH  DAILY 90 tablet 3    niacin (SLO-NIACIN) 500 MG extended release tablet Take 500 mg by mouth nightly      ipratropium (ATROVENT) 0.06 % nasal spray 2 sprays by Nasal route 4 times daily 1 Bottle 3    aspirin 81 MG tablet Take 81 mg by mouth daily      Cholecalciferol (VITAMIN D3) 2000 UNITS CAPS One daily for Vit D supplement 30 capsule     melatonin 5 MG TABS tablet Take 3 mg by mouth nightly One at bedtime for sleep assist       ammonium lactate (AMLACTIN) 12 % cream Apply  topically as needed.  Apply topically as needed.  magnesium chloride (MAG DELAY) 535 (64 MG) MG TBCR CR tablet Take 64 mg by mouth 2 times daily       acetaminophen (TYLENOL) 500 MG tablet Take 500 mg by mouth daily.  Multiple Vitamin (MULTIVITAMIN PO) Take 1 tablet by mouth daily. No current facility-administered medications for this visit. Objective   Physical Exam  Vitals and nursing note reviewed. Constitutional:       General: She is not in acute distress. Appearance: Normal appearance. She is well-developed. She is obese. She is not diaphoretic. Eyes:      General: No scleral icterus. Right eye: No discharge. Left eye: No discharge. Conjunctiva/sclera: Conjunctivae normal.   Neck:      Thyroid: No thyroid mass or thyromegaly. Vascular: No carotid bruit or JVD. Trachea: Trachea and phonation normal. No tracheal deviation. Cardiovascular:      Rate and Rhythm: Normal rate and regular rhythm. Occasional extrasystoles are present. Heart sounds: S1 normal and S2 normal. Heart sounds not distant. Murmur heard. Systolic murmur is present with a grade of 1/6. No friction rub. No gallop. No S3 or S4 sounds. Comments: Occasional trigeminal beat. Pulmonary:      Effort: Pulmonary effort is normal. No respiratory distress. Breath sounds: Normal breath sounds. No decreased breath sounds, wheezing, rhonchi or rales. Musculoskeletal:      Cervical back: Neck supple. Lymphadenopathy:      Head:      Right side of head: No submandibular or tonsillar adenopathy. Left side of head: No submandibular or tonsillar adenopathy. Cervical: No cervical adenopathy. Right cervical: No superficial, deep or posterior cervical adenopathy. Left cervical: No superficial, deep or posterior cervical adenopathy. Skin:     General: Skin is warm and dry. Coloration: Skin is not pale. Nails: There is no clubbing. Neurological:      Mental Status: She is alert. Deep Tendon Reflexes:      Reflex Scores:       Bicep reflexes are 2+ on the right side and 2+ on the left side. Comments: She is able to transfer on her own. Uses walker. Psychiatric:         Attention and Perception: Attention and perception normal.         Mood and Affect: Mood and affect normal.         Speech: Speech normal.         Behavior: Behavior normal. Behavior is cooperative. Cognition and Memory: Cognition and memory normal.         Judgment: Judgment normal.        Vitals:    02/15/22 0758   BP: 122/80   Site: Left Upper Arm   Position: Sitting   Cuff Size: Large Adult   Pulse: 60   Resp: 16   Temp: 97 °F (36.1 °C)   TempSrc: Infrared   SpO2: 97%   Weight: 174 lb 9.6 oz (79.2 kg)   Height: 5' 2\" (1.575 m)     BP Readings from Last 3 Encounters:   02/15/22 122/80   11/16/21 118/72   08/09/21 122/72     Pulse Readings from Last 3 Encounters:   02/15/22 60   11/16/21 60   08/09/21 69     Wt Readings from Last 3 Encounters:   02/15/22 174 lb 9.6 oz (79.2 kg)   11/16/21 172 lb (78 kg)   08/09/21 177 lb 6.4 oz (80.5 kg)     Body mass index is 31.93 kg/m². 11/16/2021 10:35   Total Protein 6.0 (L)   Albumin 4.3   TSH 4.01      On this date 2/15/2022 I have spent 64 minutes reviewing previous notes, test results and face to face with the patient discussing the diagnosis and importance of compliance with the treatment plan as well as documenting on the day of the visit. An electronic signature was used to authenticate this note.     --Marshall Mcmahan DO

## 2022-04-01 ENCOUNTER — SCHEDULED TELEPHONE ENCOUNTER (OUTPATIENT)
Dept: FAMILY MEDICINE CLINIC | Age: 87
End: 2022-04-01
Payer: MEDICARE

## 2022-04-01 DIAGNOSIS — J40 BRONCHITIS: Primary | ICD-10-CM

## 2022-04-01 PROCEDURE — 99214 OFFICE O/P EST MOD 30 MIN: CPT | Performed by: FAMILY MEDICINE

## 2022-04-01 RX ORDER — AZITHROMYCIN 250 MG/1
250 TABLET, FILM COATED ORAL SEE ADMIN INSTRUCTIONS
Qty: 6 TABLET | Refills: 0 | Status: SHIPPED | OUTPATIENT
Start: 2022-04-01 | End: 2022-04-06

## 2022-04-01 RX ORDER — BENZONATATE 200 MG/1
200 CAPSULE ORAL 3 TIMES DAILY PRN
Qty: 30 CAPSULE | Refills: 0 | Status: SHIPPED | OUTPATIENT
Start: 2022-04-01 | End: 2022-04-11

## 2022-04-01 ASSESSMENT — ENCOUNTER SYMPTOMS
HEMOPTYSIS: 0
COUGH: 1
RHINORRHEA: 1
WHEEZING: 0
SORE THROAT: 0
SHORTNESS OF BREATH: 0

## 2022-04-01 NOTE — PATIENT INSTRUCTIONS
Xavier Bosworth was seen today for sinus problem. Diagnoses and all orders for this visit:    Bronchitis  -     azithromycin (ZITHROMAX) 250 MG tablet; Take 1 tablet by mouth See Admin Instructions for 5 days 500mg on day 1 followed by 250mg on days 2 - 5  -     benzonatate (TESSALON) 200 MG capsule; Take 1 capsule by mouth 3 times daily as needed for Cough  Avoid being around other people for at least another 10 days. Still wear a mask around others for 2 weeks after that. As we discussed on the phone we cannot prove that then this is not COVID-19 by a single rapid home test.  The rapid COVID-19 tests that are negative are only about 70% accurate. That means your risk of COVID-19 is 30%. After vaccination oftentimes you do not shed virus for 3 to 4 days after the illness begins. You also stop shedding a detectable amount virus sometimes within a week of getting sick once you been vaccinated. If you are not shedding detectable amount of virus from the nose you will not test positive. It is still possible to to spread the virus even if the amount you are shedding is not detectable on a rapid test.    IF you develop ANY respiratory infection symptoms (not just allergy symptoms) suggestive of COVID-19 start the recommendations below: Instructions for Respiratory Infections (SAVE THIS SHEET)    For the first 7-14 days of symptoms follow instructions below, even before being seen in the office or even during treatment with antibiotics, until symptom free. 1. Water: Drink 1 ounce of water for every 2 pounds of body weight for adults, you need 64-78 ounces of water/fluids per day. This will loosen mucus in the head and chest & improve the weak feeling of dehydration, allow the body to get germ fighting resources to the infection. Half of fluids can be juice or sugar free Crystal Light. Don't count drinks with caffeine, alcohol or carbonation.  Infants can have Pedialyte liquid or freezer pops. Avoid salt and sports drinks if you have high Blood Pressure, swelling in the feet or ankles or have heart problems. 2. Humidity: Humidify the air to 35-50% ( or until the windows fog over slightly). Can use a humidifier, vaporizer, boil water on the stove or put a coffee can full of water on the heater vents. This will loosen mucus from infections and allergies. 3. Sleep: Get 8-10 hours a night and rest during the evening after work or school. If you have trouble sleeping, adults can take Melatonin 10-15mg at bedtime and another 5 mg in the middle the night if you wake up and cannot go back to sleep but still have 3-4 hours left to sleep ( not for children or pregnant women). If Mono is suspected then sleep during 9PM to 9AM time span (if possible.)  4. Cough: Take cough medicines with Guaifenesin ( to loosen chest or head congestion) and Dextromethorphan ( to decrease excess cough). Robitussin D.M. Syrup every 4-6 hrs OR Mucinex D. M. pills OR Delsym DM syrup twice a day. Use the pediatric formulations for children over 6 months making sure they are alcohol & sugar free for children, pregnant women, and diabetics. 5. Pain And Fevers: Take Acetaminophen ( Tylenol) for fevers, aches, and headaches. 2-500 mg every 8 hours for adults AS NEEDED (Tylenol only helps if it helps you to eat or sleep or drink or be more active. Mild fevers below 101 actually help you fight infections.)  Appropriate doses at bedtime for children may help them sleep better. If pregnant take 1 -500 mg (Tylenol) every 8 hours as needed. Ibuprofen/Aleve/aspirin for pain and fevers SHOULD NOT BE USED IN THE SETTING OF POSSIBLE COVID-19 viral infection NOR if pregnant, if you have acid reflux, high blood pressure, CHF, or kidney problems.   6.Gargle: (DAY ONE OF SYMPTOMS) Gargle in the back of the throat with the head tilted back and to the sides with a strong mouthwash  ( Listerine or Scope) after meals and at bedtime at least 4 -5 times a day. This helps kill bacteria and viruses in the back of the throat and will shorten the duration and decrease the severity of your symptoms: sore throat, cough, ear popping,/ear pain, and possibly dizziness. 7. Smoking: Avoid smoking or exposure to second hand smoke. 8. Zinc: (DAY ONE OF SYMPTOMS)  Zinc lozenges such as Cold Mati (available most stores), or Basic ([employer] brand) will help shorten the duration and lessen symptoms such as sore throat, cough, nasal congestion, runny nose, and post nasal drip. Use 1 lozenge every 2-4 hours ( after meals if stomach is sensitive). It is best to use the zinc lozenges immediately after gargling and then not eat or drink for 30 minutes. Children can use 10-15 mg or less 3-4 times a day or Zinc lollypops. In pregnancy limit to 50-60 mg a day for 7 days as prenatals have Zinc also. With diarrhea use zinc pills 50 mg 1/2 to 1 pill 2x/day WITH OR SHORTLY AFTER A MEAL. 9. Vitamins: Vitamin C 500 mg with breakfast and dinner (with suspected or diagnosed COVID-19 infection take vitamin C 1000 mg 2-3 times a day). Children and pregnant women should drink citrus juices. This speeds healing and strengthens immune system. 10. Chest Symptoms: Vicks Vapor rub to the chest at bedtime. (Cough is a chest symptom.)  11. Decongestants: Avoid all decongestants and antihistamine cold preparations in children. Decongestants should always be avoided in people with high blood pressure, palpitations, heart disease and those on stimulant medications. Antihistamines may impede the body's ability to fight COVID-19.  12. Frequent hand washing and/or hand gel especially after coughing or sneezing into the hands or blowing the nose to help prevent spreading to others. Use kleenex and NOT handkerchiefs. Try all of the above starting with day 1 of symptoms. If Strep throat symptoms appear call to be seen in the office as soon as possible and don't gargle on that day.  Newborns, infants, or anyone with earaches or influenza may need to be seen quickly. Adults with fevers over 103 degrees or shortness of breath should call the office immediately. Call us if not getting better.

## 2022-04-01 NOTE — PROGRESS NOTES
Batsheva Rodriguez (:  1933) is a 80 y.o. female,Established patient, here for evaluation of the following chief complaint(s):  Sinus Problem (pt c/o productive cough with yellow mucus, runny/stuffy nose, chest congestion, head congestion, fatigue, some wheezing x 2 weeks did try otc meds with no relief )       ASSESSMENT/PLAN:  311    Patient Instructions   Aurea Sandifer was seen today for sinus problem. Diagnoses and all orders for this visit:    Bronchitis  -     azithromycin (ZITHROMAX) 250 MG tablet; Take 1 tablet by mouth See Admin Instructions for 5 days 500mg on day 1 followed by 250mg on days 2 - 5  -     benzonatate (TESSALON) 200 MG capsule; Take 1 capsule by mouth 3 times daily as needed for Cough    Avoid being around other people for at least another 10 days. Still wear a mask around others for 2 weeks after that. As we discussed on the phone we cannot prove that then this is not COVID-19 by a single rapid home test.  The rapid COVID-19 tests that are negative are only about 70% accurate. That means your risk of COVID-19 is 30%. After vaccination oftentimes you do not shed virus for 3 to 4 days after the illness begins. You also stop shedding a detectable amount virus sometimes within a week of getting sick once you been vaccinated. If you are not shedding detectable amount of virus from the nose you will not test positive. It is still possible to to spread the virus even if the amount you are shedding is not detectable on a rapid test.    IF you develop ANY respiratory infection symptoms (not just allergy symptoms) suggestive of COVID-19 start the recommendations below: Instructions for Respiratory Infections (SAVE THIS SHEET)    For the first 7-14 days of symptoms follow instructions below, even before being seen in the office or even during treatment with antibiotics, until symptom free.     1. Water: Drink 1 ounce of water for every 2 pounds of body weight for adults, you need 64-78 ounces of water/fluids per day. This will loosen mucus in the head and chest & improve the weak feeling of dehydration, allow the body to get germ fighting resources to the infection. Half of fluids can be juice or sugar free Crystal Light. Don't count drinks with caffeine, alcohol or carbonation. Infants can have Pedialyte liquid or freezer pops. Avoid salt and sports drinks if you have high Blood Pressure, swelling in the feet or ankles or have heart problems. 2. Humidity: Humidify the air to 35-50% ( or until the windows fog over slightly). Can use a humidifier, vaporizer, boil water on the stove or put a coffee can full of water on the heater vents. This will loosen mucus from infections and allergies. 3. Sleep: Get 8-10 hours a night and rest during the evening after work or school. If you have trouble sleeping, adults can take Melatonin 10-15mg at bedtime and another 5 mg in the middle the night if you wake up and cannot go back to sleep but still have 3-4 hours left to sleep ( not for children or pregnant women). If Mono is suspected then sleep during 9PM to 9AM time span (if possible.)  4. Cough: Take cough medicines with Guaifenesin ( to loosen chest or head congestion) and Dextromethorphan ( to decrease excess cough). Robitussin D.M. Syrup every 4-6 hrs OR Mucinex D. M. pills OR Delsym DM syrup twice a day. Use the pediatric formulations for children over 6 months making sure they are alcohol & sugar free for children, pregnant women, and diabetics. 5. Pain And Fevers: Take Acetaminophen ( Tylenol) for fevers, aches, and headaches. 2-500 mg every 8 hours for adults AS NEEDED (Tylenol only helps if it helps you to eat or sleep or drink or be more active. Mild fevers below 101 actually help you fight infections.)  Appropriate doses at bedtime for children may help them sleep better. If pregnant take 1 -500 mg (Tylenol) every 8 hours as needed.  Ibuprofen/Aleve/aspirin for pain and fevers SHOULD NOT BE USED IN THE SETTING OF POSSIBLE COVID-19 viral infection NOR if pregnant, if you have acid reflux, high blood pressure, CHF, or kidney problems. 6.Gargle: (DAY ONE OF SYMPTOMS) Gargle in the back of the throat with the head tilted back and to the sides with a strong mouthwash  ( Listerine or Scope) after meals and at bedtime at least 4 -5 times a day. This helps kill bacteria and viruses in the back of the throat and will shorten the duration and decrease the severity of your symptoms: sore throat, cough, ear popping,/ear pain, and possibly dizziness. 7. Smoking: Avoid smoking or exposure to second hand smoke. 8. Zinc: (DAY ONE OF SYMPTOMS)  Zinc lozenges such as Cold Mati (available most stores), or Basic ([employer] brand) will help shorten the duration and lessen symptoms such as sore throat, cough, nasal congestion, runny nose, and post nasal drip. Use 1 lozenge every 2-4 hours ( after meals if stomach is sensitive). It is best to use the zinc lozenges immediately after gargling and then not eat or drink for 30 minutes. Children can use 10-15 mg or less 3-4 times a day or Zinc lollypops. In pregnancy limit to 50-60 mg a day for 7 days as prenatals have Zinc also. With diarrhea use zinc pills 50 mg 1/2 to 1 pill 2x/day WITH OR SHORTLY AFTER A MEAL. 9. Vitamins: Vitamin C 500 mg with breakfast and dinner (with suspected or diagnosed COVID-19 infection take vitamin C 1000 mg 2-3 times a day). Children and pregnant women should drink citrus juices. This speeds healing and strengthens immune system. 10. Chest Symptoms: Vicks Vapor rub to the chest at bedtime. (Cough is a chest symptom.)  11. Decongestants: Avoid all decongestants and antihistamine cold preparations in children. Decongestants should always be avoided in people with high blood pressure, palpitations, heart disease and those on stimulant medications.    Antihistamines may impede the body's ability to fight COVID-19.  12. Frequent hand washing and/or hand gel especially after coughing or sneezing into the hands or blowing the nose to help prevent spreading to others. Use kleenex and NOT handkerchiefs. Try all of the above starting with day 1 of symptoms. If Strep throat symptoms appear call to be seen in the office as soon as possible and don't gargle on that day. Newborns, infants, or anyone with earaches or influenza may need to be seen quickly. Adults with fevers over 103 degrees or shortness of breath should call the office immediately. Call us if not getting better. Follow-up about 5/17/2022     Subjective   SUBJECTIVE/OBJECTIVE:  Chief Complaint   Patient presents with    Sinus Problem     pt c/o productive cough with yellow mucus, runny/stuffy nose, chest congestion, head congestion, fatigue, some wheezing x 2 weeks did try otc meds with no relief    228  Cough  This is a new problem. Episode onset: 3/21/22. The problem has been gradually improving. The problem occurs every few hours. Cough characteristics: Was yellow and thicker. Now it is less coming up and now it is clearing up. Associated symptoms include nasal congestion, postnasal drip and rhinorrhea. Pertinent negatives include no chest pain, chills, ear congestion, ear pain, fever, headaches, hemoptysis, myalgias, sore throat, shortness of breath, sweats, weight loss or wheezing. Exacerbated by: Not coughing when she eats or drinks. Risk factors: She is not been exposed to sick. Her son and grandkids and daughter-in-law were there. Her 2 grandsons had a cough. She has tried OTC cough suppressant for the symptoms. The treatment provided mild relief. There is no history of asthma, bronchiectasis, bronchitis, COPD, emphysema, environmental allergies or pneumonia. Started 3/21/22. First started with cough. She started blowing her nose a lot. She got [employer] Mucus Relief once daily. Then she went to twice a day.   Mucus is not as thick as it was. Mucus is more runny. She was drinking 1-2 bottles in addition to 16 ounce container she drinks every day. Is going to the bathroom often. She has to cough longer to get it up. Has not lost her sense of taste or smell. Cough does not keep her up at night. No pain in her chest when she coughs. No burning sensation in the chest when she coughs. Review of Systems   Constitutional: Negative for chills, fever and weight loss. HENT: Positive for postnasal drip and rhinorrhea. Negative for ear pain and sore throat. Respiratory: Positive for cough. Negative for hemoptysis, shortness of breath and wheezing. Cardiovascular: Negative for chest pain. Musculoskeletal: Negative for myalgias. Allergic/Immunologic: Negative for environmental allergies. Neurological: Negative for headaches.      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    Hypothyroidism Nov, 2011    Intervertebral lumbar disc disorder with myelopathy, lumbar region     OA (osteoarthritis) 2000    severe in knees, left ankle, left >right hip    Primary osteoarthritis of right wrist     Severe arthritic change within carpal - 1st metacarpal joint, Extensive chondrocalcinosis throughout wrist    Pure hypercholesterolemia     Seborrheic dermatitis 11/2014    Subdural hematoma, post-traumatic ([address] 5/26/2016    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 Gamaliel Valentine MD. [facility]    JOINT REPLACEMENT      right knee and left knee in     MOUTH SURGERY  2015    verrous hyperplasia left ventral tongue, [facility] [id number] [facility], Dr Christianson Sink  1970       Social History     Socioeconomic History    Marital status:      Spouse name: Usha Carpenter -      Number of children: 3    Years of education: 15    Highest education level: Not on file   Occupational History    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 Using her walker routinely 2016. NO CURRENT EXERCISE PROGRAM BEYOND STRETCH. 11/15/16. 17. None. Has left knee pain. 17. Does HEP from PT, calf pumps, wall push ups. 17. No exercise except leg. 17 Does leg exercises with pedals. No muscle conditioning. 4/3/18. 7/3/18. Does leg lifts when sitting. Uses floor pedals 3-4 x/wk for 3-5 min. No problems when does. Patient has a health aide who comes for 3 hours 2 times a week. 10/2/18. Floor pedals 3-5 min qd and occasional bid. 19. 5-10 min qd and occasional 5 min bid. 19. 5x/wk 1-2x/day. 19. 10/22/19. 5x/wk 5-10 min. 20. 20 Now at 15 min w/floor pedals. 10/27/20. 20 min. On pedals >/= 5 day/wk. 20. 4/27/21. With left ankle pain 10 min 4x/wk. 7/27/21. Sometimes does more exercise. Still driving. 11/16/21. Does pedals 3x/wk for 20 min. 2/15/22. Till she got sick. 4/1/22. Social Determinants of Health     Financial Resource Strain: Low Risk     Difficulty of Paying Living Expenses: Not hard at all   Food Insecurity: No Food Insecurity    Worried About Running Out of Food in the Last Year: Never true    Alex of Food in the Last Year: Never true   Transportation Needs: No Transportation Needs    Lack of Transportation (Medical): No    Lack of Transportation (Non-Medical): No   Physical Activity:     Days of Exercise per Week: Not on file    Minutes of Exercise per Session: Not on file   Stress:     Feeling of Stress : Not on file   Social Connections:     Frequency of Communication with Friends and Family: Not on file    Frequency of Social Gatherings with Friends and Family: Not on file    Attends Gnosticism Services: Not on file    Active Member of [address] [place] [address] [employer] or Organizations: Not on file    Attends Club or Organization Meetings: Not on file    Marital Status: Not on file   Intimate Partner Violence:     Fear of Current or Ex-Partner: Not on file    Emotionally Abused: Not on file    Physically Abused: Not on file    Sexually Abused: Not on file   Housing Stability:     Unable to Pay for Housing in the Last Year: Not on file    Number of [place] in the Last Year: Not on file    Unstable Housing in the Last Year: Not on file       Family History   Problem Relation Age of Onset    Cancer Mother         bone    Heart Disease Father         CAD    Diabetes Father     Diabetes Sister     Cancer Sister 54        leukemia    Arrhythmia Brother         A fib    Stroke Brother 80        ?     Other Brother         THR after fx.  bilateral pneumonia, better    Other Son         Spinal fusion with rods/screws    No Known Problems Daughter     High Blood Pressure Daughter     High Blood Pressure Son      No Known Allergies    Current Outpatient Medications   Medication Sig Dispense Refill    buPROPion (WELLBUTRIN SR) 150 MG extended release tablet Take 1 tablet by mouth twice daily 180 tablet 0    levothyroxine (SYNTHROID) 50 MCG tablet Take 1 tablet by mouth daily on empty stomach for thyroid 90 tablet 0    atorvastatin (LIPITOR) 40 MG tablet Take 1 tablet by mouth nightly 90 tablet 1    potassium chloride (KLOR-CON M) 10 MEQ extended release tablet TAKE 1 TABLET BY MOUTH  DAILY 90 tablet 3    probenecid (BENEMID) 500 MG tablet TAKE ONE-HALF TABLET BY  MOUTH DAILY TO PREVENT GOUT 45 tablet 3    lisinopril (PRINIVIL;ZESTRIL) 2.5 MG tablet TAKE 1 TABLET BY MOUTH  DAILY 90 tablet 3    niacin (SLO-NIACIN) 500 MG extended release tablet Take 500 mg by mouth nightly      ipratropium (ATROVENT) 0.06 % nasal spray 2 sprays by Nasal route 4 times daily 1 Bottle 3    aspirin 81 MG tablet Take 81 mg by mouth daily      Cholecalciferol (VITAMIN D3) 2000 UNITS CAPS One daily for Vit D supplement 30 capsule     melatonin 5 MG TABS tablet Take 3 mg by mouth nightly One at bedtime for sleep assist       ammonium lactate (AMLACTIN) 12 % cream Apply  topically as needed. Apply topically as needed.  magnesium chloride (MAG DELAY) 535 (64 MG) MG TBCR CR tablet Take 64 mg by mouth 2 times daily       acetaminophen (TYLENOL) 500 MG tablet Take 500 mg by mouth daily.  Multiple Vitamin (MULTIVITAMIN PO) Take 1 tablet by mouth daily. No current facility-administered medications for this visit.         Objective    Patient-Reported Vitals 4/1/2022   Patient-Reported Weight 170lbs   Patient-Reported Height 5'2\"      BP Readings from Last 3 Encounters:   02/15/22 122/80   11/16/21 118/72   08/09/21 122/72     Pulse Readings from Last 3 Encounters:   02/15/22 60   11/16/21 60   08/09/21 69     Wt Readings from Last 3 Encounters:   02/15/22 174 lb 9.6 oz (79.2 kg)   11/16/21 172 lb (78 kg) 08/09/21 177 lb 6.4 oz (80.5 kg)     Physical Exam        Batsheva Rodriguez is a 80 y.o. female evaluated via telephone on 4/1/2022 for Sinus Problem (pt c/o productive cough with yellow mucus, runny/stuffy nose, chest congestion, head congestion, fatigue, some wheezing x 2 weeks did try otc meds with no relief )  . Documentation:   I communicated with the patient and/or health care decision maker about issues above. Details of this discussion including any medical advice provided: Information above. Total Time:  43 min. Batsheva Rodriguez was evaluated through a synchronous (real-time) audio encounter. Patient identification was verified at the start of the visit. She (or guardian if applicable) is aware that this is a billable service, which includes applicable co-pays. This visit was conducted with the patient's (and/or legal guardian's) verbal consent. She has not had a related appointment within my department in the past 7 days or scheduled within the next 24 hours. The patient was located in a state where the provider was licensed to provide care.     Note: not billable if this call serves to triage the patient into an appointment for the relevant concern    Florian Gunderson DO

## 2022-04-18 DIAGNOSIS — F33.41 RECURRENT MAJOR DEPRESSIVE DISORDER, IN PARTIAL REMISSION (HCC): Chronic | ICD-10-CM

## 2022-04-18 DIAGNOSIS — E03.9 ACQUIRED HYPOTHYROIDISM: Chronic | ICD-10-CM

## 2022-04-19 RX ORDER — LEVOTHYROXINE SODIUM 0.05 MG/1
TABLET ORAL
Qty: 90 TABLET | Refills: 0 | Status: ON HOLD | OUTPATIENT
Start: 2022-04-19 | End: 2022-07-26 | Stop reason: HOSPADM

## 2022-04-19 RX ORDER — BUPROPION HYDROCHLORIDE 150 MG/1
TABLET, EXTENDED RELEASE ORAL
Qty: 180 TABLET | Refills: 0 | Status: SHIPPED | OUTPATIENT
Start: 2022-04-19 | End: 2022-09-06

## 2022-05-23 ENCOUNTER — OFFICE VISIT (OUTPATIENT)
Dept: FAMILY MEDICINE CLINIC | Age: 87
End: 2022-05-23
Payer: MEDICARE

## 2022-05-23 VITALS
HEIGHT: 62 IN | HEART RATE: 79 BPM | OXYGEN SATURATION: 97 % | SYSTOLIC BLOOD PRESSURE: 122 MMHG | WEIGHT: 171 LBS | BODY MASS INDEX: 31.47 KG/M2 | RESPIRATION RATE: 18 BRPM | DIASTOLIC BLOOD PRESSURE: 82 MMHG

## 2022-05-23 DIAGNOSIS — M17.12 PRIMARY OSTEOARTHRITIS OF LEFT KNEE: ICD-10-CM

## 2022-05-23 DIAGNOSIS — F33.41 RECURRENT MAJOR DEPRESSIVE DISORDER, IN PARTIAL REMISSION (HCC): ICD-10-CM

## 2022-05-23 DIAGNOSIS — E03.9 ACQUIRED HYPOTHYROIDISM: ICD-10-CM

## 2022-05-23 DIAGNOSIS — I10 HTN (HYPERTENSION), BENIGN: Primary | ICD-10-CM

## 2022-05-23 DIAGNOSIS — E77.8 HYPOPROTEINEMIA (HCC): ICD-10-CM

## 2022-05-23 LAB
T4 FREE: 1.3 NG/DL (ref 0.9–1.8)
TOTAL PROTEIN: 5.9 G/DL (ref 6.4–8.2)
TSH REFLEX: 5.53 UIU/ML (ref 0.27–4.2)

## 2022-05-23 PROCEDURE — 36415 COLL VENOUS BLD VENIPUNCTURE: CPT | Performed by: FAMILY MEDICINE

## 2022-05-23 PROCEDURE — 1123F ACP DISCUSS/DSCN MKR DOCD: CPT | Performed by: FAMILY MEDICINE

## 2022-05-23 PROCEDURE — 99214 OFFICE O/P EST MOD 30 MIN: CPT | Performed by: FAMILY MEDICINE

## 2022-05-23 PROCEDURE — G8427 DOCREV CUR MEDS BY ELIG CLIN: HCPCS | Performed by: FAMILY MEDICINE

## 2022-05-23 PROCEDURE — 1036F TOBACCO NON-USER: CPT | Performed by: FAMILY MEDICINE

## 2022-05-23 PROCEDURE — G8417 CALC BMI ABV UP PARAM F/U: HCPCS | Performed by: FAMILY MEDICINE

## 2022-05-23 PROCEDURE — 1090F PRES/ABSN URINE INCON ASSESS: CPT | Performed by: FAMILY MEDICINE

## 2022-05-23 NOTE — PROGRESS NOTES
Sachin Eng (:  1933) is a 80 y.o. female,Established patient, here for evaluation of the following chief complaint(s):  Hypertension (FOLLOW UP ON HTN), Hyperlipidemia (FOLLOW UP ON CHOLESTEROL), and Hypothyroidism (FOLLOW UP ON THYROID)       ASSESSMENT/PLAN:  928    Patient Instructions   Delbert Gutiérrez was seen today for hypertension, hyperlipidemia and hypothyroidism. Diagnoses and all orders for this visit:    HTN (hypertension), benign  -     Good control.  -     Continue meds and lifestyle control. Hypoproteinemia (HCC)  -     Protein, Total; Future  Recheck today. Continue current plan. Eat all of protein at meals. Recurrent major depressive disorder, in partial remission (HCC)  Continue current medications. Primary osteoarthritis of left knee  Continue glucosamine and Chondroitin and bring dose or bottle to next. Acquired hypothyroidism  -     TSH with Reflex; Future  -     Good control at last check. Recheck today. Return in about 3 months (around 2022) for Hypertension, Cholesterol, Thyroid. Subjective   SUBJECTIVE/OBJECTIVE:  Chief Complaint   Patient presents with    Hypertension     FOLLOW UP ON HTN    Hyperlipidemia     FOLLOW UP ON CHOLESTEROL    Hypothyroidism     FOLLOW UP ON THYROID   851  HPI  SON JAIRO HERE. HTN (hypertension), benign  Is not checking. She does have a cuff at home. Is watching salt. Not much chips/fries. Hypoproteinemia (HCC)  Still eating protein bars one daily with 30 gm protein. Has protein with a meal qd. Protein drink w/ dinner and w/ 30 gm protein. Recurrent major depressive disorder, in partial remission ([facility] [address]  Good friend  of blood clots. Sleeps ok. No SEs with med. Primary osteoarthritis of left knee  Takes gluc/chond. Should she continue. Not bother her.      Review of Systems   Past Medical History:   Diagnosis Date    Breast cancer ([facility] [address]     ductal vs lobular, not clear from pathologist    Cellulitis of left leg     Cellulitis of wrist 2018    RIGHT    Chronic kidney disease, stage III (moderate) ([address]     Depression     Diverticulitis of colon 1979    DVT (deep venous thrombosis) ([address] 2004    after first knee surgery    Gout 2011    Hematoma of left thigh 2017    Herpes zoster 2018    right ant shoulder    Hypertension     Hypoproteinemia ([address] 2019    Hypothyroidism 2011    Intervertebral lumbar disc disorder with myelopathy, lumbar region     OA (osteoarthritis)     severe in knees, left ankle, left >right hip    Primary osteoarthritis of right wrist     Severe arthritic change within carpal - 1st metacarpal joint, Extensive chondrocalcinosis throughout wrist    Pure hypercholesterolemia     Seborrheic dermatitis 2014    Subdural hematoma, post-traumatic ([address] 2016    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 Lizy Magallon MD. [facility]    JOINT REPLACEMENT      right knee and left knee in     MOUTH SURGERY  2015    verrous hyperplasia left ventral tongue, [facility] [id number] [facility], Dr Dinh Husbands  1970       Social History     Socioeconomic History    Marital status:       Spouse name: Kavin Cuevas -  2016    Number of children: 3    Years of education: 12    Highest education level: Not on file   Occupational History    Occupation: SECRETARIAL WORK - RETIRED    Tobacco Use    Smoking status: Never Smoker    Smokeless tobacco: Never Used    Tobacco comment: avoid tobacco   Vaping Use    Vaping Use: Never used Substance and Sexual Activity    Alcohol use: No     Alcohol/week: 0.0 standard drinks    Drug use: No    Sexual activity: Not Currently     Birth control/protection: Post-menopausal   Other Topics Concern    Not on file   Social History Narrative    Lives by herself. Using her walker routinely 5/2016. NO CURRENT EXERCISE PROGRAM BEYOND STRETCH. 11/15/16. 2/20/17. None. Has left knee pain. 6/7/17. Does HEP from PT, calf pumps, wall push ups. 9/19/17. No exercise except leg. 12/5/17 Does leg exercises with pedals. No muscle conditioning. 4/3/18. 7/3/18. Does leg lifts when sitting. Uses floor pedals 3-4 x/wk for 3-5 min. No problems when does. Patient has a health aide who comes for 3 hours 2 times a week. 10/2/18. Floor pedals 3-5 min qd and occasional bid. 1/8/19. 5-10 min qd and occasional 5 min bid. 4/16/19. 5x/wk 1-2x/day. 7/16/19. 10/22/19. 5x/wk 5-10 min. 1/21/20. 7/21/20 Now at 15 min w/floor pedals. 10/27/20. 20 min. On pedals >/= 5 day/wk. 1/26/20. 4/27/21. With left ankle pain 10 min 4x/wk. 7/27/21. Sometimes does more exercise. Still driving. 11/16/21. Does pedals 3x/wk for 20 min. 2/15/22. Till she got sick. 4/1/22. Does pedals 3x/wk for 20 min. 5/23/22. Social Determinants of Health     Financial Resource Strain: Low Risk     Difficulty of Paying Living Expenses: Not hard at all   Food Insecurity: No Food Insecurity    Worried About Running Out of Food in the Last Year: Never true    Alex of Food in the Last Year: Never true   Transportation Needs: No Transportation Needs    Lack of Transportation (Medical): No    Lack of Transportation (Non-Medical):  No   Physical Activity:     Days of Exercise per Week: Not on file    Minutes of Exercise per Session: Not on file   Stress:     Feeling of Stress : Not on file   Social Connections:     Frequency of Communication with Friends and Family: Not on file    Frequency of Social Gatherings with Friends and Family: Not on file    Attends Oriental orthodox Services: Not on file    Active Member of Clubs or Organizations: Not on file    Attends Club or Organization Meetings: Not on file    Marital Status: Not on file   Intimate Partner Violence:     Fear of Current or Ex-Partner: Not on file    Emotionally Abused: Not on file    Physically Abused: Not on file    Sexually Abused: Not on file   Housing Stability:     Unable to Pay for Housing in the Last Year: Not on file    Number of [place] in the Last Year: Not on file    Unstable Housing in the Last Year: Not on file       Family History   Problem Relation Age of Onset    Cancer Mother         bone    Heart Disease Father         CAD    Diabetes Father     Diabetes Sister     Cancer Sister 54        leukemia    Arrhythmia Brother         A fib    Stroke Brother 80        ?     Other Brother         THR after fx.  bilateral pneumonia, better    Other Son         Spinal fusion with rods/screws    No Known Problems Daughter     High Blood Pressure Daughter     High Blood Pressure Son        No Known Allergies    Current Outpatient Medications   Medication Sig Dispense Refill    GLUCOSAMINE-CHONDROITIN-MSM PO Take 1 tablet by mouth daily Indications: Joint Damage causing Pain and Loss of Function      levothyroxine (SYNTHROID) 50 MCG tablet TAKE 1 TABLET BY MOUTH  DAILY ON EMPTY STOMACH FOR  THYROID 90 tablet 0    buPROPion (WELLBUTRIN SR) 150 MG extended release tablet TAKE 1 TABLET BY MOUTH  TWICE DAILY 180 tablet 0    atorvastatin (LIPITOR) 40 MG tablet Take 1 tablet by mouth nightly 90 tablet 1    potassium chloride (KLOR-CON M) 10 MEQ extended release tablet TAKE 1 TABLET BY MOUTH  DAILY 90 tablet 3    probenecid (BENEMID) 500 MG tablet TAKE ONE-HALF TABLET BY  MOUTH DAILY TO PREVENT GOUT 45 tablet 3    lisinopril (PRINIVIL;ZESTRIL) 2.5 MG tablet TAKE 1 TABLET BY MOUTH  DAILY 90 tablet 3    niacin (SLO-NIACIN) 500 MG extended release tablet Take 500 mg by mouth nightly  ipratropium (ATROVENT) 0.06 % nasal spray 2 sprays by Nasal route 4 times daily 1 Bottle 3    aspirin 81 MG tablet Take 81 mg by mouth daily      Cholecalciferol (VITAMIN D3) 2000 UNITS CAPS One daily for Vit D supplement 30 capsule     melatonin 5 MG TABS tablet Take 3 mg by mouth nightly One at bedtime for sleep assist       ammonium lactate (AMLACTIN) 12 % cream Apply  topically as needed. Apply topically as needed.  magnesium chloride (MAG DELAY) 535 (64 MG) MG TBCR CR tablet Take 64 mg by mouth 2 times daily       acetaminophen (TYLENOL) 500 MG tablet Take 500 mg by mouth daily.  Multiple Vitamin (MULTIVITAMIN PO) Take 1 tablet by mouth daily. No current facility-administered medications for this visit. Objective    Vitals:    05/23/22 0842   BP: 122/82   Site: Right Upper Arm   Position: Sitting   Cuff Size: Large Adult   Pulse: 79   Resp: 18   SpO2: 97%   Weight: 171 lb (77.6 kg)   Height: 5' 2\" (1.575 m)     BP Readings from Last 3 Encounters:   05/23/22 122/82   02/15/22 122/80   11/16/21 118/72     Pulse Readings from Last 3 Encounters:   05/23/22 79   02/15/22 60   11/16/21 60     Wt Readings from Last 3 Encounters:   05/23/22 171 lb (77.6 kg)   02/15/22 174 lb 9.6 oz (79.2 kg)   11/16/21 172 lb (78 kg)     Body mass index is 31.28 kg/m². Physical Exam  Vitals and nursing note reviewed. Constitutional:       General: She is not in acute distress. Appearance: Normal appearance. She is well-developed. She is obese. She is not diaphoretic. Eyes:      General: No scleral icterus. Right eye: No discharge. Left eye: No discharge. Conjunctiva/sclera: Conjunctivae normal.   Neck:      Thyroid: No thyroid mass or thyromegaly. Vascular: No carotid bruit or JVD. Trachea: Trachea and phonation normal. No tracheal deviation. Cardiovascular:      Rate and Rhythm: Normal rate and regular rhythm. Occasional extrasystoles are present. Heart sounds: S1 normal and S2 normal. Heart sounds not distant. Murmur heard. Systolic murmur is present with a grade of 1/6. No friction rub. No gallop. No S3 or S4 sounds. Comments: Occasional trigeminal beat. Pulmonary:      Effort: Pulmonary effort is normal. No respiratory distress. Breath sounds: Normal breath sounds. No decreased breath sounds, wheezing, rhonchi or rales. Musculoskeletal:      Cervical back: Neck supple. Lymphadenopathy:      Head:      Right side of head: No submandibular or tonsillar adenopathy. Left side of head: No submandibular or tonsillar adenopathy. Cervical: No cervical adenopathy. Right cervical: No superficial, deep or posterior cervical adenopathy. Left cervical: No superficial, deep or posterior cervical adenopathy. Skin:     General: Skin is warm and dry. Coloration: Skin is not pale. Nails: There is no clubbing. Neurological:      Mental Status: She is alert. Deep Tendon Reflexes:      Reflex Scores:       Bicep reflexes are 2+ on the right side and 2+ on the left side. Comments: She is able to transfer on her own. Uses walker. Psychiatric:         Attention and Perception: Attention and perception normal.         Mood and Affect: Affect normal. Mood is depressed. Speech: Speech normal.         Behavior: Behavior normal. Behavior is cooperative. Cognition and Memory: Cognition and memory normal.         Judgment: Judgment normal.      Comments: Near tears with death of friend.         2/15/2022 09:18   Sodium 143   Potassium 4.5   Chloride 107   CO2 25   BUN,BUNPL 27 (H)   Creatinine 0.6   Anion Gap 11   GFR Non-African American >60   Magnesium 1.90   GLUCOSE, FASTING,GF 97   CALCIUM, SERUM, 638750 9.5   Total Protein 5.9 (L)   Uric Acid, Serum 2.5 (L)   Albumin 4.0   Albumin/Globulin Ratio 2.1   Alk Phos 110   ALT 19   AST 23   Bilirubin 0.5        On this date 5/23/2022 I have spent 37 minutes reviewing previous notes, test results and face to face with the patient discussing the diagnosis and importance of compliance with the treatment plan as well as documenting on the day of the visit. An electronic signature was used to authenticate this note.     --Michelle Gallegos DO

## 2022-05-23 NOTE — PATIENT INSTRUCTIONS
Xavier Bosworth was seen today for hypertension, hyperlipidemia and hypothyroidism. Diagnoses and all orders for this visit:    HTN (hypertension), benign  -     Good control.  -     Continue meds and lifestyle control. Hypoproteinemia (HCC)  -     Protein, Total; Future  Recheck today. Continue current plan. Eat all of protein at meals. Recurrent major depressive disorder, in partial remission (HCC)  Continue current medications. Primary osteoarthritis of left knee  Continue glucosamine and Chondroitin and bring dose or bottle to next. Acquired hypothyroidism  -     TSH with Reflex; Future  -     Good control at last check. Recheck today.

## 2022-06-10 ENCOUNTER — HOSPITAL ENCOUNTER (EMERGENCY)
Age: 87
Discharge: HOME | End: 2022-06-11
Payer: MEDICARE

## 2022-06-10 ENCOUNTER — APPOINTMENT (OUTPATIENT)
Dept: GENERAL RADIOLOGY | Age: 87
End: 2022-06-10
Payer: MEDICARE
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
## 2022-06-10 PROCEDURE — 73610 X-RAY EXAM OF ANKLE: CPT

## 2022-06-10 PROCEDURE — 99283 EMERGENCY DEPT VISIT LOW MDM: CPT

## 2022-06-10 ASSESSMENT — ENCOUNTER SYMPTOMS
NAUSEA: 0
CHEST TIGHTNESS: 0
SHORTNESS OF BREATH: 0
VOMITING: 0
DIARRHEA: 0
ABDOMINAL PAIN: 0

## 2022-06-10 ASSESSMENT — LIFESTYLE VARIABLES: HOW OFTEN DO YOU HAVE A DRINK CONTAINING ALCOHOL: NEVER

## 2022-06-10 ASSESSMENT — PAIN - FUNCTIONAL ASSESSMENT: PAIN_FUNCTIONAL_ASSESSMENT: NONE - DENIES PAIN

## 2022-06-11 VITALS
RESPIRATION RATE: 20 BRPM | SYSTOLIC BLOOD PRESSURE: 148 MMHG | TEMPERATURE: 97.5 F | OXYGEN SATURATION: 100 % | BODY MASS INDEX: 34.27 KG/M2 | HEIGHT: 59 IN | DIASTOLIC BLOOD PRESSURE: 82 MMHG | WEIGHT: 170 LBS | HEART RATE: 72 BPM

## 2022-06-11 NOTE — ED NOTES
Pt ambulated with walker for over 50 feet with no complaints of pain or tenderness. Pt had a steady gait and was able to maintain balance with no assistance form staff.       Fredis Le RN  06/11/22 0111

## 2022-06-11 NOTE — ED PROVIDER NOTES
[facility]        Pt Name: Hola Thapa  MRN: [id number]  [place] 9/14/1933  Date of evaluation: 6/10/2022  Provider: SANYA Sauer - DINESH  PCP: Junior Orion DO  Note Started: 11:09 PM EDT       ALICIA. I have evaluated this patient. My supervising physician was available for consultation. CHIEF COMPLAINT       Chief Complaint   Patient presents with    Fall     Pt fell trying to transfer a heavy basket of pictures from tray on walker to table and it caused her to fall forward and she landed on her right but is C/O left ankle swelling. Denies pain. HISTORY OF PRESENT ILLNESS   (Location, Timing/Onset, Context/Setting, Quality, Duration, Modifying Factors, Severity, Associated Signs and Symptoms)  Note limiting factors. Chief Complaint: Cecilio Gomez is a 80 y.o. female who presents to the emergency department with complaints for. Patient denies hitting her head. Left ankle appears to be deformed however patient denies pain and states that her ankle always looks like this. Patient states that she sees a foot doctor and her ROM in her left foot is at baseline. Denies any headache, fever, lightheadedness, dizziness, visual disturbances. No chest pain or pressure. No neck or back pain. No shortness of breath, cough, or congestion. No abdominal pain, nausea, vomiting, diarrhea, constipation, or dysuria. No rash. Nursing Notes were all reviewed and agreed with or any disagreements were addressed in the HPI. REVIEW OF SYSTEMS    (2-9 systems for level 4, 10 or more for level 5)     Review of Systems   Constitutional: Negative for activity change, chills and fever. Respiratory: Negative for chest tightness and shortness of breath. Cardiovascular: Negative for chest pain. Gastrointestinal: Negative for abdominal pain, diarrhea, nausea and vomiting.    Genitourinary: Negative for COLOSTOMY  07/01/1970         CURRENTMEDICATIONS       Previous Medications    ACETAMINOPHEN (TYLENOL) 500 MG TABLET    Take 500 mg by mouth daily. AMMONIUM LACTATE (AMLACTIN) 12 % CREAM    Apply  topically as needed. Apply topically as needed. ASPIRIN 81 MG TABLET    Take 81 mg by mouth daily    ATORVASTATIN (LIPITOR) 40 MG TABLET    Take 1 tablet by mouth nightly    BUPROPION (WELLBUTRIN SR) 150 MG EXTENDED RELEASE TABLET    TAKE 1 TABLET BY MOUTH  TWICE DAILY    CHOLECALCIFEROL (VITAMIN D3) 2000 UNITS CAPS    One daily for Vit D supplement    GLUCOSAMINE-CHONDROITIN-MSM PO    Take 1 tablet by mouth daily Indications: Joint Damage causing Pain and Loss of Function    IPRATROPIUM (ATROVENT) 0.06 % NASAL SPRAY    2 sprays by Nasal route 4 times daily    LEVOTHYROXINE (SYNTHROID) 50 MCG TABLET    TAKE 1 TABLET BY MOUTH  DAILY ON EMPTY STOMACH FOR  THYROID    LISINOPRIL (PRINIVIL;ZESTRIL) 2.5 MG TABLET    TAKE 1 TABLET BY MOUTH  DAILY    MAGNESIUM CHLORIDE (MAG DELAY) 535 (64 MG) MG TBCR CR TABLET    Take 64 mg by mouth 2 times daily     MELATONIN 5 MG TABS TABLET    Take 3 mg by mouth nightly One at bedtime for sleep assist     MULTIPLE VITAMIN (MULTIVITAMIN PO)    Take 1 tablet by mouth daily. NIACIN (SLO-NIACIN) 500 MG EXTENDED RELEASE TABLET    Take 500 mg by mouth nightly    POTASSIUM CHLORIDE (KLOR-CON M) 10 MEQ EXTENDED RELEASE TABLET    TAKE 1 TABLET BY MOUTH  DAILY    PROBENECID (BENEMID) 500 MG TABLET    TAKE ONE-HALF TABLET BY  MOUTH DAILY TO PREVENT GOUT         ALLERGIES     Patient has no known allergies. FAMILYHISTORY       Family History   Problem Relation Age of Onset   [de-identified] Cancer Mother         bone    Heart Disease Father         CAD    Diabetes Father     Diabetes Sister     Cancer Sister 54        leukemia    Arrhythmia Brother         A fib    Stroke Brother 80        ?     Other Brother         THR after fx.  bilateral pneumonia, better    Other Son         Spinal fusion with rods/screws    No Known Problems Daughter     High Blood Pressure Daughter     High Blood Pressure Son           SOCIAL HISTORY       Social History     Tobacco Use    Smoking status: Never Smoker    Smokeless tobacco: Never Used    Tobacco comment: avoid tobacco   Vaping Use    Vaping Use: Never used   Substance Use Topics    Alcohol use: No     Alcohol/week: 0.0 standard drinks    Drug use: No       SCREENINGS    Alex Coma Scale  Eye Opening: Spontaneous  Best Verbal Response: Oriented  Best Motor Response: Obeys commands  [place] Coma Scale Score: 15        PHYSICAL EXAM    (up to 7 for level 4, 8 or more for level 5)     ED Triage Vitals [06/10/22 2256]   BP Temp Temp Source Heart Rate Resp SpO2 Height Weight   (!) 157/69 97.5 °F (36.4 °C) Oral 72 20 100 % 4' 11\" (1.499 m) 170 lb (77.1 kg)       Physical Exam  Vitals and nursing note reviewed. Constitutional:       Appearance: She is well-developed. She is not diaphoretic. HENT:      Head: Normocephalic and atraumatic. Right Ear: External ear normal.      Left Ear: External ear normal.   Eyes:      General:         Right eye: No discharge. Left eye: No discharge. Neck:      Vascular: No JVD. Cardiovascular:      Rate and Rhythm: Normal rate and regular rhythm. Pulses: Normal pulses. Dorsalis pedis pulses are 2+ on the right side and 2+ on the left side. Posterior tibial pulses are 2+ on the right side and 2+ on the left side. Heart sounds: Normal heart sounds. Pulmonary:      Effort: Pulmonary effort is normal. No respiratory distress. Breath sounds: Normal breath sounds. Musculoskeletal:         General: Deformity and signs of injury present. Cervical back: Normal range of motion and neck supple. Left foot: Decreased range of motion. Deformity present.         Feet:    Feet:      Right foot:      Skin integrity: Skin integrity normal.      Left foot:      Skin integrity: Skin integrity normal.      Comments: Deformity to left ankle   Skin:     General: Skin is warm and dry. Coloration: Skin is not pale. Neurological:      Mental Status: She is alert and oriented to person, place, and time. Psychiatric:         Behavior: Behavior normal.         DIAGNOSTIC RESULTS   LABS:    Labs Reviewed - No data to display    When ordered only abnormal lab results are displayed. All other labs were within normal range or not returned as of this dictation. EKG: When ordered, EKG's are interpreted by the Emergency Department Physician in the absence of a cardiologist.  Please see their note for interpretation of EKG. RADIOLOGY:   Non-plain film images such as CT, Ultrasound and MRI are read by the radiologist. Plain radiographic images are visualized and preliminarily interpreted by the ED Provider with the below findings:        Interpretation per the Radiologist below, if available at the time of this note:    XR ANKLE LEFT (MIN 3 VIEWS)   Final Result   Moderate osteoarthritic changes along the tibiotalar joint and old healed   distal fibular fracture with no acute abnormality seen. Diffuse osteopenia. Moderate pes planus deformity along the midfoot which is unchanged. Recommend orthopedic follow-up. No results found. PROCEDURES   Unless otherwise noted below, none     Procedures    CRITICAL CARE TIME       CONSULTS:  None      EMERGENCY DEPARTMENT COURSE and DIFFERENTIAL DIAGNOSIS/MDM:   Vitals:    Vitals:    06/10/22 2256   BP: (!) 157/69   Pulse: 72   Resp: 20   Temp: 97.5 °F (36.4 °C)   TempSrc: Oral   SpO2: 100%   Weight: 170 lb (77.1 kg)   Height: 4' 11\" (1.499 m)       Patient was given the following medications:  Medications - No data to display      Is this patient to be included in the SEP-1 Core Measure due to severe sepsis or septic shock? No   Exclusion criteria - the patient is NOT to be included for SEP-1 Core Measure due to:   Infection is not suspected    Briefly, this is an 80-year-old female with past medical history of hypertension, breast cancer, depression, CKD, DVT, diverticulitis, and history of arthritis. Patient presents to the emergency department after a mechanical fall with complaints of the left ankle deformity. Patient denies pain and states that this deformity has been ongoing and she sees a orthopedic doctor. Patient denies use of blood thinners. Patient denies hitting her head or loss of consciousness. At this time patient has no complaints. X-ray ordered of the left ankle. XR ANKLE LEFT (MIN 3 VIEWS) (Final result)  Result time 06/11/22 00:07:07  Final result by Sunita Payton MD (06/11/22 00:07:07)                Impression:    Moderate osteoarthritic changes along the tibiotalar joint and old healed   distal fibular fracture with no acute abnormality seen. Diffuse osteopenia. Moderate pes planus deformity along the midfoot which is unchanged. Recommend orthopedic follow-up. The patient is able to walk with her walker, this is her baseline. She does have chronic deformity to the left ankle. She continues to have no pain    I estimate there is LOW risk for FRACTURE, COMPARTMENT SYNDROME, DEEP VENOUS THROMBOSIS, SEPTIC ARTHRITIS, TENDON OR NEUROVASCULAR INJURY, thus I consider the discharge disposition reasonable. FINAL IMPRESSION      1.  Fall from standing, initial encounter          DISPOSITION/PLAN   DISPOSITION Decision To Discharge 06/11/2022 01:14:25 AM      PATIENT REFERRED TO:  Nery Hoang [address]  [phone]    Schedule an appointment as soon as possible for a visit         DISCHARGE MEDICATIONS:  New Prescriptions    No medications on file       DISCONTINUED MEDICATIONS:  Discontinued Medications    No medications on file              (Please note that portions of this note were completed with a voice recognition program.  Efforts were made to edit the dictations but occasionally words are mis-transcribed.)    SANYA Lane CNP (electronically signed)            SANYA Lane CNP  06/11/22 9963

## 2022-06-13 ENCOUNTER — CARE COORDINATION (OUTPATIENT)
Dept: CARE COORDINATION | Age: 87
End: 2022-06-13

## 2022-06-13 ENCOUNTER — OFFICE VISIT (OUTPATIENT)
Dept: FAMILY MEDICINE CLINIC | Age: 87
End: 2022-06-13
Payer: MEDICARE

## 2022-06-13 VITALS
HEART RATE: 84 BPM | SYSTOLIC BLOOD PRESSURE: 130 MMHG | WEIGHT: 171 LBS | BODY MASS INDEX: 34.47 KG/M2 | HEIGHT: 59 IN | DIASTOLIC BLOOD PRESSURE: 86 MMHG | OXYGEN SATURATION: 100 %
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
## 2022-06-13 DIAGNOSIS — Z09 HOSPITAL DISCHARGE FOLLOW-UP: ICD-10-CM

## 2022-06-13 PROCEDURE — G8417 CALC BMI ABV UP PARAM F/U: HCPCS | Performed by: NURSE PRACTITIONER

## 2022-06-13 PROCEDURE — 1123F ACP DISCUSS/DSCN MKR DOCD: CPT | Performed by: NURSE PRACTITIONER

## 2022-06-13 PROCEDURE — 99214 OFFICE O/P EST MOD 30 MIN: CPT | Performed by: NURSE PRACTITIONER

## 2022-06-13 PROCEDURE — G8427 DOCREV CUR MEDS BY ELIG CLIN: HCPCS | Performed by: NURSE PRACTITIONER

## 2022-06-13 PROCEDURE — 1036F TOBACCO NON-USER: CPT | Performed by: NURSE PRACTITIONER

## 2022-06-13 PROCEDURE — 1090F PRES/ABSN URINE INCON ASSESS: CPT | Performed by: NURSE PRACTITIONER

## 2022-06-13 ASSESSMENT — ENCOUNTER SYMPTOMS
BACK PAIN: 0
SHORTNESS OF BREATH: 0
WHEEZING: 0

## 2022-06-13 NOTE — PROGRESS NOTES
Razia Chilel (:  1933) is a 80 y.o. female,Established patient, here for evaluation of the following chief complaint(s):  Follow-Up from Hospital (ER FOLLOW UP, FALL )      ASSESSMENT/PLAN:  1. Fall, subsequent encounter  -Aside from bruising, normal evaluation today. Reassured patient that everything will heal on its own. Follow-up as needed. 2. Hospital discharge follow-up  -Emergency room notes and results reviewed. No changes medication. Return in about 3 months (around 2022) for Already scheduled with Dr. Adair Alanis. SUBJECTIVE/OBJECTIVE:  HPI Pearlie Goldberg presents today with family for emergency room follow-up. She was seen at [facility] emergency department following a fall. She states that she has a tray for her walker. She had went to lift something heavy off of the tray onto a table, and she fell. Believes that the tray may have broken. She states that she fell predominantly on her right side. As she was going down, the left knee made contact with the right leg. She went to the emergency room for evaluation. Denies contact with head or loss of consciousness. She has a known history of pes planus for which she is following podiatry. She believes that the deformity in her ankle made the emergency room most worried about this, and that her focus was on the ankle. She came in today to make sure that her right leg was okay as well as her left knee. She states she has significant bruising to the left knee. She is able to move everything without issue. Denies any numbness or tingling. She has been able to ambulate with walker without issue. Denies any headaches, dizziness, blurred vision, or other issues. Review of Systems   Constitutional: Negative for chills and fever. Respiratory: Negative for shortness of breath and wheezing. Cardiovascular: Negative for chest pain, palpitations and leg swelling.    Musculoskeletal: Negative for arthralgias, back pain, gait problem, joint swelling, myalgias, neck pain and neck stiffness. Skin:        Bruising to the left knee and right thigh   Neurological: Negative for dizziness, weakness, light-headedness, numbness and headaches. Psychiatric/Behavioral: Negative for decreased concentration, dysphoric mood, self-injury, sleep disturbance and suicidal ideas. The patient is not nervous/anxious. Physical Exam  Constitutional:       General: She is not in acute distress. Appearance: Normal appearance. She is obese. HENT:      Head: Normocephalic and atraumatic. Right Ear: Tympanic membrane, ear canal and external ear normal.      Left Ear: Tympanic membrane, ear canal and external ear normal.      Mouth/Throat:      Mouth: Mucous membranes are moist.   Eyes:      Extraocular Movements: Extraocular movements intact. Conjunctiva/sclera: Conjunctivae normal.      Pupils: Pupils are equal, round, and reactive to light. Neck:      Thyroid: No thyromegaly. Cardiovascular:      Rate and Rhythm: Normal rate and regular rhythm. Pulses: Normal pulses. Heart sounds: Murmur heard. No gallop. Pulmonary:      Effort: Pulmonary effort is normal.      Breath sounds: Normal breath sounds. Abdominal:      General: Bowel sounds are normal.      Palpations: Abdomen is soft. Tenderness: There is no abdominal tenderness. There is no guarding or rebound. Musculoskeletal:         General: Deformity present. No swelling or tenderness. Normal range of motion. Cervical back: Normal range of motion and neck supple. Comments: Baseline pes planus of the left ankle. No laxity of the joints of the bilateral knees. Negative anterior and posterior drawer. Negative Gill. Extensive bruising to the left knee. No point tenderness. Lymphadenopathy:      Cervical: No cervical adenopathy. Skin:     General: Skin is warm and dry. Capillary Refill: Capillary refill takes less than 2 seconds. Findings: Bruising present. Comments: Extensive bruising to the left knee   Neurological:      Mental Status: She is alert and oriented to person, place, and time. Psychiatric:         Mood and Affect: Mood normal.         Behavior: Behavior normal.         Thought Content: Thought content normal.         Judgment: Judgment normal.               This dictation was generated by voice recognition computer software. Although all attempts are made to edit the dictation for accuracy, there may be errors in the transcription that are not intended. An electronic signature was used to authenticate this note.     --SANYA Preston - CNP

## 2022-06-14 ENCOUNTER — CARE COORDINATION (OUTPATIENT)
Dept: CARE COORDINATION | Age: 87
End: 2022-06-14

## 2022-06-14 NOTE — CARE COORDINATION
Ambulatory Care Coordination  ED Follow up Call    Reason for ED visit:  Fall   Status:     significantly improved    Did you call your PCP prior to going to the ED? No      Did you receive a discharge instructions from the Emergency Room? Yes  Review of Instructions:     Understands what to report/when to return?:  Yes   Understands discharge instructions?:  Yes   Following discharge instructions?:  Yes   If not why? N/A    Are there any new complaints of pain? No  New Pain Meds? No    Constipation prophylaxis needed? No    If you have a wound is the dressing clean, dry, and intact? N/A  Understands wound care regimen? N/A    Are there any other complaints/concerns that you wish to tell your provider? No    FU appts/Provider:    Future Appointments   Date Time Provider Parviz Rubin   8/30/2022  9:00 AM Manpreet Díaz, DO [place] - [facility]           New Medications?:   No      Medication Reconciliation by phone - Yes  Understands Medications? Yes  Taking Medications? Yes  Can you swallow your pills? Yes    Any further needs in the home i.e. Equipment? No    Link to services in community?:  No   Which services:  N/A     Outreach call to patient to assess for needs for Ambulatory Care Management. The patient has an excellent support system. She has someone come to her house 2 days a week and helps with laundry and other household needs. The patient has her grocerys delivered. The patient still drives, but has family that can drive her if needed. The patient states she has all of the DME that she needs. The patient advises that she manages her health quite well and has had no issues. The patient denied needs from [facility]. The patient did accept this [facility]'s contact information and was encouraged to call with any needs. She stated that she would if needed. The patient denies needs at this time.

## 2022-06-22 ENCOUNTER — OFFICE VISIT (OUTPATIENT)
Dept: FAMILY MEDICINE CLINIC | Age: 87
End: 2022-06-22
Payer: MEDICARE

## 2022-06-22 ENCOUNTER — CARE COORDINATION (OUTPATIENT)
Dept: CARE COORDINATION | Age: 87
End: 2022-06-22

## 2022-06-22 VITALS
HEIGHT: 59 IN | DIASTOLIC BLOOD PRESSURE: 84 MMHG | OXYGEN SATURATION: 97 % | RESPIRATION RATE: 18 BRPM | BODY MASS INDEX: 34.27 KG/M2 | WEIGHT: 170 LBS | HEART RATE: 65 BPM | SYSTOLIC BLOOD PRESSURE: 124 MMHG

## 2022-06-22 DIAGNOSIS — N63.20 LEFT BREAST LUMP: Primary | ICD-10-CM

## 2022-06-22 DIAGNOSIS — L89.892 DECUBITUS ULCER OF LEFT FOOT, STAGE 2 (HCC): ICD-10-CM

## 2022-06-22 DIAGNOSIS — D24.2 BENIGN NEOPLASM OF LEFT BREAST: ICD-10-CM

## 2022-06-22 DIAGNOSIS — S20.02XA TRAUMATIC ECCHYMOSIS OF LEFT FEMALE BREAST, INITIAL ENCOUNTER: ICD-10-CM

## 2022-06-22 PROCEDURE — 1036F TOBACCO NON-USER: CPT | Performed by: FAMILY MEDICINE

## 2022-06-22 PROCEDURE — G8417 CALC BMI ABV UP PARAM F/U: HCPCS | Performed by: FAMILY MEDICINE

## 2022-06-22 PROCEDURE — 99214 OFFICE O/P EST MOD 30 MIN: CPT | Performed by: FAMILY MEDICINE

## 2022-06-22 PROCEDURE — 1123F ACP DISCUSS/DSCN MKR DOCD: CPT | Performed by: FAMILY MEDICINE

## 2022-06-22 PROCEDURE — G8427 DOCREV CUR MEDS BY ELIG CLIN: HCPCS | Performed by: FAMILY MEDICINE

## 2022-06-22 PROCEDURE — 1090F PRES/ABSN URINE INCON ASSESS: CPT | Performed by: FAMILY MEDICINE

## 2022-06-22 NOTE — PATIENT INSTRUCTIONS
Marcus Duran was seen today for other. Diagnoses and all orders for this visit:    Left breast lump  -     [facility] DI[facility] W CAD LEFT  -     US BREAST COMPLETE LEFT; Future    Benign neoplasm of left breast   -     US BREAST COMPLETE LEFT; Future    Traumatic ecchymosis of left female breast, initial encounter  -     [facility] DIAGNOSTIC W CAD LEFT  -     US BREAST COMPLETE LEFT; Future    Decubitus ulcer of left foot, stage 2 (HCC)  Use desitin Daily. Use a washer type pad around the wound.

## 2022-06-22 NOTE — CARE COORDINATION
Incoming call from patient. The patient reached out to this ACM. The patient expressed concern to me. The patient stated that she had fallen back on Pamela the 10th and went to the ED to get checked out. This ACM and spoke with the patient on June 13th to follow up on her ED visit. She had also had her follow up appoint with her PCP at that time. The patient advised that she found a bruise on one of her breast. She then advised that she thanks she feels a lump under the bruise, but she is not for sure. The patient stated that when she fell, she may have hit it on something causing the bruise and lump. This patient advises that she does not have an Ob-Gyn. This ACM advised the patient to see her doctor and let him advise her on the bruise and lump. This ACM volunteered to make an appointment for the patient. She was agreeable and advised that she would like to be seen as soon as possible. This AC was able to schedule an appointment with Dr. Gabo Armijo for 3:30 today. The patient stated that would be wonderful and thanked this AC for my help. The patient denied any other needs at this time. She is agreeable to a follow up call on Friday June 24th. The patient was advised to reach out to this ACM for any future needs. The patient FLETCHER.

## 2022-06-22 NOTE — PROGRESS NOTES
Margaret Cristobal (:  1933) is a 80 y.o. female,Established patient, here for evaluation of the following chief complaint(s): Other (pt c/o brusing and a possible lump on her left breast that she noticed this morning while getting dressed. she did have a fall back on 6/10/22 and followed up with Sussy Sexton on 22 from the ED. unsure if this is related or not)       ASSESSMENT/PLAN:  456    Patient Instructions   Pablo Lewis was seen today for other. Diagnoses and all orders for this visit:    Left breast lump  -     [facility] DIAGNOSTIC W CAD LEFT  -     US BREAST COMPLETE LEFT; Future  Patient has history of breast cancer is therefore high risk. Benign neoplasm of left breast   -     US BREAST COMPLETE LEFT; Future  Possible benign traumatic lump. Could be a small hematoma. Traumatic ecchymosis of left female breast, initial encounter  -     [facility] DIAGNOSTIC W CAD LEFT  -     US BREAST COMPLETE LEFT; Future  Amount of bruising not worrisome. Decubitus ulcer of left foot, stage 2 (HCC)  Use desitin Daily. Use a washer type pad around the wound. Explained that placing padding on top of the wound just thickens the area and increases the pressure from the shoe. Return if symptoms worsen or fail to improve, for scheduled/requested routine visit. Subjective   SUBJECTIVE/OBJECTIVE:  Chief Complaint   Patient presents with    Other     pt c/o brusing and a possible lump on her left breast that she noticed this morning while getting dressed. she did have a fall back on 6/10/22 and followed up with Sussy Sexton on 22 from the ED. unsure if this is related or not   416 (out [phone])  HPI    Fall  Fell 2 fridays ago onto right side. Both of knees were bruised. Was on the carpet from shoulder to butt. Left leg went up in air and both knees were bruised. Called non-emergency and life squad took her [facility]. Still has left ankle pain over the left medial side.   Breast bruising on left  Noted today about  am.  Not painful. Did not know the injury was there when she fell. She noted a breast lump under the bruise. Here for that. Had right lumpectomy for cancer ~2004. Chief Complaint   Patient presents with    Fall       Pt fell trying to transfer a heavy basket of pictures from tray on walker to table and it caused her to fall forward and she landed on her right but is C/O left ankle swelling. Denies pain.      HISTORY OF PRESENT ILLNESS     Chief Complaint: Ankur Brewster is a 80 y.o. female who presents to the emergency department with complaints for. Patient denies hitting her head. Left ankle appears to be deformed however patient denies pain and states that her ankle always looks like this. Patient states that she sees a foot doctor and her ROM in her left foot is at baseline.      Denies any headache, fever, lightheadedness, dizziness, visual disturbances. No chest pain or pressure. No neck or back pain. No shortness of breath, cough, or congestion. No abdominal pain, nausea, vomiting, diarrhea, constipation, or dysuria. No rash. Musculoskeletal: Positive for joint swelling. Negative for gait problem. PE  Musculoskeletal:         General: Deformity and signs of injury present. Cervical back: Normal range of motion and neck supple. Left foot: Decreased range of motion. Deformity present.         Feet:    Feet:      Right foot:      Skin integrity: Skin integrity normal.      Left foot:      Skin integrity: Skin integrity normal.      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    Hypothyroidism 2011    Intervertebral lumbar disc disorder with myelopathy, lumbar region     OA (osteoarthritis) 2000    severe in knees, left ankle, left >right hip    Primary osteoarthritis of right wrist     Severe arthritic change within carpal - 1st metacarpal joint, Extensive chondrocalcinosis throughout wrist    Pure hypercholesterolemia     Seborrheic dermatitis 2014    Subdural hematoma, post-traumatic ([facility] 2016    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 Cristina Vicente MD. 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 Using her walker routinely 5/2016. NO CURRENT EXERCISE PROGRAM BEYOND STRETCH. 11/15/16. 2/20/17. None. Has left knee pain. 6/7/17. Does HEP from PT, calf pumps, wall push ups. 9/19/17. No exercise except leg. 12/5/17 Does leg exercises with pedals. No muscle conditioning. 4/3/18. 7/3/18. Does leg lifts when sitting. Uses floor pedals 3-4 x/wk for 3-5 min. No problems when does. Patient has a health aide who comes for 3 hours 2 times a week. 10/2/18. Floor pedals 3-5 min qd and occasional bid. 1/8/19. 5-10 min qd and occasional 5 min bid. 4/16/19. 5x/wk 1-2x/day. 7/16/19. 10/22/19. 5x/wk 5-10 min. 1/21/20. 7/21/20 Now at 15 min w/floor pedals. 10/27/20. 20 min. On pedals >/= 5 day/wk. 1/26/20. 4/27/21. With left ankle pain 10 min 4x/wk. 7/27/21. Sometimes does more exercise. Still driving. 11/16/21. Does pedals 3x/wk for 20 min. 2/15/22. Till she got sick. 4/1/22. Does pedals 3x/wk for 20 min. 5/23/22. None since fall. Social Determinants of Health     Financial Resource Strain: Low Risk     Difficulty of Paying Living Expenses: Not hard at all   Food Insecurity: No Food Insecurity    Worried About Running Out of Food in the Last Year: Never true    Alex of Food in the Last Year: Never true   Transportation Needs: No Transportation Needs    Lack of Transportation (Medical): No    Lack of Transportation (Non-Medical):  No   Physical Activity:     Days of Exercise per Week: Not on file    Minutes of Exercise per Session: Not on file   Stress:     Feeling of Stress : Not on file   Social Connections:     Frequency of Communication with Friends and Family: Not on file    Frequency of Social Gatherings with Friends and Family: Not on file    Attends Anglican Services: Not on file    Active Member of Clubs or Organizations: Not on file    Attends Club or Organization Meetings: Not on file    Marital Status: Not on file   Intimate Partner Violence:     Fear of Current or Ex-Partner: Not on file    Emotionally Abused: Not on file    Physically Abused: Not on file    Sexually Abused: Not on file   Housing Stability:     Unable to Pay for Housing in the Last Year: Not on file    Number of Places Lived in the Last Year: Not on file    Unstable Housing in the Last Year: Not on file       Family History   Problem Relation Age of Onset    Cancer Mother         bone    Heart Disease Father         CAD    Diabetes Father     Diabetes Sister     Cancer Sister 54        leukemia    Arrhythmia Brother         A fib    Stroke Brother 80        ?     Other Brother         THR after fx.  bilateral pneumonia, better    Other Son         Spinal fusion with rods/screws    No Known Problems Daughter     High Blood Pressure Daughter     High Blood Pressure Son      No Known Allergies    Current Outpatient Medications   Medication Sig Dispense Refill    GLUCOSAMINE-CHONDROITIN-MSM PO Take 1 tablet by mouth daily Indications: Joint Damage causing Pain and Loss of Function      levothyroxine (SYNTHROID) 50 MCG tablet TAKE 1 TABLET BY MOUTH  DAILY ON EMPTY STOMACH FOR  THYROID 90 tablet 0    buPROPion (WELLBUTRIN SR) 150 MG extended release tablet TAKE 1 TABLET BY MOUTH  TWICE DAILY 180 tablet 0    atorvastatin (LIPITOR) 40 MG tablet Take 1 tablet by mouth nightly 90 tablet 1    potassium chloride (KLOR-CON M) 10 MEQ extended release tablet TAKE 1 TABLET BY MOUTH  DAILY 90 tablet 3    lisinopril (PRINIVIL;ZESTRIL) 2.5 MG tablet TAKE 1 TABLET BY MOUTH  DAILY 90 tablet 3    niacin (SLO-NIACIN) 500 MG extended release tablet Take 500 mg by mouth nightly      ipratropium (ATROVENT) 0.06 % nasal spray 2 sprays by Nasal route 4 times daily 1 Bottle 3    aspirin 81 MG tablet Take 81 mg by mouth daily      Cholecalciferol (VITAMIN D3) 2000 UNITS CAPS One daily for Vit D supplement 30 capsule     melatonin 5 MG TABS tablet Take 3 mg by mouth nightly One at bedtime for sleep assist       ammonium lactate (AMLACTIN) 12 % cream Apply topically as needed Apply topically as needed.  magnesium chloride (MAG DELAY) 535 (64 MG) MG TBCR CR tablet Take 64 mg by mouth 2 times daily       acetaminophen (TYLENOL) 500 MG tablet Take 500 mg by mouth daily.  Multiple Vitamin (MULTIVITAMIN PO) Take 1 tablet by mouth daily. No current facility-administered medications for this visit. Objective    Vitals:    06/22/22 1515   BP: 124/84   Site: Left Upper Arm   Position: Sitting   Cuff Size: Medium Adult   Pulse: 65   Resp: 18   SpO2: 97%   Weight: 170 lb (77.1 kg)   Height: 4' 11\" (1.499 m)     BP Readings from Last 3 Encounters:   06/22/22 124/84   06/13/22 130/86   06/11/22 (!) 148/82     Pulse Readings from Last 3 Encounters:   06/22/22 65   06/13/22 84   06/10/22 72     Wt Readings from Last 3 Encounters:   06/30/22 170 lb (77.1 kg)   06/22/22 170 lb (77.1 kg)   06/13/22 171 lb (77.6 kg)     Body mass index is 34.34 kg/m². Physical Exam  Vitals and nursing note reviewed. Constitutional:       General: She is not in acute distress. Appearance: Normal appearance. She is well-developed, well-groomed and normal weight. Chest:   Breasts:      Left: Mass and skin change (bruising) present. No swelling, bleeding, tenderness, axillary adenopathy or supraclavicular adenopathy. Musculoskeletal:        Feet:    Lymphadenopathy:      Upper Body:      Left upper body: No supraclavicular, axillary or pectoral adenopathy. Neurological:      Mental Status: She is alert. Psychiatric:         Attention and Perception: Attention and perception normal.         Mood and Affect: Mood is anxious ( Slightly about this breast lump with her history of breast cancer). Speech: Speech normal.         Behavior: Behavior normal. Behavior is cooperative. Thought Content:  Thought content normal.         Cognition and Memory: Cognition and memory normal. Judgment: Judgment normal.          On this date 6/22/2022 I have spent 35 minutes reviewing previous notes, test results and face to face with the patient discussing the diagnosis and importance of compliance with the treatment plan as well as documenting on the day of the visit. An electronic signature was used to authenticate this note.     --Kodi Sotomayor, DO

## 2022-06-27 ENCOUNTER — TELEPHONE (OUTPATIENT)
Dept: FAMILY MEDICINE CLINIC | Age: 87
End: 2022-06-27

## 2022-06-27 DIAGNOSIS — Z12.31 ENCOUNTER FOR SCREENING MAMMOGRAM FOR MALIGNANT NEOPLASM OF BREAST: Primary | ICD-10-CM

## 2022-06-27 NOTE — TELEPHONE ENCOUNTER
Pt has a mammogram scheduled for June 30 as a diagnostic just left side. They would like a bilateral mammogram placed. Left side lump. She had a bilateral screening in 2014. Please place a new order.

## 2022-06-30 ENCOUNTER — HOSPITAL ENCOUNTER (OUTPATIENT)
Dept: ULTRASOUND IMAGING | Age: 87
Discharge: HOME | End: 2022-06-30
Payer: MEDICARE

## 2022-06-30 ENCOUNTER — HOSPITAL ENCOUNTER (OUTPATIENT)
Dept: WOMENS IMAGING | Age: 87
Discharge: HOME | End: 2022-06-30
Payer: MEDICARE

## 2022-06-30 VITALS — BODY MASS INDEX: 34.27 KG/M2 | HEIGHT: 59 IN | WEIGHT: 170 LBS

## 2022-06-30 DIAGNOSIS — D24.2 BENIGN NEOPLASM OF LEFT BREAST: ICD-10-CM

## 2022-06-30 DIAGNOSIS — N63.20 LEFT BREAST MASS: Primary | ICD-10-CM

## 2022-06-30 DIAGNOSIS — S20.02XA TRAUMATIC ECCHYMOSIS OF LEFT FEMALE BREAST, INITIAL ENCOUNTER: ICD-10-CM

## 2022-06-30 DIAGNOSIS — N63.20 LEFT BREAST LUMP: ICD-10-CM

## 2022-06-30 PROCEDURE — 76641 ULTRASOUND BREAST COMPLETE: CPT

## 2022-06-30 PROCEDURE — G0279 TOMOSYNTHESIS, MAMMO: HCPCS

## 2022-06-30 NOTE — PROGRESS NOTES
Per clarification from radiology. Cannot do screening mammogram on right and diagnostic on the left.

## 2022-07-10 DIAGNOSIS — R92.8 ABNORMAL MAMMOGRAM OF LEFT BREAST: Primary | ICD-10-CM

## 2022-07-10 NOTE — PROGRESS NOTES
Abnormal mammogram of left breast  -     US BREAST COMPLETE LEFT; Future  -     [facility] DIAGNOSTIC W CAD LEFT;  Future

## 2022-07-15 DIAGNOSIS — I10 HTN (HYPERTENSION), BENIGN: ICD-10-CM

## 2022-07-15 DIAGNOSIS — N18.30 CHRONIC KIDNEY DISEASE, STAGE III (MODERATE) (HCC): Chronic | ICD-10-CM

## 2022-07-15 DIAGNOSIS — E78.00 PURE HYPERCHOLESTEROLEMIA: ICD-10-CM

## 2022-07-15 NOTE — TELEPHONE ENCOUNTER
Dr. Dejuan Coronado, DO,     Outreach regarding medication adherence. Pharmacy out of atorvastatin and lisinopril refills. Order(s) pended for your convenience. Last visit: 2022, Next visit: 2022    See encounter note(s) below for complete details. Please let me know if you have any questions. Thank you,  Leopoldo Parkers, PharmD, BCACP, [facility], toll free: [phone], option 1    =======================================================    POPULATION HEALTH CLINICAL PHARMACY REVIEW: ADHERENCE  Identified care gap per [employer]: fills at [facility]: Statin adherence    Last Visit: 2022    Patient also appears to be prescribed: lisinopril    ASSESSMENT  ACE/ARB ADHERENCE    Insurance Records claims through 2022 (YTD [place] =  78%; Potential Fail Date: 2022 ):   Lisinopril 2.5mg daily last filled on 2022 for 90 day supply. Next refill due: 2022    Per [employer] Portal: same as above    Last Rx sent in on 2021 for 90ds with 3 refills    Per Reconciled Dispense Report as of 07/15/2022:  Filled for 90ds on 2021, 10/30/2022, 01/15/2022 - prescription now     BP Readings from Last 3 Encounters:   22 124/84   22 130/86   22 (!) 148/82     Lab Results   Component Value Date    CREATININE 0.6 02/15/2022     Estimated Creatinine Clearance: 61 mL/min (based on SCr of 0.6 mg/dL). [id number] Records claims through 2022 (LUCILLE Ralph = Filled only once; Potential Fail Date: 2022 ):   Atorvastatin 40mg daily last filled on 02/15/2022 for 90 day supply. Next refill due: 2022    Per [employer] Portal: same as above    Last Rx sent in on 02/15/2022 for 90ds with 1 refill, which should be on file    Per Pharmacy:   Last mailed on 2022 for 90 day supply. 1 refill remaining.  [facility] Value Date    CHOL 121 2021 TRIG 65 2021    HDL 39 (L) 2021    LDLCALC 69 2021     ALT   Date Value Ref Range Status   02/15/2022 19 10 - 40 U/L Final     AST   Date Value Ref Range Status   02/15/2022 23 15 - 37 U/L Final     The ASCVD Risk score (Pj Church, et al., 2013) failed to calculate for the following reasons: The 2013 ASCVD risk score is only valid for ages 36 to 78     PLAN  The following are interventions that have been identified:   - Patient overdue refilling atorvastatin and active on home medication list.   - Patient needs refills for lisinopril    Per [facility] MyScript via [employer], patient should have $0 copay for statin refill. Verified with this is price. Appears patient has consisently been filling medication in previous years and do not see any comments patient is no longer taking. Dose was decreased to 40mg daily and patient was using up 80mg tabs by cutting in half but would anticipate patient would still need refill. Pharmacy to refill medication with last refill on file. Will send refill request to PCP for atorvastatin and lisinopril.      Future Appointments   Date Time Provider Parviz Rubin   2022  9:00 AM Mike Alvarado DO [address], PharmD, [facility], [address] [place] [address], toll free: [phone], option 1    =======================================================    For Pharmacy Admin Tracking Only  Recommendation Provided To: Provider: 2 via Note to Provider  Intervention Detail: Adherence Monitorin and Refill(s) Provided  Gap Closed?: Yes   Intervention Accepted By: Provider: 2  Time Spent (min): 20

## 2022-07-18 RX ORDER — ATORVASTATIN CALCIUM 40 MG/1
40 TABLET, FILM COATED ORAL NIGHTLY
Qty: 90 TABLET | Refills: 1 | Status: SHIPPED | OUTPATIENT
Start: 2022-07-18

## 2022-07-18 RX ORDER — LISINOPRIL 2.5 MG/1
TABLET ORAL
Qty: 90 TABLET | Refills: 3 | Status: SHIPPED | OUTPATIENT
Start: 2022-07-18

## 2022-07-22 ENCOUNTER — HOSPITAL ENCOUNTER (OUTPATIENT)
Age: 87
Setting detail: OBSERVATION
Discharge: NURSING, REHAB OR LONG-TERM CARE | End: 2022-07-27
Attending: INTERNAL MEDICINE | Admitting: INTERNAL MEDICINE
Payer: MEDICARE

## 2022-07-22 ENCOUNTER — APPOINTMENT (OUTPATIENT)
Dept: GENERAL RADIOLOGY | Age: 87
End: 2022-07-22
Payer: MEDICARE

## 2022-07-22 DIAGNOSIS — M19.021 ARTHRITIS OF RIGHT ELBOW: ICD-10-CM

## 2022-07-22 DIAGNOSIS — M25.521 RIGHT ELBOW PAIN: Primary | ICD-10-CM

## 2022-07-22 DIAGNOSIS — M25.521 ARTHRALGIA OF RIGHT ELBOW: ICD-10-CM

## 2022-07-22 PROBLEM — E77.8 HYPOPROTEINEMIA (HCC): Chronic | Status: RESOLVED | Noted: 2019-07-27 | Resolved: 2022-07-22

## 2022-07-22 PROBLEM — Z78.9 UNABLE TO CARE FOR SELF: Status: ACTIVE | Noted: 2022-07-22

## 2022-07-22 PROBLEM — R26.2 UNABLE TO AMBULATE: Status: ACTIVE | Noted: 2022-07-22

## 2022-07-22 LAB
ANION GAP SERPL CALCULATED.3IONS-SCNC: 9 MMOL/L (ref 3–16)
BASOPHILS ABSOLUTE: 0 K/UL (ref 0–0.2)
BASOPHILS RELATIVE PERCENT: 0.1 %
BUN BLDV-MCNC: 17 MG/DL (ref 7–20)
CALCIUM SERPL-MCNC: 9 MG/DL (ref 8.3–10.6)
CHLORIDE BLD-SCNC: 104 MMOL/L (ref 99–110)
CO2: 27 MMOL/L (ref 21–32)
CREAT SERPL-MCNC: <0.5 MG/DL (ref 0.6–1.2)
EOSINOPHILS ABSOLUTE: 0 K/UL (ref 0–0.6)
EOSINOPHILS RELATIVE PERCENT: 0 %
GFR AFRICAN AMERICAN: >60
GFR NON-AFRICAN AMERICAN: >60
GLUCOSE BLD-MCNC: 121 MG/DL (ref 70–99)
HCT VFR BLD CALC: 41 % (ref 36–48)
HEMOGLOBIN: 13.3 G/DL (ref 12–16)
LYMPHOCYTES ABSOLUTE: 0.4 K/UL (ref 1–5.1)
LYMPHOCYTES RELATIVE PERCENT: 3.6 %
MCH RBC QN AUTO: 31.1 PG (ref 26–34)
MCHC RBC AUTO-ENTMCNC: 32.4 G/DL (ref 31–36)
MCV RBC AUTO: 95.9 FL (ref 80–100)
MONOCYTES ABSOLUTE: 1.3 K/UL (ref 0–1.3)
MONOCYTES RELATIVE PERCENT: 11.1 %
NEUTROPHILS ABSOLUTE: 9.9 K/UL (ref 1.7–7.7)
NEUTROPHILS RELATIVE PERCENT: 85.2 %
PDW BLD-RTO: 13.3 % (ref 12.4–15.4)
PLATELET # BLD: 198 K/UL (ref 135–450)
PMV BLD AUTO: 8 FL (ref 5–10.5)
POTASSIUM SERPL-SCNC: 4 MMOL/L (ref 3.5–5.1)
RBC # BLD: 4.27 M/UL (ref 4–5.2)
SODIUM BLD-SCNC: 140 MMOL/L (ref 136–145)
WBC # BLD: 11.6 K/UL (ref 4–11)

## 2022-07-22 PROCEDURE — 99285 EMERGENCY DEPT VISIT HI MDM: CPT

## 2022-07-22 PROCEDURE — 97530 THERAPEUTIC ACTIVITIES: CPT

## 2022-07-22 PROCEDURE — 6360000002 HC RX W HCPCS: Performed by: INTERNAL MEDICINE

## 2022-07-22 PROCEDURE — G0378 HOSPITAL OBSERVATION PER HR: HCPCS

## 2022-07-22 PROCEDURE — 96374 THER/PROPH/DIAG INJ IV PUSH: CPT

## 2022-07-22 PROCEDURE — 80048 BASIC METABOLIC PNL TOTAL CA: CPT

## 2022-07-22 PROCEDURE — 85025 COMPLETE CBC W/AUTO DIFF WBC: CPT

## 2022-07-22 PROCEDURE — 97162 PT EVAL MOD COMPLEX 30 MIN: CPT

## 2022-07-22 PROCEDURE — 73080 X-RAY EXAM OF ELBOW: CPT

## 2022-07-22 PROCEDURE — 6360000002 HC RX W HCPCS: Performed by: PHYSICIAN ASSISTANT

## 2022-07-22 PROCEDURE — 96372 THER/PROPH/DIAG INJ SC/IM: CPT

## 2022-07-22 PROCEDURE — 6370000000 HC RX 637 (ALT 250 FOR IP): Performed by: INTERNAL MEDICINE

## 2022-07-22 PROCEDURE — 6370000000 HC RX 637 (ALT 250 FOR IP): Performed by: PHYSICIAN ASSISTANT

## 2022-07-22 RX ORDER — LIDOCAINE 50 MG/G
1 PATCH TOPICAL DAILY
Qty: 30 PATCH | Refills: 0 | Status: SHIPPED | OUTPATIENT
Start: 2022-07-22 | End: 2022-07-22

## 2022-07-22 RX ORDER — PREDNISONE 10 MG/1
10 TABLET ORAL 2 TIMES DAILY
Status: COMPLETED | OUTPATIENT
Start: 2022-07-22 | End: 2022-07-25

## 2022-07-22 RX ORDER — OXYCODONE HYDROCHLORIDE AND ACETAMINOPHEN 5; 325 MG/1; MG/1
1 TABLET ORAL EVERY 4 HOURS PRN
Status: DISCONTINUED | OUTPATIENT
Start: 2022-07-22 | End: 2022-07-27 | Stop reason: HOSPADM

## 2022-07-22 RX ORDER — FENTANYL CITRATE 50 UG/ML
25 INJECTION, SOLUTION INTRAMUSCULAR; INTRAVENOUS ONCE
Status: COMPLETED | OUTPATIENT
Start: 2022-07-22 | End: 2022-07-22

## 2022-07-22 RX ORDER — ACETAMINOPHEN 500 MG
500 TABLET ORAL DAILY
Status: DISCONTINUED | OUTPATIENT
Start: 2022-07-22 | End: 2022-07-27 | Stop reason: HOSPADM

## 2022-07-22 RX ORDER — ENOXAPARIN SODIUM 100 MG/ML
40 INJECTION SUBCUTANEOUS NIGHTLY
Status: DISCONTINUED | OUTPATIENT
Start: 2022-07-22 | End: 2022-07-27 | Stop reason: HOSPADM

## 2022-07-22 RX ORDER — ATORVASTATIN CALCIUM 20 MG/1
20 TABLET, FILM COATED ORAL NIGHTLY
Status: DISCONTINUED | OUTPATIENT
Start: 2022-07-22 | End: 2022-07-27 | Stop reason: HOSPADM

## 2022-07-22 RX ORDER — IPRATROPIUM BROMIDE 42 UG/1
2 SPRAY, METERED NASAL 4 TIMES DAILY
Status: DISCONTINUED | OUTPATIENT
Start: 2022-07-22 | End: 2022-07-22

## 2022-07-22 RX ORDER — OXYCODONE HYDROCHLORIDE AND ACETAMINOPHEN 5; 325 MG/1; MG/1
1 TABLET ORAL ONCE
Status: COMPLETED | OUTPATIENT
Start: 2022-07-22 | End: 2022-07-22

## 2022-07-22 RX ORDER — AMMONIUM LACTATE 12 G/100G
CREAM TOPICAL PRN
Status: DISCONTINUED | OUTPATIENT
Start: 2022-07-22 | End: 2022-07-27 | Stop reason: HOSPADM

## 2022-07-22 RX ORDER — LISINOPRIL 5 MG/1
2.5 TABLET ORAL DAILY
Status: DISCONTINUED | OUTPATIENT
Start: 2022-07-22 | End: 2022-07-27 | Stop reason: HOSPADM

## 2022-07-22 RX ORDER — POTASSIUM CHLORIDE 750 MG/1
10 TABLET, FILM COATED, EXTENDED RELEASE ORAL DAILY
Status: DISCONTINUED | OUTPATIENT
Start: 2022-07-22 | End: 2022-07-27 | Stop reason: HOSPADM

## 2022-07-22 RX ORDER — HYDROCODONE BITARTRATE AND ACETAMINOPHEN 5; 325 MG/1; MG/1
1 TABLET ORAL EVERY 4 HOURS PRN
Qty: 10 TABLET | Refills: 0 | Status: SHIPPED | OUTPATIENT
Start: 2022-07-22 | End: 2022-07-22

## 2022-07-22 RX ORDER — BUPROPION HYDROCHLORIDE 150 MG/1
150 TABLET, EXTENDED RELEASE ORAL 2 TIMES DAILY
Status: DISCONTINUED | OUTPATIENT
Start: 2022-07-22 | End: 2022-07-27 | Stop reason: HOSPADM

## 2022-07-22 RX ORDER — LEVOTHYROXINE SODIUM 0.1 MG/1
100 TABLET ORAL DAILY
Status: DISCONTINUED | OUTPATIENT
Start: 2022-07-22 | End: 2022-07-27 | Stop reason: HOSPADM

## 2022-07-22 RX ORDER — LIDOCAINE 4 G/G
1 PATCH TOPICAL ONCE
Status: COMPLETED | OUTPATIENT
Start: 2022-07-22 | End: 2022-07-22

## 2022-07-22 RX ORDER — M-VIT,TX,IRON,MINS/CALC/FOLIC 27MG-0.4MG
1 TABLET ORAL DAILY
Status: DISCONTINUED | OUTPATIENT
Start: 2022-07-22 | End: 2022-07-27 | Stop reason: HOSPADM

## 2022-07-22 RX ORDER — VITAMIN B COMPLEX
2000 TABLET ORAL DAILY
Status: DISCONTINUED | OUTPATIENT
Start: 2022-07-22 | End: 2022-07-27 | Stop reason: HOSPADM

## 2022-07-22 RX ORDER — LANOLIN ALCOHOL/MO/W.PET/CERES
400 CREAM (GRAM) TOPICAL DAILY
Status: DISCONTINUED | OUTPATIENT
Start: 2022-07-22 | End: 2022-07-27 | Stop reason: HOSPADM

## 2022-07-22 RX ORDER — LANOLIN ALCOHOL/MO/W.PET/CERES
3 CREAM (GRAM) TOPICAL NIGHTLY
Status: DISCONTINUED | OUTPATIENT
Start: 2022-07-22 | End: 2022-07-27 | Stop reason: HOSPADM

## 2022-07-22 RX ORDER — ASPIRIN 81 MG/1
81 TABLET ORAL DAILY
Status: DISCONTINUED | OUTPATIENT
Start: 2022-07-22 | End: 2022-07-27 | Stop reason: HOSPADM

## 2022-07-22 RX ORDER — LANOLIN ALCOHOL/MO/W.PET/CERES
500 CREAM (GRAM) TOPICAL NIGHTLY
Status: DISCONTINUED | OUTPATIENT
Start: 2022-07-22 | End: 2022-07-27 | Stop reason: HOSPADM

## 2022-07-22 RX ADMIN — BUPROPION HYDROCHLORIDE 150 MG: 150 TABLET, EXTENDED RELEASE ORAL at 21:40

## 2022-07-22 RX ADMIN — Medication 1 TABLET: at 17:56

## 2022-07-22 RX ADMIN — ACETAMINOPHEN 500 MG: 500 TABLET ORAL at 18:03

## 2022-07-22 RX ADMIN — FENTANYL CITRATE 25 MCG: 50 INJECTION, SOLUTION INTRAMUSCULAR; INTRAVENOUS at 10:07

## 2022-07-22 RX ADMIN — MELATONIN TAB 3 MG 3 MG: 3 TAB at 21:40

## 2022-07-22 RX ADMIN — LISINOPRIL 2.5 MG: 5 TABLET ORAL at 17:55

## 2022-07-22 RX ADMIN — NIACIN 500 MG: 500 TABLET, EXTENDED RELEASE ORAL at 21:41

## 2022-07-22 RX ADMIN — PREDNISONE 10 MG: 10 TABLET ORAL at 21:40

## 2022-07-22 RX ADMIN — LEVOTHYROXINE SODIUM 100 MCG: 0.1 TABLET ORAL at 17:56

## 2022-07-22 RX ADMIN — POTASSIUM CHLORIDE 10 MEQ: 750 TABLET, FILM COATED, EXTENDED RELEASE ORAL at 17:55

## 2022-07-22 RX ADMIN — ASPIRIN 81 MG: 81 TABLET, COATED ORAL at 17:56

## 2022-07-22 RX ADMIN — ENOXAPARIN SODIUM 40 MG: 100 INJECTION SUBCUTANEOUS at 21:40

## 2022-07-22 RX ADMIN — ATORVASTATIN CALCIUM 20 MG: 20 TABLET, FILM COATED ORAL at 21:40

## 2022-07-22 RX ADMIN — OXYCODONE AND ACETAMINOPHEN 1 TABLET: 5; 325 TABLET ORAL at 17:55

## 2022-07-22 RX ADMIN — Medication 400 MG: at 17:56

## 2022-07-22 RX ADMIN — Medication 2000 UNITS: at 17:56

## 2022-07-22 RX ADMIN — OXYCODONE AND ACETAMINOPHEN 1 TABLET: 5; 325 TABLET ORAL at 08:51

## 2022-07-22 ASSESSMENT — PAIN DESCRIPTION - ORIENTATION
ORIENTATION: RIGHT

## 2022-07-22 ASSESSMENT — PAIN DESCRIPTION - PAIN TYPE
TYPE: ACUTE PAIN

## 2022-07-22 ASSESSMENT — PAIN SCALES - GENERAL
PAINLEVEL_OUTOF10: 10
PAINLEVEL_OUTOF10: 5

## 2022-07-22 ASSESSMENT — ENCOUNTER SYMPTOMS
STRIDOR: 0
BACK PAIN: 0
COUGH: 0
NAUSEA: 0
WHEEZING: 0
COLOR CHANGE: 0
SHORTNESS OF BREATH: 0
ABDOMINAL PAIN: 0
VOMITING: 0

## 2022-07-22 ASSESSMENT — PAIN DESCRIPTION - LOCATION
LOCATION: ELBOW
LOCATION: ELBOW
LOCATION: ARM
LOCATION: ELBOW
LOCATION: ARM

## 2022-07-22 ASSESSMENT — PAIN DESCRIPTION - DESCRIPTORS
DESCRIPTORS: ACHING
DESCRIPTORS: ACHING;DULL

## 2022-07-22 ASSESSMENT — PAIN DESCRIPTION - FREQUENCY
FREQUENCY: CONTINUOUS

## 2022-07-22 ASSESSMENT — PAIN DESCRIPTION - ONSET: ONSET: ON-GOING

## 2022-07-22 ASSESSMENT — PAIN - FUNCTIONAL ASSESSMENT: PAIN_FUNCTIONAL_ASSESSMENT: PREVENTS OR INTERFERES WITH MANY ACTIVE NOT PASSIVE ACTIVITIES

## 2022-07-22 NOTE — PLAN OF CARE
Patient will be free from pain during stay. Patient educated on pain scale. Patient verbalized understanding

## 2022-07-22 NOTE — ED PROVIDER NOTES
[facility]        Pt Name: Jacy Marks  MRN: [id number]  [place] 9/14/1933  Date of evaluation: 7/22/2022  Provider: Kristan Walker PA-C  PCP: Vinicio Alston DO  Note Started: 8:15 AM EDT       ALICIA. I have evaluated this patient. My supervising physician was available for consultation. CHIEF COMPLAINT       Chief Complaint   Patient presents with    Arm Pain     R shoulder/arm pain since last night. Pt denies falls or injury to the shoulder. Pt in a sling placed by ems. From home via [address]. HISTORY OF PRESENT ILLNESS   (Location, Timing/Onset, Context/Setting, Quality, Duration, Modifying Factors, Severity, Associated Signs and Symptoms)  Note limiting factors. Chief Complaint: Right elbow pain    Jacy Marks is a 80 y.o. female who presents to the emergency department complaining of right elbow pain since yesterday evening. She denies any preceding injury. Pain has progressively worsened this morning. It is worse with range of motion and palpation. Denies any redness, fever, chills, numbness, tingling or weakness. She does live at home alone but does have home health care come to the house twice a week. Son is at bedside and states that she is getting around the house just fine. Paramedics placed her in a sling prior to arrival.    Nursing Notes were all reviewed and agreed with or any disagreements were addressed in the HPI. REVIEW OF SYSTEMS    (2-9 systems for level 4, 10 or more for level 5)     Review of Systems   Constitutional:  Negative for chills and fever. Eyes:  Negative for visual disturbance. Respiratory:  Negative for cough, shortness of breath, wheezing and stridor. Cardiovascular:  Negative for chest pain, palpitations and leg swelling. Gastrointestinal:  Negative for abdominal pain, nausea and vomiting. Musculoskeletal:  Positive for myalgias.  Negative for back pain, neck pain and neck stiffness. Skin:  Negative for color change, rash and wound. Neurological:  Negative for dizziness, tremors, seizures, syncope, facial asymmetry, speech difficulty, weakness, light-headedness, numbness and headaches. Psychiatric/Behavioral:  Negative for confusion. All other systems reviewed and are negative. Positives and Pertinent negatives as per HPI. Except as noted above in the ROS, all other systems were reviewed and negative. 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    Hypothyroidism 2011    Intervertebral lumbar disc disorder with myelopathy, lumbar region     OA (osteoarthritis)     severe in knees, left ankle, left >right hip    Primary osteoarthritis of right wrist     Severe arthritic change within carpal - 1st metacarpal joint, Extensive chondrocalcinosis throughout wrist    Pure hypercholesterolemia     Seborrheic dermatitis 2014    Subdural hematoma, post-traumatic ([address] 2016    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   Abhishek Castro MD. [facility]    JOINT REPLACEMENT  2004    right knee and left knee in 2007    MOUTH SURGERY  07/08/2015    verrous hyperplasia left ventral tongue, WLE [id number] [facility], Dr Anisa Anderson  07/01/1970         [address]       Current Discharge Medication List        CONTINUE these medications which have NOT CHANGED    Details   atorvastatin (LIPITOR) 40 MG tablet Take 1 tablet by mouth nightly  Qty: 90 tablet, Refills: 1    Associated Diagnoses: Pure hypercholesterolemia      lisinopril (PRINIVIL;ZESTRIL) 2.5 MG tablet TAKE 1 TABLET BY MOUTH  DAILY  Qty: 90 tablet, Refills: 3    Comments: Requesting 1 year supply  Associated Diagnoses: Chronic kidney disease, stage III (moderate) (HCC); HTN (hypertension), benign      GLUCOSAMINE-CHONDROITIN-MSM PO Take 1 tablet by mouth daily Indications: Joint Damage causing Pain and Loss of Function      levothyroxine (SYNTHROID) 50 MCG tablet TAKE 1 TABLET BY MOUTH  DAILY ON EMPTY STOMACH FOR  THYROID  Qty: 90 tablet, Refills: 0    Comments: Requesting 1 year supply  Associated Diagnoses: Acquired hypothyroidism      buPROPion (WELLBUTRIN SR) 150 MG extended release tablet TAKE 1 TABLET BY MOUTH  TWICE DAILY  Qty: 180 tablet, Refills: 0    Comments: Requesting 1 year supply  Associated Diagnoses: Recurrent major depressive disorder, in partial remission (HCC)      potassium chloride (KLOR-CON M) 10 MEQ extended release tablet TAKE 1 TABLET BY MOUTH  DAILY  Qty: 90 tablet, Refills: 3    Comments: Requesting 1 year supply  Associated Diagnoses: Hypokalemia      niacin (SLO-NIACIN) 500 MG extended release tablet Take 500 mg by mouth nightly      ipratropium (ATROVENT) 0.06 % nasal spray 2 sprays by Nasal route 4 times daily  Qty: 1 Bottle, Refills: 3      aspirin 81 MG tablet Take 81 mg by mouth daily      Cholecalciferol (VITAMIN D3) 2000 UNITS CAPS One daily for Vit D supplement  Qty: 30 capsule      melatonin 5 MG TABS tablet Take 3 mg by mouth nightly One at bedtime for sleep assist       ammonium lactate (AMLACTIN) 12 % cream Apply topically as needed Apply topically as needed. magnesium chloride (MAG DELAY) 535 (64 MG) MG TBCR CR tablet Take 64 mg by mouth 2 times daily       acetaminophen (TYLENOL) 500 MG tablet Take 500 mg by mouth daily. Multiple Vitamin (MULTIVITAMIN PO) Take 1 tablet by mouth daily. ALLERGIES     Patient has no known allergies. FAMILYHISTORY       Family History   Problem Relation Age of Onset    Cancer Mother         bone    Heart Disease Father         CAD    Diabetes Father     Diabetes Sister     Cancer Sister 54        leukemia    Arrhythmia Brother         A fib    Stroke Brother 80        ? Other Brother         THR after fx.  bilateral pneumonia, better    Other Son         Spinal fusion with rods/screws    No Known Problems Daughter     High Blood Pressure Daughter     High Blood Pressure Son           SOCIAL HISTORY       Social History     Tobacco Use    Smoking status: Never    Smokeless tobacco: Never    Tobacco comments:     avoid tobacco   Vaping Use    Vaping Use: Never used   Substance Use Topics    Alcohol use: No     Alcohol/week: 0.0 standard drinks    Drug use: No       SCREENINGS             PHYSICAL EXAM    (up to 7 for level 4, 8 or more for level 5)     ED Triage Vitals [07/22/22 0739]   BP Temp Temp Source Heart Rate Resp SpO2 Height Weight   (!) 143/74 98.5 °F (36.9 °C) Oral 72 16 97 % 4' 11\" (1.499 m) 170 lb (77.1 kg)       Physical Exam  Vitals and nursing note reviewed. Constitutional:       Appearance: Normal appearance. She is well-developed. She is not toxic-appearing or diaphoretic. HENT:      Head: Normocephalic and atraumatic. Right Ear: External ear normal.      Left Ear: External ear normal.      Nose: Nose normal.      Mouth/Throat:      Mouth: Mucous membranes are moist.      Pharynx: Oropharynx is clear. Eyes:      General: No scleral icterus.         Right eye: No discharge. Left eye: No discharge. Extraocular Movements: Extraocular movements intact. Conjunctiva/sclera: Conjunctivae normal.      Pupils: Pupils are equal, round, and reactive to light. Cardiovascular:      Rate and Rhythm: Normal rate. Pulses:           Radial pulses are 2+ on the right side and 2+ on the left side. Pulmonary:      Effort: Pulmonary effort is normal.      Breath sounds: Normal breath sounds. Abdominal:      General: Bowel sounds are normal.      Palpations: Abdomen is soft. Tenderness: There is no abdominal tenderness. Musculoskeletal:      Right shoulder: Normal.      Left shoulder: Normal.      Right upper arm: Normal.      Left upper arm: Normal.      Right elbow: Swelling present. No deformity, effusion or lacerations. Decreased range of motion. Tenderness present. Left elbow: Normal.      Right forearm: Tenderness (proximal aspect with movement) present. No swelling, edema, deformity, lacerations or bony tenderness. Left forearm: Normal.      Right wrist: Normal.      Left wrist: Normal.      Right hand: Normal.      Left hand: Normal.      Cervical back: Normal range of motion. Comments: No extremity edema, palpable cord, discoloration, redness, red streaking, rash, wound, poikilothermia, pallor, paresthesia, pain out of proportion to physical findings, clicking or laxity. Skin:     General: Skin is warm and dry. Capillary Refill: Capillary refill takes less than 2 seconds. Coloration: Skin is not jaundiced or pale. Findings: No bruising, erythema, lesion or rash. Neurological:      General: No focal deficit present. Mental Status: She is alert and oriented to person, place, and time. Cranial Nerves: No cranial nerve deficit (II-XII intact). Sensory: No sensory deficit. Motor: No weakness.       Deep Tendon Reflexes: Reflexes normal.   Psychiatric:         Behavior: Behavior normal. DIAGNOSTIC RESULTS   LABS:    Labs Reviewed   CBC WITH AUTO DIFFERENTIAL   BASIC METABOLIC PANEL       When ordered only abnormal lab results are displayed. All other labs were within normal range or not returned as of this dictation. EKG: When ordered, EKG's are interpreted by the Emergency Department Physician in the absence of a cardiologist.  Please see their note for interpretation of EKG. RADIOLOGY:   Non-plain film images such as CT, Ultrasound and MRI are read by the radiologist. Plain radiographic images are visualized and preliminarily interpreted by the ED Provider with the below findings:        Interpretation per the Radiologist below, if available at the time of this note:    XR ELBOW RIGHT (MIN 3 VIEWS)   Final Result   Severe osteopenia and moderate to severe arthrosis right elbow. No acute   osseous injury. PROCEDURES   Unless otherwise noted below, none     Procedures    CRITICAL CARE TIME   N/a    CONSULTS:  Carline AVILES(1723)    EMERGENCY DEPARTMENT COURSE and DIFFERENTIAL DIAGNOSIS/MDM:   Vitals:    Vitals:    07/22/22 0849 07/22/22 0904 07/22/22 0919 07/22/22 0930   BP: (!) 122/54 127/86 138/82 (!) 146/82   Pulse:       Resp:       Temp:       TempSrc:       SpO2: 97% 96% 93% 95%   Weight:       Height:           Patient was given the following medications:  Medications   lidocaine 4 % external patch 1 patch (1 patch TransDERmal Patch Applied 7/22/22 0852)   fentaNYL (SUBLIMAZE) injection 25 mcg (has no administration in time range)   oxyCODONE-acetaminophen (PERCOCET) 5-325 MG per tablet 1 tablet (1 tablet Oral Given 7/22/22 0851)         Is this patient to be included in the SEP-1 Core Measure due to severe sepsis or septic shock? No   Exclusion criteria - the patient is NOT to be included for SEP-1 Core Measure due to:   Infection is not suspected    This patient presents to the emergency department complaining of right elbow pain without any preceding injury. She is limiting range of motion secondary to pain however motor and sensory function are preserved. X-ray shows severe osteopenia and arthritis. She has no redness or red streaking. No IV drug use, fever or chills. She is right-hand dominant. Son at bedside is concerned about her daily activities at home. She states that she is unable to prepare meals or use the restroom because of this severe pain. After lengthy discussion with patient and family member, they would prefer that this patient stay in the hospital for rehabilitation and possible placement. Dr. Julee Duke has agreed to admit. My suspicion is low for subungual hematoma, paronychia, eponychia, felon, flexor tenosynovitis, ACS, PE, thoracic aortic dissection, thoracic outlet obstruction, SVC syndrome, foreign body, tendon rupture, compartment syndrome, acute fracture, dislocation, DVT, arterial compromise or occlusion, limb ischemia, gout, septic joint, abscess, cellulitis, osteomyelitis, gonococcal arthritis,avascular necrosis, or other concerning pathology. FINAL IMPRESSION      1. Right elbow pain    2. Arthritis of right elbow          DISPOSITION/PLAN   DISPOSITION Decision To Admit 07/22/2022 09:18:54 AM      PATIENT REFERRED TO:  No follow-up provider specified.     DISCHARGE MEDICATIONS:  Current Discharge Medication List          DISCONTINUED MEDICATIONS:  Current Discharge Medication List                 (Please note that portions of this note were completed with a voice recognition program.  Efforts were made to edit the dictations but occasionally words are mis-transcribed.)    Jonathan Garrido PA-C (electronically signed)            Jonathan Garrido PA-C  07/22/22 5317

## 2022-07-22 NOTE — PROGRESS NOTES
[facility]   Phone: [phone]    Physical Therapy    [x] Initial Evaluation            [] Daily Treatment Note         [] Discharge Summary      Patient: Rossi Sargent   : 1933   MRN: [id number]   Date of Service:  2022  Admitting Diagnosis: Unable to care for self    Current Admission Summary: Rossi Sargent is a 80 y.o. female who presents to the emergency department complaining of right elbow pain since yesterday evening. She denies any preceding injury. Pain has progressively worsened this morning. It is worse with range of motion and palpation. Denies any redness, fever, chills, numbness, tingling or weakness. She does live at home alone but does have home health care come to the house twice a week. Son is at bedside and states that she is getting around the house just fine. Paramedics placed her in a sling prior to arrival.  Xrays negative for acute fracture, show severe OA right elbow. Past Medical History:  has a past medical history of Breast cancer ([address] Cellulitis of left leg, Cellulitis of wrist, Chronic kidney disease, stage III (moderate) ([address] Depression, Diverticulitis of colon, DVT (deep venous thrombosis) ([address] Gout, Hematoma of left thigh, Herpes zoster, Hypertension, Hypoproteinemia ([address] Hypothyroidism, Intervertebral lumbar disc disorder with myelopathy, lumbar region, OA (osteoarthritis), Primary osteoarthritis of right wrist, Pure hypercholesterolemia, Seborrheic dermatitis, Subdural hematoma, post-traumatic ([address] and Tongue dysplasia. Past Surgical History:  has a past surgical history that includes Colon surgery (1970); Breast surgery (); joint replacement (); Mouth surgery (2015); Incisional hernia repair (2016);  section (1970); and Revision Colostomy (1970).     Discharge Recommendations: Rossi Sessions scored a 1124 on the AM-PAC short mobility form. Current research shows that an AM-PAC score of 17 or less is typically not associated with a discharge to the patient's home setting. Based on the patient's AM-PAC score and their current functional mobility deficits, it is recommended that the patient have 3-5 sessions per week of Physical Therapy at d/c to increase the patient's independence. Please see assessment section for further patient specific details. If patient discharges prior to next session this note will serve as a discharge summary. Please see below for the latest assessment towards goals. DME Required For Discharge: DME to be determined pending patient progress  Precautions/Restrictions: high fall risk  Weight Bearing Restrictions: no restrictions  [] Right Upper Extremity  [] Left Upper Extremity [] Right Lower Extremity  [] Left Lower Extremity     Required Braces/Orthotics: no braces required   [] Right  [] Left  Positional Restrictions:no positional restrictions    Pre-Admission Information   Lives With: alone    Type of Home: house  Home Layout: two level, able to live on main level, . Comment: Patient has stairs to basement and 2nd level but does not ever go up or down. Home Access: ramped entry  Bathroom Layout: tub/shower unit  Toilet Height: standard height  Bathroom Equipment: shower chair  Home Equipment: rolling walker  Transfer Assistance: modified independent with use of RW  Ambulation Assistance:modified independent with use of RW  ADL Assistance: independent with all ADL's  IADL Assistance: requires assistance with laundry  Active :        [x] Yes  [] No  Hand Dominance: [x] Left  [x] Right  (Patient is ambidextrous.)  Current Employment: retired. Hobbies: None shared. Recent Falls: Patient reports one fall about one month ago.     Examination   Vision:   Vision Gross Assessment: WFL  Hearing:   WFL  Observation:   General Observation:  Patient supine in bed w/ HOB elevated, RUE maintained in approximately 30 degrees of flexion. Posture:   WFL  Sensation:   WFL  Proprioception:    WFL  Tone:   Normotonic  Coordination Testing:   Unable to formally test secondary to patient's inability to move RUE secondary to pain with movement. ROM:   LUE and BLEs WFL except for limited left ankle PF to approximately 25 degrees. RUE hand/wrist WFL, patient only able to flex elbow to 90 degrees to and extend elbow to 20 degrees short of full extension. Strength:   LUE/BLE  WFL. RUE grossly 2/5 d/t pain. Decision Making: medium complexity  Clinical Presentation: evolving      Subjective  General: Patient reports being independent at home with walker. She denies any current symptoms other than right elbow pain. She denies any history of injury to right elbow. She mentions she has a history of gout, last flare up was 10 years ago, none since. She has been weaning down off gout medication and MD recently, approximately a few weeks ago, took her off gout medication entirely. Pain: 0/10 and 10/10. Location: No pain at rest, 10/10 w/ movement. Pain Interventions: RN notified, ice applied, and heat applied. Initially attempted ice, patient reports heat feel better. Moist heat pack applied. Patient instructed to remove as needed. Functional Mobility  Bed Mobility  Rolling Left: minimal assistance  Rolling Right: minimal assistance  Comments:  Increased pain with rolling, unable to progress mobility secondary to increased pain. Transfers  No transfers completed on this date. Comments:  Ambulation  Ambulation not tested on this date. Distance: N/A  Gait Mechanics: N/A  Comments:    Stair Mobility  Stair mobility not completed on this date. Comments:  Wheelchair Mobility:  No w/c mobility completed on this date. Comments:  Balance  Pt unable to tolerate sitting/standing position during for rating of balance component.    Comments:    Other Therapeutic Interventions    Functional Outcomes  AM-PAC Inpatient Mobility Raw Score : 11              Cognition  WFL  Orientation:    alert and oriented x 4  Command Following:   [facility]    Education  Barriers To Learning: none  Patient Education: patient educated on goals, PT role and benefits, plan of care, functional mobility training, disease specific education, transfer training  Learning Assessment:  patient verbalizes and demonstrates understanding    Assessment  Activity Tolerance: Limited by pain. Impairments Requiring Therapeutic Intervention: decreased functional mobility, decreased ROM, decreased strength, decreased balance, increased pain  Prognosis: good  Clinical Assessment:  Patient presents with idiopathic right elbow pain. She denies any injury but does mention a history of gout and was recently taken off gout medication as she hadn't had a gout flare up in 10 years. She is unable to flex elbow past 90 degrees or extend elbow beyond -20 degrees of full extension. Wrist and hand movement is preserved. TTP generally about elbow but no specific palpation causes significant increase in pain. Wrist MMT did not elicit increase in elbow pain. PROM increased pain significantly. Patient's current symptoms are highly suspicious for gout given history and recent cessation of medication as well lack of injury. Patient declined out of bed activity secondary to pain. Anticipate significant improvement, as elbow pain improves so should functional mobility. Will continue to follow. Safety Interventions: patient left in bed, bed alarm in place, call light within reach, patient at risk for falls, and nurse notified    Plan  Frequency: 5-7 x/week  Current Treatment Recommendations: strengthening, ROM, functional mobility training, transfer training, gait training, modalities, patient/caregiver education, pain management, home exercise program, and equipment evaluation/education    Goals  Patient Goals: Decrease pain, move elbow pain free.    Short Term Goals:  Time Frame: Discharge.   Patient will complete bed mobility at modified independent   Patient will complete transfers at [facility]   Patient will ambulate 50 ft with use of LRAD at [facility]    Therapy Session Time      Individual Group Co-treatment   Time In 1620       Time Out 1716       Minutes 56         Timed Code Treatment Minutes:  41 Minutes  Total Treatment Minutes:  56 minutes       Electronically Signed By: Jermaine Zarco PT, DPT, ATC-R [id number]

## 2022-07-23 LAB
ANTI-NUCLEAR ANTIBODY (ANA): POSITIVE
C-REACTIVE PROTEIN: 196.8 MG/L (ref 0–5.1)
RHEUMATOID FACTOR: 15 IU/ML
SEDIMENTATION RATE, ERYTHROCYTE: 23 MM/HR (ref 0–30)
URIC ACID, SERUM: 3.5 MG/DL (ref 2.6–6)

## 2022-07-23 PROCEDURE — 97530 THERAPEUTIC ACTIVITIES: CPT

## 2022-07-23 PROCEDURE — 97165 OT EVAL LOW COMPLEX 30 MIN: CPT

## 2022-07-23 PROCEDURE — 96372 THER/PROPH/DIAG INJ SC/IM: CPT

## 2022-07-23 PROCEDURE — 86140 C-REACTIVE PROTEIN: CPT

## 2022-07-23 PROCEDURE — 97116 GAIT TRAINING THERAPY: CPT

## 2022-07-23 PROCEDURE — G0378 HOSPITAL OBSERVATION PER HR: HCPCS

## 2022-07-23 PROCEDURE — 84550 ASSAY OF BLOOD/URIC ACID: CPT

## 2022-07-23 PROCEDURE — 87449 NOS EACH ORGANISM AG IA: CPT

## 2022-07-23 PROCEDURE — 36415 COLL VENOUS BLD VENIPUNCTURE: CPT

## 2022-07-23 PROCEDURE — 85652 RBC SED RATE AUTOMATED: CPT

## 2022-07-23 PROCEDURE — 86431 RHEUMATOID FACTOR QUANT: CPT

## 2022-07-23 PROCEDURE — 6370000000 HC RX 637 (ALT 250 FOR IP): Performed by: INTERNAL MEDICINE

## 2022-07-23 PROCEDURE — 6360000002 HC RX W HCPCS: Performed by: INTERNAL MEDICINE

## 2022-07-23 PROCEDURE — 86038 ANTINUCLEAR ANTIBODIES: CPT

## 2022-07-23 PROCEDURE — 97535 SELF CARE MNGMENT TRAINING: CPT

## 2022-07-23 PROCEDURE — 86039 ANTINUCLEAR ANTIBODIES (ANA): CPT

## 2022-07-23 RX ORDER — GUAIFENESIN/DEXTROMETHORPHAN 100-10MG/5
5 SYRUP ORAL EVERY 4 HOURS PRN
Status: DISCONTINUED | OUTPATIENT
Start: 2022-07-23 | End: 2022-07-27 | Stop reason: HOSPADM

## 2022-07-23 RX ADMIN — LEVOTHYROXINE SODIUM 100 MCG: 0.1 TABLET ORAL at 05:28

## 2022-07-23 RX ADMIN — ASPIRIN 81 MG: 81 TABLET, COATED ORAL at 08:30

## 2022-07-23 RX ADMIN — ATORVASTATIN CALCIUM 20 MG: 20 TABLET, FILM COATED ORAL at 20:47

## 2022-07-23 RX ADMIN — ACETAMINOPHEN 500 MG: 500 TABLET ORAL at 08:30

## 2022-07-23 RX ADMIN — LISINOPRIL 2.5 MG: 5 TABLET ORAL at 08:30

## 2022-07-23 RX ADMIN — ENOXAPARIN SODIUM 40 MG: 100 INJECTION SUBCUTANEOUS at 20:47

## 2022-07-23 RX ADMIN — Medication 400 MG: at 08:30

## 2022-07-23 RX ADMIN — PREDNISONE 10 MG: 10 TABLET ORAL at 20:47

## 2022-07-23 RX ADMIN — MELATONIN TAB 3 MG 3 MG: 3 TAB at 20:47

## 2022-07-23 RX ADMIN — POTASSIUM CHLORIDE 10 MEQ: 750 TABLET, FILM COATED, EXTENDED RELEASE ORAL at 08:30

## 2022-07-23 RX ADMIN — NIACIN 500 MG: 500 TABLET, EXTENDED RELEASE ORAL at 20:52

## 2022-07-23 RX ADMIN — Medication 1 TABLET: at 08:30

## 2022-07-23 RX ADMIN — BUPROPION HYDROCHLORIDE 150 MG: 150 TABLET, EXTENDED RELEASE ORAL at 20:47

## 2022-07-23 RX ADMIN — Medication 2000 UNITS: at 08:30

## 2022-07-23 RX ADMIN — BUPROPION HYDROCHLORIDE 150 MG: 150 TABLET, EXTENDED RELEASE ORAL at 08:30

## 2022-07-23 RX ADMIN — GUAIFENESIN AND DEXTROMETHORPHAN 5 ML: 100; 10 SYRUP ORAL at 20:47

## 2022-07-23 RX ADMIN — PREDNISONE 10 MG: 10 TABLET ORAL at 08:30

## 2022-07-23 ASSESSMENT — PAIN SCALES - GENERAL
PAINLEVEL_OUTOF10: 0

## 2022-07-23 NOTE — PROGRESS NOTES
[facility]   Phone: [phone]    Physical Therapy    [] Initial Evaluation            [x] Daily Treatment Note         [] Discharge Summary      Patient: Carmel Lester   : 1933   MRN: [id number]   Date of Service:  2022  Admitting Diagnosis: Unable to care for self    Current Admission Summary: Carmel Lester is a 80 y.o. female who presents to the emergency department complaining of right elbow pain since yesterday evening. She denies any preceding injury. Pain has progressively worsened this morning. It is worse with range of motion and palpation. Denies any redness, fever, chills, numbness, tingling or weakness. She does live at home alone but does have home health care come to the house twice a week. Son is at bedside and states that she is getting around the house just fine. Paramedics placed her in a sling prior to arrival.  Xrays negative for acute fracture, show severe OA right elbow. Past Medical History:  has a past medical history of Breast cancer ([address] Cellulitis of left leg, Cellulitis of wrist, Chronic kidney disease, stage III (moderate) ([address] Depression, Diverticulitis of colon, DVT (deep venous thrombosis) ([address] Gout, Hematoma of left thigh, Herpes zoster, Hypertension, Hypoproteinemia ([address] Hypothyroidism, Intervertebral lumbar disc disorder with myelopathy, lumbar region, OA (osteoarthritis), Primary osteoarthritis of right wrist, Pure hypercholesterolemia, Seborrheic dermatitis, Subdural hematoma, post-traumatic ([address] and Tongue dysplasia. Past Surgical History:  has a past surgical history that includes Colon surgery (1970); Breast surgery (); joint replacement (); Mouth surgery (2015); Incisional hernia repair (2016);  section (1970); and Revision Colostomy (1970).     Discharge Recommendations: Carmel Lester scored a 16/24 on the AM-PAC short mobility form. Current research shows that an AM-PAC score of 17 or less is typically not associated with a discharge to the patient's home setting. Based on the patient's AM-PAC score and their current functional mobility deficits, it is recommended that the patient have 3-5 sessions per week of Physical Therapy at d/c to increase the patient's independence. Please see assessment section for further patient specific details. If patient discharges prior to next session this note will serve as a discharge summary. Please see below for the latest assessment towards goals. DME Required For Discharge: DME to be determined pending patient progress  Precautions/Restrictions: high fall risk  Weight Bearing Restrictions: no restrictions  [] Right Upper Extremity  [] Left Upper Extremity [] Right Lower Extremity  [] Left Lower Extremity     Required Braces/Orthotics: no braces required   [] Right  [] Left  Positional Restrictions:no positional restrictions    Pre-Admission Information   Lives With: alone    Type of Home: house  Home Layout: two level, able to live on main level, . Comment: Patient has stairs to basement and 2nd level but does not ever go up or down. Home Access: ramped entry  Bathroom Layout: tub/shower unit  Toilet Height: standard height  Bathroom Equipment: shower chair  Home Equipment: rolling walker  Transfer Assistance: modified independent with use of RW  Ambulation Assistance:modified independent with use of RW  ADL Assistance: independent with all ADL's  IADL Assistance: requires assistance with laundry  Active :        [x] Yes  [] No  Hand Dominance: [x] Left  [x] Right  (Patient is ambidextrous.)  Current Employment: retired. Hobbies: None shared. Recent Falls: Patient reports one fall about one month ago. Subjective  General: Patient reports she still cannot move right elbow well, reports no pain at this time without movement, 4/10 w/ movement. Pain: 4/10.   Location: right elbow  Pain Interventions: repositioned . Functional Mobility  Bed Mobility  Supine to Sit: stand by assistance  Rolling Left: stand by assistance  Scooting: stand by assistance  Comments:  Patient able to use RUE to help pull to sit via bed rail. Transfers  Sit to stand transfer: moderate assistance  Stand to sit transfer: contact guard assistance  Bed to chair transfer: contact guard assistance  Comments:  Gait belt donned. CGA to stand from elevated height, MOD assist to stand from toilet. Ambulation  Surface:level surface  Assistive Device: rolling walker  Assistance: contact guard assistance  Distance: 10', 80'  Gait Mechanics: dec'd lee, narrow CHARLA  Comments:  Unsteady, CGA maintained for safety. Stair Mobility  Stair mobility not completed on this date. Comments:  Wheelchair Mobility:  No w/c mobility completed on this date. Comments:  Balance  Static Sitting Balance: fair (+): maintains balance at SBA/supervision without use of UE support  Static Standing Balance: fair (-): maintains balance at CGA with use of UE support  Dynamic Standing Balance: fair (-): maintains balance at CGA with use of UE support  Comments:    Other Therapeutic Interventions    Functional Outcomes  AM-PAC Inpatient Mobility Raw Score : 16              Cognition  Overall Cognitive Status: Impaired  Orientation:    alert and oriented x 4  Command Following:   [facility]    Education  Barriers To Learning: hearing  Patient Education: patient educated on goals, PT role and benefits, plan of care, functional mobility training, disease specific education, transfer training  Learning Assessment:  patient verbalizes and demonstrates understanding    Assessment  Activity Tolerance: Good overall tolerance, does not appear to be impaired by right elbow pain during session although patient has difficulty using RUE to comb hair and brush teeth.   Impairments Requiring Therapeutic Intervention: decreased functional mobility, decreased ROM, decreased strength, decreased balance, increased pain  Prognosis: good  Clinical Assessment:  Patient w/ dec'd pain this session, improved functional mobility but requiring assistance for all transfers and ambulation d/t weakness and impaired balance. She also seems to exhibit greater cognitive impairments today compared to yesterday. These deficits don't necessarily appear to be a new development but just had not manifested themselves in conversation. Recommend 24 hour assist and continued therapy at d/c, consider long term assisted living given patient's age and current deficits. Safety Interventions: patient left in bed, bed alarm in place, call light within reach, patient at risk for falls, and nurse notified    Plan  Frequency: 3-5 x/per week  Current Treatment Recommendations: strengthening, ROM, functional mobility training, transfer training, gait training, modalities, patient/caregiver education, pain management, home exercise program, and equipment evaluation/education    Goals  Patient Goals: Decrease pain, move elbow pain free. Short Term Goals:  Time Frame: Discharge. Patient will complete bed mobility at modified independent   Patient will complete transfers at [facility]   Patient will ambulate 50 ft with use of LRAD at [facility]  No goals met this session but progressing.     Therapy Session Time      Individual Group Co-treatment   Time In     1736   Time Out     1238   Minutes      44     Timed Code Treatment Minutes:  44 minutes  Total Treatment Minutes:  44 minutes       Electronically Signed By: Kelsy Engle, PT, DPT, ATC-R [id number]

## 2022-07-23 NOTE — PROGRESS NOTES
Shift assessment completed. Routine vitals completed . Scheduled medications given. Patient is very teary eyed and frustrated because she's only able to use one hand. Pt is currently awake, alert and oriented. Respirations are easy and unlabored. Patient does not appear to be in distress, resting comfortably at this time. Call light within reach.

## 2022-07-23 NOTE — PROGRESS NOTES
[facility]   Phone: [phone]    Occupational Therapy    [x] Initial Evaluation            [] Daily Treatment Note         [] Discharge Summary      Patient: Carmel Lester   : 1933   MRN: [id number]   Date of Service:  2022    Admitting Diagnosis:  Unable to care for self  Current Admission Summary: 80 y.o. female who presents to the emergency department complaining of right elbow pain since yesterday evening. She denies any preceding injury. Pain has progressively worsened this morning. It is worse with range of motion and palpation. Denies any redness, fever, chills, numbness, tingling or weakness. She does live at home alone but does have home health care come to the house twice a week. Son is at bedside and states that she is getting around the house just fine. Paramedics placed her in a sling prior to arrival.  Xrays negative for acute fracture, show severe OA right elbow. Past Medical History:  has a past medical history of Breast cancer ([address] Cellulitis of left leg, Cellulitis of wrist, Chronic kidney disease, stage III (moderate) ([address] Depression, Diverticulitis of colon, DVT (deep venous thrombosis) ([address] Gout, Hematoma of left thigh, Herpes zoster, Hypertension, Hypoproteinemia ([address] Hypothyroidism, Intervertebral lumbar disc disorder with myelopathy, lumbar region, OA (osteoarthritis), Primary osteoarthritis of right wrist, Pure hypercholesterolemia, Seborrheic dermatitis, Subdural hematoma, post-traumatic ([address] and Tongue dysplasia. Past Surgical History:  has a past surgical history that includes Colon surgery (1970); Breast surgery (); joint replacement (); Mouth surgery (2015); Incisional hernia repair (2016);  section (1970); and Revision Colostomy (1970). Discharge Recommendations: Carmel Lester scored a 15/24 on the AM-PAC ADL Inpatient form.  Current research shows that an AM-PAC score of 17 or less is typically not associated with a discharge to the patient's home setting. Based on the patient's AM-PAC score and their current ADL deficits, it is recommended that the patient have 3-5 sessions per week of Occupational Therapy at d/c to increase the patient's independence. Please see assessment section for further patient specific details. If patient discharges prior to next session this note will serve as a discharge summary. Please see below for the latest assessment towards goals. DME Required For Discharge: DME to be determined at next level of care    Precautions/Restrictions: high fall risk    Pre-Admission Information   Lives With: alone                     Type of Home: house  Home Layout: two level, able to live on main level, . Comment: Patient has stairs to basement and 2nd level but does not ever go up or down. Home Access: ramped entry  Bathroom Layout: tub/shower unit  Toilet Height: standard height  Bathroom Equipment: shower chair, w/c  Home Equipment: rolling walker  Transfer Assistance: modified independent with use of RW  Ambulation Assistance:modified independent with use of RW  ADL Assistance: independent with all ADL's  IADL Assistance: requires assistance with laundry  Active :        [x] Yes                 [] No  Hand Dominance: [x] Left                 [x] Right  (Patient is ambidextrous.)  Current Employment: retired. Hobbies: None shared. Recent Falls: Patient reports one fall about one month ago. Examination   Vision:   Vision Gross Assessment: WFL  Hearing:   WFL  Sensation:   WFL  ROM:   (L) Shoulder: WFL     (R) Shoulder: WFL  (L) Elbow: WFL     (R) Elbow: 100* flexion, lacking ~10* extension  (L) Hand: WFL     (R) Hand: WFL  Strength:   (B) UE strength grossly WFL    Decision Making: low complexity  Clinical Presentation: stable      Subjective  General: Pt supine in bed upon arrival, agreeable to evaluation.  Reports pain to R elbow, recently given medication  Pain: 4/10. Location: R elbow  Pain Interventions: pain medication in place prior to arrival        Activities of Daily Living  Basic Activities of Daily Living  Grooming: stand by assistance  Grooming Comments: oral/hand hygiene stance at sink  Upper Extremity Bathing: stand by assistance. Equipment: none  Lower Extremity Bathing: minimal assistance. Equipment: none  Bathing Comments: posterior liset  Upper Extremity Dressing: minimal assistance. Equipment: none  Lower Extremity Dressing: maximum assistance. Equipment: none  Dressing Comments: min A gown, max A for depends  Toileting: moderate assistance. Equipment: none  Toileting Comments: thoroughness with pericare, clothing mgmt  Instrumental Activities of Daily Living  No IADL completed on this date. Functional Mobility  Bed Mobility  Supine to Sit: stand by assistance  Scooting: stand by assistance  Comments:  Transfers  Sit to stand transfer:contact guard assistance  Stand to sit transfer: contact guard assistance  Toilet transfer: moderate assistance. Mobility technique: ambulating. Equipment utilized: standard toilet  Comments:  Functional Mobility:  Standing Balance: contact guard assistance. Standing Balance Comment: stance at sink with B forearm support on sink, ~3-4 min  Functional Mobility: rolling walker. contact guard assistance.   Activity: to/from bathroom, hallway amb    Other Therapeutic Interventions    Functional Outcomes  AM-PAC Inpatient Daily Activity Raw Score: 15    Cognition  Overall Cognitive Status: Impaired  Arousal/Alterness: inconsistent responses to stimuli  Following Commands: follows one step commands with repetition  Attention Span: attends with cues to redirect  Memory: decreased recall of recent events, decreased long term memory  Safety Judgement: decreased awareness of need for assistance, decreased awareness of need for safety  Problem Solving: assistance required to generate solutions, assistance required to implement solutions, assistance required to identify errors made, assistance required to correct errors made  Insights: decreased awareness of deficits  Sequencing: requires cues for some  Orientation:    alert and oriented x 4  Command Following:   [facility]  Barriers To Learning: cognition  Patient Education: patient educated on goals, OT role and benefits, plan of care, ADL adaptive strategies, transfer training, discharge recommendations  Learning Assessment:  patient verbalizes understanding, would benefit from continued reinforcement, patient will require reinforcement due to cognitive deficits    Assessment  Activity Tolerance: Difficulty incorporating RUE into ADL (combing hair, brushing teeth), low task tolerance  Impairments Requiring Therapeutic Intervention: decreased functional mobility, decreased ADL status, decreased endurance, decreased balance, decreased IADL  Prognosis: good  Clinical Assessment: Pt presents below baseline d/t above deficits, unsafe to return home alone at this time d/t cognitive deficits as she typically lives alone.  Continued OT indicated to promote return to PLOF  Safety Interventions: patient left in chair, chair alarm in place, call light within reach, gait belt, and nurse notified    Plan  Frequency: 3-5 x/per week  Current Treatment Recommendations: strengthening, ROM, balance training, functional mobility training, transfer training, endurance training, patient/caregiver education, and ADL/self-care training    Goals    Short Term Goals:  Time Frame: by discharge  Patient will complete upper body ADL at supervision   Patient will complete lower body ADL at minimal assistance   Patient will complete toileting at contact guard assistance   Patient will complete grooming at supervision   Patient will complete functional transfers at stand by assistance   Patient will complete functional mobility at stand by assistance Therapy Session Time     Individual Group Co-treatment   Time In    6180   Time Out    1238   Minutes    43        Timed Code Treatment Minutes:   28 minutes  Total Treatment Minutes:  43 minutes       Electronically Signed By: JS Lorenzana MOT OTR/L [id number]

## 2022-07-23 NOTE — PLAN OF CARE
Problem: Pain  Goal: Verbalizes/displays adequate comfort level or baseline comfort level  Outcome: Progressing  Flowsheets (Taken 7/22/2022 1711 by Moises Gallardo RN)  Verbalizes/displays adequate comfort level or baseline comfort level:   Encourage patient to monitor pain and request assistance   Assess pain using appropriate pain scale   Administer analgesics based on type and severity of pain and evaluate response   Implement non-pharmacological measures as appropriate and evaluate response     Problem: Safety - Adult  Goal: Free from fall injury  7/23/2022 0112 by Dalton Alegria RN  Outcome: Progressing  7/22/2022 1734 by Moises Gallardo RN  Flowsheets (Taken 7/22/2022 1734)  Free From Fall Injury:   Instruct family/caregiver on patient safety   Based on caregiver fall risk screen, instruct family/caregiver to ask for assistance with transferring infant if caregiver noted to have fall risk factors  Note: Instructed patient to call for assistance. Call light in reach, bed in lowest position with wheels locked and bed alarm on     Problem: Skin/Tissue Integrity  Goal: Absence of new skin breakdown  Description: 1. Monitor for areas of redness and/or skin breakdown  2. Assess vascular access sites hourly  3. Every 4-6 hours minimum:  Change oxygen saturation probe site  4. Every 4-6 hours:  If on nasal continuous positive airway pressure, respiratory therapy assess nares and determine need for appliance change or resting period.   Outcome: Progressing     Problem: ABCDS Injury Assessment  Goal: Absence of physical injury  Outcome: Progressing

## 2022-07-23 NOTE — PROGRESS NOTES
Patient Active Problem List   Diagnosis    Gout    History of breast cancer    Pure hypercholesterolemia    Depression    Osteoarthritis, mostly knees    Hypothyroidism    Hypotension    Unsteady gait when walking    HTN (hypertension), benign    Recurrent major depressive disorder, in partial remission (HCC)    Bilateral hearing loss    Unable to care for self    Arthralgia of right elbow    Unable to ambulate   H&P dictated

## 2022-07-23 NOTE — PLAN OF CARE
Problem: Pain  Goal: Verbalizes/displays adequate comfort level or baseline comfort level  7/23/2022 1443 by Chavo Bishop RN  Outcome: Progressing  7/23/2022 0112 by Hector Lemos RN  Outcome: Progressing  Flowsheets (Taken 7/22/2022 1711 by Anila Sierra RN)  Verbalizes/displays adequate comfort level or baseline comfort level:   Encourage patient to monitor pain and request assistance   Assess pain using appropriate pain scale   Administer analgesics based on type and severity of pain and evaluate response   Implement non-pharmacological measures as appropriate and evaluate response     Problem: Safety - Adult  Goal: Free from fall injury  7/23/2022 1443 by Chavo Bishop RN  Outcome: Progressing  7/23/2022 0112 by Hector Lemos RN  Outcome: Progressing     Problem: Skin/Tissue Integrity  Goal: Absence of new skin breakdown  Description: 1. Monitor for areas of redness and/or skin breakdown  2. Assess vascular access sites hourly  3. Every 4-6 hours minimum:  Change oxygen saturation probe site  4. Every 4-6 hours:  If on nasal continuous positive airway pressure, respiratory therapy assess nares and determine need for appliance change or resting period.   7/23/2022 1443 by Chavo Bishop RN  Outcome: Progressing  7/23/2022 0112 by Hector Lemos RN  Outcome: Progressing     Problem: ABCDS Injury Assessment  Goal: Absence of physical injury  7/23/2022 1443 by Chavo Bishop RN  Outcome: Progressing  7/23/2022 0112 by Hector Lemos RN  Outcome: Progressing

## 2022-07-23 NOTE — H&P
[facility]                     [address] [facility], [address]                              HISTORY AND PHYSICAL    PATIENT NAME: Brittney Mas                 :        1933  MED REC NO:   [id number]                          ROOM:       [id number]  ACCOUNT NO:   [de-identified]                           ADMIT DATE: 2022  PROVIDER:     Cynthia Irizarry MD    HISTORY OF PRESENT ILLNESS:  The patient is an 80-year-old white  American lady, who came to the emergency room. She lives by herself. She is a . She has severe pain in the right elbow. She uses a  walker to get around within her home, where she lives independently. With the excruciating pain in the elbow, there is no way she can make an  independent living. She has severe right shoulder and arm pain since  last night without any history of injury or any falls. No obvious  broken bone. The patient was brought in from home via [address] [facility]. There is no  chest pain, no shortness of breath. Pain is progressively worse. It is  worse with range of motion and palpation. In fact, she resists our very  attempt to even touch it. There was no obvious redness. No fever, no  chills. Under the present circumstances, there is no way she can make  her activities of daily living on her own, so it was decided to bring  her in as an observation for placement. PAST MEDICAL HISTORY:  Pertinent for breast cancer, cellulitis of left  leg, cellulitis of the wrist, chronic kidney disease, depression,  diverticulosis, deep venous thrombosis, gout, hematoma of the left  thigh, herpes zoster, hypertension, hypoproteinemia, hypothyroidism,  intervertebral lumbar disc disorder with myelopathy, osteoarthritis of  the right wrist, pure hypercholesterolemia, seborrheic dermatitis,  subdural hematoma, tongue dysplasia.     PAST SURGICAL HISTORY:  Pertinent for colon resection, revision  colostomy, , colostomy, breast surgery, joint replacement,  mouth surgery, incisional hernia repair. FAMILY HISTORY:  Both her parents are . Mother had cancer. Father had diabetes and atherosclerotic heart disease. MEDICATIONS:  The patient is on aspirin, acetaminophen, Wellbutrin,  Lipitor, levothyroxine, Lidoderm patch, lisinopril, magnesium oxide,  melatonin, Niaspan, potassium chloride, multivitamin, and vitamin D. ALLERGIES:  No known allergies. SOCIAL HISTORY:  She is a . She has four children. She was always  a nonsmoker, always a nondrinker. She used to work as a  and  . She does not have any history of substance abuse. She  lives by herself. REVIEW OF SYSTEM:  Negative for loss of consciousness. No fall. No  visual blurring. No speech disturbance. No dysphagia. No angina  pectoris. Does have exertional shortness of breath. No orthopnea or  paroxysmal nocturnal dyspnea. No abdominal pain. No hematemesis. No  melena. Has excruciating spontaneous pain in the right elbow. No  antecedent trauma. No distal neurovascular deficit. PHYSICAL EXAMINATION:  GENERAL:  She is alert, awake, and oriented x3. An 80-year-old white  American lady looking consistent with her stated age. VITAL SIGNS:  Her temperature is 99, blood pressure 134/73, respirations  17, heart rate is 84. Her temperature went as high as 99.4. No oxygen  desaturation. HEENT:  Oral mucosa is dry. SKIN:  Warm and dry. NECK:  Supple. No jugular venous distention. No lymphadenopathy. No  thyromegaly. LUNGS:  Vesicular breath sounds. Poor inspiration. No crackles or  wheezing. HEART:  Regular rate and rhythm. S1 and S2, 1/6 systolic ejection  murmur. ABDOMEN:  Soft, nontender. Bowel sounds are present. EXTREMITIES:  Right elbow is excruciatingly, exquisitely tender with  very severe restriction of range of motion. There is some rise in local  temperature.   No surrounding edema or swelling. There is no distal  neurovascular deficit. NEUROLOGIC:  The patient appears grossly intact. LABORATORY DATA:  Lab evaluation shows sodium 140, potassium 4.0,  chloride of 104, CO2 of 27, BUN 17, creatinine less than 0.5, anion gap  is 9. Fasting plasma glucose 121, calcium is 9.0. White blood cell  count 11.6, hemoglobin/hematocrit is 13.3 and 31, platelet count 725. 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 ASSESSMENT:  Acute pain in right elbow, unable to provide self-care,  hypertension, breast cancer per history, unsteady gait, depression, pure  hyperlipidemia, primary osteoarthritis, bilateral knee osteoarthritis,  hypothyroidism, hypotension, essential hypertension, history of gout,  depression. She has essentially normal renal function. Bilateral  hearing loss. PLAN:  Get her admitted. Treat her with PT/OT, DVT prophylaxis. We  will also give empirically prednisone. We will also check uric acid  level. If _____, we will consult Orthopedic Surgery Department to  consider an intraarticular cortisone shot. She needs skilled nursing facility placement.         Kwabena Kaur MD    D: 07/22/2022 22:09:21       T: 07/22/2022 22:12:36     [email or web address]  Job#: [id number]     Doc#: [id number]    CC:

## 2022-07-24 LAB
L. PNEUMOPHILA SEROGP 1 UR AG: NORMAL
SARS-COV-2, NAAT: NOT DETECTED

## 2022-07-24 PROCEDURE — 86225 DNA ANTIBODY NATIVE: CPT

## 2022-07-24 PROCEDURE — 6360000002 HC RX W HCPCS: Performed by: INTERNAL MEDICINE

## 2022-07-24 PROCEDURE — 6370000000 HC RX 637 (ALT 250 FOR IP): Performed by: INTERNAL MEDICINE

## 2022-07-24 PROCEDURE — 87635 SARS-COV-2 COVID-19 AMP PRB: CPT

## 2022-07-24 PROCEDURE — G0378 HOSPITAL OBSERVATION PER HR: HCPCS

## 2022-07-24 PROCEDURE — 96372 THER/PROPH/DIAG INJ SC/IM: CPT

## 2022-07-24 PROCEDURE — 86200 CCP ANTIBODY: CPT

## 2022-07-24 RX ORDER — AZITHROMYCIN 250 MG/1
500 TABLET, FILM COATED ORAL DAILY
Status: COMPLETED | OUTPATIENT
Start: 2022-07-24 | End: 2022-07-26

## 2022-07-24 RX ORDER — BENZONATATE 100 MG/1
100 CAPSULE ORAL 3 TIMES DAILY PRN
Status: DISCONTINUED | OUTPATIENT
Start: 2022-07-24 | End: 2022-07-27 | Stop reason: HOSPADM

## 2022-07-24 RX ORDER — TOBRAMYCIN 3 MG/ML
1 SOLUTION/ DROPS OPHTHALMIC
Status: DISCONTINUED | OUTPATIENT
Start: 2022-07-24 | End: 2022-07-27 | Stop reason: HOSPADM

## 2022-07-24 RX ADMIN — Medication 2000 UNITS: at 09:19

## 2022-07-24 RX ADMIN — BENZONATATE 100 MG: 100 CAPSULE ORAL at 17:24

## 2022-07-24 RX ADMIN — Medication 400 MG: at 09:19

## 2022-07-24 RX ADMIN — ACETAMINOPHEN 500 MG: 500 TABLET ORAL at 09:19

## 2022-07-24 RX ADMIN — BUPROPION HYDROCHLORIDE 150 MG: 150 TABLET, EXTENDED RELEASE ORAL at 20:52

## 2022-07-24 RX ADMIN — TOBRAMYCIN OPHTHALMIC SOLUTION 1 DROP: 3 SOLUTION/ DROPS OPHTHALMIC at 20:53

## 2022-07-24 RX ADMIN — GUAIFENESIN AND DEXTROMETHORPHAN 5 ML: 100; 10 SYRUP ORAL at 20:55

## 2022-07-24 RX ADMIN — ASPIRIN 81 MG: 81 TABLET, COATED ORAL at 09:19

## 2022-07-24 RX ADMIN — LISINOPRIL 2.5 MG: 5 TABLET ORAL at 09:19

## 2022-07-24 RX ADMIN — BUPROPION HYDROCHLORIDE 150 MG: 150 TABLET, EXTENDED RELEASE ORAL at 09:19

## 2022-07-24 RX ADMIN — LEVOTHYROXINE SODIUM 100 MCG: 0.1 TABLET ORAL at 05:22

## 2022-07-24 RX ADMIN — NIACIN 500 MG: 500 TABLET, EXTENDED RELEASE ORAL at 20:52

## 2022-07-24 RX ADMIN — POTASSIUM CHLORIDE 10 MEQ: 750 TABLET, FILM COATED, EXTENDED RELEASE ORAL at 09:19

## 2022-07-24 RX ADMIN — ENOXAPARIN SODIUM 40 MG: 100 INJECTION SUBCUTANEOUS at 20:53

## 2022-07-24 RX ADMIN — MELATONIN TAB 3 MG 3 MG: 3 TAB at 20:53

## 2022-07-24 RX ADMIN — PREDNISONE 10 MG: 10 TABLET ORAL at 20:53

## 2022-07-24 RX ADMIN — AZITHROMYCIN MONOHYDRATE 500 MG: 250 TABLET ORAL at 20:52

## 2022-07-24 RX ADMIN — ATORVASTATIN CALCIUM 20 MG: 20 TABLET, FILM COATED ORAL at 20:53

## 2022-07-24 RX ADMIN — PREDNISONE 10 MG: 10 TABLET ORAL at 09:19

## 2022-07-24 RX ADMIN — Medication 1 TABLET: at 09:19

## 2022-07-24 ASSESSMENT — PAIN SCALES - GENERAL
PAINLEVEL_OUTOF10: 0

## 2022-07-24 NOTE — PROGRESS NOTES
Department of Internal Medicine  General Internal Medicine   Progress Note      SUBJECTIVE: pain improved , able to get around in the room     History obtained from chart review and the patient  General ROS: positive for  - fatigue, malaise, and weight loss  negative for - chills, fever, or night sweats  Psychological ROS: positive for - anxiety  negative for - behavioral disorder, disorientation, hallucinations, or irritability  Ophthalmic ROS: negative  Respiratory ROS: no cough, shortness of breath, or wheezing  Cardiovascular ROS: no chest pain or dyspnea on exertion  Gastrointestinal ROS: no abdominal pain, change in bowel habits, or black or bloody stools  Genito-Urinary ROS: no dysuria, trouble voiding, or hematuria  Musculoskeletal ROS: pain right elbow   Neurological ROS: no TIA or stroke symptoms  Dermatological ROS: negative    OBJECTIVE      Medications      Current Facility-Administered Medications: benzonatate (TESSALON) capsule 100 mg, 100 mg, Oral, TID PRN  guaiFENesin-dextromethorphan (ROBITUSSIN DM) 100-10 MG/5ML syrup 5 mL, 5 mL, Oral, Q4H PRN  acetaminophen (TYLENOL) tablet 500 mg, 500 mg, Oral, Daily  ammonium lactate (AMLACTIN) 12 % cream, , Topical, PRN  aspirin EC tablet 81 mg, 81 mg, Oral, Daily  atorvastatin (LIPITOR) tablet 20 mg, 20 mg, Oral, Nightly  buPROPion (WELLBUTRIN SR) extended release tablet 150 mg, 150 mg, Oral, BID  Vitamin D (CHOLECALCIFEROL) tablet 2,000 Units, 2,000 Units, Oral, Daily  levothyroxine (SYNTHROID) tablet 100 mcg, 100 mcg, Oral, Daily  lisinopril (PRINIVIL;ZESTRIL) tablet 2.5 mg, 2.5 mg, Oral, Daily  melatonin tablet 3 mg, 3 mg, Oral, Nightly  therapeutic multivitamin-minerals 1 tablet, 1 tablet, Oral, Daily  niacin (SLO-NIACIN) extended release tablet 500 mg, 500 mg, Oral, Nightly  potassium chloride (KLOR-CON) extended release tablet 10 mEq, 10 mEq, Oral, Daily  magnesium oxide (MAG-OX) tablet 400 mg, 400 mg, Oral, Daily  enoxaparin (LOVENOX) injection 40 mg, 40 mg, SubCUTAneous, Nightly  predniSONE (DELTASONE) tablet 10 mg, 10 mg, Oral, BID  oxyCODONE-acetaminophen (PERCOCET) 5-325 MG per tablet 1 tablet, 1 tablet, Oral, Q4H PRN    Physical      Vitals: /71   Pulse 84   Temp 98.3 °F (36.8 °C) (Oral)   Resp 18   Ht 4' 11\" (1.499 m)   Wt 167 lb 14.4 oz (76.2 kg)   LMP 1970 (Exact Date)   SpO2 91%   BMI 33.91 kg/m²   Temp: Temp: 98.3 °F (36.8 °C)  Max: Temp  Av.3 °F (36.8 °C)  Min: 97.7 °F (36.5 °C)  Max: 98.9 °F (37.2 °C)  Respiration range:  Resp  Av.2  Min: 18  Max: 19  Pulse Range:  Pulse  Av.8  Min: 70  Max: 105  Blood pressure range:  Systolic (42OTF), KOF:316 , Min:109 , TMD:909   , Diastolic (36RNO), LYV:35, Min:69, Max:87    SpO2  Av.5 %  Min: 90 %  Max: 95 %    Intake/Output Summary (Last 24 hours) at 2022 1503  Last data filed at 2022 0915  Gross per 24 hour   Intake 240 ml   Output --   Net 240 ml       Vent settings:  Pulse  Av.9  Min: 65  Max: 106  Resp  Av.1  Min: 16  Max: 22  SpO2  Av.2 %  Min: 90 %  Max: 98 %    CONSTITUTIONAL:  awake, alert, cooperative, no apparent distress, appears stated age, and moderately obese  EYES:  unremarkable   NECK:  Supple, symmetrical, trachea midline, no adenopathy, thyroid symmetric, not enlarged and no tenderness, skin normal  BACK:  symmetric and no curvature  LUNGS:  No increased work of breathing, good air exchange, clear to auscultation bilaterally, no crackles or wheezing  CARDIOVASCULAR:  Normal apical impulse, regular rate and rhythm, normal S1 and S2, no S3 or S4, and no murmur noted  ABDOMEN:  No scars, normal bowel sounds, soft, non-distended, non-tender, no masses palpated, no hepatosplenomegally  MUSCULOSKELETAL:  tenderness and minimal swelling right elbow improved since admission   NEUROLOGIC:  grossly intact   SKIN:  warm and dry  and no bruising or bleeding    Data      Recent Results (from the past 96 hour(s))   CBC with Auto Differential Collection Time: 07/22/22 10:06 AM   Result Value Ref Range    WBC 11.6 (H) 4.0 - 11.0 K/uL    RBC 4.27 4.00 - 5.20 M/uL    Hemoglobin 13.3 12.0 - 16.0 g/dL    Hematocrit 41.0 36.0 - 48.0 %    MCV 95.9 80.0 - 100.0 fL    MCH 31.1 26.0 - 34.0 pg    MCHC 32.4 31.0 - 36.0 g/dL    RDW 13.3 12.4 - 15.4 %    Platelets 927 812 - 582 K/uL    MPV 8.0 5.0 - 10.5 fL    Neutrophils % 85.2 %    Lymphocytes % 3.6 %    Monocytes % 11.1 %    Eosinophils % 0.0 %    Basophils % 0.1 %    Neutrophils Absolute 9.9 (H) 1.7 - 7.7 K/uL    Lymphocytes Absolute 0.4 (L) 1.0 - 5.1 K/uL    Monocytes Absolute 1.3 0.0 - 1.3 K/uL    Eosinophils Absolute 0.0 0.0 - 0.6 K/uL    Basophils Absolute 0.0 0.0 - 0.2 K/uL   Basic Metabolic Panel    Collection Time: 07/22/22 10:06 AM   Result Value Ref Range    Sodium 140 136 - 145 mmol/L    Potassium 4.0 3.5 - 5.1 mmol/L    Chloride 104 99 - 110 mmol/L    CO2 27 21 - 32 mmol/L    Anion Gap 9 3 - 16    Glucose 121 (H) 70 - 99 mg/dL    BUN 17 7 - 20 mg/dL    Creatinine <0.5 (L) 0.6 - 1.2 mg/dL    GFR Non-African American >60 >60    GFR African American >60 >60    Calcium 9.0 8.3 - 10.6 mg/dL   Legionella antigen, urine    Collection Time: 07/23/22  5:20 AM    Specimen: Urine, clean catch   Result Value Ref Range    L. pneumophila Serogp 1 Ur Ag       Presumptive Negative  No Legionella pneumophila serogroup 1 antigens detected. A negative result does not exclude infection with  Legionella pneumophila serogroup 1 nor does it rule out  other microbial-caused respiratory infections or  disease caused by other serogroups of  Legionella pneumophila.   Normal Range: Presumptive Negative     HERIBERTO    Collection Time: 07/23/22  6:27 AM   Result Value Ref Range    HERIBERTO POSITIVE (A) Negative   Uric Acid    Collection Time: 07/23/22  6:27 AM   Result Value Ref Range    Uric Acid, Serum 3.5 2.6 - 6.0 mg/dL   C-Reactive Protein    Collection Time: 07/23/22  6:27 AM   Result Value Ref Range    .8 (H) 0.0 - 5.1 mg/L

## 2022-07-24 NOTE — PLAN OF CARE
Problem: Pain  Goal: Verbalizes/displays adequate comfort level or baseline comfort level  7/24/2022 0112 by Lavern Lawrence RN  Outcome: Progressing  7/23/2022 1443 by Stephanie Amador RN  Outcome: Progressing     Problem: Safety - Adult  Goal: Free from fall injury  7/24/2022 0112 by Lavern Lawrence RN  Outcome: Progressing  7/23/2022 1443 by Stephanie Amador RN  Outcome: Progressing     Problem: Skin/Tissue Integrity  Goal: Absence of new skin breakdown  Description: 1. Monitor for areas of redness and/or skin breakdown  2. Assess vascular access sites hourly  3. Every 4-6 hours minimum:  Change oxygen saturation probe site  4. Every 4-6 hours:  If on nasal continuous positive airway pressure, respiratory therapy assess nares and determine need for appliance change or resting period.   7/24/2022 0112 by Lavern Lawrence RN  Outcome: Progressing  7/23/2022 1443 by Stephanie Amador RN  Outcome: Progressing     Problem: ABCDS Injury Assessment  Goal: Absence of physical injury  7/24/2022 0112 by Lavern Lawrence RN  Outcome: Progressing  7/23/2022 1443 by Stephanie Amador RN  Outcome: Progressing

## 2022-07-24 NOTE — PROGRESS NOTES
Pt up stand by assist x1 with walker to restroom continent of urine and stool. Denies any pain VSS call light in reach alarm on for pt safety.

## 2022-07-24 NOTE — PROGRESS NOTES
Shift assessment completed. Routine vitals competed. Pt is up in chair. Scheduled medications given. Patient is awake, alert and oriented. Respirations are easy and unlabored. Patient does not appear to be in distress, resting comfortably at this time. Call light within reach.

## 2022-07-24 NOTE — PLAN OF CARE
Problem: Pain  Goal: Verbalizes/displays adequate comfort level or baseline comfort level  7/24/2022 1201 by Chavo Bishop RN  Outcome: Progressing  7/24/2022 0112 by Margaret Pacheco RN  Outcome: Progressing     Problem: Safety - Adult  Goal: Free from fall injury  7/24/2022 1201 by Chavo Bishop RN  Outcome: Progressing  7/24/2022 0112 by Margaret Pacheco RN  Outcome: Progressing     Problem: Skin/Tissue Integrity  Goal: Absence of new skin breakdown  Description: 1. Monitor for areas of redness and/or skin breakdown  2. Assess vascular access sites hourly  3. Every 4-6 hours minimum:  Change oxygen saturation probe site  4. Every 4-6 hours:  If on nasal continuous positive airway pressure, respiratory therapy assess nares and determine need for appliance change or resting period.   7/24/2022 1201 by Chavo Bishop RN  Outcome: Progressing  7/24/2022 0112 by Margaret Pacheco RN  Outcome: Progressing     Problem: ABCDS Injury Assessment  Goal: Absence of physical injury  7/24/2022 1201 by Chavo Bishop RN  Outcome: Progressing  7/24/2022 0112 by Margaret Pacheco RN  Outcome: Progressing

## 2022-07-24 NOTE — PLAN OF CARE
Problem: Pain  Goal: Verbalizes/displays adequate comfort level or baseline comfort level  7/24/2022 1202 by Dwayne Morales RN  Outcome: Progressing  7/24/2022 1201 by Dwayne Morales RN  Outcome: Progressing  7/24/2022 0112 by Keira Duong RN  Outcome: Progressing     Problem: Safety - Adult  Goal: Free from fall injury  7/24/2022 1202 by Dwayne Morales RN  Outcome: Progressing  7/24/2022 1201 by Dwayne Morales RN  Outcome: Progressing  7/24/2022 0112 by Keira Duong RN  Outcome: Progressing     Problem: Skin/Tissue Integrity  Goal: Absence of new skin breakdown  Description: 1. Monitor for areas of redness and/or skin breakdown  2. Assess vascular access sites hourly  3. Every 4-6 hours minimum:  Change oxygen saturation probe site  4. Every 4-6 hours:  If on nasal continuous positive airway pressure, respiratory therapy assess nares and determine need for appliance change or resting period.   7/24/2022 1202 by Dwayne Morales RN  Outcome: Progressing  7/24/2022 1201 by Dwayne Morales RN  Outcome: Progressing  7/24/2022 0112 by Keira Duong RN  Outcome: Progressing     Problem: ABCDS Injury Assessment  Goal: Absence of physical injury  7/24/2022 1202 by Dwayne Morales RN  Outcome: Progressing  7/24/2022 1201 by Dwayne Morales RN  Outcome: Progressing  7/24/2022 0112 by Keira Duong RN  Outcome: Progressing

## 2022-07-24 NOTE — PROGRESS NOTES
Son Nicol Herron called and would like for his mom to get covid tested because pt was out of town for 1 week.  I'll message the

## 2022-07-25 LAB
ANTI-DSDNA IGG: <1 IU/ML (ref 0–9)
CYCLIC CITRULLINATED PEPTIDE ANTIBODY IGG: <0.5 U/ML (ref 0–2.9)

## 2022-07-25 PROCEDURE — 6360000002 HC RX W HCPCS: Performed by: INTERNAL MEDICINE

## 2022-07-25 PROCEDURE — 96372 THER/PROPH/DIAG INJ SC/IM: CPT

## 2022-07-25 PROCEDURE — G0378 HOSPITAL OBSERVATION PER HR: HCPCS

## 2022-07-25 PROCEDURE — 6370000000 HC RX 637 (ALT 250 FOR IP): Performed by: INTERNAL MEDICINE

## 2022-07-25 RX ADMIN — ATORVASTATIN CALCIUM 20 MG: 20 TABLET, FILM COATED ORAL at 20:08

## 2022-07-25 RX ADMIN — Medication 2000 UNITS: at 09:52

## 2022-07-25 RX ADMIN — TOBRAMYCIN OPHTHALMIC SOLUTION 1 DROP: 3 SOLUTION/ DROPS OPHTHALMIC at 04:00

## 2022-07-25 RX ADMIN — TOBRAMYCIN OPHTHALMIC SOLUTION 1 DROP: 3 SOLUTION/ DROPS OPHTHALMIC at 00:00

## 2022-07-25 RX ADMIN — ACETAMINOPHEN 500 MG: 500 TABLET ORAL at 09:54

## 2022-07-25 RX ADMIN — BUPROPION HYDROCHLORIDE 150 MG: 150 TABLET, EXTENDED RELEASE ORAL at 09:52

## 2022-07-25 RX ADMIN — PREDNISONE 10 MG: 10 TABLET ORAL at 09:54

## 2022-07-25 RX ADMIN — NIACIN 500 MG: 500 TABLET, EXTENDED RELEASE ORAL at 21:39

## 2022-07-25 RX ADMIN — ASPIRIN 81 MG: 81 TABLET, COATED ORAL at 09:54

## 2022-07-25 RX ADMIN — TOBRAMYCIN OPHTHALMIC SOLUTION 1 DROP: 3 SOLUTION/ DROPS OPHTHALMIC at 15:57

## 2022-07-25 RX ADMIN — TOBRAMYCIN OPHTHALMIC SOLUTION 1 DROP: 3 SOLUTION/ DROPS OPHTHALMIC at 13:23

## 2022-07-25 RX ADMIN — TOBRAMYCIN OPHTHALMIC SOLUTION 1 DROP: 3 SOLUTION/ DROPS OPHTHALMIC at 19:43

## 2022-07-25 RX ADMIN — GUAIFENESIN AND DEXTROMETHORPHAN 5 ML: 100; 10 SYRUP ORAL at 02:39

## 2022-07-25 RX ADMIN — BUPROPION HYDROCHLORIDE 150 MG: 150 TABLET, EXTENDED RELEASE ORAL at 20:07

## 2022-07-25 RX ADMIN — ENOXAPARIN SODIUM 40 MG: 100 INJECTION SUBCUTANEOUS at 20:08

## 2022-07-25 RX ADMIN — AZITHROMYCIN MONOHYDRATE 500 MG: 250 TABLET ORAL at 09:54

## 2022-07-25 RX ADMIN — TOBRAMYCIN OPHTHALMIC SOLUTION 1 DROP: 3 SOLUTION/ DROPS OPHTHALMIC at 09:57

## 2022-07-25 RX ADMIN — LEVOTHYROXINE SODIUM 100 MCG: 0.1 TABLET ORAL at 05:39

## 2022-07-25 RX ADMIN — Medication 1 TABLET: at 09:54

## 2022-07-25 RX ADMIN — Medication 400 MG: at 09:52

## 2022-07-25 RX ADMIN — LISINOPRIL 2.5 MG: 5 TABLET ORAL at 09:53

## 2022-07-25 RX ADMIN — MELATONIN TAB 3 MG 3 MG: 3 TAB at 20:07

## 2022-07-25 RX ADMIN — POTASSIUM CHLORIDE 10 MEQ: 750 TABLET, FILM COATED, EXTENDED RELEASE ORAL at 09:53

## 2022-07-25 ASSESSMENT — PAIN SCALES - GENERAL: PAINLEVEL_OUTOF10: 0

## 2022-07-25 NOTE — CARE COORDINATION
Spoke to patient and she stated it was ok to speak to son re: discharge planning. Called son Priscilla Rueda @ [phone], message left for call back.

## 2022-07-25 NOTE — PLAN OF CARE
Problem: Pain  Goal: Verbalizes/displays adequate comfort level or baseline comfort level  Outcome: Progressing Towards Goal     Problem: Safety - Adult  Goal: Free from fall injury  Outcome: Progressing Towards Goal     Problem: Skin/Tissue Integrity  Goal: Absence of new skin breakdown  Description: 1. Monitor for areas of redness and/or skin breakdown  2. Assess vascular access sites hourly  3. Every 4-6 hours minimum:  Change oxygen saturation probe site  4. Every 4-6 hours:  If on nasal continuous positive airway pressure, respiratory therapy assess nares and determine need for appliance change or resting period.   Outcome: Progressing Towards Goal     Problem: ABCDS Injury Assessment  Goal: Absence of physical injury  Outcome: Progressing Towards Goal

## 2022-07-25 NOTE — PLAN OF CARE
Problem: Pain  Goal: Verbalizes/displays adequate comfort level or baseline comfort level  7/24/2022 2058 by Randal Robles RN  Outcome: Progressing  7/24/2022 1202 by Ashanti Ch RN  Outcome: Progressing  7/24/2022 1201 by Ashanti Ch RN  Outcome: Progressing     Problem: Safety - Adult  Goal: Free from fall injury  7/24/2022 2058 by Randal Robles RN  Outcome: Progressing  7/24/2022 1202 by Ashanti Ch RN  Outcome: Progressing  7/24/2022 1201 by Ashanti Ch RN  Outcome: Progressing     Problem: Skin/Tissue Integrity  Goal: Absence of new skin breakdown  Description: 1. Monitor for areas of redness and/or skin breakdown  2. Assess vascular access sites hourly  3. Every 4-6 hours minimum:  Change oxygen saturation probe site  4. Every 4-6 hours:  If on nasal continuous positive airway pressure, respiratory therapy assess nares and determine need for appliance change or resting period.   7/24/2022 2058 by Randal Robles RN  Outcome: Progressing  7/24/2022 1202 by Ashanti Ch RN  Outcome: Progressing  7/24/2022 1201 by Ashanti Ch RN  Outcome: Progressing     Problem: ABCDS Injury Assessment  Goal: Absence of physical injury  7/24/2022 2058 by Randal Robles RN  Outcome: Progressing  7/24/2022 1202 by Ashanti Ch RN  Outcome: Progressing  7/24/2022 1201 by Ashanti Ch RN  Outcome: Progressing

## 2022-07-26 LAB
ANA PATTERN: ABNORMAL
ANA TITER: ABNORMAL
ANTINUCLEAR AB INTERPRETIVE COMMENT: ABNORMAL
ANTINUCLEAR ANTIBODY, HEP-2, IGG: DETECTED

## 2022-07-26 PROCEDURE — G0378 HOSPITAL OBSERVATION PER HR: HCPCS

## 2022-07-26 PROCEDURE — 97535 SELF CARE MNGMENT TRAINING: CPT

## 2022-07-26 PROCEDURE — 97116 GAIT TRAINING THERAPY: CPT

## 2022-07-26 PROCEDURE — 97530 THERAPEUTIC ACTIVITIES: CPT

## 2022-07-26 PROCEDURE — 6370000000 HC RX 637 (ALT 250 FOR IP): Performed by: INTERNAL MEDICINE

## 2022-07-26 PROCEDURE — 6360000002 HC RX W HCPCS: Performed by: INTERNAL MEDICINE

## 2022-07-26 PROCEDURE — 97110 THERAPEUTIC EXERCISES: CPT

## 2022-07-26 PROCEDURE — 96372 THER/PROPH/DIAG INJ SC/IM: CPT

## 2022-07-26 RX ORDER — GUAIFENESIN/DEXTROMETHORPHAN 100-10MG/5
5 SYRUP ORAL EVERY 4 HOURS PRN
Qty: 120 ML | COMMUNITY
Start: 2022-07-26 | End: 2022-08-05

## 2022-07-26 RX ORDER — LEVOTHYROXINE SODIUM 0.1 MG/1
100 TABLET ORAL DAILY
Qty: 30 TABLET | Refills: 3 | Status: SHIPPED | OUTPATIENT
Start: 2022-07-27 | End: 2022-11-04 | Stop reason: SDUPTHER

## 2022-07-26 RX ORDER — LANOLIN ALCOHOL/MO/W.PET/CERES
400 CREAM (GRAM) TOPICAL DAILY
Qty: 30 TABLET | Refills: 0 | Status: SHIPPED | OUTPATIENT
Start: 2022-07-27

## 2022-07-26 RX ORDER — OXYCODONE HYDROCHLORIDE AND ACETAMINOPHEN 5; 325 MG/1; MG/1
1 TABLET ORAL EVERY 4 HOURS PRN
Qty: 12 TABLET | Refills: 0 | Status: SHIPPED | OUTPATIENT
Start: 2022-07-26 | End: 2022-07-29

## 2022-07-26 RX ORDER — TOBRAMYCIN 3 MG/ML
1 SOLUTION/ DROPS OPHTHALMIC EVERY 6 HOURS
Qty: 1 EACH | Refills: 0 | Status: SHIPPED | OUTPATIENT
Start: 2022-07-26 | End: 2022-07-31

## 2022-07-26 RX ADMIN — ASPIRIN 81 MG: 81 TABLET, COATED ORAL at 09:59

## 2022-07-26 RX ADMIN — LISINOPRIL 2.5 MG: 5 TABLET ORAL at 10:00

## 2022-07-26 RX ADMIN — GUAIFENESIN AND DEXTROMETHORPHAN 5 ML: 100; 10 SYRUP ORAL at 16:51

## 2022-07-26 RX ADMIN — BUPROPION HYDROCHLORIDE 150 MG: 150 TABLET, EXTENDED RELEASE ORAL at 09:59

## 2022-07-26 RX ADMIN — TOBRAMYCIN OPHTHALMIC SOLUTION 1 DROP: 3 SOLUTION/ DROPS OPHTHALMIC at 20:55

## 2022-07-26 RX ADMIN — NIACIN 500 MG: 500 TABLET, EXTENDED RELEASE ORAL at 20:59

## 2022-07-26 RX ADMIN — GUAIFENESIN AND DEXTROMETHORPHAN 5 ML: 100; 10 SYRUP ORAL at 20:55

## 2022-07-26 RX ADMIN — Medication 2000 UNITS: at 09:59

## 2022-07-26 RX ADMIN — TOBRAMYCIN OPHTHALMIC SOLUTION 1 DROP: 3 SOLUTION/ DROPS OPHTHALMIC at 16:45

## 2022-07-26 RX ADMIN — BUPROPION HYDROCHLORIDE 150 MG: 150 TABLET, EXTENDED RELEASE ORAL at 20:55

## 2022-07-26 RX ADMIN — GUAIFENESIN AND DEXTROMETHORPHAN 5 ML: 100; 10 SYRUP ORAL at 04:14

## 2022-07-26 RX ADMIN — TOBRAMYCIN OPHTHALMIC SOLUTION 1 DROP: 3 SOLUTION/ DROPS OPHTHALMIC at 04:11

## 2022-07-26 RX ADMIN — TOBRAMYCIN OPHTHALMIC SOLUTION 1 DROP: 3 SOLUTION/ DROPS OPHTHALMIC at 23:29

## 2022-07-26 RX ADMIN — ENOXAPARIN SODIUM 40 MG: 100 INJECTION SUBCUTANEOUS at 20:55

## 2022-07-26 RX ADMIN — ATORVASTATIN CALCIUM 20 MG: 20 TABLET, FILM COATED ORAL at 20:55

## 2022-07-26 RX ADMIN — TOBRAMYCIN OPHTHALMIC SOLUTION 1 DROP: 3 SOLUTION/ DROPS OPHTHALMIC at 10:00

## 2022-07-26 RX ADMIN — POTASSIUM CHLORIDE 10 MEQ: 750 TABLET, FILM COATED, EXTENDED RELEASE ORAL at 09:59

## 2022-07-26 RX ADMIN — AZITHROMYCIN MONOHYDRATE 500 MG: 250 TABLET ORAL at 09:59

## 2022-07-26 RX ADMIN — ACETAMINOPHEN 500 MG: 500 TABLET ORAL at 09:59

## 2022-07-26 RX ADMIN — Medication 1 TABLET: at 09:59

## 2022-07-26 RX ADMIN — MELATONIN TAB 3 MG 3 MG: 3 TAB at 20:55

## 2022-07-26 RX ADMIN — Medication 400 MG: at 09:59

## 2022-07-26 RX ADMIN — TOBRAMYCIN OPHTHALMIC SOLUTION 1 DROP: 3 SOLUTION/ DROPS OPHTHALMIC at 00:01

## 2022-07-26 ASSESSMENT — PAIN SCALES - GENERAL
PAINLEVEL_OUTOF10: 0

## 2022-07-26 NOTE — CARE COORDINATION
Spoke to son Micheal Escobedo and informed him that [facility] can accept the patient. Micheal Escobedo stated that he would like to go with [facility]. [place] notified that another facility was selected. Pre-cert for [facility] will be started when updated therapy notes have been entered on the chart.

## 2022-07-26 NOTE — PROGRESS NOTES
Department of Internal Medicine  General Internal Medicine   Progress Note      SUBJECTIVE: denies specific complaints     History obtained from chart review and the patient  General ROS: positive for  - fatigue, malaise, and weight loss  negative for - chills, fever, or night sweats  Psychological ROS: positive for - anxiety  negative for - behavioral disorder, disorientation, hallucinations, or irritability  Ophthalmic ROS: negative  Respiratory ROS: no cough, shortness of breath, or wheezing  Cardiovascular ROS: no chest pain or dyspnea on exertion  Gastrointestinal ROS: no abdominal pain, change in bowel habits, or black or bloody stools  Genito-Urinary ROS: no dysuria, trouble voiding, or hematuria  Musculoskeletal ROS: pain right elbow   Neurological ROS: no TIA or stroke symptoms  Dermatological ROS: negative    OBJECTIVE      Medications      Current Facility-Administered Medications: benzonatate (TESSALON) capsule 100 mg, 100 mg, Oral, TID PRN  tobramycin (TOBREX) 0.3 % ophthalmic solution 1 drop, 1 drop, Both Eyes, 6 times per day  azithromycin (ZITHROMAX) tablet 500 mg, 500 mg, Oral, Daily  guaiFENesin-dextromethorphan (ROBITUSSIN DM) 100-10 MG/5ML syrup 5 mL, 5 mL, Oral, Q4H PRN  acetaminophen (TYLENOL) tablet 500 mg, 500 mg, Oral, Daily  ammonium lactate (AMLACTIN) 12 % cream, , Topical, PRN  aspirin EC tablet 81 mg, 81 mg, Oral, Daily  atorvastatin (LIPITOR) tablet 20 mg, 20 mg, Oral, Nightly  buPROPion (WELLBUTRIN SR) extended release tablet 150 mg, 150 mg, Oral, BID  Vitamin D (CHOLECALCIFEROL) tablet 2,000 Units, 2,000 Units, Oral, Daily  levothyroxine (SYNTHROID) tablet 100 mcg, 100 mcg, Oral, Daily  lisinopril (PRINIVIL;ZESTRIL) tablet 2.5 mg, 2.5 mg, Oral, Daily  melatonin tablet 3 mg, 3 mg, Oral, Nightly  therapeutic multivitamin-minerals 1 tablet, 1 tablet, Oral, Daily  niacin (SLO-NIACIN) extended release tablet 500 mg, 500 mg, Oral, Nightly  potassium chloride (KLOR-CON) extended release Collection Time: 07/23/22  6:27 AM   Result Value Ref Range    Rheumatoid Factor 15.0 (H) <14 IU/mL   Sedimentation Rate    Collection Time: 07/23/22  6:27 AM   Result Value Ref Range    Sed Rate 23 0 - 30 mm/Hr   Anti-DNA antibody, double-stranded    Collection Time: 07/24/22  6:24 AM   Result Value Ref Range    Anti-dsDNA IgG <1 0 - 9 IU/mL   Cyclic citrul peptide antibody, IgG    Collection Time: 07/24/22  6:24 AM   Result Value Ref Range    CC Peptide,IgG Ab <0.5 0.0 - 2.9 U/mL   COVID-19, Rapid    Collection Time: 07/24/22  1:35 PM    Specimen: Nasopharyngeal Swab   Result Value Ref Range    SARS-CoV-2, NAAT Not Detected Not Detected       [address] [place] [address] Way Problems             Last Modified POA    * (Principal) Unable to care for self 7/22/2022 Yes    Pure hypercholesterolemia (Chronic) 7/22/2022 Yes    Gout (Chronic) 7/22/2022 Yes    History of breast cancer (Chronic) 7/22/2022 Yes    Arthralgia of right elbow 7/22/2022 Yes    Unable to ambulate 7/22/2022 Yes    Depression (Chronic) 7/22/2022 Yes    Osteoarthritis, mostly knees (Chronic) 7/22/2022 Yes    Hypothyroidism (Chronic) 7/22/2022 Yes    Hypotension 7/22/2022 Yes    Unsteady gait when walking 7/22/2022 Yes    HTN (hypertension), benign (Chronic) 7/22/2022 Yes    Recurrent major depressive disorder, in partial remission ([facility] [address] (Chronic) 7/22/2022 Yes     Ct present care , ready for dismissal when NH placement arrangements can be made

## 2022-07-26 NOTE — PROGRESS NOTES
Department of Internal Medicine  General Internal Medicine   Progress Note      SUBJECTIVE: patient remains calm and comfortable      History obtained from chart review and the patient  General ROS: positive for  - fatigue, malaise, and weight loss  negative for - chills, fever, or night sweats  Psychological ROS: positive for - anxiety  negative for - behavioral disorder, disorientation, hallucinations, or irritability  Ophthalmic ROS: negative  Respiratory ROS: no cough, shortness of breath, or wheezing  Cardiovascular ROS: no chest pain or dyspnea on exertion  Gastrointestinal ROS: no abdominal pain, change in bowel habits, or black or bloody stools  Genito-Urinary ROS: no dysuria, trouble voiding, or hematuria  Musculoskeletal ROS: pain right elbow   Neurological ROS: no TIA or stroke symptoms  Dermatological ROS: negative    OBJECTIVE      Medications      Current Facility-Administered Medications: benzonatate (TESSALON) capsule 100 mg, 100 mg, Oral, TID PRN  tobramycin (TOBREX) 0.3 % ophthalmic solution 1 drop, 1 drop, Both Eyes, 6 times per day  azithromycin (ZITHROMAX) tablet 500 mg, 500 mg, Oral, Daily  guaiFENesin-dextromethorphan (ROBITUSSIN DM) 100-10 MG/5ML syrup 5 mL, 5 mL, Oral, Q4H PRN  acetaminophen (TYLENOL) tablet 500 mg, 500 mg, Oral, Daily  ammonium lactate (AMLACTIN) 12 % cream, , Topical, PRN  aspirin EC tablet 81 mg, 81 mg, Oral, Daily  atorvastatin (LIPITOR) tablet 20 mg, 20 mg, Oral, Nightly  buPROPion (WELLBUTRIN SR) extended release tablet 150 mg, 150 mg, Oral, BID  Vitamin D (CHOLECALCIFEROL) tablet 2,000 Units, 2,000 Units, Oral, Daily  levothyroxine (SYNTHROID) tablet 100 mcg, 100 mcg, Oral, Daily  lisinopril (PRINIVIL;ZESTRIL) tablet 2.5 mg, 2.5 mg, Oral, Daily  melatonin tablet 3 mg, 3 mg, Oral, Nightly  therapeutic multivitamin-minerals 1 tablet, 1 tablet, Oral, Daily  niacin (SLO-NIACIN) extended release tablet 500 mg, 500 mg, Oral, Nightly  potassium chloride (KLOR-CON) extended release tablet 10 mEq, 10 mEq, Oral, Daily  magnesium oxide (MAG-OX) tablet 400 mg, 400 mg, Oral, Daily  enoxaparin (LOVENOX) injection 40 mg, 40 mg, SubCUTAneous, Nightly  oxyCODONE-acetaminophen (PERCOCET) 5-325 MG per tablet 1 tablet, 1 tablet, Oral, Q4H PRN    Physical      Vitals: /80   Pulse 81   Temp 98.3 °F (36.8 °C) (Oral)   Resp 18   Ht 4' 11\" (1.499 m)   Wt 169 lb 14.4 oz (77.1 kg)   LMP 1970 (Exact Date)   SpO2 92%   BMI 34.32 kg/m²   Temp: Temp: 98.3 °F (36.8 °C)  Max: Temp  Av.1 °F (36.7 °C)  Min: 97.5 °F (36.4 °C)  Max: 98.9 °F (37.2 °C)  Respiration range:  Resp  Av.6  Min: 18  Max: 22  Pulse Range:  Pulse  Av  Min: 69  Max: 95  Blood pressure range:  Systolic (01NHE), KBV:656 , Min:116 , QFR:094   , Diastolic (12TNF), QYV:17, Min:55, Max:80    SpO2  Av %  Min: 85 %  Max: 96 %  No intake or output data in the 24 hours ending 22 2309      Vent settings:  Pulse  Av.1  Min: 65  Max: 106  Resp  Av.4  Min: 16  Max: 22  SpO2  Av.8 %  Min: 85 %  Max: 98 %    CONSTITUTIONAL:  awake, alert, cooperative, no apparent distress, appears stated age, and moderately obese  EYES:  unremarkable   NECK:  Supple, symmetrical, trachea midline, no adenopathy, thyroid symmetric, not enlarged and no tenderness, skin normal  BACK:  symmetric and no curvature  LUNGS:  No increased work of breathing, good air exchange, clear to auscultation bilaterally, no crackles or wheezing  CARDIOVASCULAR:  Normal apical impulse, regular rate and rhythm, normal S1 and S2, no S3 or S4, and no murmur noted  ABDOMEN:  No scars, normal bowel sounds, soft, non-distended, non-tender, no masses palpated, no hepatosplenomegally  MUSCULOSKELETAL:  tenderness and minimal swelling right elbow improved since admission   NEUROLOGIC:  grossly intact   SKIN:  warm and dry  and no bruising or bleeding    Data      Recent Results (from the past 96 hour(s))   CBC with Auto Differential Collection Time: 07/22/22 10:06 AM   Result Value Ref Range    WBC 11.6 (H) 4.0 - 11.0 K/uL    RBC 4.27 4.00 - 5.20 M/uL    Hemoglobin 13.3 12.0 - 16.0 g/dL    Hematocrit 41.0 36.0 - 48.0 %    MCV 95.9 80.0 - 100.0 fL    MCH 31.1 26.0 - 34.0 pg    MCHC 32.4 31.0 - 36.0 g/dL    RDW 13.3 12.4 - 15.4 %    Platelets 641 917 - 537 K/uL    MPV 8.0 5.0 - 10.5 fL    Neutrophils % 85.2 %    Lymphocytes % 3.6 %    Monocytes % 11.1 %    Eosinophils % 0.0 %    Basophils % 0.1 %    Neutrophils Absolute 9.9 (H) 1.7 - 7.7 K/uL    Lymphocytes Absolute 0.4 (L) 1.0 - 5.1 K/uL    Monocytes Absolute 1.3 0.0 - 1.3 K/uL    Eosinophils Absolute 0.0 0.0 - 0.6 K/uL    Basophils Absolute 0.0 0.0 - 0.2 K/uL   Basic Metabolic Panel    Collection Time: 07/22/22 10:06 AM   Result Value Ref Range    Sodium 140 136 - 145 mmol/L    Potassium 4.0 3.5 - 5.1 mmol/L    Chloride 104 99 - 110 mmol/L    CO2 27 21 - 32 mmol/L    Anion Gap 9 3 - 16    Glucose 121 (H) 70 - 99 mg/dL    BUN 17 7 - 20 mg/dL    Creatinine <0.5 (L) 0.6 - 1.2 mg/dL    GFR Non-African American >60 >60    GFR African American >60 >60    Calcium 9.0 8.3 - 10.6 mg/dL   Legionella antigen, urine    Collection Time: 07/23/22  5:20 AM    Specimen: Urine, clean catch   Result Value Ref Range    L. pneumophila Serogp 1 Ur Ag       Presumptive Negative  No Legionella pneumophila serogroup 1 antigens detected. A negative result does not exclude infection with  Legionella pneumophila serogroup 1 nor does it rule out  other microbial-caused respiratory infections or  disease caused by other serogroups of  Legionella pneumophila.   Normal Range: Presumptive Negative     HERIBERTO    Collection Time: 07/23/22  6:27 AM   Result Value Ref Range    HERIBERTO POSITIVE (A) Negative   Uric Acid    Collection Time: 07/23/22  6:27 AM   Result Value Ref Range    Uric Acid, Serum 3.5 2.6 - 6.0 mg/dL   C-Reactive Protein    Collection Time: 07/23/22  6:27 AM   Result Value Ref Range    .8 (H) 0.0 - 5.1 mg/L Rheumatoid Factor    Collection Time: 07/23/22  6:27 AM   Result Value Ref Range    Rheumatoid Factor 15.0 (H) <14 IU/mL   Sedimentation Rate    Collection Time: 07/23/22  6:27 AM   Result Value Ref Range    Sed Rate 23 0 - 30 mm/Hr   Anti-DNA antibody, double-stranded    Collection Time: 07/24/22  6:24 AM   Result Value Ref Range    Anti-dsDNA IgG <1 0 - 9 IU/mL   Cyclic citrul peptide antibody, IgG    Collection Time: 07/24/22  6:24 AM   Result Value Ref Range    CC Peptide,IgG Ab <0.5 0.0 - 2.9 U/mL   COVID-19, Rapid    Collection Time: 07/24/22  1:35 PM    Specimen: Nasopharyngeal Swab   Result Value Ref Range    SARS-CoV-2, NAAT Not Detected Not Detected       [address] [place] [address] Way Problems             Last Modified POA    * (Principal) Unable to care for self 7/22/2022 Yes    Pure hypercholesterolemia (Chronic) 7/22/2022 Yes    Gout (Chronic) 7/22/2022 Yes    History of breast cancer (Chronic) 7/22/2022 Yes    Arthralgia of right elbow 7/22/2022 Yes    Unable to ambulate 7/22/2022 Yes    Depression (Chronic) 7/22/2022 Yes    Osteoarthritis, mostly knees (Chronic) 7/22/2022 Yes    Hypothyroidism (Chronic) 7/22/2022 Yes    Hypotension 7/22/2022 Yes    Unsteady gait when walking 7/22/2022 Yes    HTN (hypertension), benign (Chronic) 7/22/2022 Yes    Recurrent major depressive disorder, in partial remission (HCC) (Chronic) 7/22/2022 Yes     Ct prednisone   CCP and double stranded DNA antibodies are negative    Should be dismissal ready when arrangements for a SNF are made

## 2022-07-26 NOTE — CARE COORDINATION
[address] pre-cert request submitted via NH Access Portal.    Requested Facility:  [address] [place] [address]  Anticipated Admit Date to [facility]:  07/26/2022  Merged with [facility] #:  [id number]      Araceli updated. Will continue to follow as needed.      Electronically signed by Huong Resendiz RN on 7/26/22 at 4:00 PM EDT

## 2022-07-26 NOTE — PROGRESS NOTES
[facility] Department   Phone: [phone]    Physical Therapy    [] Initial Evaluation            [x] Daily Treatment Note         [] Discharge Summary      Patient: Uvaldo Brand   : 1933   MRN: [id number]   Date of Service:  2022  Admitting Diagnosis: Unable to care for self    Current Admission Summary: Uvaldo Brand is a 80 y.o. female who presents to the emergency department complaining of right elbow pain since yesterday evening. She denies any preceding injury. Pain has progressively worsened this morning. It is worse with range of motion and palpation. Denies any redness, fever, chills, numbness, tingling or weakness. She does live at home alone but does have home health care come to the house twice a week. Son is at bedside and states that she is getting around the house just fine. Paramedics placed her in a sling prior to arrival.  Xrays negative for acute fracture, show severe OA right elbow. Past Medical History:  has a past medical history of Breast cancer ([address] Cellulitis of left leg, Cellulitis of wrist, Chronic kidney disease, stage III (moderate) ([address] Depression, Diverticulitis of colon, DVT (deep venous thrombosis) ([address] Gout, Hematoma of left thigh, Herpes zoster, Hypertension, Hypoproteinemia ([address] Hypothyroidism, Intervertebral lumbar disc disorder with myelopathy, lumbar region, OA (osteoarthritis), Primary osteoarthritis of right wrist, Pure hypercholesterolemia, Seborrheic dermatitis, Subdural hematoma, post-traumatic ([address] and Tongue dysplasia. Past Surgical History:  has a past surgical history that includes Colon surgery (1970); Breast surgery (); joint replacement (); Mouth surgery (2015); Incisional hernia repair (2016);  section (1970); and Revision Colostomy (1970).     Discharge Recommendations: Uvaldo Brand scored a 16/24 on the AM-PAC short mobility form. Current research shows that an AM-PAC score of 17 or less is typically not associated with a discharge to the patient's home setting. Based on the patient's AM-PAC score and their current functional mobility deficits, it is recommended that the patient have 3-5 sessions per week of Physical Therapy at d/c to increase the patient's independence. Please see assessment section for further patient specific details. If patient discharges prior to next session this note will serve as a discharge summary. Please see below for the latest assessment towards goals. DME Required For Discharge: DME to be determined pending patient progress  Precautions/Restrictions: high fall risk  Weight Bearing Restrictions: no restrictions  [] Right Upper Extremity  [] Left Upper Extremity [] Right Lower Extremity  [] Left Lower Extremity     Required Braces/Orthotics: no braces required   [] Right  [] Left  Positional Restrictions:no positional restrictions    Pre-Admission Information   Lives With: alone    Type of Home: house  Home Layout: two level, able to live on main level, . Comment: Patient has stairs to basement and 2nd level but does not ever go up or down. Home Access: ramped entry  Bathroom Layout: tub/shower unit  Toilet Height: standard height  Bathroom Equipment: shower chair  Home Equipment: rolling walker  Transfer Assistance: modified independent with use of RW  Ambulation Assistance:modified independent with use of RW  ADL Assistance: independent with all ADL's  IADL Assistance: requires assistance with laundry  Active :        [x] Yes  [] No  Hand Dominance: [x] Left  [x] Right  (Patient is ambidextrous.)  Current Employment: retired. Hobbies: None shared. Recent Falls: Patient reports one fall about one month ago. Subjective  General: Patient supine in bed upon arrival. Pt agreeable to PT sessio and denies pain. Pt notes she would like to go to the bathroom and get cleaned up.    Pain: 0/10  Pain Interventions: not applicable. Functional Mobility  Bed Mobility  Supine to Sit: stand by assistance  Scooting: stand by assistance  Comments:  Patient requires increased time to complete. Transfers  Sit to stand transfer: minimal assistance, moderate assistance, . Comment Mod A from lower surfaces  Stand to sit transfer: minimal assistance  Bed to chair transfer: minimal assistance  Comments:  Gait belt donned. Min to stand from elevated height, MOD assist to stand from toilet. VC throughout session for proper hand placement and sequencing. Ambulation  Surface:level surface  Assistive Device: rolling walker  Assistance: contact guard assistance  Distance: 150' + 15' x 2 (to/from bathroom)   Gait Mechanics: dec'd lee, narrow CHARLA, forward flexed posture  Comments:  Unsteady, CGA maintained for safety and for RW management secondary to pt forward flexed posture and pushing RW out in front of her  Stair Mobility  Stair mobility not completed on this date. Comments:  Wheelchair Mobility:  No w/c mobility completed on this date. Comments:  Balance  Static Sitting Balance: fair (+): maintains balance at SBA/supervision without use of UE support  Dynamic Sitting Balance: fair (+): maintains balance at SBA/supervision without use of UE support  Static Standing Balance: fair (-): maintains balance at CGA with use of UE support  Dynamic Standing Balance: fair (-): maintains balance at CGA with use of UE support  Comments:    Other Therapeutic Interventions  Assisted patient to bathroom. Changed gown, brief and cleaned up while on the toilet. Pt completed all but her back, PT assisted with cleaning her back. Pt stood at sink to wash face, brush teeth, wash hands and comb hair with CGA. Pt took seated rest break prior to ambulation.    After ambulation pt took seated rest break prior to completing following seated B LE exercises:  - x 15 marches  - x 15 LAQ  -  x 15 ankle pumps    Functional Outcomes  AM-PAC Inpatient Mobility Raw Score : 16              Cognition  Overall Cognitive Status: Impaired  Orientation:    alert and oriented x 4  Command Following:   [facility]    Education  Barriers To Learning: hearing  Patient Education: patient educated on goals, PT role and benefits, plan of care, functional mobility training, disease specific education, transfer training  Learning Assessment:  patient verbalizes and demonstrates understanding    Assessment  Activity Tolerance: Good overall tolerance, does not appear to be impaired by right elbow pain during session although patient has difficulty using RUE to comb hair and brush teeth. Impairments Requiring Therapeutic Intervention: decreased functional mobility, decreased ROM, decreased strength, decreased balance, increased pain  Prognosis: good  Clinical Assessment:  Patient presents with no pain this session and improved functional activity tolerance as evident by ambulating further distances and standing for increased duration prior to needing seated rest break. Pt continues to require CGA for ambulation secondary to forward flexed posture and assistance needed with RW management. Pt continues to present below functional baseline and it is recommended that pt continues skilled PT services upon discharge to promote increased safety ad independence with all functional mobility to allow return to PLOF. Safety Interventions: patient left in chair, chair alarm in place, call light within reach, patient at risk for falls, telesitter in use, and nurse notified    Plan  Frequency: 3-5 x/per week  Current Treatment Recommendations: strengthening, ROM, functional mobility training, transfer training, gait training, modalities, patient/caregiver education, pain management, home exercise program, and equipment evaluation/education    Goals  Patient Goals: Decrease pain, move elbow pain free. Short Term Goals:  Time Frame: Discharge.   Patient will complete bed mobility at modified independent   Patient will complete transfers at modified independent   Patient will ambulate 50 ft with use of LRAD at modified independent  No goals met this session but progressing.     Therapy Session Time      Individual Group Co-treatment   Time In 1020      Time Out 1120      Minutes 60        Timed Code Treatment Minutes: 60 minutes  Total Treatment Minutes:  60 minutes       Electronically Signed By: Neeraj Cho, [place], DPT PT [id number]

## 2022-07-26 NOTE — DISCHARGE INSTR - COC
Continuity of Care Form    Patient Name: Norma Carpenter   :  1933  MRN:  [id number]    Admit date:  2022  Discharge date:  2022    Code Status Order: Full Code   Advance Directives:     Admitting Physician:  Jamse Severe, MD  PCP: Anila Bee DO    Discharging Nurse: [id number] Unit/Room#: 8RS-6430/5193-44  Discharging Unit Phone Number:     Emergency Contact:   Extended Emergency Contact Information  Primary Emergency Contact: [address] [place] [address] Phone: [phone]  Relation: Child    Past Surgical History:  Past Surgical History:   Procedure Laterality Date    BREAST SURGERY      right mastectomy for cancer     SECTION  1970    COLON SURGERY  1970    right partial colectomy for ruptured diverticulitis    INCISIONAL HERNIA REPAIR  2016    with mesh, laparoscopic, was incarcerated.   Autumn Strauss MD. [facility]    JOINT REPLACEMENT      right knee and left knee in     MOUTH SURGERY  2015    verrous hyperplasia left ventral tongue, WLE T1N0M0 [facility], Dr Flynn Newman  1970       Immunization History:   Immunization History   Administered Date(s) Administered    COVID-19, MODERNA BLUE border, Primary or Immunocompromised, (age 12y+), IM, 100 mcg/0.5mL 2021, 2021, 2021, 2022    Influenza Virus Vaccine 10/08/2008, 10/02/2009, 2010, 10/28/2010    Influenza, High Dose (Fluzone 65 yrs and older) 2011, 2012, 2013, 2014, 10/13/2015, 11/15/2016, 2017, 10/02/2018    Influenza, High-dose, Matthewycedgard Todd, 65 yrs +, IM (Fluzone) 10/30/2020    Influenza, Quadv, adjuvanted, 65 yrs +, IM, PF (Fluad) 10/05/2021    Influenza, Triv, inactivated, subunit, adjuvanted, IM (Fluad 65 yrs and older) 10/22/2019    Pneumococcal Conjugate 13-valent (Hyqucde10) 10/13/2015    Pneumococcal Conjugate Vaccine 1999    Pneumococcal Polysaccharide (Pohyoewyo17) 03/22/1999    Td, unspecified formulation 01/01/1999, 11/01/2009    Tdap (Boostrix, Adacel) 04/21/2018    Zoster Live (Zostavax) 03/21/2013    Zoster Recombinant (Shingrix) 07/19/2019, 09/25/2019       Active Problems:  Patient Active Problem List   Diagnosis Code    Gout M10.9    History of breast cancer Z85.3    Pure hypercholesterolemia E78.00    Depression F32. A    Osteoarthritis, mostly knees M19.90    Hypothyroidism E03.9    Hypotension I95.9    Unsteady gait when walking R26.81    HTN (hypertension), benign I10    Recurrent major depressive disorder, in partial remission ([facility] [address] F33.41    Bilateral hearing loss H91.93    Unable to care for self Z78.9    Arthralgia of right elbow M25.521    Unable to ambulate R26.2       Isolation/Infection:   Isolation            No Isolation          Patient Infection Status       None to display            Nurse Assessment:  Last Vital Signs: /72   Pulse 77   Temp 98.6 °F (37 °C) (Oral)   Resp 18   Ht 4' 11\" (1.499 m)   Wt 170 lb 12.8 oz (77.5 kg)   LMP 01/01/1970 (Exact Date)   SpO2 93%   BMI 34.50 kg/m²     Last documented pain score (0-10 scale): Pain Level: 0  Last Weight:   Wt Readings from Last 1 Encounters:   07/26/22 170 lb 12.8 oz (77.5 kg)     Mental Status:   Alert Oriented x4    IV Access:  None    Nursing Mobility/ADLs:  Walking   Assisted  Transfer  Assisted  Bathing  Assisted   Dressing  Assisted   Toileting  Assisted  Feeding  Independent  Med Admin  Assisted  Med Delivery   Whole     Wound Care Documentation and Therapy:  Incision 05/27/16 Abdomen (Active)   Number of days: 2251        Elimination:  Continence: Bowel: No  Bladder: Yes  Urinary Catheter: None   Colostomy/Ileostomy/Ileal Conduit: No       Date of Last BM: 7/27/22  No intake or output data in the 24 hours ending 07/26/22 1355  No intake/output data recorded.     Safety Concerns:     Fall risk    Impairments/Disabilities:      Wears Glasses  Nutrition Therapy:  Current Nutrition Therapy:   Regular Diet    Routes of Feeding: Oral  Liquids: Thin  Daily Fluid Restriction: No  Last Modified Barium Swallow with Video (Video Swallowing Test): N/a    Treatments at the Time of Hospital Discharge:   Respiratory Treatments: None  Oxygen Therapy:  Room air  Ventilator:        Rehab Therapies: PT/OT  Weight Bearing Status/Restrictions: Other Medical Equipment (for information only, NOT a DME order): Walker 1 assisted  Other Treatments:     Patient's personal belongings (please select all that are sent with patient):  Rhiannon MORRIS SIGNATURE:  Subhash Alonso RN    CASE MANAGEMENT/SOCIAL WORK SECTION    Inpatient Status Date: 07/22/22 - 07/27/22    Readmission Risk Assessment Score:  Readmission Risk              Risk of Unplanned Readmission:  0           Discharging to Facility/ [address] [place] [address] [facility]  Report: [phone]  Fax: [phone]       / signature: Electronically signed by JOJO Ortega, LSW on 7/27/22 at 3:05 PM EDT    PHYSICIAN SECTION    Prognosis: Guarded    Condition at Discharge: Stable    Rehab Potential (if transferring to Rehab): Fair    Recommended Labs or Other Treatments After Discharge: PT OT     Physician Certification: I certify the above information and transfer of Geovany Magallon  is necessary for the continuing treatment of the diagnosis listed and that she requires [facility] for less 30 days.      Update Admission H&P: No change in H&P    PHYSICIAN SIGNATURE:  Electronically signed by Sherice Barajas MD on 7/26/22 at 1:55 PM EDT

## 2022-07-26 NOTE — PROGRESS NOTES
[facility] [address] Department   Phone: [phone]    Occupational Therapy    [] Initial Evaluation            [x] Daily Treatment Note         [] Discharge Summary      Patient: Norma Carpenter   : 1933   MRN: [id number]   Date of Service:  2022    Admitting Diagnosis:  Unable to care for self  Current Admission Summary: 80 y.o. female who presents to the emergency department complaining of right elbow pain since yesterday evening. She denies any preceding injury. Pain has progressively worsened this morning. It is worse with range of motion and palpation. Denies any redness, fever, chills, numbness, tingling or weakness. She does live at home alone but does have home health care come to the house twice a week. Son is at bedside and states that she is getting around the house just fine. Paramedics placed her in a sling prior to arrival.  Xrays negative for acute fracture, show severe OA right elbow. Past Medical History:  has a past medical history of Breast cancer ([address] Cellulitis of left leg, Cellulitis of wrist, Chronic kidney disease, stage III (moderate) ([address] Depression, Diverticulitis of colon, DVT (deep venous thrombosis) ([address] Gout, Hematoma of left thigh, Herpes zoster, Hypertension, Hypoproteinemia ([address] Hypothyroidism, Intervertebral lumbar disc disorder with myelopathy, lumbar region, OA (osteoarthritis), Primary osteoarthritis of right wrist, Pure hypercholesterolemia, Seborrheic dermatitis, Subdural hematoma, post-traumatic ([address] and Tongue dysplasia. Past Surgical History:  has a past surgical history that includes Colon surgery (1970); Breast surgery (); joint replacement (); Mouth surgery (2015); Incisional hernia repair (2016);  section (1970); and Revision Colostomy (1970). Discharge Recommendations: Norma Carpenter scored a 15/24 on the AM-PAC ADL Inpatient form.  Current research shows that an AM-PAC score of 17 or less is typically not associated with a discharge to the patient's home setting. Based on the patient's AM-PAC score and their current ADL deficits, it is recommended that the patient have 3-5 sessions per week of Occupational Therapy at d/c to increase the patient's independence. Please see assessment section for further patient specific details. If patient discharges prior to next session this note will serve as a discharge summary. Please see below for the latest assessment towards goals. DME Required For Discharge: DME to be determined at next level of care    Precautions/Restrictions: high fall risk    Pre-Admission Information   Lives With: alone                     Type of Home: house  Home Layout: two level, able to live on main level, . Comment: Patient has stairs to basement and 2nd level but does not ever go up or down. Home Access: ramped entry  Bathroom Layout: tub/shower unit  Toilet Height: standard height  Bathroom Equipment: shower chair, w/c  Home Equipment: rolling walker  Transfer Assistance: modified independent with use of RW  Ambulation Assistance:modified independent with use of RW  ADL Assistance: independent with all ADL's  IADL Assistance: requires assistance with laundry  Active :        [x] Yes                 [] No  Hand Dominance: [x] Left                 [x] Right  (Patient is ambidextrous.)  Current Employment: retired. Hobbies: None shared. Recent Falls: Patient reports one fall about one month ago. Subjective  General: Pt sitting in recliner, fatigued but agreeable to session.   Pain: 0/10  Pain Interventions: not applicable        Activities of Daily Living  Basic Activities of Daily Living  Grooming: stand by assistance  Grooming Comments: oral/hand hygiene stance at sink  Lower Extremity Bathing: moderate assistance   Bathing Comments: posterior liset  Lower Extremity Dressing: maximum assistance  Dressing Comments: max A for depends  Toileting: moderate assistance maximum assistance. Toileting Comments: thoroughness with pericare, clothing mgmt  Instrumental Activities of Daily Living  No IADL completed on this date. Functional Mobility  Bed Mobility  Sit to Supine: minimal assistance  Scooting: minimal assistance  Bridging: minimal assistance  Comments:  Transfers  Sit to stand transfer:contact guard assistance  Stand to sit transfer: contact guard assistance  Toilet transfer: moderate assistance. Mobility technique: ambulating. Equipment utilized: standard toilet  Comments: CGA stand>sit, modA sit>stand  Functional Mobility:  Standing Balance: contact guard assistance. Standing Balance Comment: stance at sink with B forearm support on sink ~3 minutes, stance at toilet ~2-3 minutes   Functional Mobility: rolling walker. contact guard assistance, minimal assistance.   Activity: to/from bathroom, ~50 feet x 2 (forward flexed posture)    Other Therapeutic Interventions    Functional Outcomes  AM-PAC Inpatient Daily Activity Raw Score: 15    Cognition  Overall Cognitive Status: Impaired  Arousal/Alterness: inconsistent responses to stimuli  Following Commands: follows one step commands with repetition  Attention Span: attends with cues to redirect  Memory: decreased recall of recent events, decreased long term memory  Safety Judgement: decreased awareness of need for assistance, decreased awareness of need for safety  Problem Solving: assistance required to generate solutions, assistance required to implement solutions, assistance required to identify errors made, assistance required to correct errors made  Insights: decreased awareness of deficits  Sequencing: requires cues for some  Orientation:    alert and oriented x 4  Command Following:   [facility]     Education  Barriers To Learning: cognition  Patient Education: patient educated on goals, OT role and benefits, plan of care, ADL adaptive strategies, transfer training, discharge recommendations  Learning Assessment:  patient verbalizes understanding, would benefit from continued reinforcement, patient will require reinforcement due to cognitive deficits    Assessment  Activity Tolerance: Difficulty incorporating RUE into ADL (combing hair, brushing teeth), low task tolerance  Impairments Requiring Therapeutic Intervention: decreased functional mobility, decreased ADL status, decreased endurance, decreased balance, decreased IADL  Prognosis: good  Clinical Assessment: Pt presents below baseline d/t above deficits, unsafe to return home alone at this time d/t cognitive deficits as she typically lives alone.  Continued OT indicated to promote return to PLOF  Safety Interventions: patient left in chair, chair alarm in place, call light within reach, gait belt, and nurse notified    Plan  Frequency: 3-5 x/per week  Current Treatment Recommendations: strengthening, ROM, balance training, functional mobility training, transfer training, endurance training, patient/caregiver education, and ADL/self-care training    Goals    Short Term Goals:  Time Frame: by discharge (ongoing 7/26)  Patient will complete upper body ADL at supervision   Patient will complete lower body ADL at minimal assistance   Patient will complete toileting at contact guard assistance   Patient will complete grooming at supervision   Patient will complete functional transfers at stand by assistance   Patient will complete functional mobility at stand by assistance     Therapy Session Time     Individual Group Co-treatment   Time In    1436   Time Out    1500   Minutes    24        Timed Code Treatment Minutes:   24 minutes  Total Treatment Minutes:  24 minutes       Electronically Signed By: IWONA Manzanares/MICHELLE [id number]

## 2022-07-27 VITALS
WEIGHT: 170.2 LBS | HEART RATE: 82 BPM | SYSTOLIC BLOOD PRESSURE: 140 MMHG | BODY MASS INDEX: 34.31 KG/M2 | OXYGEN SATURATION: 93 % | TEMPERATURE: 97.7 F | DIASTOLIC BLOOD PRESSURE: 70 MMHG | HEIGHT: 59 IN | RESPIRATION RATE: 16 BRPM

## 2022-07-27 PROCEDURE — G0378 HOSPITAL OBSERVATION PER HR: HCPCS

## 2022-07-27 PROCEDURE — 6370000000 HC RX 637 (ALT 250 FOR IP): Performed by: INTERNAL MEDICINE

## 2022-07-27 PROCEDURE — 97530 THERAPEUTIC ACTIVITIES: CPT

## 2022-07-27 PROCEDURE — 97116 GAIT TRAINING THERAPY: CPT

## 2022-07-27 PROCEDURE — 97535 SELF CARE MNGMENT TRAINING: CPT

## 2022-07-27 RX ADMIN — TOBRAMYCIN OPHTHALMIC SOLUTION 1 DROP: 3 SOLUTION/ DROPS OPHTHALMIC at 09:18

## 2022-07-27 RX ADMIN — ACETAMINOPHEN 500 MG: 500 TABLET ORAL at 09:19

## 2022-07-27 RX ADMIN — Medication 400 MG: at 09:19

## 2022-07-27 RX ADMIN — BUPROPION HYDROCHLORIDE 150 MG: 150 TABLET, EXTENDED RELEASE ORAL at 09:18

## 2022-07-27 RX ADMIN — TOBRAMYCIN OPHTHALMIC SOLUTION 1 DROP: 3 SOLUTION/ DROPS OPHTHALMIC at 14:17

## 2022-07-27 RX ADMIN — ASPIRIN 81 MG: 81 TABLET, COATED ORAL at 09:19

## 2022-07-27 RX ADMIN — POTASSIUM CHLORIDE 10 MEQ: 750 TABLET, FILM COATED, EXTENDED RELEASE ORAL at 09:19

## 2022-07-27 RX ADMIN — GUAIFENESIN AND DEXTROMETHORPHAN 5 ML: 100; 10 SYRUP ORAL at 04:17

## 2022-07-27 RX ADMIN — LEVOTHYROXINE SODIUM 100 MCG: 0.1 TABLET ORAL at 06:19

## 2022-07-27 RX ADMIN — TOBRAMYCIN OPHTHALMIC SOLUTION 1 DROP: 3 SOLUTION/ DROPS OPHTHALMIC at 04:02

## 2022-07-27 RX ADMIN — Medication 1 TABLET: at 09:18

## 2022-07-27 RX ADMIN — LISINOPRIL 2.5 MG: 5 TABLET ORAL at 09:19

## 2022-07-27 RX ADMIN — Medication 2000 UNITS: at 09:18

## 2022-07-27 ASSESSMENT — PAIN SCALES - GENERAL
PAINLEVEL_OUTOF10: 0

## 2022-07-27 NOTE — CARE COORDINATION
Pre-cert remains pending at this time for possible transfer to [facility].

## 2022-07-27 NOTE — PROGRESS NOTES
[facility]   Phone: [phone]    Occupational Therapy    [] Initial Evaluation            [x] Daily Treatment Note         [] Discharge Summary      Patient: Robert Urbina   : 1933   MRN: [id number]   Date of Service:  2022    Admitting Diagnosis:  Unable to care for self  Current Admission Summary: 80 y.o. female who presents to the emergency department complaining of right elbow pain since yesterday evening. She denies any preceding injury. Pain has progressively worsened this morning. It is worse with range of motion and palpation. Denies any redness, fever, chills, numbness, tingling or weakness. She does live at home alone but does have home health care come to the house twice a week. Son is at bedside and states that she is getting around the house just fine. Paramedics placed her in a sling prior to arrival.  Xrays negative for acute fracture, show severe OA right elbow. Past Medical History:  has a past medical history of Breast cancer ([address] Cellulitis of left leg, Cellulitis of wrist, Chronic kidney disease, stage III (moderate) ([address] Depression, Diverticulitis of colon, DVT (deep venous thrombosis) ([address] Gout, Hematoma of left thigh, Herpes zoster, Hypertension, Hypoproteinemia ([address] Hypothyroidism, Intervertebral lumbar disc disorder with myelopathy, lumbar region, OA (osteoarthritis), Primary osteoarthritis of right wrist, Pure hypercholesterolemia, Seborrheic dermatitis, Subdural hematoma, post-traumatic ([address] and Tongue dysplasia. Past Surgical History:  has a past surgical history that includes Colon surgery (1970); Breast surgery (); joint replacement (); Mouth surgery (2015); Incisional hernia repair (2016);  section (1970); and Revision Colostomy (1970). Discharge Recommendations: Robert Urbina scored a 17/24 on the AM-PAC ADL Inpatient form.  Current research shows that an AM-PAC score of 17 or less is typically not associated with a discharge to the patient's home setting. Based on the patient's AM-PAC score and their current ADL deficits, it is recommended that the patient have 3-5 sessions per week of Occupational Therapy at d/c to increase the patient's independence. Please see assessment section for further patient specific details. If patient discharges prior to next session this note will serve as a discharge summary. Please see below for the latest assessment towards goals. DME Required For Discharge: DME to be determined at next level of care    Precautions/Restrictions: high fall risk    Pre-Admission Information   Lives With: alone                     Type of Home: house  Home Layout: two level, able to live on main level, . Comment: Patient has stairs to basement and 2nd level but does not ever go up or down. Home Access: ramped entry  Bathroom Layout: tub/shower unit  Toilet Height: standard height  Bathroom Equipment: shower chair, w/c  Home Equipment: rolling walker  Transfer Assistance: modified independent with use of RW  Ambulation Assistance:modified independent with use of RW  ADL Assistance: independent with all ADL's  IADL Assistance: requires assistance with laundry  Active :        [x] Yes                 [] No  Hand Dominance: [x] Left                 [x] Right  (Patient is ambidextrous.)  Current Employment: retired. Hobbies: None shared. Recent Falls: Patient reports one fall about one month ago. Subjective  General: Pt sitting in recliner, agreeable to session. Pt with several strong productive cough bouts throughout session. Pt reports no pain in R elbow.   Pain: 0/10  Pain Interventions: not applicable        Activities of Daily Living  Basic Activities of Daily Living  Grooming: stand by assistance  Grooming Comments: oral/hand hygiene and benitez hair stance at sink ~3-4minutes  Upper Extremity Bathing: minimal assistance . Comment: seated in chair, Min A to wash back  Lower Extremity Bathing: contact guard assistance . Comment: liset/posterior liset care in standing    Dressing Comments: doff socks mod I with figure 4 tech, don socks with Min A and figure 4 tech, increased time to position BLE in figure 4. Pt declines brief change, CGA for standing balance to doff/don brief over hips for liset hygiene  Instrumental Activities of Daily Living  No IADL completed on this date. Functional Mobility  Bed Mobility  Bed mobility not completed on this date. Comments:  Transfers  Sit to stand transfer:minimal assistance  Stand to sit transfer: contact guard assistance  Comments: chair x2 with Min A, requires rocking for momentum  Functional Mobility:  Standing Balance: stand by assistance. Standing Balance Comment: stance at sink with B forearm support on sink ~3 minutes,    Functional Mobility: rolling walker. contact guard assistance.   Activity: to/from bathroom 10+10 (forward flexed)    Other Therapeutic Interventions    Functional Outcomes  AM-PAC Inpatient Daily Activity Raw Score: 17    Cognition  Overall Cognitive Status: Impaired  Arousal/Alterness: appropriate responses to stimuli  Following Commands: follows one step commands consistently  Attention Span: appears intact  Safety Judgement: decreased awareness of need for assistance, decreased awareness of need for safety  Insights: decreased awareness of deficits  Sequencing: requires cues for some  Orientation:    alert and oriented x 4  Command Following:   [facility]     Education  Barriers To Learning: cognition  Patient Education: patient educated on goals, OT role and benefits, plan of care, ADL adaptive strategies, transfer training, discharge recommendations  Learning Assessment:  patient verbalizes understanding, would benefit from continued reinforcement, patient will require reinforcement due to cognitive deficits    Assessment  Activity Tolerance: fair, pt with persistent, productive cough throughout session, wheezing noted, SpO2 93% on RA  Impairments Requiring Therapeutic Intervention: decreased functional mobility, decreased ADL status, decreased endurance, decreased balance, decreased IADL  Prognosis: good  Clinical Assessment: Pt presents below baseline d/t above deficits, unsafe to return home alone at this time as she typically lives alone and currently requires CGA for functional mobility and Min A for ADLs.  Continued OT indicated to promote return to PLOF  Safety Interventions: patient left in chair, chair alarm in place, call light within reach, gait belt, and nurse notified    Plan  Frequency: 3-5 x/per week  Current Treatment Recommendations: strengthening, ROM, balance training, functional mobility training, transfer training, endurance training, patient/caregiver education, and ADL/self-care training    Goals    Short Term Goals:  Time Frame: by discharge (ongoing 7/26, 7/27)  Patient will complete upper body ADL at supervision   Patient will complete lower body ADL at minimal assistance   Patient will complete toileting at contact guard assistance   Patient will complete grooming at supervision   Patient will complete functional transfers at stand by assistance   Patient will complete functional mobility at stand by assistance     Therapy Session Time     Individual Group Co-treatment   Time In  1048     Time Out  1116     Minutes  28          Timed Code Treatment Minutes:   28 minutes  Total Treatment Minutes:  28minutes       Electronically Signed By: IWONA Pendleton/MICHELLE [id number]

## 2022-07-27 NOTE — CARE COORDINATION
Pre-cert has been obtained for patient to transfer to [facility]. Per [address]  Authorized 07/26/2022 - 07/28/2022  next review date 07/28/2022.    Huy Wong ID [id number]    Call placed to [facility] to see if they can accept the patient today, await call back

## 2022-07-27 NOTE — PROGRESS NOTES
[facility]   Phone: [phone]    Physical Therapy    [] Initial Evaluation            [x] Daily Treatment Note         [] Discharge Summary      Patient: Margaret Corral   : 1933   MRN: [id number]   Date of Service:  2022  Admitting Diagnosis: Unable to care for self    Current Admission Summary: Margaret Corral is a 80 y.o. female who presents to the emergency department complaining of right elbow pain since yesterday evening. She denies any preceding injury. Pain has progressively worsened this morning. It is worse with range of motion and palpation. Denies any redness, fever, chills, numbness, tingling or weakness. She does live at home alone but does have home health care come to the house twice a week. Son is at bedside and states that she is getting around the house just fine. Paramedics placed her in a sling prior to arrival.  Xrays negative for acute fracture, show severe OA right elbow. Past Medical History:  has a past medical history of Breast cancer ([address] Cellulitis of left leg, Cellulitis of wrist, Chronic kidney disease, stage III (moderate) ([address] Depression, Diverticulitis of colon, DVT (deep venous thrombosis) ([address] Gout, Hematoma of left thigh, Herpes zoster, Hypertension, Hypoproteinemia ([address] Hypothyroidism, Intervertebral lumbar disc disorder with myelopathy, lumbar region, OA (osteoarthritis), Primary osteoarthritis of right wrist, Pure hypercholesterolemia, Seborrheic dermatitis, Subdural hematoma, post-traumatic ([address] and Tongue dysplasia. Past Surgical History:  has a past surgical history that includes Colon surgery (1970); Breast surgery (); joint replacement (); Mouth surgery (2015); Incisional hernia repair (2016);  section (1970); and Revision Colostomy (1970).     Discharge Recommendations: Margaret Corral scored a 16/24 on the AM-PAC short mobility form. Current research shows that an AM-PAC score of 17 or less is typically not associated with a discharge to the patient's home setting. Based on the patient's AM-PAC score and their current functional mobility deficits, it is recommended that the patient have 3-5 sessions per week of Physical Therapy at d/c to increase the patient's independence. Please see assessment section for further patient specific details. If patient discharges prior to next session this note will serve as a discharge summary. Please see below for the latest assessment towards goals. DME Required For Discharge: DME to be determined pending patient progress  Precautions/Restrictions: high fall risk  Weight Bearing Restrictions: no restrictions  [] Right Upper Extremity  [] Left Upper Extremity [] Right Lower Extremity  [] Left Lower Extremity     Required Braces/Orthotics: no braces required   [] Right  [] Left  Positional Restrictions:no positional restrictions    Pre-Admission Information   Lives With: alone    Type of Home: house  Home Layout: two level, able to live on main level, . Comment: Patient has stairs to basement and 2nd level but does not ever go up or down. Home Access: ramped entry  Bathroom Layout: tub/shower unit  Toilet Height: standard height  Bathroom Equipment: shower chair  Home Equipment: rolling walker  Transfer Assistance: modified independent with use of RW  Ambulation Assistance:modified independent with use of RW  ADL Assistance: independent with all ADL's  IADL Assistance: requires assistance with laundry  Active :        [x] Yes  [] No  Hand Dominance: [x] Left  [x] Right  (Patient is ambidextrous.)  Current Employment: retired. Hobbies: None shared. Recent Falls: Patient reports one fall about one month ago. Subjective  General: Patient reclined in bed upon arrival, eating breakfast. Pt agreeable to PT session and denies pain. Pt notes she would like to go to the bathroom.   Pain: 0/10  Pain Interventions: not applicable. Functional Mobility  Bed Mobility  Supine to Sit: stand by assistance  Scooting: stand by assistance  Comments:  Patient requires increased time to complete, HOB elevated, bedrail. Transfers  Sit to stand transfer: minimal assistance  Stand to sit transfer: contact guard assistance  Comments:  from bed, toilet, chair 2x; verbal cues for hand placement    Ambulation  Surface:level surface  Assistive Device: rolling walker  Assistance: contact guard assistance  Distance: 175 + 15' x 2 (to/from bathroom)   Gait Mechanics: dec'd lee, narrow CHARLA, forward flexed posture  Comments:  Unsteady, CGA maintained for safety and for RW management secondary to pt forward flexed posture and pushing RW out in front of her  Stair Mobility  Stair mobility not completed on this date. Comments:  Wheelchair Mobility:  No w/c mobility completed on this date.   Comments:  Balance  Static Sitting Balance: fair (+): maintains balance at SBA/supervision without use of UE support  Dynamic Sitting Balance: fair (+): maintains balance at SBA/supervision without use of UE support  Static Standing Balance: fair (-): maintains balance at CGA with use of UE support  Dynamic Standing Balance: fair (-): maintains balance at CGA with use of UE support  Comments: pt requires assistance to pull brief up and down due to decreased standing balance and endurance; stands at sink to wash hands with CGA, flexed posture    Other Therapeutic Interventions    Functional Outcomes  AM-PAC Inpatient Mobility Raw Score : 16              Cognition  Overall Cognitive Status: Impaired  Orientation:    alert and oriented x 4  Command Following:   [facility]    Education  Barriers To Learning: hearing  Patient Education: patient educated on goals, PT role and benefits, plan of care, functional mobility training, disease specific education, transfer training  Learning Assessment:  patient verbalizes and demonstrates understanding    Assessment  Activity Tolerance: Pt tolerated treatment well; no dizziness or SOB; some mild wheezing after ambulation in hallway, SpO2 dropped to 83% and recovered to 93% in <30 sec  Impairments Requiring Therapeutic Intervention: decreased functional mobility, decreased ROM, decreased strength, decreased balance, increased pain  Prognosis: good  Clinical Assessment:   Pt is limited by the above deficits and would benefit from skilled PT services to maximize pt functional mobility and safety prior to discharge. Pt is ambulating increased distances with RW with CGA. Pt requires CGA-min A for standing balance due to need for B UE support in standing. Pt is very  motivated to participate in therapy and return home independently. Recommend SNF at discharge due to decreased activity tolerance and pt is currently a high fall risk. Safety Interventions: patient left in chair, chair alarm in place, call light within reach, patient at risk for falls, telesitter in use, and nurse notified    Plan  Frequency: 3-5 x/per week  Current Treatment Recommendations: strengthening, ROM, functional mobility training, transfer training, gait training, modalities, patient/caregiver education, pain management, home exercise program, and equipment evaluation/education    Goals  Patient Goals: Decrease pain, move elbow pain free. Short Term Goals:  Time Frame: Discharge. Patient will complete bed mobility at modified independent   Patient will complete transfers at [facility]   Patient will ambulate 50 ft with use of LRAD at [facility]  No goals met this session but progressing.     Therapy Session Time      Individual Group Co-treatment   Time In 2986      Time Out 1024      Minutes 46        Timed Code Treatment Minutes: 46 minutes  Total Treatment Minutes:  46 minutes       Electronically Signed By: Akanksha Skelton, PT

## 2022-07-27 NOTE — PROGRESS NOTES
Department of Internal Medicine  General Internal Medicine   Progress Note      SUBJECTIVE: elbow pain significantly improved trying to participate in PT and OT     History obtained from chart review and the patient  General ROS: positive for  - fatigue, malaise, and weight loss  negative for - chills, fever, or night sweats  Psychological ROS: positive for - anxiety  negative for - behavioral disorder, disorientation, hallucinations, or irritability  Ophthalmic ROS: negative  Respiratory ROS: no cough, shortness of breath, or wheezing  Cardiovascular ROS: no chest pain or dyspnea on exertion  Gastrointestinal ROS: no abdominal pain, change in bowel habits, or black or bloody stools  Genito-Urinary ROS: no dysuria, trouble voiding, or hematuria  Musculoskeletal ROS: pain right elbow   Neurological ROS: no TIA or stroke symptoms  Dermatological ROS: negative    OBJECTIVE      Medications      Current Facility-Administered Medications: benzonatate (TESSALON) capsule 100 mg, 100 mg, Oral, TID PRN  tobramycin (TOBREX) 0.3 % ophthalmic solution 1 drop, 1 drop, Both Eyes, 6 times per day  guaiFENesin-dextromethorphan (ROBITUSSIN DM) 100-10 MG/5ML syrup 5 mL, 5 mL, Oral, Q4H PRN  acetaminophen (TYLENOL) tablet 500 mg, 500 mg, Oral, Daily  ammonium lactate (AMLACTIN) 12 % cream, , Topical, PRN  aspirin EC tablet 81 mg, 81 mg, Oral, Daily  atorvastatin (LIPITOR) tablet 20 mg, 20 mg, Oral, Nightly  buPROPion (WELLBUTRIN SR) extended release tablet 150 mg, 150 mg, Oral, BID  Vitamin D (CHOLECALCIFEROL) tablet 2,000 Units, 2,000 Units, Oral, Daily  levothyroxine (SYNTHROID) tablet 100 mcg, 100 mcg, Oral, Daily  lisinopril (PRINIVIL;ZESTRIL) tablet 2.5 mg, 2.5 mg, Oral, Daily  melatonin tablet 3 mg, 3 mg, Oral, Nightly  therapeutic multivitamin-minerals 1 tablet, 1 tablet, Oral, Daily  niacin (SLO-NIACIN) extended release tablet 500 mg, 500 mg, Oral, Nightly  potassium chloride (KLOR-CON) extended release tablet 10 mEq, 10 mEq, Oral, Daily  magnesium oxide (MAG-OX) tablet 400 mg, 400 mg, Oral, Daily  enoxaparin (LOVENOX) injection 40 mg, 40 mg, SubCUTAneous, Nightly  oxyCODONE-acetaminophen (PERCOCET) 5-325 MG per tablet 1 tablet, 1 tablet, Oral, Q4H PRN    Physical      Vitals: BP (!) 144/84   Pulse 73   Temp 97.9 °F (36.6 °C) (Oral)   Resp 16   Ht 4' 11\" (1.499 m)   Wt 170 lb 3.2 oz (77.2 kg)   LMP 1970 (Exact Date)   SpO2 92%   BMI 34.38 kg/m²   Temp: Temp: 97.9 °F (36.6 °C)  Max: Temp  Av.9 °F (36.6 °C)  Min: 97 °F (36.1 °C)  Max: 98.8 °F (37.1 °C)  Respiration range:  Resp  Av.7  Min: 16  Max: 18  Pulse Range:  Pulse  Av.8  Min: 73  Max: 87  Blood pressure range:  Systolic (51OXG), NSN:727 , Min:122 , KCB:863   , Diastolic (96DBT), RSJ:21, Min:62, Max:84    SpO2  Av.8 %  Min: 92 %  Max: 95 %  No intake or output data in the 24 hours ending 22 1333      Vent settings:  Pulse  Av.5  Min: 65  Max: 106  Resp  Av.1  Min: 16  Max: 22  SpO2  Av.7 %  Min: 85 %  Max: 98 %    CONSTITUTIONAL:  awake, alert, cooperative, no apparent distress, appears stated age, and moderately obese  EYES:  unremarkable   NECK:  Supple, symmetrical, trachea midline, no adenopathy, thyroid symmetric, not enlarged and no tenderness, skin normal  BACK:  symmetric and no curvature  LUNGS:  No increased work of breathing, good air exchange, clear to auscultation bilaterally, no crackles or wheezing  CARDIOVASCULAR:  Normal apical impulse, regular rate and rhythm, normal S1 and S2, no S3 or S4, and no murmur noted  ABDOMEN:  No scars, normal bowel sounds, soft, non-distended, non-tender, no masses palpated, no hepatosplenomegally  MUSCULOSKELETAL:  tenderness and minimal swelling right elbow improved since admission   NEUROLOGIC:  grossly intact   SKIN:  warm and dry  and no bruising or bleeding    Data      Recent Results (from the past 96 hour(s))   Anti-DNA antibody, double-stranded    Collection Time: 22 6:24 AM   Result Value Ref Range    Anti-dsDNA IgG <1 0 - 9 IU/mL   Cyclic citrul peptide antibody, IgG    Collection Time: 07/24/22  6:24 AM   Result Value Ref Range    CC Peptide,IgG Ab <0.5 0.0 - 2.9 U/mL   COVID-19, Rapid    Collection Time: 07/24/22  1:35 PM    Specimen: Nasopharyngeal Swab   Result Value Ref Range    SARS-CoV-2, NAAT Not Detected Not Detected   Antinuclear AB, Single Pattern    Collection Time: 07/26/22  3:00 PM   Result Value Ref Range    HERIBERTO Pattern [employer]rome[employer] (A)     HERIBERTO TITER 1:640 (A)        ASSESSMENT AND PLAN     Hospital Problems             Last Modified POA    * (Principal) Unable to care for self 7/22/2022 Yes    Pure hypercholesterolemia (Chronic) 7/22/2022 Yes    Gout (Chronic) 7/22/2022 Yes    History of breast cancer (Chronic) 7/22/2022 Yes    Arthralgia of right elbow 7/22/2022 Yes    Unable to ambulate 7/22/2022 Yes    Depression (Chronic) 7/22/2022 Yes    Osteoarthritis, mostly knees (Chronic) 7/22/2022 Yes    Hypothyroidism (Chronic) 7/22/2022 Yes    Hypotension 7/22/2022 Yes    Unsteady gait when walking 7/22/2022 Yes    HTN (hypertension), benign (Chronic) 7/22/2022 Yes    Recurrent major depressive disorder, in partial remission ([facility] [address] (Chronic) 7/22/2022 Yes     Ct present care , ready for dismissal when NH placement  pre-cert is obtained

## 2022-07-28 ENCOUNTER — CARE COORDINATION (OUTPATIENT)
Dept: CARE COORDINATION | Age: 87
End: 2022-07-28

## 2022-07-28 NOTE — CARE COORDINATION
Outreach call to patient's residents. Outreach call made and no answer. Left message with ACM contact information. Outreach call to [facility]. This ACM spoke with Louis Hogue the , who then connected me to Radha Fish, nurse Supervisor. Radha Fish advised that the patient just was admitted there yesterday and she was there for therapy, not long term care. Radha Fish advised that she didn't know how long she would be there, but it would be a minimal of 1 week. This ACM thanked Radha Fish for her time. This ACM will continue to monitor and will reach out to the patient when it is appropriate.

## 2022-08-15 ENCOUNTER — OFFICE VISIT (OUTPATIENT)
Dept: FAMILY MEDICINE CLINIC | Age: 87
End: 2022-08-15
Payer: MEDICARE

## 2022-08-15 ENCOUNTER — CARE COORDINATION (OUTPATIENT)
Dept: CASE MANAGEMENT | Age: 87
End: 2022-08-15

## 2022-08-15 VITALS
SYSTOLIC BLOOD PRESSURE: 122 MMHG | OXYGEN SATURATION: 97 % | BODY MASS INDEX: 34.88 KG/M2 | HEIGHT: 59 IN | HEART RATE: 65 BPM | WEIGHT: 173 LBS | RESPIRATION RATE: 14 BRPM | DIASTOLIC BLOOD PRESSURE: 82 MMHG

## 2022-08-15 DIAGNOSIS — M25.521 RIGHT ELBOW PAIN: ICD-10-CM

## 2022-08-15 DIAGNOSIS — J40 BRONCHITIS: ICD-10-CM

## 2022-08-15 DIAGNOSIS — R60.0 EDEMA OF BOTH LEGS: ICD-10-CM

## 2022-08-15 DIAGNOSIS — R53.1 WEAKNESS GENERALIZED: Primary | ICD-10-CM

## 2022-08-15 PROCEDURE — 99215 OFFICE O/P EST HI 40 MIN: CPT | Performed by: FAMILY MEDICINE

## 2022-08-15 PROCEDURE — 1124F ACP DISCUSS-NO DSCNMKR DOCD: CPT | Performed by: FAMILY MEDICINE

## 2022-08-15 ASSESSMENT — ENCOUNTER SYMPTOMS
SHORTNESS OF BREATH: 0
CHEST TIGHTNESS: 0
VOICE CHANGE: 1
WHEEZING: 0
CHOKING: 1
RHINORRHEA: 0
SORE THROAT: 0
SINUS PAIN: 0
TROUBLE SWALLOWING: 0
SINUS PRESSURE: 0

## 2022-08-15 NOTE — PROGRESS NOTES
high Blood Pressure, swelling in the feet or ankles or have heart problems. 2. Humidity: Humidify the air to 35-50% ( or until the windows fog over slightly). Can use a humidifier, vaporizer, boil water on the stove or put a coffee can full of water on the heater vents. This will loosen mucus from infections and allergies. 3. Sleep: Get 8-10 hours a night and rest during the evening after work or school. If you have trouble sleeping, adults can take Melatonin 5mg up to 2 tabs at bedtime ( not for children or pregnant women). If Mono is suspected then sleep during 9PM to 9AM time span (if possible.)  4. Cough: Take cough medicines with Guaifenesin ( to loosen chest or head congestion) and Dextromethorphan ( to decrease excess cough). Robitussin D.M. Syrup every 4-6 hrs OR Mucinex D. M. pills OR Delsym DM syrup twice a day. Use the pediatric formulations for children over 6 months making sure they are alcohol & sugar free for children, pregnant women, and diabetics. 5. Pain And Fevers: Take Acetaminophen ( Tylenol) for fevers, aches, and headaches. 2-500 mg every 8 hours for adults. Appropriate doses at bedtime for children may help them sleep better. If pregnant take 1 -500 mg (Tylenol) every 8 hours as needed. Ibuprofen/Aleve/aspirin for pain and fevers SHOULD NOT BE USED IN THE SETTING OF POSSIBLE COVID-19 viral infection NOR if pregnant, if you have acid reflux, high blood pressure, CHF, or kidney problems. 6.Gargle: (DAY ONE OF SYMPTOMS) Gargle in the back of the throat with the head tilted back and to the sides with a strong mouthwash  ( Listerine or Scope) after meals and at bedtime at least 4 -5 times a day. This helps kill bacteria and viruses in the back of the throat and will shorten the duration and decrease the severity of your symptoms: sore throat, cough, ear popping,/ear pain, and possibly dizziness. 7. Smoking: Avoid smoking or exposure to second hand smoke.   8. Zinc: (DAY ONE OF SYMPTOMS)  Zinc lozenges such as Cold Mati (available most stores), or Basic ([employer] brand) will help shorten the duration and lessen symptoms such as sore throat, cough, nasal congestion, runny nose, and post nasal drip. Use 1 lozenge every 2-4 hours ( after meals if stomach is sensitive). Children can use 10-15 mg or less 3-4 times a day or Zinc lollypops. In pregnancy limit to 50-60 mg a day for 7 days as prenatals have Zinc also. With diarrhea use zinc pills 50 mg 1/2 to 1 pill 2x/day WITH OR SHORTLY AFTER A MEAL. 9. Vitamins: Vitamin C 500 mg with breakfast and dinner (with suspected or diagnosed COVID-19 infection take vitamin C 1000 mg 2-3 times a day). Children and pregnant women should drink citrus juices. This speeds healing and strengthens immune system. 10. Chest Symptoms: Vicks Vapor rub to the chest at bedtime. 11. Decongestants: Avoid all decongestants and antihistamine cold preparations in children. Decongestants should always be avoided in people with high blood pressure, palpitations, heart disease and those on stimulant medications. Antihistamines may impede the body's ability to fight COVID-19.  12. Frequent hand washing and/or hand gel especially after coughing or sneezing into the hands or blowing the nose to help prevent spreading to others. Use kleenex and NOT handkerchiefs. Try all of the above starting with day 1 of symptoms. If Strep throat symptoms appear call to be seen in the office as soon as possible and don't gargle on that day. Newborns, infants, or anyone with earaches or influenza may need to be seen quickly. Adults with fevers over 103 degrees or shortness of breath should call the office immediately. Edema of both legs  Keep leg elevated when sitting. Wear compression stockings as much as possible. If feet are down while sitting press toes and front of foot down then lift them firmly with heel on the ground. When standing gently rock from heels to standing on toe tips.   These are nasal spray, two sprays each nostril four times a day. 14.  Aspirin 81 mg once a day. 15.  Vitamin D3 2000 units daily. 16.  Melatonin 5 mg daily. 17.  AmLactin every day. 18.  Tylenol 650 q.i.d. p.r.n. 19.  Multivitamin once a day. HISTORY OF PRESENT ILLNESS:  This 45-year-old white American lady, who  lives by herself, came in. She is a . She has severe pain in the  right elbow. Using a walker to get around within her home. Lives  independently. She has also excruciating pain in the right shoulder. No recent trauma. Her blood pressure was 134/73, respirations 17, heart  rate was 84, temperature spiked as high as 99.4. Right elbow was  excruciatingly, exquisitely tender with severe restriction of range of  motion and rise in local temperature. There was suspicion of elbow  cellulitis and possible septic arthritis. HOSPITAL COURSE:  The patient was admitted for pain management. PT and  OT evaluation since dismissing her to her home environment was  impossible and not compatible with her safety. The patient definitely  needed some skilled nursing facility. Physical and Occupational Therapy  was involved. Our case management worked very hard on finding a  placement for her _____ the patient's family were given options of being  placed in either [facility] _____ [facility], so it took some time  for family to check out those places. Finally, after 4 to 5 days of  observation stay and clinical stabilization, the patient was discharged. Her [facility] score for independent living was 15/24, which is less than 17  and hence she definitely needed a skilled nursing facility placement,  which was finally accomplished. The patient was discharged in stable  condition to a nursing home on 07/27/2022. This was an observational  stay. NOW  Pt was admitted to the hospital for incapacitating elbow pain. The son thought she had cellulitis per his impression of the ER doctors guess.  The back side of the elbow was swollen red and warm. She had a cough for  1 wk but was not bed bound. Used Mucinex and cough drops. Had not injured her elbow. Has a hx of gout but had a normal uric acid here in 2/2022 and in the hospital. Was placed Prednisone 10 mg bid. She was better after 1 day and was completely better after 3 days. She was in hospital bed for several days. Then got her up in a chair. By then she was so weak she couldn't get around. Prior to the hospital coughing for 1 week. No COVID test then but tested neg in the hospital. Still has a cough. Got Zithromax 500 mg qd x 3 days. She got another course of Z pack x 5 days. SaO2. She is still very hoarse. Also got an eye infection. Sent to [facility] for [facility] 7/27-8-12/22. They gave her Oxycodone but she only took one dose. Got PT and was walking well. Had swelling in legs in [facility] 8/5/22. She thinks was after a hot bath. Does not recall meals being salty. She was on Furosemide while in the ECF on 8/9/22. Swelling stayed the same. They did not elevate her feet high. She has elevated her toes above her nose she got home and is going down. She was sleeping on her right side before the hospital and since then on her back. She sleeps with 1 regular pillow and a little pillow to raise her head. She is not SOB laying down flat.      azithromycin [facility]) tablet 500 mg Completed DAILY 07/24/22 07/26/22   tobramycin (TOBREX) 0.3 % ophthalmic solution 1 drop Discontinued 6 times per day 07/24/22 07/27/22   benzonatate (TESSALON) capsule 100 mg Discontinued 3 TIMES DAILY PRN 07/24/22 07/27/22   guaiFENesin-dextromethorphan (ROBITUSSIN DM) 100-10 MG/5ML syrup 5 mL Discontinued EVERY 4 HOURS PRN 07/23/22 07/27/22   oxyCODONE-acetaminophen (PERCOCET) 5-325 MG per tablet 1 tablet Discontinued EVERY 4 HOURS PRN 07/22/22 07/27/22   predniSONE (DELTASONE) tablet 10 mg Completed 2 TIMES DAILY 07/22/22 07/25/22   acetaminophen (TYLENOL) tablet 500 mg Discontinued DAILY 07/22/22 07/27/22   ammonium lactate (AMLACTIN) 12 % cream Discontinued PRN 07/22/22 07/27/22   aspirin EC tablet 81 mg Discontinued DAILY 07/22/22 07/27/22   atorvastatin (LIPITOR) tablet 20 mg Discontinued NIGHTLY 07/22/22 07/27/22   buPROPion [facility] SR) extended release tablet 150 mg Discontinued 2 TIMES DAILY 07/22/22 07/27/22   Vitamin D (CHOLECALCIFEROL) tablet 2,000 Units Discontinued DAILY 07/22/22 07/27/22   Glucosamine-Chondroitin--333.3-200 MG TABS 1 tablet Discontinued DAILY 07/22/22 07/22/22   ipratropium (ATROVENT) 0.06 % nasal spray 2 spray Discontinued 4 TIMES DAILY 07/22/22 07/22/22   levothyroxine (SYNTHROID) tablet 100 mcg Discontinued DAILY 07/22/22 07/27/22   lisinopril (PRINIVIL;ZESTRIL) tablet 2.5 mg Discontinued DAILY 07/22/22 07/27/22   melatonin tablet 3 mg Discontinued NIGHTLY 07/22/22 07/27/22   therapeutic multivitamin-minerals 1 tablet Discontinued DAILY 07/22/22 07/27/22   niacin (SLO-NIACIN) extended release tablet 500 mg Discontinued NIGHTLY 07/22/22 07/27/22   potassium chloride (KLOR-CON) extended release tablet 10 mEq Discontinued DAILY 07/22/22 07/27/22   magnesium oxide (MAG-OX) tablet 400 mg Discontinued DAILY 07/22/22 07/27/22   enoxaparin (LOVENOX) injection 40 mg Discontinued Nightly 07/22/22 07/27/22   fentaNYL (SUBLIMAZE) injection 25 mcg Completed ONCE 07/22/22 07/22/22   oxyCODONE-acetaminophen (PERCOCET) 5-325 MG per tablet 1 tablet Completed ONCE 07/22/22 07/22/22   lidocaine 4 % external patch 1 patch Completed ONCE 07/22/22 07/22/22     Review of Systems   Constitutional:  Positive for fatigue. Negative for chills, diaphoresis and fever. HENT:  Positive for postnasal drip and voice change. Negative for congestion, ear discharge, ear pain, hearing loss, rhinorrhea, sinus pressure, sinus pain, sneezing, sore throat and trouble swallowing. Respiratory:  Positive for choking.  Negative for chest tightness, shortness of breath and wheezing. Cardiovascular:  Negative for chest pain. Neurological:  Positive for weakness. 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    Hypothyroidism 2011    Intervertebral lumbar disc disorder with myelopathy, lumbar region     OA (osteoarthritis)     severe in knees, left ankle, left >right hip    Primary osteoarthritis of right wrist     Severe arthritic change within carpal - 1st metacarpal joint, Extensive chondrocalcinosis throughout wrist    Pure hypercholesterolemia     Seborrheic dermatitis 2014    Subdural hematoma, post-traumatic ([address] 2016    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 Odette Gorman MD. 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 Using her walker routinely 5/2016. NO CURRENT EXERCISE PROGRAM BEYOND STRETCH. 11/15/16. 2/20/17. None. Has left knee pain. 6/7/17. Does HEP from PT, calf pumps, wall push ups. 9/19/17. No exercise except leg. 12/5/17 Does leg exercises with pedals. No muscle conditioning. 4/3/18. 7/3/18. Does leg lifts when sitting. Uses floor pedals 3-4 x/wk for 3-5 min. No problems when does. Patient has a health aide who comes for 3 hours 2 times a week. 10/2/18. Floor pedals 3-5 min qd and occasional bid. 1/8/19. 5-10 min qd and occasional 5 min bid. 4/16/19. 5x/wk 1-2x/day. 7/16/19. 10/22/19. 5x/wk 5-10 min. 1/21/20. 7/21/20 Now at 15 min w/floor pedals. 10/27/20. 20 min. On pedals >/= 5 day/wk. 1/26/20. 4/27/21. With left ankle pain 10 min 4x/wk. 7/27/21. Sometimes does more exercise. Still driving. 11/16/21. Does pedals 3x/wk for 20 min. 2/15/22. Till she got sick. 4/1/22. Does pedals 3x/wk for 20 min. 5/23/22. None since fall. 6/22/22. Was in PT while in [place] and came back. Now has PT at home. 8/15/22. Social Determinants of Health     Financial Resource Strain: Low Risk     Difficulty of Paying Living Expenses: Not hard at all   Food Insecurity: No Food Insecurity    Worried About [address] in the Last Year: Never true    [address] in the Last Year: Never true   Transportation Needs: No Transportation Needs    Lack of Transportation (Medical): No    Lack of Transportation (Non-Medical):  No   Physical Activity: Not on file   Stress: Not on file 500 mg by mouth daily. Multiple Vitamin (MULTIVITAMIN PO) Take 1 tablet by mouth daily. No current facility-administered medications for this visit. Objective   Vitals:    08/15/22 1551   BP: 122/82   Site: Right Upper Arm   Position: Sitting   Cuff Size: Large Adult   Pulse: 65   Resp: 14   SpO2: 97%   Weight: 173 lb (78.5 kg)   Height: 4' 11\" (1.499 m)     BP Readings from Last 3 Encounters:   08/15/22 122/82   07/27/22 (!) 140/70   06/22/22 124/84     Pulse Readings from Last 3 Encounters:   08/15/22 65   07/27/22 82   06/22/22 65     Wt Readings from Last 3 Encounters:   08/15/22 173 lb (78.5 kg)   07/27/22 170 lb 3.2 oz (77.2 kg)   06/30/22 170 lb (77.1 kg)     Body mass index is 34.94 kg/m². Physical Exam  Vitals and nursing note reviewed. Constitutional:       General: She is not in acute distress. Appearance: Normal appearance. She is well-developed. She is obese. She is ill-appearing. She is not toxic-appearing or diaphoretic. Eyes:      General: No scleral icterus. Right eye: No discharge. Left eye: No discharge. Conjunctiva/sclera: Conjunctivae normal.   Neck:      Thyroid: No thyroid mass or thyromegaly. Vascular: No carotid bruit or JVD. Trachea: Trachea and phonation normal. No tracheal deviation. Cardiovascular:      Rate and Rhythm: Normal rate and regular rhythm. Occasional Extrasystoles are present. Heart sounds: S1 normal and S2 normal. Heart sounds not distant. Murmur heard. Systolic murmur is present with a grade of 1/6. No friction rub. No gallop. No S3 or S4 sounds. Comments: Occasional trigeminal beat. Pulmonary:      Effort: Pulmonary effort is normal. No respiratory distress. Breath sounds: Normal breath sounds. No decreased breath sounds, wheezing, rhonchi or rales. Musculoskeletal:      Cervical back: Neck supple. Right lower leg: 3+ Edema present. Left lower leg: 3+ Edema present. Lymphadenopathy:      Head:      Right side of head: No submandibular or tonsillar adenopathy. Left side of head: No submandibular or tonsillar adenopathy. Cervical: No cervical adenopathy. Right cervical: No superficial, deep or posterior cervical adenopathy. Left cervical: No superficial, deep or posterior cervical adenopathy. Skin:     General: Skin is warm and dry. Coloration: Skin is not pale. Nails: There is no clubbing. Neurological:      Mental Status: She is alert. Deep Tendon Reflexes:      Reflex Scores:       Bicep reflexes are 2+ on the right side and 2+ on the left side. Comments: She is able to transfer on her own. Uses walker. Psychiatric:         Attention and Perception: Attention and perception normal.         Mood and Affect: Mood and affect normal.         Speech: Speech normal.         Behavior: Behavior normal. Behavior is cooperative.          Cognition and Memory: Cognition and memory normal.         Judgment: Judgment normal.      Latest Reference Range & Units 5/23/22 09:31 7/22/22 10:06 7/23/22 06:27   Sodium 136 - 145 mmol/L  140    Potassium 3.5 - 5.1 mmol/L  4.0    Chloride 99 - 110 mmol/L  104    CO2 21 - 32 mmol/L  27    BUN,BUNPL 7 - 20 mg/dL  17    Creatinine 0.6 - 1.2 mg/dL  <0.5 (L)    Anion Gap 3 - 16   9    GFR Non- >60   >60    GFR  >60   >60    Glucose, Random 70 - 99 mg/dL  121 (H)    CALCIUM, SERUM, 282805 8.3 - 10.6 mg/dL  9.0    Total Protein 6.4 - 8.2 g/dL 5.9 (L)     Uric Acid, Serum 2.6 - 6.0 mg/dL   3.5   CRP 0.0 - 5.1 mg/L   196.8 (H)   TSH 0.27 - 4.20 uIU/mL 5.53 (H)     T4 Free 0.9 - 1.8 ng/dL 1.3        Latest Reference Range & Units 6/6/18 12:42 7/22/22 10:06 7/23/22 06:27   WBC 4.0 - 11.0 K/uL 8.1 11.6 (H)    RBC 4.00 - 5.20 M/uL 4.40 4.27    Hemoglobin Quant 12.0 - 16.0 g/dL 14.2 13.3    Hematocrit 36.0 - 48.0 % 42.1 41.0    MCV 80.0 - 100.0 fL 95.7 95.9    MCH 26.0 - 34.0 pg 32.2 31.1 MCHC 31.0 - 36.0 g/dL 33.7 32.4    MPV 5.0 - 10.5 fL 8.4 8.0    RDW 12.4 - 15.4 % 14.7 13.3    Platelet Count 538 - 450 K/uL 196 198    Neutrophils % % 80.1 85.2    Lymphocyte % % 9.0 3.6    Monocytes % % 9.7 11.1    Eosinophils % % 0.9 0.0    Basophils % % 0.3 0.1    Neutrophils Absolute 1.7 - 7.7 K/uL 6.5 9.9 (H)    Lymphocytes Absolute 1.0 - 5.1 K/uL 0.7 (L) 0.4 (L)    Monocytes Absolute 0.0 - 1.3 K/uL 0.8 1.3    Eosinophils Absolute 0.0 - 0.6 K/uL 0.1 0.0    Basophils Absolute 0.0 - 0.2 K/uL 0.0 0.0    Sed Rate 0 - 30 mm/Hr 3  23      Latest Reference Range & Units 7/23/22 05:20 7/24/22 13:35   SARS-CoV-2, NAAT Not Detected   Not Detected   L. pneumophila Serogp 1 Ur Ag  Presumptive Negative  No Legionella pneumophila serogroup 1 antigens detected. Latest Reference Range & Units 7/23/22 06:27 7/26/22 15:00   HERIBERTO Negative  POSITIVE ! HERIBERTO PATTERN   [employer] ! HERIBERTO TITER   1:640 ! Rheumatoid Factor <14 IU/mL 15.0 (H)    Antinuclear AB Interpretive Comment  See Note    Antinuclear Antibody, Hep-2, IGG <1:80  Detected (H)       Latest Reference Range & Units 7/24/22 06:24   Anti-dsDNA IgG 0 - 9 IU/mL <1   CC Peptide,IgG Ab 0.0 - 2.9 U/mL <0.5     THREE XRAY VIEWS OF THE RIGHT ELBOW  7/22/2022 8:17 am  COMPARISON:  None. HISTORY:  ORDERING SYSTEM PROVIDED HISTORY: pain  TECHNOLOGIST PROVIDED HISTORY:  Reason for exam:->pain  Reason for Exam: no injury/lots of pain     FINDINGS:  Severe osteopenia. Large hypertrophic spur noted arising from the olecranon  process of ulna. Moderate to severe degenerative changes at the right elbow,  with prominent spurring noted laterally. Subchondral sclerosis and cystic  change identified in the coronoid process of ulna. Mild-to-moderate joint  space narrowing. No significant joint effusion noted. No acute osseous injury. 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 No acute  osseous injury.      On this date 8/15/2022 I have spent 85 minutes reviewing previous notes, test results and face to face with the patient discussing the diagnosis and importance of compliance with the treatment plan as well as documenting on the day of the visit. An electronic signature was used to authenticate this note.     --Cherry Jordan, DO

## 2022-08-15 NOTE — PATIENT INSTRUCTIONS
Jhoana Landeros was seen today for other and hypertension. Diagnoses and all orders for this visit:    Weakness generalized  Continue PT at home and get HEP. Right elbow pain  Range of motion do every 1-2 hours. Moist heat  Sports cream (Aspercreme doesn't smell). Diclofenac (brand name Voltaren) gel 1% is available over-the-counter. You can place 4 g on the knee NEXT TO the kneecap but NOT ON TOP OF the kneecap. If placed on top of the kneecap it will not penetrate into the joint. You can also put some of the 4 g dose on the joint line on each side of the knee going back towards behind the knee. You can put this on up to 4 times a day. Even if you do both joints 4 times a day there is not enough absorbed into the bloodstream to upset the stomach or significantly affect the kidneys. Bronchitis  IF you develop ANY respiratory infection symptoms (not just allergy symptoms) suggestive of COVID-19 start the recommendations below: Instructions for Respiratory Infections (SAVE THIS SHEET)    For the first 7-14 days of symptoms follow instructions below, even before being seen in the office or even during treatment with antibiotics, until symptom free. 1. Water: Drink 1 ounce of water for every 2 pounds of body weight for adults, 64 Ounces of water/fluids per day. This will loosen mucus in the head and chest & improve the weak feeling of dehydration, allow the body to get germ fighting resources to the infection. Half of fluids can be juice or sugar free Crystal Light. Don't count drinks with caffeine, alcohol or carbonation. Infants can have Pedialyte liquid or freezer pops. Avoid salt and sports drinks if you have high Blood Pressure, swelling in the feet or ankles or have heart problems. 2. Humidity: Humidify the air to 35-50% ( or until the windows fog over slightly).  Can use a humidifier, vaporizer, boil water on the stove or put a coffee can full of water on the heater vents. This will loosen mucus from infections and allergies. 3. Sleep: Get 8-10 hours a night and rest during the evening after work or school. If you have trouble sleeping, adults can take Melatonin 5mg up to 2 tabs at bedtime ( not for children or pregnant women). If Mono is suspected then sleep during 9PM to 9AM time span (if possible.)  4. Cough: Take cough medicines with Guaifenesin ( to loosen chest or head congestion) and Dextromethorphan ( to decrease excess cough). Robitussin D.M. Syrup every 4-6 hrs OR Mucinex D. M. pills OR Delsym DM syrup twice a day. Use the pediatric formulations for children over 6 months making sure they are alcohol & sugar free for children, pregnant women, and diabetics. 5. Pain And Fevers: Take Acetaminophen ( Tylenol) for fevers, aches, and headaches. 2-500 mg every 8 hours for adults. Appropriate doses at bedtime for children may help them sleep better. If pregnant take 1 -500 mg (Tylenol) every 8 hours as needed. Ibuprofen/Aleve/aspirin for pain and fevers SHOULD NOT BE USED IN THE SETTING OF POSSIBLE COVID-19 viral infection NOR if pregnant, if you have acid reflux, high blood pressure, CHF, or kidney problems. 6.Gargle: (DAY ONE OF SYMPTOMS) Gargle in the back of the throat with the head tilted back and to the sides with a strong mouthwash  ( Listerine or Scope) after meals and at bedtime at least 4 -5 times a day. This helps kill bacteria and viruses in the back of the throat and will shorten the duration and decrease the severity of your symptoms: sore throat, cough, ear popping,/ear pain, and possibly dizziness. 7. Smoking: Avoid smoking or exposure to second hand smoke. 8. Zinc: (DAY ONE OF SYMPTOMS)  Zinc lozenges such as Cold Mati (available most stores), or Basic ([employer] brand) will help shorten the duration and lessen symptoms such as sore throat, cough, nasal congestion, runny nose, and post nasal drip.  Use 1 lozenge every 2-4 hours ( after meals if stomach is sensitive). Children can use 10-15 mg or less 3-4 times a day or Zinc lollypops. In pregnancy limit to 50-60 mg a day for 7 days as prenatals have Zinc also. With diarrhea use zinc pills 50 mg 1/2 to 1 pill 2x/day WITH OR SHORTLY AFTER A MEAL. 9. Vitamins: Vitamin C 500 mg with breakfast and dinner (with suspected or diagnosed COVID-19 infection take vitamin C 1000 mg 2-3 times a day). Children and pregnant women should drink citrus juices. This speeds healing and strengthens immune system. 10. Chest Symptoms: Vicks Vapor rub to the chest at bedtime. 11. Decongestants: Avoid all decongestants and antihistamine cold preparations in children. Decongestants should always be avoided in people with high blood pressure, palpitations, heart disease and those on stimulant medications. Antihistamines may impede the body's ability to fight COVID-19.  12. Frequent hand washing and/or hand gel especially after coughing or sneezing into the hands or blowing the nose to help prevent spreading to others. Use kleenex and NOT handkerchiefs. Try all of the above starting with day 1 of symptoms. If Strep throat symptoms appear call to be seen in the office as soon as possible and don't gargle on that day. Newborns, infants, or anyone with earaches or influenza may need to be seen quickly. Adults with fevers over 103 degrees or shortness of breath should call the office immediately. Edema of both legs  Keep leg elevated when sitting. Wear compression stockings as much as possible. If feet are down while sitting press toes and front of foot down then lift them firmly with heel on the ground. When standing gently rock from heels to standing on toe tips. These are calf pumps that move fluid out of the calf and back to the central circulation and prevent worsened swelling in the foot and calf.   Avoid adding salt at the table in the cooking and eating salty foods (such as pork, canned soups, fast food and salty snacks.)

## 2022-08-15 NOTE — CARE COORDINATION
[facility] Discharge Call    8/15/2022    Patient: Meenakshi Greco Patient : 1933   MRN: [id number]  Reason for Admission: fall and weakness  Discharge Date: 22 RARS: No data recorded     Acute Care Course:   Pt to [place] on 6/10 for a fall and then on  to  with inability to care for self. Pt then went to [facility] for 16 days with a d/c home with [facility]      HFU made:  8/15 Dr Brooks Reyna Hx:   HTN, Tongue Ca, CKD3, OA, constipation, depression, gout. DME: walker    Conversation:   Spoke with son Santosh Gallardo after 2 IDs. Pt is doing ok and is currently with PT and that is why she did not  the phone. She does have hearing aids and when in can converse on the phone. She had trouble sleeping last night. This is new and hoping just due to a change in venue. He will tell Dr Artemio Bosworth. Pt has a walker and does not use any steps in the house. [facility] PT came out and checked the house for safety and all loose rugs have been picked up and an additional grab bar has been placed in shower. She is able to use the toilet. Her appetite is good and she will let him know if there is an issue with constipation. Educated to call PCP if there is an issue. All the new prescriptions have been picked up and they will take to the PCP. Declined review as going today to PCP. Pt manages her own meds. Educated on the importance for him to also be aware of her med regimen. Agreeable to calls. Follow up plan:   Will hand off to [facility]           Discharge Facility: [facility] Transition            Care Transitions Interventions         Future Appointments   Date Time Provider Parviz Rubin   8/15/2022  4:00 PM Eli Hernadez DO [facility]   2022 10:10 AM Nicole Farah, APRN - CNP [address]   2022  9:00 AM Eli Hernadez DO [facility]       Transitions of Care Initial Call    Was this an external facility discharge? Yes, 22  Discharge Facility: Parv[facility] Godoy to be reviewed by the provider   Additional needs identified to be addressed with provider: Yes  Family was educated to bring in medications for you to review             Method of communication with provider : none    Advance Care Planning:   Does patient have an Advance Directive: not on file. Care Transition Nurse contacted the family by telephone to perform post hospital discharge assessment. Verified name and  with family as identifiers. Provided introduction to self, and explanation of the CTN role. CTN reviewed discharge instructions, medical action plan and red flags with family who verbalized understanding. Family given an opportunity to ask questions and does not have any further questions or concerns at this time. Were discharge instructions available to patient? Yes. Reviewed appropriate site of care based on symptoms and resources available to patient including: PCP. The family agrees to contact the PCP office for questions related to their healthcare. Medication reconciliation was not performed with family as pt with PCP appt today. Family affirmed that prescriptions were obtained and that any new meds will be reviewed by PCP    Was patient discharged with a pulse oximeter? no    CTN provided contact information. Plan for follow-up call in 5-7 days based on severity of symptoms and risk factors.   Plan for next call: referral to ambulatory care manager-JUSTINO JoeN, RN   [facility] Transition Nurse  [phone]

## 2022-08-22 ENCOUNTER — CARE COORDINATION (OUTPATIENT)
Dept: CARE COORDINATION | Age: 87
End: 2022-08-22

## 2022-08-29 NOTE — PROGRESS NOTES
Patient Active Problem List   Diagnosis    Gout    History of breast cancer    Pure hypercholesterolemia    Depression    Osteoarthritis, mostly knees    Hypothyroidism    Hypotension    Unsteady gait when walking    HTN (hypertension), benign    Recurrent major depressive disorder, in partial remission ([facility] [address]    Bilateral hearing loss    Unable to care for self    Arthralgia of right elbow    Unable to ambulate     Patient seen , discharge dictated scripts given , arrangements made , IFEOMA completed .  Discussed with nursing staff  And   If applicable ,  Discussed with  Patient's family , all questions answered and concerns addressed  When applicable

## 2022-08-30 ENCOUNTER — OFFICE VISIT (OUTPATIENT)
Dept: FAMILY MEDICINE CLINIC | Age: 87
End: 2022-08-30
Payer: MEDICARE

## 2022-08-30 ENCOUNTER — CARE COORDINATION (OUTPATIENT)
Dept: CARE COORDINATION | Age: 87
End: 2022-08-30

## 2022-08-30 VITALS
HEART RATE: 74 BPM | RESPIRATION RATE: 14 BRPM | SYSTOLIC BLOOD PRESSURE: 122 MMHG | OXYGEN SATURATION: 98 % | HEIGHT: 59 IN | WEIGHT: 172 LBS | DIASTOLIC BLOOD PRESSURE: 78 MMHG | BODY MASS INDEX: 34.68 KG/M2

## 2022-08-30 DIAGNOSIS — E77.8 HYPOPROTEINEMIA (HCC): ICD-10-CM

## 2022-08-30 DIAGNOSIS — E03.9 ACQUIRED HYPOTHYROIDISM: ICD-10-CM

## 2022-08-30 DIAGNOSIS — M25.521 RIGHT ELBOW PAIN: ICD-10-CM

## 2022-08-30 DIAGNOSIS — I10 HTN (HYPERTENSION), BENIGN: Primary | ICD-10-CM

## 2022-08-30 DIAGNOSIS — R26.81 UNSTEADY GAIT WHEN WALKING: ICD-10-CM

## 2022-08-30 DIAGNOSIS — M1A.0720 CHRONIC GOUT OF LEFT ANKLE, UNSPECIFIED CAUSE: ICD-10-CM

## 2022-08-30 DIAGNOSIS — N63.20 BREAST MASS, LEFT: ICD-10-CM

## 2022-08-30 LAB
ALBUMIN SERPL-MCNC: 4.3 G/DL (ref 3.4–5)
T4 FREE: 1.5 NG/DL (ref 0.9–1.8)
TOTAL PROTEIN: 6.1 G/DL (ref 6.4–8.2)
TSH REFLEX: 4.86 UIU/ML (ref 0.27–4.2)

## 2022-08-30 PROCEDURE — 1124F ACP DISCUSS-NO DSCNMKR DOCD: CPT | Performed by: FAMILY MEDICINE

## 2022-08-30 PROCEDURE — 99214 OFFICE O/P EST MOD 30 MIN: CPT | Performed by: FAMILY MEDICINE

## 2022-08-30 PROCEDURE — 36415 COLL VENOUS BLD VENIPUNCTURE: CPT | Performed by: FAMILY MEDICINE

## 2022-08-30 NOTE — PATIENT INSTRUCTIONS
Elaine Cunningham was seen today for hypertension and hypothyroidism. Diagnoses and all orders for this visit:    HTN (hypertension), benign  -     Good control.  -     Continue meds and lifestyle control. Right elbow pain  Resolved. Acquired hypothyroidism  -     TSH with Reflex; Future  Slightly off at last visit. Unsteady gait when walking  Improving with PT/OT. Breast mass, left  Call to schedule mammogram/ US. Chronic gout of left ankle, unspecified cause  Resolved. Hypoproteinemia (HCC)  -     Albumin; Future  -     Protein, Total; Future  Still low.

## 2022-08-30 NOTE — PROGRESS NOTES
Renata Iyer (:  1933) is a 80 y.o. female,Established patient, here for evaluation of the following chief complaint(s):  Hypertension (FOLLOW UP ON HTN ) and Hypothyroidism (FOLLOW UP ON HYPOTHYROIDISM)       ASSESSMENT/PLAN:  1000 (out for more than 6 min)    Patient Instructions   Dino Teran was seen today for hypertension and hypothyroidism. Diagnoses and all orders for this visit:    HTN (hypertension), benign  -     Good control.  -     Continue meds and lifestyle control. Right elbow pain  Resolved. Acquired hypothyroidism  -     TSH with Reflex; Future  Slightly off at last visit. Unsteady gait when walking  Improving with PT/OT. Breast mass, left  Call to schedule mammogram/ US. Chronic gout of left ankle, unspecified cause  Resolved. Hypoproteinemia (HCC)  -     Albumin; Future  -     Protein, Total; Future  Still low. Return in about 3 months (around 2022) for Hypertension, Thyroid, Low Proteins, Cholesterol. Subjective   SUBJECTIVE/OBJECTIVE:  Chief Complaint   Patient presents with    Hypertension     FOLLOW UP ON HTN     Hypothyroidism     FOLLOW UP ON HYPOTHYROIDISM   65    HTN (hypertension), benign  HH RN & PT are checking 3x/wk. Has been on goal at their checks. Is watching salt. Discussed goal.  Right elbow pain  Resolved in hospital.  Acquired hypothyroidism  Current symptoms include feeling cold and cold intolerance, swelling. Patient denies fatigue, weight gain, constipation, anxiousness, feeling excessive energy, tremulousness, palpitations, sweating, weight loss, diarrhea, change in skin,  nails, or hair, heat intolerance, depression, goiter, ocular symptoms. Takes 1 tab oral prior to breakfast with 8 oz water on empty stomach. Doesn't take food/drinks/meds/supplements for 30 min. Unsteady gait when walking  No falls nor near misses since home. PT/OT runs out next week at the end. Doing HEP for the most part. Not using bike yet.    Breast mass, left  Mentions due for US mammogram /22. Seems ok. Hypoproteinemia (HCC)  Trying to increase her intake. Drinks 1 Premier protein per//day. 1 greek yogurt 3-4x/wk. Hypercholesterolemia  Only takes 1/2 pill nightly. Edema  Takes lower dose of water pills. It was decreased  Review of Systems    Past Medical History:   Diagnosis Date    Breast cancer ([address]     ductal vs lobular, not clear from pathologist    Cellulitis of left leg     Cellulitis of wrist 2018    RIGHT    Chronic kidney disease, stage III (moderate) ([address]     Depression     Diverticulitis of colon 1979    DVT (deep venous thrombosis) ([address]     after first knee surgery    Gout 2011    Hematoma of left thigh 2017    Herpes zoster 2018    right ant shoulder    Hypertension     Hypoproteinemia ([address] 2019    Hypothyroidism 2011    Intervertebral lumbar disc disorder with myelopathy, lumbar region     OA (osteoarthritis)     severe in knees, left ankle, left >right hip    Primary osteoarthritis of right wrist     Severe arthritic change within carpal - 1st metacarpal joint, Extensive chondrocalcinosis throughout wrist    Pure hypercholesterolemia     Seborrheic dermatitis 2014    Subdural hematoma, post-traumatic ([address] 2016    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   Olesya Alvarado MD. 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 Using her walker routinely 5/2016. NO CURRENT EXERCISE PROGRAM BEYOND STRETCH. 11/15/16. 2/20/17. None. Has left knee pain. 6/7/17. Does HEP from PT, calf pumps, wall push ups. 9/19/17. No exercise except leg. 12/5/17 Does leg exercises with pedals. No muscle conditioning. 4/3/18. 7/3/18. Does leg lifts when sitting. Uses floor pedals 3-4 x/wk for 3-5 min. No problems when does. Patient has a health aide who comes for 3 hours 2 times a week. 10/2/18. Floor pedals 3-5 min qd and occasional bid. 1/8/19. 5-10 min qd and occasional 5 min bid. 4/16/19. 5x/wk 1-2x/day. 7/16/19. 10/22/19. 5x/wk 5-10 min. 1/21/20. 7/21/20 Now at 15 min w/floor pedals. 10/27/20. 20 min. On pedals >/= 5 day/wk. 1/26/20. 4/27/21. With left ankle pain 10 min 4x/wk. 7/27/21. Sometimes does more exercise. Still driving. 11/16/21. Does pedals 3x/wk for 20 min. 2/15/22. Till she got sick. 4/1/22. Does pedals 3x/wk for 20 min. 5/23/22. None since fall. 6/22/22. Was in PT while in [facility] and came back. Now has PT at home. 8/15/22. Okay to restart pedals. 8/30/22.      Social Determinants of Health     Financial Resource Strain: Low Risk     Difficulty of Paying Living Expenses: Not hard at all   Food Insecurity: No Food Insecurity    Worried About [address] in the Last Year: Never true    Ran Out of Food in the Last Year: Never true   Transportation Needs: No Transportation Needs    Lack of Transportation (Medical): No    Lack of Transportation (Non-Medical): No   Physical Activity: Not on file   Stress: Not on file   Social Connections: Not on file   Intimate Partner Violence: Not on file   Housing Stability: Not on file       Family History   Problem Relation Age of Onset    Cancer Mother         bone    Heart Disease Father         CAD    Diabetes Father     Diabetes Sister     Cancer Sister 54        leukemia    Arrhythmia Brother         A fib    Stroke Brother 80        ? Other Brother         THR after fx.  bilateral pneumonia, better    Other Son         Spinal fusion with rods/screws    No Known Problems Daughter     High Blood Pressure Daughter     High Blood Pressure Son        No Known Allergies    Current Outpatient Medications   Medication Sig Dispense Refill    magnesium oxide (MAG-OX) 400 (240 Mg) MG tablet Take 1 tablet by mouth in the morning. 30 tablet 0    levothyroxine (SYNTHROID) 100 MCG tablet Take 1 tablet by mouth in the morning.  30 tablet 3    atorvastatin (LIPITOR) 40 MG tablet Take 1 tablet by mouth nightly (Patient taking differently: Take 20 mg by mouth nightly) 90 tablet 1    lisinopril (PRINIVIL;ZESTRIL) 2.5 MG tablet TAKE 1 TABLET BY MOUTH  DAILY 90 tablet 3    GLUCOSAMINE-CHONDROITIN-MSM PO Take 1 tablet by mouth daily Indications: Joint Damage causing Pain and Loss of Function      buPROPion (WELLBUTRIN SR) 150 MG extended release tablet TAKE 1 TABLET BY MOUTH  TWICE DAILY 180 tablet 0    potassium chloride (KLOR-CON M) 10 MEQ extended release tablet TAKE 1 TABLET BY MOUTH  DAILY 90 tablet 3    niacin (SLO-NIACIN) 500 MG extended release tablet Take 500 mg by mouth nightly      aspirin 81 MG tablet Take 81 mg by mouth daily      Cholecalciferol (VITAMIN D3) 2000 UNITS CAPS One daily for Vit D supplement 30 capsule     melatonin 5 MG TABS tablet Take 3 mg by mouth nightly One at bedtime for sleep assist       ammonium lactate (AMLACTIN) 12 % cream Apply topically as needed Apply topically as needed. magnesium chloride (MAG DELAY) 535 (64 MG) MG TBCR CR tablet Take 64 mg by mouth 2 times daily       acetaminophen (TYLENOL) 500 MG tablet Take 500 mg by mouth daily. Multiple Vitamin (MULTIVITAMIN PO) Take 1 tablet by mouth daily. No current facility-administered medications for this visit. Objective   Physical Exam  Vitals and nursing note reviewed. Constitutional:       General: She is not in acute distress. Appearance: Normal appearance. She is well-developed. She is obese. She is ill-appearing. She is not toxic-appearing or diaphoretic. Eyes:      General: No scleral icterus. Right eye: No discharge. Left eye: No discharge. Conjunctiva/sclera: Conjunctivae normal.   Neck:      Thyroid: No thyroid mass or thyromegaly. Vascular: No carotid bruit or JVD. Trachea: Trachea and phonation normal. No tracheal deviation. Cardiovascular:      Rate and Rhythm: Normal rate and regular rhythm. Occasional Extrasystoles are present. Heart sounds: S1 normal and S2 normal. Heart sounds not distant. Murmur heard. Systolic murmur is present with a grade of 1/6. No friction rub. No gallop. No S3 or S4 sounds. Comments: Occasional trigeminal beat. Pulmonary:      Effort: Pulmonary effort is normal. No respiratory distress. Breath sounds: Normal breath sounds. No decreased breath sounds, wheezing, rhonchi or rales. Musculoskeletal:      Cervical back: Neck supple. Right lower leg: 3+ Edema present. Left lower leg: 3+ Edema present. Lymphadenopathy:      Head:      Right side of head: No submandibular or tonsillar adenopathy. Left side of head: No submandibular or tonsillar adenopathy. Cervical: No cervical adenopathy.       Right cervical: No superficial, deep or posterior cervical adenopathy. Left cervical: No superficial, deep or posterior cervical adenopathy. Skin:     General: Skin is warm and dry. Coloration: Skin is not pale. Nails: There is no clubbing. Neurological:      Mental Status: She is alert. Deep Tendon Reflexes:      Reflex Scores:       Bicep reflexes are 2+ on the right side and 2+ on the left side. Comments: She is able to transfer on her own. Uses walker. Psychiatric:         Attention and Perception: Attention and perception normal.         Mood and Affect: Mood and affect normal.         Speech: Speech normal.         Behavior: Behavior normal. Behavior is cooperative. Cognition and Memory: Cognition and memory normal.         Judgment: Judgment normal.        On this date 8/30/2022 I have spent 39 minutes reviewing previous notes, test results and face to face with the patient discussing the diagnosis and importance of compliance with the treatment plan as well as documenting on the day of the visit. An electronic signature was used to authenticate this note.     --Esperanza Stanley, DO

## 2022-08-30 NOTE — CARE COORDINATION
Ambulatory Care Coordination Note  8/30/2022    ACC: Maria C Tom RN    Summary Note: Outreach call to patient to assess the patient's needs and to complete enrollment in ACM. This ACM asked the patient how her doctor's appointment went today and she stated just fine. This ACM asked if we could review her medications and she stated now was not a good time because she had company. This ACM asked when would be a good time to call back and she said she didn't know. I asked if I could call her back tomorrow and she advised that should be fine. This ACM will reach out to the patient tomorrow  Dr. Dunia Akins reviewed patient's medications at office visit today. Lab Results       None                 Goals Addressed    None         Prior to Admission medications    Medication Sig Start Date End Date Taking?  Authorizing Provider   magnesium oxide (MAG-OX) 400 (240 Mg) MG tablet Take 1 tablet by mouth in the morning. 7/27/22   Funmilayo Ruiz MD   levothyroxine (SYNTHROID) 100 MCG tablet Take 1 tablet by mouth in the morning. 7/27/22   Funmilayo Ruiz MD   atorvastatin (LIPITOR) 40 MG tablet Take 1 tablet by mouth nightly  Patient taking differently: Take 20 mg by mouth nightly 7/18/22   Nereida Ernandez DO   lisinopril (PRINIVIL;ZESTRIL) 2.5 MG tablet TAKE 1 TABLET BY MOUTH  DAILY 7/18/22   Nereida Ernandez DO   GLUCOSAMINE-CHONDROITIN-MSM PO Take 1 tablet by mouth daily Indications: Joint Damage causing Pain and Loss of Function    Historical Provider, MD   buPROPion (WELLBUTRIN SR) 150 MG extended release tablet TAKE 1 TABLET BY MOUTH  TWICE DAILY 4/19/22   Nereida Ernandez DO   potassium chloride (KLOR-CON M) 10 MEQ extended release tablet TAKE 1 TABLET BY MOUTH  DAILY 9/7/21   Nereida Ernandez,    niacin (SLO-NIACIN) 500 MG extended release tablet Take 500 mg by mouth nightly    Historical Provider, MD   aspirin 81 MG tablet Take 81 mg by mouth daily    Historical Provider, MD   Cholecalciferol (VITAMIN D3) 2000 UNITS CAPS One daily for Vit D supplement 11/23/15   Maria C Guardado MD   melatonin 5 MG TABS tablet Take 3 mg by mouth nightly One at bedtime for sleep assist  6/5/13   Historical Provider, MD   ammonium lactate (AMLACTIN) 12 % cream Apply topically as needed Apply topically as needed. Historical Provider, MD   magnesium chloride (MAG DELAY) 535 (64 MG) MG TBCR CR tablet Take 64 mg by mouth 2 times daily     Historical Provider, MD   acetaminophen (TYLENOL) 500 MG tablet Take 500 mg by mouth daily. Historical Provider, MD   Multiple Vitamin (MULTIVITAMIN PO) Take 1 tablet by mouth daily. Historical Provider, MD       Future Appointments   Date Time Provider Parviz Caryn   12/6/2022  8:30 AM DO Rukhsana Gonzalez 122     Plan: follow up tomorrow. Review medications. Initiate enrollment questions.

## 2022-08-30 NOTE — DISCHARGE SUMMARY
[facility]                     [address] [facility], [address]                               DISCHARGE SUMMARY    PATIENT NAME: Kike Min                 :        1933  MED REC NO:   [id number]                          ROOM:       [id number]  ACCOUNT NO:   [de-identified]                           ADMIT DATE: 2022  PROVIDER:     Carmel Pryor MD                  DISCHARGE DATE:  2022    FINAL DIAGNOSES:  1. Acute pain in the right elbow, unable to provide self-care. 2.  Hypertension. 3.  Breast cancer per history. 4.  Unsteady gait. 5.  Depression. 6.  Pure hyperlipidemia. 7.  Primary osteoarthritis. 8.  Bilateral knee osteoarthritis. 9.  Hypothyroidism. 10.  Hypotension. 11.  Essential hypertension history. 12.  History of gout. 13.  Chronic depression. 14.  Bilateral hearing loss. DISCHARGE MEDICATIONS:  1. Percocet 5/325 one every 4 hours p.r.n.  2.  Robitussin DM 5 mL every 4 hours p.r.n.  3.  Magnesium oxide 400 mg once a day. 4.  Tobrex ophthalmic solution 1 drop into both eyes in the morning,  noon, and at night. 5.  Levothyroxine 100 mcg once a day. 6.  Atorvastatin 40 mg once a day. 7.  Lisinopril 2.5 mg once a day. 8.  Glucosamine chondroitin 1 tablet daily. 9.  Wellbutrin  mg twice a day. 10.  Levothyroxine 50 mcg was discontinued. 11.  Potassium chloride 10 mEq daily. 12.  Niacin 500 mg extended release tablet nightly. 13.  Atrovent nasal spray, two sprays each nostril four times a day. 14.  Aspirin 81 mg once a day. 15.  Vitamin D3 2000 units daily. 16.  Melatonin 5 mg daily. 17.  AmLactin every day. 18.  Tylenol 650 q.i.d. p.r.n. 19.  Multivitamin once a day. HISTORY OF PRESENT ILLNESS:  This 80-year-old white American lady, who  lives by herself, came in. She is a . She has severe pain in the  right elbow. Using a walker to get around within her home. Lives  independently.   She has also excruciating pain in the right shoulder. No recent trauma. Her blood pressure was 134/73, respirations 17, heart  rate was 84, temperature spiked as high as 99.4. Right elbow was  excruciatingly, exquisitely tender with severe restriction of range of  motion and rise in local temperature. There was suspicion of elbow  cellulitis and possible septic arthritis. HOSPITAL COURSE:  The patient was admitted for pain management. PT and  OT evaluation since dismissing her to her home environment was  impossible and not compatible with her safety. The patient definitely  needed some skilled nursing facility. Physical and Occupational Therapy  was involved. Our case management worked very hard on finding a  placement for her _____ the patient's family were given options of being  placed in either [facility], so it took some time  for family to check out those places. Finally, after 4 to 5 days of  observation stay and clinical stabilization, the patient was discharged. Her [facility] score for independent living was 15/24, which is less than 17  and hence she definitely needed a skilled nursing facility placement,  which was finally accomplished. The patient was discharged in stable  condition to a nursing home on 07/27/2022. This was an observational  stay.         Emma Frederick MD    D: 08/29/2022 12:02:16       T: 08/29/2022 12:06:02     [id number]  Job#: [id number]     Doc#: [id number]    CC:

## 2022-09-05 DIAGNOSIS — F33.41 RECURRENT MAJOR DEPRESSIVE DISORDER, IN PARTIAL REMISSION (HCC): Chronic | ICD-10-CM

## 2022-09-06 RX ORDER — BUPROPION HYDROCHLORIDE 150 MG/1
TABLET, EXTENDED RELEASE ORAL
Qty: 180 TABLET | Refills: 1 | Status: SHIPPED | OUTPATIENT
Start: 2022-09-06

## 2022-09-20 DIAGNOSIS — E87.6 HYPOKALEMIA: ICD-10-CM

## 2022-09-20 RX ORDER — POTASSIUM CHLORIDE 750 MG/1
TABLET, EXTENDED RELEASE ORAL
Qty: 90 TABLET | Refills: 0 | Status: SHIPPED | OUTPATIENT
Start: 2022-09-20

## 2022-09-23 ENCOUNTER — TELEPHONE (OUTPATIENT)
Dept: FAMILY MEDICINE CLINIC | Age: 87
End: 2022-09-23

## 2022-09-23 NOTE — TELEPHONE ENCOUNTER
She developed pain in her left leg while in the hospital.  It has gotten worse at home. The area is not red per the nurse. Her knee is giving her pain. She read about the protein on the Internet that will help joint pain go away. It said the doctors usually just give steroids and other medicines that do not really solve the problem like this protein will. Told her that it is okay to try the protein and probably will not hurt her. However it may be expensive and may not help. She should have her son check out the website and see if this is potentially a realistic solution or a scam.  Recommended we not do an x-ray but refer her to an orthopedic surgeon who can both do an x-ray and deal with the problem of her left knee pain after total knee replacement and her left hip pain with known arthritis. She does not know who did her previous knee replacement. She is going to check with family to find out.   She should call back if she needs a referral.  Would recommend Dr. Ana Sanders at [facility].

## 2022-09-23 NOTE — TELEPHONE ENCOUNTER
----- Message from Jasbir Jauregui sent at 9/23/2022  8:49 AM EDT -----  Subject: Referral Request    Reason for referral request? X-ray left knee and hip   Provider patient wants to be referred to(if known):     Provider Phone Number(if known): Additional Information for Provider? PT was advise by pcp for x-ray . PT   stated when pt go into car to drive pt has to lift leg up into and out of   car. When pt is trying to put on sock pt is struggling to put on due to   hip and knee .  PT would like to know when appt will be schedule due to pt   son bringing her into appt   ---------------------------------------------------------------------------  --------------  [id number]    [phone]; Do not leave any message, patient will call back for answer  ---------------------------------------------------------------------------  --------------

## 2022-09-23 NOTE — TELEPHONE ENCOUNTER
PT CALLED STATED WHEN SHE WAS IN NURSING HOME SHE STARTED WITH PAIN IN HIP AND KNEE SHE CANNOT LIFT IT. SHE HAS TO LIFT HER LEG WITH ARM TO MOVE IT. LEFT LEG.  SHE WANTS TO HAVE AN XRAY DONE PRIOR TO HER APPT ON 10/4

## 2022-09-26 ENCOUNTER — TELEPHONE (OUTPATIENT)
Dept: FAMILY MEDICINE CLINIC | Age: 87
End: 2022-09-26

## 2022-09-26 NOTE — TELEPHONE ENCOUNTER
Printed the first request home health 45 that is in the computer under media that was signed. Apparently was scanned but not faxed or fax but not received. Please send this copy to them.

## 2022-09-28 ENCOUNTER — CARE COORDINATION (OUTPATIENT)
Dept: CARE COORDINATION | Age: 87
End: 2022-09-28

## 2022-09-28 NOTE — CARE COORDINATION
Ambulatory Care Coordination Note  9/28/2022    ACC: Malika Armando, RN    Incoming call from the patient. The patient stated that she had spoke with Dr. Raegan Yang and he advised her to make an appointment with an orthopedic doctor. The patient stated that she has an appointment for Monday with an orthopedic doctor and she would like to let Dr. Raegan Yang know. The patient stated that she wanted to get her influenza vaccine and was also asking if the new COVID vaccine was available. The patient stated that she was going to call [facility] and see if she could get her flu vaccine there. This ACM advised that she could request to get the flu vaccine when she visits Dr. Raegan Yang on Tuesday and also ask about the COVID vaccine at that time. The patient asked if this ACM would get a message to Dr. Raegan Yang and let him know about her appointment and about the vaccines. This ACM advised the patient that this ACM would send Dr. Raegan Yang a message with the information she had given me and her request for the vaccines. The patient thanked this ACM and then ended the call. Offered patient enrollment in the Remote Patient Monitoring (RPM) program for in-home monitoring:  Patient ended the call before this ACM could offer enrollment in RPM.  .    Lab Results       None                 Goals Addressed    None         Prior to Admission medications    Medication Sig Start Date End Date Taking?  Authorizing Provider   potassium chloride (KLOR-CON M) 10 MEQ extended release tablet TAKE 1 TABLET BY MOUTH  DAILY 9/20/22   Kodi Sotomayor DO   buPROPion [facility] SR) 150 MG extended release tablet TAKE 1 TABLET BY MOUTH  TWICE DAILY 9/6/22   Kodi Sotomayor DO   magnesium oxide (MAG-OX) 400 (240 Mg) MG tablet Take 1 tablet by mouth in the morning. 7/27/22   Jose Armando Lovett MD   levothyroxine (SYNTHROID) 100 MCG tablet Take 1 tablet by mouth in the morning. 7/27/22   Jose Armando Lovett MD   atorvastatin (LIPITOR) 40 MG tablet Take 1 tablet by mouth nightly  Patient taking differently: Take 20 mg by mouth nightly 7/18/22   Palak Phoenix, DO   lisinopril (PRINIVIL;ZESTRIL) 2.5 MG tablet TAKE 1 TABLET BY MOUTH  DAILY 7/18/22   Palak Phoenix, DO   GLUCOSAMINE-CHONDROITIN-MSM PO Take 1 tablet by mouth daily Indications: Joint Damage causing Pain and Loss of Function    Historical Provider, MD   niacin (SLO-NIACIN) 500 MG extended release tablet Take 500 mg by mouth nightly    Historical Provider, MD   aspirin 81 MG tablet Take 81 mg by mouth daily    Historical Provider, MD   Cholecalciferol (VITAMIN D3) 2000 UNITS CAPS One daily for Vit D supplement 11/23/15   Linette Guillen MD   melatonin 5 MG TABS tablet Take 3 mg by mouth nightly One at bedtime for sleep assist  6/5/13   Historical Provider, MD   ammonium lactate (AMLACTIN) 12 % cream Apply topically as needed Apply topically as needed. Historical Provider, MD   magnesium chloride (MAG DELAY) 535 (64 MG) MG TBCR CR tablet Take 64 mg by mouth 2 times daily     Historical Provider, MD   acetaminophen (TYLENOL) 500 MG tablet Take 500 mg by mouth daily. Historical Provider, MD   Multiple Vitamin (MULTIVITAMIN PO) Take 1 tablet by mouth daily. Historical Provider, MD       Future Appointments   Date Time Provider Parviz Rubin   10/3/2022 10:15 AM MD JAIRON Wray ORTHO [facility]   10/4/2022  8:30 AM Milana Phoenix, DO [address] [place] [place] - [facility]   12/6/2022  8:30 AM Milana Phoenix, DO [address]:  Message PCP and inform of ortho appointment and request for patient's vaccines. Follow up in two weeks and offer RPM if patient is eligible.

## 2022-10-03 ENCOUNTER — OFFICE VISIT (OUTPATIENT)
Dept: ORTHOPEDIC SURGERY | Age: 87
End: 2022-10-03
Payer: MEDICARE

## 2022-10-03 ENCOUNTER — TELEPHONE (OUTPATIENT)
Dept: FAMILY MEDICINE CLINIC | Age: 87
End: 2022-10-03

## 2022-10-03 VITALS — WEIGHT: 172 LBS | BODY MASS INDEX: 34.68 KG/M2 | HEIGHT: 59 IN

## 2022-10-03 DIAGNOSIS — Z96.652 HISTORY OF TOTAL KNEE ARTHROPLASTY, LEFT: ICD-10-CM

## 2022-10-03 DIAGNOSIS — M16.12 PRIMARY OSTEOARTHRITIS OF LEFT HIP: Primary | ICD-10-CM

## 2022-10-03 PROCEDURE — 1124F ACP DISCUSS-NO DSCNMKR DOCD: CPT | Performed by: ORTHOPAEDIC SURGERY

## 2022-10-03 PROCEDURE — 99203 OFFICE O/P NEW LOW 30 MIN: CPT | Performed by: ORTHOPAEDIC SURGERY

## 2022-10-03 RX ORDER — MELOXICAM 7.5 MG/1
7.5 TABLET ORAL DAILY PRN
Qty: 30 TABLET | Refills: 0 | Status: SHIPPED | OUTPATIENT
Start: 2022-10-03

## 2022-10-03 NOTE — PROGRESS NOTES
[facility]  Office Visit    Chief Complaint: Left knee pain    HPI:  Tony Canas is a 80 y.o. who is here initial evaluation of left knee pain. Her history is significant for left total knee arthroplasty about 15 years ago. She reports she was recently at [facility] admitted for about 5 days. She was then in a skilled nursing facility after that. She had issues with leg swelling in the hospital in a skilled nursing facility for which they were elevating her legs and started diuretic. Her left leg is always more swollen than her right and both have nearly returned to her baseline. She walks with use of walker and is here with her son today. She reports trouble getting into and out of a car and putting on socks and shoes. Patient Active Problem List   Diagnosis    Gout    History of breast cancer    Pure hypercholesterolemia    Depression    Osteoarthritis, mostly knees    Hypothyroidism    Hypotension    Unsteady gait when walking    HTN (hypertension), benign    Recurrent major depressive disorder, in partial remission (HCC)    Bilateral hearing loss    Unable to care for self    Arthralgia of right elbow    Unable to ambulate       ROS:  Constitutional: denies fever, chills, weight loss  MSK: denies pain in other joints, muscle aches  Neurological: denies numbness, tingling, weakness    Exam:  Height 4' 11\" (1.499 m), weight 172 lb (78 kg)    Appearance: sitting in exam room chair, appears to be in no acute distress, awake and alert  Resp: unlabored breathing on room air  Skin: warm, dry and intact with out erythema or significant increased temperature  Neuro: grossly intact both lower extremities. Intact sensation to light touch. Motor exam 4+ to 5/5 in all major motor groups. LLE: Active knee range of motion 0 to 120 degrees. The knee is stable to varus and valgus stress and stable to anterior and posterior drawer sign.   Pain with passive logroll of the hip and with [facility]. Sensation is intact light touch. There is brisk capillary refill. There is 5/5 muscle strength in all muscle groups. Imaging:  3 views of the left knee were performed and interpreted today. There is a cemented total knee arthroplasty prosthesis in place with no signs of osteolysis, loosening, fracture, dislocation. 3 views of the left hip were performed and interpreted today. Significant for joint space narrowing, periarticular osteophytes, chondrocalcinosis. Assessment:  History of left knee arthroplasty  Left hip osteoarthritis    Plan:  We discussed the diagnosis and treatment options. I suspect the left knee pain is actually coming from her left hip which has significant degenerative changes. Her left knee is functioning well clinically and radiographically. I recommended and prescribed meloxicam 7.5 mg to take once daily to help with the pain. I also recommended an intra-articular left hip steroid injection which will be scheduled for the near future. She will follow-up for the left hip injection. Total time spent on today's encounter was at least 32 minutes. This time included reviewing prior notes, radiographs, and lab results when available, reviewing history obtained by medical assistant, performing history and physical exam, reviewing tests/radiographs with the patient, counseling the patient, ordering medications or tests, documentation in the electronic health record, and coordination of care. This dictation was done with Dragon dictation and may contain mechanical errors related to translation.

## 2022-10-03 NOTE — TELEPHONE ENCOUNTER
----- Message from Burke Merlin sent at 10/3/2022 11:44 AM EDT -----  Subject: Message to Provider    QUESTIONS  Information for Provider? PT called in to request a recommendation on   where to get her flu vaccine and the new covid booster. Please follow up   with PT.   ---------------------------------------------------------------------------  --------------  Ector RINCON  [phone]; OK to leave message on voicemail  ---------------------------------------------------------------------------  --------------  SCRIPT ANSWERS  Relationship to Patient?  Self

## 2022-10-06 ENCOUNTER — OFFICE VISIT (OUTPATIENT)
Dept: ORTHOPEDIC SURGERY | Age: 87
End: 2022-10-06
Payer: MEDICARE

## 2022-10-06 ENCOUNTER — HOSPITAL ENCOUNTER (OUTPATIENT)
Dept: GENERAL RADIOLOGY | Age: 87
Discharge: HOME | End: 2022-10-06
Payer: MEDICARE

## 2022-10-06 DIAGNOSIS — M16.12 PRIMARY OSTEOARTHRITIS OF LEFT HIP: Primary | ICD-10-CM

## 2022-10-06 DIAGNOSIS — M16.12 PRIMARY OSTEOARTHRITIS OF LEFT HIP: ICD-10-CM

## 2022-10-06 DIAGNOSIS — M25.552 LEFT HIP PAIN: ICD-10-CM

## 2022-10-06 PROCEDURE — 20610 DRAIN/INJ JOINT/BURSA W/O US: CPT | Performed by: ORTHOPAEDIC SURGERY

## 2022-10-06 PROCEDURE — 2500000003 HC RX 250 WO HCPCS

## 2022-10-06 PROCEDURE — 77002 NEEDLE LOCALIZATION BY XRAY: CPT

## 2022-10-06 PROCEDURE — 6360000002 HC RX W HCPCS

## 2022-10-06 PROCEDURE — 99999 PR OFFICE/OUTPT VISIT,PROCEDURE ONLY: CPT | Performed by: ORTHOPAEDIC SURGERY

## 2022-10-06 PROCEDURE — 20610 DRAIN/INJ JOINT/BURSA W/O US: CPT

## 2022-10-06 PROCEDURE — 77002 NEEDLE LOCALIZATION BY XRAY: CPT | Performed by: ORTHOPAEDIC SURGERY

## 2022-11-04 ENCOUNTER — TELEPHONE (OUTPATIENT)
Dept: FAMILY MEDICINE CLINIC | Age: 87
End: 2022-11-04

## 2022-11-04 RX ORDER — LEVOTHYROXINE SODIUM 0.1 MG/1
100 TABLET ORAL DAILY
Qty: 30 TABLET | Refills: 2 | Status: SHIPPED | OUTPATIENT
Start: 2022-11-04

## 2022-11-29 ENCOUNTER — CARE COORDINATION (OUTPATIENT)
Dept: CARE COORDINATION | Age: 87
End: 2022-11-29

## 2022-11-30 NOTE — CARE COORDINATION
Ambulatory Care Coordination Note  11/30/2022    ACC: David Palacio, ARTURO    [employer] spoke with Ajit Viveros and her son to follow up on established care. She has a history of HTN, OA, Gout, CKD , breast cancer, hypercholesterolemia and high fall risk. She reports that in the past she has been in a nursing home for rehab and has home health care for PT. She is dependent on a walker for ambulation. Ajit Viveros can dress, groom and prepare light meals. She lives alone but has support of son and private paid HHA x 2 days per week for 3 hours. HHA assist with house keeping, laundry, groceries and errands. Her son will take to her appts. Soon, she will have a grand-daughter staying with her for an extended stay. [employer] has provided railings from garage, safety bars, shower chair. She has a BP cuff at home but not monitoring. Denies symptoms of HA, lightheadedness and dizziness. Offered patient enrollment in the Remote Patient Monitoring (RPM) program for in-home monitoring:  not appropriate. BP has been stable and pt is elderly and difficult to comply. ACTION: reviewed history and meds. Discussed fall prevention and safety  measures. Advised on appropriate DMEs. Encouraged follow up on recommended PT exercises. Sent Senior Chair exercises. Send handout Fall prevention in My Chart. Advised son to bring copy of ACP to next office visit.   Future Appointments   Date Time Provider Parviz Rubin   12/6/2022  8:30 AM DO Rukhsana Esquivel [place] [facility]   12/12/2022 10:30 AM [facility]MO RM 3 [facility] [place]   12/12/2022 11:15 AM [facility] US RM 4 [facility] ULTRA [place]    PLAN:  F/u on next visit  F/u on fall prevention  Likely discharge next encounter     Lab Results       None                 Goals Addressed                   This Visit's Progress     Reduce Falls         I will reduce my risk of falls by the following: Use walking aids like cane or walker  And review fall prevention handout sent in [facility] Chart with son     Barriers: impairment:  hearing and physical: using walker  Plan for overcoming my barriers: ACM will provided fall prevention teaching, send handout and follow up with son regarding DME's. Confidence: 9/10  Anticipated Goal Completion Date: 1/2023              Prior to Admission medications    Medication Sig Start Date End Date Taking? Authorizing Provider   levothyroxine (SYNTHROID) 100 MCG tablet Take 1 tablet by mouth Daily 11/4/22  Yes [place] Matters, DO   meloxicam (MOBIC) 7.5 MG tablet Take 1 tablet by mouth daily as needed for Pain 10/3/22  Yes Jenaro Holcomb MD   potassium chloride (KLOR-CON M) 10 MEQ extended release tablet TAKE 1 TABLET BY MOUTH  DAILY 9/20/22  Yes Candido Matters, DO   buPROPion [facility] SR) 150 MG extended release tablet TAKE 1 TABLET BY MOUTH  TWICE DAILY 9/6/22  Yes [place] Matters, DO   lisinopril (PRINIVIL;ZESTRIL) 2.5 MG tablet TAKE 1 TABLET BY MOUTH  DAILY 7/18/22  Yes [place] Matters, DO   GLUCOSAMINE-CHONDROITIN-MSM PO Take 1 tablet by mouth daily Indications: Joint Damage causing Pain and Loss of Function   Yes Historical Provider, MD   niacin (SLO-NIACIN) 500 MG extended release tablet Take 500 mg by mouth nightly   Yes Historical Provider, MD   aspirin 81 MG tablet Take 81 mg by mouth daily   Yes Historical Provider, MD   Cholecalciferol (VITAMIN D3) 2000 UNITS CAPS One daily for Vit D supplement 11/23/15  Yes Melody Wong MD   melatonin 5 MG TABS tablet Take 3 mg by mouth nightly One at bedtime for sleep assist  6/5/13  Yes Historical Provider, MD   ammonium lactate (AMLACTIN) 12 % cream Apply topically as needed Apply topically as needed. Yes Historical Provider, MD   magnesium chloride (MAG DELAY) 535 (64 MG) MG TBCR CR tablet Take 64 mg by mouth 2 times daily    Yes Historical Provider, MD   acetaminophen (TYLENOL) 500 MG tablet Take 500 mg by mouth daily.    Yes Historical Provider, MD   Multiple Vitamin (MULTIVITAMIN PO) Take 1 tablet by mouth daily.      Yes Historical Provider, MD   magnesium oxide (MAG-OX) 400 (240 Mg) MG tablet Take 1 tablet by mouth in the morning. 7/27/22   Oscar Reaves MD   atorvastatin (LIPITOR) 40 MG tablet Take 1 tablet by mouth nightly  Patient taking differently: Take 20 mg by mouth nightly 7/18/22   Delfino Hill DO       Future Appointments   Date Time Provider Parviz Rubin   12/6/2022  8:30 AM Delfino Hill DO [facility]   12/12/2022 10:30 AM [facility] MAMMO RM 3 [facility] Dates   12/12/2022 11:15 AM [facility]  [place]

## 2022-12-06 ENCOUNTER — OFFICE VISIT (OUTPATIENT)
Dept: FAMILY MEDICINE CLINIC | Age: 87
End: 2022-12-06
Payer: MEDICARE

## 2022-12-06 VITALS
HEART RATE: 66 BPM | SYSTOLIC BLOOD PRESSURE: 130 MMHG | BODY MASS INDEX: 34.47 KG/M2 | HEIGHT: 59 IN | WEIGHT: 171 LBS | DIASTOLIC BLOOD PRESSURE: 84 MMHG

## 2022-12-06 DIAGNOSIS — R53.1 WEAKNESS GENERALIZED: ICD-10-CM

## 2022-12-06 DIAGNOSIS — M16.12 PRIMARY OSTEOARTHRITIS OF LEFT HIP: Chronic | ICD-10-CM

## 2022-12-06 DIAGNOSIS — R26.81 UNSTEADY GAIT WHEN WALKING: ICD-10-CM

## 2022-12-06 DIAGNOSIS — E77.8 HYPOPROTEINEMIA (HCC): ICD-10-CM

## 2022-12-06 DIAGNOSIS — E03.9 ACQUIRED HYPOTHYROIDISM: ICD-10-CM

## 2022-12-06 DIAGNOSIS — R73.01 IFG (IMPAIRED FASTING GLUCOSE): ICD-10-CM

## 2022-12-06 DIAGNOSIS — I10 HTN (HYPERTENSION), BENIGN: Primary | ICD-10-CM

## 2022-12-06 LAB
ALBUMIN SERPL-MCNC: 4.2 G/DL (ref 3.4–5)
ESTIMATED AVERAGE GLUCOSE: 105.4 MG/DL
HBA1C MFR BLD: 5.3 %
TOTAL PROTEIN: 6 G/DL (ref 6.4–8.2)
TSH REFLEX: 2.05 UIU/ML (ref 0.27–4.2)

## 2022-12-06 PROCEDURE — 36415 COLL VENOUS BLD VENIPUNCTURE: CPT | Performed by: FAMILY MEDICINE

## 2022-12-06 PROCEDURE — 99215 OFFICE O/P EST HI 40 MIN: CPT | Performed by: FAMILY MEDICINE

## 2022-12-06 PROCEDURE — 1124F ACP DISCUSS-NO DSCNMKR DOCD: CPT | Performed by: FAMILY MEDICINE

## 2022-12-06 NOTE — PATIENT INSTRUCTIONS
Christiana Singh was seen today for hypertension and thyroid problem. Diagnoses and all orders for this visit:    HTN (hypertension), benign  -     Good control.  -     Continue meds and lifestyle control. Acquired hypothyroidism  -     TSH with Reflex; Future  Fair control.  -     Continue meds and lifestyle control. Weakness generalized  The more active you stay the more independent you stay. (movement disorder exercise hand out). Unsteady gait when walking  Continue with walker. Stairs. Hypoproteinemia (HCC)  -     Protein, Total; Future  -     Albumin; Future  You can increase your protein using egg substitutes, eating small portions of red meat and using more skinless poultry and fish to meet your protein needs. Cottage cheese and greek yogurt are also good sources of protein. Try to get some of your proteins from plant sources such as beans, nuts, peas and / or soy products such as tofu or soy milk. Whey protein powders 1 scoop daily can add 15-25% of your daily need when mixed in with food or as a smoothie blended with vegetable or fruit juice, yogurt or milk. You can also substitute soy or alfalfa powder which is cholesterol free. IFG (impaired fasting glucose)  -     Hemoglobin A1C; Future   Exercise, weight loss and frequent small healthy balanced meals help prevent diabetes. Weekly weights can help you track your progress.

## 2022-12-06 NOTE — PROGRESS NOTES
Alexsandra Heath (:  1933) is a 80 y.o. female,Established patient, here for evaluation of the following chief complaint(s):  Hypertension (ROUTINE FOLLOW UP ) and Thyroid Problem (ROUTINE FOLLOW UP )       ASSESSMENT/PLAN:  944    Patient Instructions   Fransisca Gottron was seen today for hypertension and thyroid problem. Diagnoses and all orders for this visit:    HTN (hypertension), benign  -     Good control.  -     Continue meds and lifestyle control. Acquired hypothyroidism  -     TSH with Reflex; Future  Fair control.  -     Continue meds and lifestyle control. Weakness generalized  The more active you stay the more independent you stay. (movement disorder exercise hand out). Unsteady gait when walking  Continue with walker. Stairs. Hypoproteinemia (HCC)  -     Protein, Total; Future  -     Albumin; Future  You can increase your protein using egg substitutes, eating small portions of red meat and using more skinless poultry and fish to meet your protein needs. Cottage cheese and greek yogurt are also good sources of protein. Try to get some of your proteins from plant sources such as beans, nuts, peas and / or soy products such as tofu or soy milk. Whey protein powders 1 scoop daily can add 15-25% of your daily need when mixed in with food or as a smoothie blended with vegetable or fruit juice, yogurt or milk. You can also substitute soy or alfalfa powder which is cholesterol free. IFG (impaired fasting glucose)  -     Hemoglobin A1C; Future   Exercise, weight loss and frequent small healthy balanced meals help prevent diabetes. Weekly weights can help you track your progress. Primary osteoarthritis of left hip greater than right  Helped after left hip steroid injection. Return in about 3 months (around 3/6/2023) for Hypertension, Weakness.        Subjective   SUBJECTIVE/OBJECTIVE:  Chief Complaint   Patient presents with    Hypertension     ROUTINE FOLLOW UP     Thyroid Problem     ROUTINE FOLLOW UP    904    HTN (hypertension), benign  She got out of the [facility] 8/12/22 ~. Was checking Bp more before the confusion of son and daughter-in-law. She is watching salt. Acquired hypothyroidism  Current symptoms include ocular symptoms. Patient denies fatigue, weight gain, feeling cold and cold intolerance, constipation, swelling, anxiousness, feeling excessive energy, tremulousness, palpitations, sweating, weight loss, diarrhea, change in skin,  nails, or hair, heat intolerance, depression, goiter. Weakness generalized  Does leg lifts sitting. Can get up out of a chair easier if it has arms. Harder to get off the couch. Unsteady gait when walking/osteoarthritis left hip  No falls since home from [facility]. Destiny Stewartwers slows her down. If she goes somewhere and sits she keeps her walker near her. She saw Dr. Kimberly Lao for her left hip pain. Dr. Kimberly Lao gave her a steroid injection in the left hip. This reduced pain significantly. Hypoproteinemia (HCC)  Takes protein shake at lunch and +/- dinner. Was not doing this at the last time she checked. No protein bars. Eats more cheese. Occasional scrambled eggs. Has meals on wheels now. A lot of meals with meat. Memory  Knows she is getting worse.     Review of Systems    Past Medical History:   Diagnosis Date    Breast cancer ([address] 2004    ductal vs lobular, not clear from pathologist    Cellulitis of left leg     Cellulitis of wrist 06/06/2018    RIGHT    Chronic kidney disease, stage III (moderate) ([address] 2005    Depression     Diverticulitis of colon 01/01/1979    DVT (deep venous thrombosis) ([address] 2004    after first knee surgery    Gout 05/13/2011    Hematoma of left thigh 12/02/2017    Herpes zoster 01/04/2018    right ant shoulder    Hypertension     Hypoproteinemia ([address] 07/27/2019    Hypothyroidism 11/2011    Intervertebral lumbar disc disorder with myelopathy, lumbar region     OA (osteoarthritis) 2000    severe in knees, left ankle, left >right hip    Primary osteoarthritis of right wrist     Severe arthritic change within carpal - 1st metacarpal joint, Extensive chondrocalcinosis throughout wrist    Pure hypercholesterolemia     Seborrheic dermatitis 2014    Subdural hematoma, post-traumatic 2016    Tongue dysplasia 2014    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 Ilda Soriano MD. [facility]    JOINT REPLACEMENT      right knee and left knee in     MOUTH SURGERY  2015    verrous hyperplasia left ventral tongue, [facility] T1N0M0 [facility], Dr Rocael Narvaez  1970       Social History     Socioeconomic History    Marital status:      Spouse name: Sabine Altamirano -      Number of children: 4    Years of education: 12    Highest education level: Not on file   Occupational History    Occupation: SECRETARIAL WORK - RETIRED    Tobacco Use    Smoking status: Never    Smokeless tobacco: Never    Tobacco comments:     avoid tobacco   Vaping Use    Vaping Use: Never used   Substance and Sexual Activity    Alcohol use: No     Alcohol/week: 0.0 standard drinks    Drug use: No    Sexual activity: Not Currently     Birth control/protection: Post-menopausal   Other Topics Concern    Not on file   Social History Narrative    Lives by herself. Using her walker routinely 2016. NO CURRENT EXERCISE PROGRAM BEYOND STRETCH. 11/15/16. 17. None. Has left knee pain. 17. Does HEP from PT, calf pumps, wall push ups. 17. No exercise except leg. 17 Does leg exercises with pedals. No muscle conditioning. 4/3/18. 7/3/18. Does leg lifts when sitting.  Uses floor pedals 3-4 x/wk for 3-5 min. No problems when does. Patient has a health aide who comes for 3 hours 2 times a week. 10/2/18. Floor pedals 3-5 min qd and occasional bid. 1/8/19. 5-10 min qd and occasional 5 min bid. 4/16/19. 5x/wk 1-2x/day. 7/16/19. 10/22/19. 5x/wk 5-10 min. 1/21/20. 7/21/20 Now at 15 min w/floor pedals. 10/27/20. 20 min. On pedals >/= 5 day/wk. 1/26/20. 4/27/21. With left ankle pain 10 min 4x/wk. 7/27/21. Sometimes does more exercise. Still driving. 11/16/21. Does pedals 3x/wk for 20 min. 2/15/22. Till she got sick. 4/1/22. Does pedals 3x/wk for 20 min. 5/23/22. None since fall. 6/22/22. Was in PT while in [facility] and came back. Now has PT at home. 8/15/22. Okay to restart pedals. 8/30/22. Not doing pedals because of confusion. 12/6/22. Social Determinants of Health     Financial Resource Strain: Not on file   Food Insecurity: Not on file   Transportation Needs: Not on file   Physical Activity: Not on file   Stress: Not on file   Social Connections: Not on file   Intimate Partner Violence: Not on file   Housing Stability: Not on file       Family History   Problem Relation Age of Onset    Cancer Mother         bone    Heart Disease Father         CAD    Diabetes Father     Diabetes Sister     Cancer Sister 54        leukemia    Arrhythmia Brother         A fib    Stroke Brother 80        ?     Other Brother         THR after fx.  bilateral pneumonia, better    No Known Problems Daughter     High Blood Pressure Daughter     Other Son         Spinal fusion with rods/screws    High Blood Pressure Son     Other Son         rotator cuff tear       No Known Allergies    Current Outpatient Medications   Medication Sig Dispense Refill    levothyroxine (SYNTHROID) 100 MCG tablet Take 1 tablet by mouth Daily 30 tablet 2    meloxicam (MOBIC) 7.5 MG tablet Take 1 tablet by mouth daily as needed for Pain 30 tablet 0    potassium chloride (KLOR-CON M) 10 MEQ extended release tablet TAKE 1 TABLET BY MOUTH  DAILY 90 tablet 0    buPROPion (WELLBUTRIN SR) 150 MG extended release tablet TAKE 1 TABLET BY MOUTH  TWICE DAILY 180 tablet 1    atorvastatin (LIPITOR) 40 MG tablet Take 1 tablet by mouth nightly (Patient taking differently: Take 20 mg by mouth nightly) 90 tablet 1    lisinopril (PRINIVIL;ZESTRIL) 2.5 MG tablet TAKE 1 TABLET BY MOUTH  DAILY 90 tablet 3    GLUCOSAMINE-CHONDROITIN-MSM PO Take 1 tablet by mouth daily Indications: Joint Damage causing Pain and Loss of Function      niacin (SLO-NIACIN) 500 MG extended release tablet Take 500 mg by mouth nightly      aspirin 81 MG tablet Take 81 mg by mouth daily      Cholecalciferol (VITAMIN D3) 2000 UNITS CAPS One daily for Vit D supplement 30 capsule     melatonin 3 MG TABS tablet Take 3 mg by mouth nightly One at bedtime for sleep assist       magnesium chloride (MAG DELAY) 535 (64 MG) MG TBCR CR tablet Take 64 mg by mouth 2 times daily       acetaminophen (TYLENOL) 500 MG tablet Take 500 mg by mouth daily. Multiple Vitamin (MULTIVITAMIN PO) Take 1 tablet by mouth daily. No current facility-administered medications for this visit. Objective   Vitals:    12/06/22 0833   BP: 130/84   Site: Right Upper Arm   Position: Sitting   Cuff Size: Medium Adult   Pulse: 66   Weight: 171 lb (77.6 kg)   Height: 4' 11\" (1.499 m)     BP Readings from Last 3 Encounters:   12/06/22 130/84   08/30/22 122/78   08/15/22 122/82     Pulse Readings from Last 3 Encounters:   12/06/22 66   08/30/22 74   08/15/22 65     Wt Readings from Last 3 Encounters:   12/06/22 171 lb (77.6 kg)   10/03/22 172 lb (78 kg)   08/30/22 172 lb (78 kg)     Body mass index is 34.54 kg/m². Physical Exam  Vitals and nursing note reviewed. Constitutional:       General: She is not in acute distress. Appearance: Normal appearance. She is well-developed. She is obese. She is not ill-appearing, toxic-appearing or diaphoretic. Eyes:      General: No scleral icterus. Right eye: No discharge. Left eye: No discharge. Conjunctiva/sclera: Conjunctivae normal.   Neck:      Thyroid: No thyroid mass or thyromegaly. Vascular: No carotid bruit or JVD. Trachea: Trachea and phonation normal. No tracheal deviation. Cardiovascular:      Rate and Rhythm: Normal rate and regular rhythm. No extrasystoles are present. Heart sounds: S1 normal and S2 normal. Heart sounds not distant. Murmur heard. Systolic murmur is present with a grade of 1/6. No friction rub. No gallop. No S3 or S4 sounds. Pulmonary:      Effort: Pulmonary effort is normal. No respiratory distress. Breath sounds: Normal breath sounds. No decreased breath sounds, wheezing, rhonchi or rales. Musculoskeletal:      Cervical back: Neck supple. Right lower le+ Edema present. Left lower le+ Edema present. Lymphadenopathy:      Head:      Right side of head: No submandibular or tonsillar adenopathy. Left side of head: No submandibular or tonsillar adenopathy. Cervical: No cervical adenopathy. Right cervical: No superficial, deep or posterior cervical adenopathy. Left cervical: No superficial, deep or posterior cervical adenopathy. Skin:     General: Skin is warm and dry. Coloration: Skin is not pale. Nails: There is no clubbing. Neurological:      Mental Status: She is alert. Cranial Nerves: No dysarthria or facial asymmetry. Comments: She is able to transfer on her own. Uses walker. Psychiatric:         Attention and Perception: Attention and perception normal.         Mood and Affect: Mood and affect normal.         Speech: Speech is delayed. Behavior: Behavior normal. Behavior is cooperative. Cognition and Memory: Cognition is impaired. Memory is impaired. She exhibits impaired recent memory.          Judgment: Judgment normal.      Latest Reference Range & Units 22 10:11   Total Protein 6.4 - 8.2 g/dL 6.1 (L)     Imaging 2022 in orthopedic office:  3 views of the left knee were performed and interpreted today. There is a cemented total knee arthroplasty prosthesis in place with no signs of osteolysis, loosening, fracture, dislocation. 3 views of the left hip were performed and interpreted today. Significant for joint space narrowing, periarticular osteophytes, chondrocalcinosis. On this date 12/6/2022 I have spent 40 minutes reviewing previous notes, test results and face to face with the patient discussing the diagnosis and importance of compliance with the treatment plan as well as documenting on the day of the visit. An electronic signature was used to authenticate this note.     --Mitchell Claire, DO

## 2022-12-08 PROBLEM — R53.1 WEAKNESS GENERALIZED: Status: ACTIVE | Noted: 2022-12-08

## 2022-12-08 PROBLEM — M16.12 PRIMARY OSTEOARTHRITIS OF LEFT HIP: Chronic | Status: ACTIVE | Noted: 2017-05-10

## 2022-12-12 ENCOUNTER — HOSPITAL ENCOUNTER (OUTPATIENT)
Dept: ULTRASOUND IMAGING | Age: 87
Discharge: HOME | End: 2022-12-12
Payer: MEDICARE

## 2022-12-12 ENCOUNTER — HOSPITAL ENCOUNTER (OUTPATIENT)
Dept: WOMENS IMAGING | Age: 87
Discharge: HOME | End: 2022-12-12
Payer: MEDICARE

## 2022-12-12 VITALS — BODY MASS INDEX: 34.27 KG/M2 | HEIGHT: 59 IN | WEIGHT: 170 LBS

## 2022-12-12 DIAGNOSIS — R92.8 ABNORMAL MAMMOGRAM OF LEFT BREAST: ICD-10-CM

## 2022-12-12 PROCEDURE — 76641 ULTRASOUND BREAST COMPLETE: CPT

## 2022-12-12 PROCEDURE — 77065 DX MAMMO INCL CAD UNI: CPT

## 2022-12-15 ENCOUNTER — TELEPHONE (OUTPATIENT)
Dept: PHARMACY | Facility: CLINIC | Age: 87
End: 2022-12-15

## 2022-12-15 NOTE — TELEPHONE ENCOUNTER
[facility] CLINICAL PHARMACY: [facility] RE[facility]  Identified care gap per [employer]: fills at [facility]: ACE/ARB and Statin adherence    Last Visit: 12.06.22        ASSESSMENT  ACE/ARB ADHERENCE    Insurance Records claims through 12.04.22 (Prior Year [place] = PASSED; YTD Jim [place] =  84%; Potential Fail Date: 12.20.22 ):   Lisinopril last filled on 07.24.22 for 90 day supply. Next refill due: 10.22.22    Per  [employer] Portal:  (same as above). BP Readings from Last 3 Encounters:   12/06/22 130/84   08/30/22 122/78   08/15/22 122/82     Estimated Creatinine Clearance: 72 mL/min (based on SCr of 0.5 mg/dL). [id number] Records claims through 12.04.22 (Prior Year Jim Jean Baptistera = PASSED; YTD Jim Ralph =  79%; Passed in 2022): Atorvastatin last filled on 09.16.22 for 90 day supply. Next refill due: 12.15.22    Per  [employer] Portal:  (same as above). Lab Results   Component Value Date    CHOL 121 08/06/2021    TRIG 65 08/06/2021    HDL 39 (L) 08/06/2021    LDLCALC 69 08/06/2021     ALT   Date Value Ref Range Status   02/15/2022 19 10 - 40 U/L Final     AST   Date Value Ref Range Status   02/15/2022 23 15 - 37 U/L Final     The ASCVD Risk score (Seda DK, et al., 2019) failed to calculate for the following reasons: The 2019 ASCVD risk score is only valid for ages 36 to 78     PLAN  The following are interventions that have been identified:   - Patient overdue refilling Lisinopril and active on home medication list.   - Atorvastatin refill due today. Attempting to reach patient to review. Left message asking for return call.          Future Appointments   Date Time Provider Parviz Rubin   3/7/2023  8:30 AM DO Donald Barrera [address], [address] [address]   [address] [facility],[address] [facility] Clinical Pharmacy  Phone: toll free [phone]

## 2022-12-15 NOTE — RESULT ENCOUNTER NOTE
Called by medical assistants- see phone note. Left breast hematoma has resolved on both the mammogram and the ultrasound. Annual mammogram is due on 6/2023.

## 2022-12-16 NOTE — TELEPHONE ENCOUNTER
CLINICAL PHARMACY: [facility]    2nd Attempt Documentation:   Left message on voicemail for patient to call us back.   Mychart message sent.        ===================================================================    For Pharmacy Admin Tracking Only    Gap Closed?: No   Time Spent (min): 15

## 2022-12-20 NOTE — TELEPHONE ENCOUNTER
Patient returns call, stating she has plenty of Atorvastatin, but does need a refill on Lisinopril. She asks to have [facility] send a 90 day supply to her home address.     Called [facility], advised Lisinopril 2.5 mg was shipped to pts home address on 12/17/22.      ===================================================================    For Pharmacy Admin Tracking Only    Program: [address] Closed?: Yes   Time Spent (min): 15

## 2022-12-21 ENCOUNTER — CARE COORDINATION (OUTPATIENT)
Dept: CARE COORDINATION | Age: 87
End: 2022-12-21

## 2023-01-03 ENCOUNTER — CARE COORDINATION (OUTPATIENT)
Dept: CARE COORDINATION | Age: 88
End: 2023-01-03

## 2023-01-05 RX ORDER — LEVOTHYROXINE SODIUM 0.1 MG/1
100 TABLET ORAL DAILY
Qty: 90 TABLET | Refills: 0 | Status: SHIPPED | OUTPATIENT
Start: 2023-01-05

## 2023-01-11 DIAGNOSIS — E87.6 HYPOKALEMIA: ICD-10-CM

## 2023-01-11 RX ORDER — POTASSIUM CHLORIDE 750 MG/1
TABLET, EXTENDED RELEASE ORAL
Qty: 90 TABLET | Refills: 1 | Status: SHIPPED | OUTPATIENT
Start: 2023-01-11

## 2023-02-03 ENCOUNTER — TELEPHONE (OUTPATIENT)
Dept: FAMILY MEDICINE CLINIC | Age: 88
End: 2023-02-03

## 2023-02-03 NOTE — TELEPHONE ENCOUNTER
Pt is calling to say she has a sinus infection and needs some help. She feels like it is sinus. Just does not feel well. PT C/O NASAL DRAINAGE: yes    WHAT COLOR: yellow , COUGH: no   BRINGING UP PHLEGM:  no  IF YES, WHAT COLOR:  no   SORE THROAT:  yes  a little irritated  PND:  yes   HEADACHE:  no   EAR PAIN:  no   LEFT, RIGHT OR BOTH:  none   S.O.B.:  no  WITH OR WITHOUT EXERTION:  none   WHEEZING:  no   HEAD CONGESTION:  yes   CHEST CONGESTION:  no   BODY ACHES:  no   VOMITTING no   DIARRHEA:  no   TEMP:  no  IF SO, HIGHEST TEMP:  none   SYMPTOMS FOR HOW MANY DAYS:  2 days since Wednesday   WHAT MEDS HAS PT TRIED:  no not sure what she can take   MED ALLERGIES:  no  APPT OFFERED:  no  VERIFY PHARMACY INFORMATION.    Aayush Gonzalez

## 2023-02-03 NOTE — TELEPHONE ENCOUNTER
Call pt give highlighted instructions below. Patient to be COVID tested now. In 4 days if her symptoms of been ongoing less than 4 days. Call us back to the person on call if you are positive for COVID. IF you develop ANY respiratory infection symptoms (not just allergy symptoms) suggestive of COVID-19 start the recommendations below: Instructions for Respiratory Infections (SAVE THIS SHEET)    For the first 7-14 days of symptoms follow instructions below, even before being seen in the office or even during treatment with antibiotics, until symptom free. 1. Water: Drink 1 ounce of water for every 2 pounds of body weight for adults, you need about 64 Ounces of water/fluids per day. This will loosen mucus in the head and chest & improve the weak feeling of dehydration, allow the body to get germ fighting resources to the infection. Half of fluids can be juice or sugar free Crystal Light. Don't count drinks with caffeine, alcohol or carbonation. Infants can have Pedialyte liquid or freezer pops. Avoid salt and sports drinks if you have high Blood Pressure, swelling in the feet or ankles or have heart problems. 2. Humidity: Humidify the air to 35-50% ( or until the windows fog over slightly). Can use a humidifier, vaporizer, boil water on the stove or put a coffee can full of water on the heater vents. This will loosen mucus from infections and allergies. 3. Sleep: Get 8-10 hours a night and rest during the evening after work or school. If you have trouble sleeping, adults can take Melatonin 5mg up to 2 tabs at bedtime ( not for children or pregnant women). If Mono is suspected then sleep during 9PM to 9AM time span (if possible.)  4. Cough: Take cough medicines with Guaifenesin ( to loosen chest or head congestion) and Dextromethorphan ( to decrease excess cough). Robitussin D.M. Syrup every 4-6 hrs OR Mucinex D. M. pills OR Delsym DM syrup twice a day.  Use the pediatric formulations for children over 6 months making sure they are alcohol & sugar free for children, pregnant women, and diabetics. 5. Pain And Fevers: Take Acetaminophen ( Tylenol) for fevers, aches, and headaches. 2-500 mg every 8 hours for adults. If you do not have these symptoms you do not need to take acetaminophen. Appropriate doses at bedtime for children may help them sleep better. If pregnant take 1 -500 mg (Tylenol) every 8 hours as needed. Ibuprofen/Aleve/aspirin for pain and fevers SHOULD NOT BE USED IN THE SETTING OF POSSIBLE COVID-19 viral infection NOR if pregnant, if you have acid reflux, high blood pressure, CHF, or kidney problems. 6.Gargle: (DAY ONE OF SYMPTOMS) Gargle in the back of the throat with the head tilted back and to the sides with a strong mouthwash  ( Listerine or Scope) after meals and at bedtime at least 4 -5 times a day. This helps kill bacteria and viruses in the back of the throat and will shorten the duration and decrease the severity of your symptoms: sore throat, cough, ear popping,/ear pain, and possibly dizziness. 7. Smoking: Avoid smoking or exposure to second hand smoke. 8. Zinc: (DAY ONE OF SYMPTOMS)  Zinc lozenges such as Cold Mati (available most stores), or Basic ([employer] brand) will help shorten the duration and lessen symptoms such as sore throat, cough, nasal congestion, runny nose, and post nasal drip. Use 1 lozenge every 2-4 hours ( after meals if stomach is sensitive). Children can use 10-15 mg or less 3-4 times a day or Zinc lollypops. In pregnancy limit to 50-60 mg a day for 7 days as prenatals have Zinc also. With diarrhea use zinc pills 50 mg 1/2 to 1 pill 2x/day WITH OR SHORTLY AFTER A MEAL. 9. Vitamins: Vitamin C 500 mg with breakfast and dinner (with suspected or diagnosed COVID-19 infection take vitamin C 1000 mg 2-3 times a day). Children and pregnant women should drink citrus juices. This speeds healing and strengthens immune system.   10. Chest Symptoms: Vicks Vapor rub to the chest at bedtime.  11. Decongestants: Avoid all decongestants and antihistamine cold preparations in children.  Decongestants should always be avoided in people with high blood pressure, palpitations, heart disease and those on stimulant medications.   Antihistamines may impede the body's ability to fight COVID-19.  12. Frequent hand washing and/or hand gel especially after coughing or sneezing into the hands or blowing the nose to help prevent spreading to others. Use kleenex and NOT handkerchiefs.   Try all of the above starting with day 1 of symptoms. If Strep throat symptoms appear call to be seen in the office as soon as possible and don't gargle on that day. Newborns, infants, or anyone with earaches or influenza may need to be seen quickly. Adults with fevers over 103 degrees or shortness of breath should call the office immediately.

## 2023-02-06 ENCOUNTER — TELEPHONE (OUTPATIENT)
Dept: FAMILY MEDICINE CLINIC | Age: 88
End: 2023-02-06

## 2023-02-06 NOTE — TELEPHONE ENCOUNTER
----- Message from Lizet Greg sent at 2/6/2023  1:34 PM EST -----  Subject: Message to Provider    QUESTIONS  Information for Provider? Pt is calling in stating she is feeling better   but she is now having pain in the right shoulder, Pt would like a call   back. ---------------------------------------------------------------------------  --------------  [facility]  [phone]; OK to leave message on voicemail  ---------------------------------------------------------------------------  --------------  SCRIPT ANSWERS  Relationship to Patient?  Self

## 2023-02-07 NOTE — TELEPHONE ENCOUNTER
Called yesterday afternoon on the way home with no answer. Called again this morning with no answer.

## 2023-02-07 NOTE — TELEPHONE ENCOUNTER
Pain in her right shoulder a couple days after she had symptoms of a sinus infection. Last year she had pain in her elbow and could not lift her arm. She wonders if it is related to the pain in her right shoulder in the same arm as right elbow pain. She had no hand pain with her elbow pain last year. Has Voltaren gel. She did rub her shoulder with Voltaren gel once. She is not sure if it helps. She does not have as much pain in her shoulder as she did have. She is now drinking 4 bottles of water a day. A/P Right Shoulder Pain  Put 4 g Voltaren gel at the site of greatest pain 4 times a day. If not getting better then would recommend that she come in and be seen so we can determine the actual cause of the pain whether it is shoulder (glenohumeral) osteoarthritis, AC osteoarthritis, bursitis, rotator cuff tendinitis, or biceps tendinitis so we can determine the best place to put the Voltaren gel and also exercises that may help. Patient agreed. Encouraged her not to wait until her routine visit to speak with us about it because we will be rushed and not able to cover it well.

## 2023-02-16 DIAGNOSIS — E78.00 PURE HYPERCHOLESTEROLEMIA: ICD-10-CM

## 2023-02-16 RX ORDER — ATORVASTATIN CALCIUM 40 MG/1
TABLET, FILM COATED ORAL
Qty: 90 TABLET | Refills: 0 | Status: SHIPPED | OUTPATIENT
Start: 2023-02-16

## 2023-02-27 ENCOUNTER — HOSPITAL ENCOUNTER (INPATIENT)
Age: 88
LOS: 4 days | Discharge: NURSING, REHAB OR LONG-TERM CARE | DRG: 554 | End: 2023-03-03
Attending: EMERGENCY MEDICINE | Admitting: INTERNAL MEDICINE
Payer: MEDICARE

## 2023-02-27 ENCOUNTER — APPOINTMENT (OUTPATIENT)
Dept: GENERAL RADIOLOGY | Age: 88
DRG: 554 | End: 2023-02-27
Payer: MEDICARE

## 2023-02-27 ENCOUNTER — HOSPITAL ENCOUNTER (EMERGENCY)
Age: 88
Discharge: HOME | DRG: 554 | End: 2023-02-27
Payer: MEDICARE

## 2023-02-27 VITALS
RESPIRATION RATE: 16 BRPM | SYSTOLIC BLOOD PRESSURE: 167 MMHG | DIASTOLIC BLOOD PRESSURE: 68 MMHG | HEART RATE: 81 BPM | BODY MASS INDEX: 34.07 KG/M2 | OXYGEN SATURATION: 92 % | WEIGHT: 169 LBS | TEMPERATURE: 98.8 F | HEIGHT: 59 IN

## 2023-02-27 DIAGNOSIS — M25.561 ACUTE PAIN OF RIGHT KNEE: Primary | ICD-10-CM

## 2023-02-27 PROBLEM — T84.54XA INFECTION AND INFLAMMATORY REACTION DUE TO INTERNAL LEFT KNEE PROSTHESIS, INITIAL ENCOUNTER (HCC): Status: ACTIVE | Noted: 2023-02-27

## 2023-02-27 PROBLEM — T84.59XA INFECTED PROSTHETIC KNEE JOINT (HCC): Status: ACTIVE | Noted: 2023-02-27

## 2023-02-27 PROBLEM — Z96.659 INFECTED PROSTHETIC KNEE JOINT (HCC): Status: ACTIVE | Noted: 2023-02-27

## 2023-02-27 LAB
A/G RATIO: 1.4 (ref 1.1–2.2)
ALBUMIN SERPL-MCNC: 3.8 G/DL (ref 3.4–5)
ALP BLD-CCNC: 101 U/L (ref 40–129)
ALT SERPL-CCNC: 17 U/L (ref 10–40)
ANION GAP SERPL CALCULATED.3IONS-SCNC: 14 MMOL/L (ref 3–16)
ANISOCYTOSIS: ABNORMAL
AST SERPL-CCNC: 24 U/L (ref 15–37)
BASOPHILS ABSOLUTE: 0 K/UL (ref 0–0.2)
BASOPHILS RELATIVE PERCENT: 0 %
BILIRUB SERPL-MCNC: 1.3 MG/DL (ref 0–1)
BUN BLDV-MCNC: 16 MG/DL (ref 7–20)
C-REACTIVE PROTEIN: 163.4 MG/L (ref 0–5.1)
CALCIUM SERPL-MCNC: 9.2 MG/DL (ref 8.3–10.6)
CHLORIDE BLD-SCNC: 99 MMOL/L (ref 99–110)
CO2: 23 MMOL/L (ref 21–32)
CREAT SERPL-MCNC: 0.6 MG/DL (ref 0.6–1.2)
EOSINOPHILS ABSOLUTE: 0 K/UL (ref 0–0.6)
EOSINOPHILS RELATIVE PERCENT: 0 %
GFR SERPL CREATININE-BSD FRML MDRD: >60 ML/MIN/{1.73_M2}
GLUCOSE BLD-MCNC: 92 MG/DL (ref 70–99)
HCT VFR BLD CALC: 47.2 % (ref 36–48)
HEMOGLOBIN: 14.9 G/DL (ref 12–16)
LYMPHOCYTES ABSOLUTE: 0.9 K/UL (ref 1–5.1)
LYMPHOCYTES RELATIVE PERCENT: 7 %
MCH RBC QN AUTO: 30.7 PG (ref 26–34)
MCHC RBC AUTO-ENTMCNC: 31.5 G/DL (ref 31–36)
MCV RBC AUTO: 97.5 FL (ref 80–100)
MONOCYTES ABSOLUTE: 1 K/UL (ref 0–1.3)
MONOCYTES RELATIVE PERCENT: 8 %
NEUTROPHILS ABSOLUTE: 10.9 K/UL (ref 1.7–7.7)
NEUTROPHILS RELATIVE PERCENT: 85 %
PDW BLD-RTO: 14.1 % (ref 12.4–15.4)
PLATELET # BLD: 136 K/UL (ref 135–450)
PMV BLD AUTO: 8.3 FL (ref 5–10.5)
POTASSIUM REFLEX MAGNESIUM: 3.6 MMOL/L (ref 3.5–5.1)
RBC # BLD: 4.84 M/UL (ref 4–5.2)
REASON FOR REJECTION: NORMAL
REJECTED TEST: NORMAL
SODIUM BLD-SCNC: 136 MMOL/L (ref 136–145)
TEAR DROP CELLS: ABNORMAL
TOTAL CK: 170 U/L (ref 26–192)
TOTAL PROTEIN: 6.6 G/DL (ref 6.4–8.2)
WBC # BLD: 12.8 K/UL (ref 4–11)

## 2023-02-27 PROCEDURE — 6370000000 HC RX 637 (ALT 250 FOR IP): Performed by: PHYSICIAN ASSISTANT

## 2023-02-27 PROCEDURE — 85025 COMPLETE CBC W/AUTO DIFF WBC: CPT

## 2023-02-27 PROCEDURE — 80053 COMPREHEN METABOLIC PANEL: CPT

## 2023-02-27 PROCEDURE — 6370000000 HC RX 637 (ALT 250 FOR IP): Performed by: INTERNAL MEDICINE

## 2023-02-27 PROCEDURE — 1200000000 HC SEMI PRIVATE

## 2023-02-27 PROCEDURE — 2580000003 HC RX 258: Performed by: INTERNAL MEDICINE

## 2023-02-27 PROCEDURE — 86140 C-REACTIVE PROTEIN: CPT

## 2023-02-27 PROCEDURE — 99284 EMERGENCY DEPT VISIT MOD MDM: CPT

## 2023-02-27 PROCEDURE — 93971 EXTREMITY STUDY: CPT

## 2023-02-27 PROCEDURE — 82550 ASSAY OF CK (CPK): CPT

## 2023-02-27 PROCEDURE — 99285 EMERGENCY DEPT VISIT HI MDM: CPT

## 2023-02-27 PROCEDURE — 73564 X-RAY EXAM KNEE 4 OR MORE: CPT

## 2023-02-27 RX ORDER — BUPROPION HYDROCHLORIDE 150 MG/1
150 TABLET, EXTENDED RELEASE ORAL 2 TIMES DAILY
Status: DISCONTINUED | OUTPATIENT
Start: 2023-02-27 | End: 2023-03-03 | Stop reason: HOSPADM

## 2023-02-27 RX ORDER — SODIUM CHLORIDE 0.9 % (FLUSH) 0.9 %
5-40 SYRINGE (ML) INJECTION PRN
Status: DISCONTINUED | OUTPATIENT
Start: 2023-02-27 | End: 2023-03-03 | Stop reason: HOSPADM

## 2023-02-27 RX ORDER — ATORVASTATIN CALCIUM 20 MG/1
20 TABLET, FILM COATED ORAL NIGHTLY
Status: DISCONTINUED | OUTPATIENT
Start: 2023-02-27 | End: 2023-03-03 | Stop reason: HOSPADM

## 2023-02-27 RX ORDER — LIDOCAINE 50 MG/G
1 PATCH TOPICAL DAILY
Qty: 30 PATCH | Refills: 0 | Status: SHIPPED | OUTPATIENT
Start: 2023-02-27

## 2023-02-27 RX ORDER — SODIUM CHLORIDE 9 MG/ML
INJECTION, SOLUTION INTRAVENOUS CONTINUOUS
Status: DISCONTINUED | OUTPATIENT
Start: 2023-02-27 | End: 2023-03-03 | Stop reason: HOSPADM

## 2023-02-27 RX ORDER — LANOLIN ALCOHOL/MO/W.PET/CERES
3 CREAM (GRAM) TOPICAL NIGHTLY
Status: DISCONTINUED | OUTPATIENT
Start: 2023-02-27 | End: 2023-03-03 | Stop reason: HOSPADM

## 2023-02-27 RX ORDER — ONDANSETRON 2 MG/ML
4 INJECTION INTRAMUSCULAR; INTRAVENOUS EVERY 6 HOURS PRN
Status: DISCONTINUED | OUTPATIENT
Start: 2023-02-27 | End: 2023-03-03 | Stop reason: HOSPADM

## 2023-02-27 RX ORDER — ATORVASTATIN CALCIUM 40 MG/1
40 TABLET, FILM COATED ORAL NIGHTLY
Status: DISCONTINUED | OUTPATIENT
Start: 2023-02-27 | End: 2023-02-27

## 2023-02-27 RX ORDER — MELOXICAM 7.5 MG/1
7.5 TABLET ORAL DAILY PRN
Status: DISCONTINUED | OUTPATIENT
Start: 2023-02-27 | End: 2023-03-03 | Stop reason: HOSPADM

## 2023-02-27 RX ORDER — ASPIRIN 81 MG/1
81 TABLET, CHEWABLE ORAL DAILY
Status: DISCONTINUED | OUTPATIENT
Start: 2023-02-27 | End: 2023-03-03 | Stop reason: HOSPADM

## 2023-02-27 RX ORDER — LIDOCAINE 50 MG/G
1 PATCH TOPICAL DAILY
Status: DISCONTINUED | OUTPATIENT
Start: 2023-02-28 | End: 2023-02-27

## 2023-02-27 RX ORDER — POTASSIUM CHLORIDE 7.45 MG/ML
10 INJECTION INTRAVENOUS PRN
Status: DISCONTINUED | OUTPATIENT
Start: 2023-02-27 | End: 2023-02-27 | Stop reason: SDUPTHER

## 2023-02-27 RX ORDER — HYDRALAZINE HYDROCHLORIDE 20 MG/ML
10 INJECTION INTRAMUSCULAR; INTRAVENOUS EVERY 6 HOURS PRN
Status: DISCONTINUED | OUTPATIENT
Start: 2023-02-27 | End: 2023-03-03 | Stop reason: HOSPADM

## 2023-02-27 RX ORDER — POTASSIUM CHLORIDE 20 MEQ/1
40 TABLET, EXTENDED RELEASE ORAL PRN
Status: DISCONTINUED | OUTPATIENT
Start: 2023-02-27 | End: 2023-02-27 | Stop reason: SDUPTHER

## 2023-02-27 RX ORDER — POTASSIUM CHLORIDE 20 MEQ/1
40 TABLET, EXTENDED RELEASE ORAL PRN
Status: DISCONTINUED | OUTPATIENT
Start: 2023-02-27 | End: 2023-03-03 | Stop reason: HOSPADM

## 2023-02-27 RX ORDER — SODIUM CHLORIDE 9 MG/ML
25 INJECTION, SOLUTION INTRAVENOUS PRN
Status: DISCONTINUED | OUTPATIENT
Start: 2023-02-27 | End: 2023-03-03 | Stop reason: HOSPADM

## 2023-02-27 RX ORDER — ACETAMINOPHEN 650 MG/1
650 SUPPOSITORY RECTAL EVERY 6 HOURS PRN
Status: DISCONTINUED | OUTPATIENT
Start: 2023-02-27 | End: 2023-03-03 | Stop reason: HOSPADM

## 2023-02-27 RX ORDER — ACETAMINOPHEN 325 MG/1
650 TABLET ORAL EVERY 6 HOURS PRN
Status: DISCONTINUED | OUTPATIENT
Start: 2023-02-27 | End: 2023-03-03 | Stop reason: HOSPADM

## 2023-02-27 RX ORDER — LANOLIN ALCOHOL/MO/W.PET/CERES
500 CREAM (GRAM) TOPICAL NIGHTLY
Status: DISCONTINUED | OUTPATIENT
Start: 2023-02-27 | End: 2023-03-03 | Stop reason: HOSPADM

## 2023-02-27 RX ORDER — 0.9 % SODIUM CHLORIDE 0.9 %
500 INTRAVENOUS SOLUTION INTRAVENOUS PRN
Status: DISCONTINUED | OUTPATIENT
Start: 2023-02-27 | End: 2023-03-03 | Stop reason: HOSPADM

## 2023-02-27 RX ORDER — ACETAMINOPHEN 500 MG
500 TABLET ORAL DAILY
Status: DISCONTINUED | OUTPATIENT
Start: 2023-02-28 | End: 2023-02-28

## 2023-02-27 RX ORDER — POTASSIUM CHLORIDE 7.45 MG/ML
10 INJECTION INTRAVENOUS PRN
Status: DISCONTINUED | OUTPATIENT
Start: 2023-02-27 | End: 2023-03-03 | Stop reason: HOSPADM

## 2023-02-27 RX ORDER — LIDOCAINE 4 G/G
1 PATCH TOPICAL ONCE
Status: DISCONTINUED | OUTPATIENT
Start: 2023-02-27 | End: 2023-02-27 | Stop reason: HOSPADM

## 2023-02-27 RX ORDER — POTASSIUM CHLORIDE 20 MEQ/1
10 TABLET, EXTENDED RELEASE ORAL DAILY
Status: DISCONTINUED | OUTPATIENT
Start: 2023-02-28 | End: 2023-03-03 | Stop reason: HOSPADM

## 2023-02-27 RX ORDER — ENOXAPARIN SODIUM 100 MG/ML
40 INJECTION SUBCUTANEOUS DAILY
Status: DISCONTINUED | OUTPATIENT
Start: 2023-02-28 | End: 2023-03-03 | Stop reason: HOSPADM

## 2023-02-27 RX ORDER — LISINOPRIL 5 MG/1
2.5 TABLET ORAL DAILY
Status: DISCONTINUED | OUTPATIENT
Start: 2023-02-28 | End: 2023-03-03 | Stop reason: HOSPADM

## 2023-02-27 RX ORDER — ONDANSETRON 4 MG/1
4 TABLET, ORALLY DISINTEGRATING ORAL EVERY 8 HOURS PRN
Status: DISCONTINUED | OUTPATIENT
Start: 2023-02-27 | End: 2023-03-03 | Stop reason: HOSPADM

## 2023-02-27 RX ORDER — SODIUM CHLORIDE 0.9 % (FLUSH) 0.9 %
5-40 SYRINGE (ML) INJECTION EVERY 12 HOURS SCHEDULED
Status: DISCONTINUED | OUTPATIENT
Start: 2023-02-27 | End: 2023-03-03 | Stop reason: HOSPADM

## 2023-02-27 RX ORDER — LEVOTHYROXINE SODIUM 0.1 MG/1
100 TABLET ORAL DAILY
Status: DISCONTINUED | OUTPATIENT
Start: 2023-02-28 | End: 2023-03-03 | Stop reason: HOSPADM

## 2023-02-27 RX ADMIN — MELATONIN TAB 3 MG 3 MG: 3 TAB at 22:52

## 2023-02-27 RX ADMIN — ATORVASTATIN CALCIUM 20 MG: 20 TABLET, FILM COATED ORAL at 22:52

## 2023-02-27 RX ADMIN — SODIUM CHLORIDE: 9 INJECTION, SOLUTION INTRAVENOUS at 22:26

## 2023-02-27 RX ADMIN — NIACIN 500 MG: 500 TABLET, EXTENDED RELEASE ORAL at 22:52

## 2023-02-27 RX ADMIN — BUPROPION HYDROCHLORIDE 150 MG: 150 TABLET, FILM COATED, EXTENDED RELEASE ORAL at 22:52

## 2023-02-27 RX ADMIN — Medication 10 ML: at 22:38

## 2023-02-27 ASSESSMENT — PAIN SCALES - GENERAL
PAINLEVEL_OUTOF10: 2
PAINLEVEL_OUTOF10: 9

## 2023-02-27 ASSESSMENT — PAIN DESCRIPTION - LOCATION
LOCATION: KNEE

## 2023-02-27 ASSESSMENT — LIFESTYLE VARIABLES
HOW OFTEN DO YOU HAVE A DRINK CONTAINING ALCOHOL: NEVER
HOW MANY STANDARD DRINKS CONTAINING ALCOHOL DO YOU HAVE ON A TYPICAL DAY: PATIENT DOES NOT DRINK
HOW OFTEN DO YOU HAVE A DRINK CONTAINING ALCOHOL: NEVER
HOW OFTEN DO YOU HAVE A DRINK CONTAINING ALCOHOL: NEVER

## 2023-02-27 ASSESSMENT — PAIN DESCRIPTION - ORIENTATION
ORIENTATION: RIGHT

## 2023-02-27 ASSESSMENT — PAIN - FUNCTIONAL ASSESSMENT
PAIN_FUNCTIONAL_ASSESSMENT: 0-10
PAIN_FUNCTIONAL_ASSESSMENT: 0-10

## 2023-02-27 ASSESSMENT — PAIN DESCRIPTION - DESCRIPTORS
DESCRIPTORS: THROBBING
DESCRIPTORS: OTHER (COMMENT)

## 2023-02-27 NOTE — ED NOTES
Right knee pain for 4 days with a knot behind right knee. Pt [place]. Son at pts bedside. Warm blanket provided to pt. Patient alert and oriented x4. GCS 15/15. Skin appropriate for ethnicity, dry and intact. No signs of acute distress noted at this time. Regular respiratory pattern, normal respiratory depth, unlabored respirations. Pt is on a continuous pulse oximetry. Bedside Monitor on with Alarms audible and alarms set. Pt continued on cycling blood pressure. Fall risk precautions in place, call light in reach, bed side table within reach,  will continue to monitor.            Breanna Klein RN  02/27/23 [id number]

## 2023-02-27 NOTE — ED PROVIDER NOTES
[facility]        Pt Name: Shabbir Ovalle  MRN: [id number]  [place] 1933  Date of evaluation: 2023  Provider: Isauro Winters PA-C  PCP: Lesly Navarro DO  Note Started: 3:43 PM EST 23       I have seen and evaluated this patient with my supervising physician Kameron Rodriguez MD.      CHIEF COMPLAINT       Chief Complaint   Patient presents with    Knee Pain     Arrived per son d/t continued right knee pain that started approx 4 days ago and progressively worse; pt states unable to get up from toilet and chair at home so son return pt to the ER         HISTORY OF PRESENT ILLNESS: 1 or more Elements     History From: patient  Limitations to history : None    Shabibr Ovalle is a 80 y.o. female who presents to the emergency department with a chief complaint of persistent right knee pain. I saw this patient earlier today and she is able to ambulate with a walker. Apparently when she got home she was having difficulty standing up from her chair and from the toilet so son brought her back to the emergency department. She denies any direct injury or trauma or  color change, vomiting, fevers or any other symptoms. Nursing Notes were all reviewed and agreed with or any disagreements were addressed in the HPI. REVIEW OF SYSTEMS :      Review of Systems    Positives and Pertinent negatives as per HPI. SURGICAL HISTORY     Past Surgical History:   Procedure Laterality Date    BREAST SURGERY      right mastectomy for cancer     SECTION  1970    COLON SURGERY  1970    right partial colectomy for ruptured diverticulitis    INCISIONAL HERNIA REPAIR  2016    with mesh, laparoscopic, was incarcerated.   Bharathi Lopez MD. [facility]    JOINT REPLACEMENT      right knee and left knee in     MOUTH SURGERY  2015    verrous hyperplasia left ventral tongue, [facility] T1N0M0 [facility], Dr Nayana Lee REVISION COLOSTOMY  07/01/1970       CURRENTMEDICATIONS       Previous Medications    ACETAMINOPHEN (TYLENOL) 500 MG TABLET    Take 500 mg by mouth daily. ASPIRIN 81 MG TABLET    Take 81 mg by mouth daily    ATORVASTATIN (LIPITOR) 40 MG TABLET    TAKE 1 TABLET BY MOUTH AT  NIGHT    BUPROPION (WELLBUTRIN SR) 150 MG EXTENDED RELEASE TABLET    TAKE 1 TABLET BY MOUTH  TWICE DAILY    CHOLECALCIFEROL (VITAMIN D3) 2000 UNITS CAPS    One daily for Vit D supplement    GLUCOSAMINE-CHONDROITIN-MSM PO    Take 1 tablet by mouth daily Indications: Joint Damage causing Pain and Loss of Function    LEVOTHYROXINE (SYNTHROID) 100 MCG TABLET    TAKE 1 TABLET BY MOUTH DAILY    LIDOCAINE (LIDODERM) 5 %    Place 1 patch onto the skin daily 12 hours on, 12 hours off. LISINOPRIL (PRINIVIL;ZESTRIL) 2.5 MG TABLET    TAKE 1 TABLET BY MOUTH  DAILY    MAGNESIUM CHLORIDE (MAG DELAY) 535 (64 MG) MG TBCR CR TABLET    Take 64 mg by mouth 2 times daily     MELATONIN 3 MG TABS TABLET    Take 3 mg by mouth nightly One at bedtime for sleep assist     MELOXICAM (MOBIC) 7.5 MG TABLET    Take 1 tablet by mouth daily as needed for Pain    MULTIPLE VITAMIN (MULTIVITAMIN PO)    Take 1 tablet by mouth daily. NIACIN (SLO-NIACIN) 500 MG EXTENDED RELEASE TABLET    Take 500 mg by mouth nightly    POTASSIUM CHLORIDE (KLOR-CON M) 10 MEQ EXTENDED RELEASE TABLET    TAKE 1 TABLET BY MOUTH  DAILY       ALLERGIES     Patient has no known allergies. FAMILYHISTORY       Family History   Problem Relation Age of Onset    Cancer Mother         bone    Heart Disease Father         CAD    Diabetes Father     Diabetes Sister     Cancer Sister 54        leukemia    Arrhythmia Brother         A fib    Stroke Brother 80        ?     Other Brother         THR after fx.  bilateral pneumonia, better    No Known Problems Daughter     High Blood Pressure Daughter     Other Son         Spinal fusion with rods/screws    High Blood Pressure Son     Other Son         rotator cuff tear        SOCIAL HISTORY       Social History     Tobacco Use    Smoking status: Never    Smokeless tobacco: Never    Tobacco comments:     avoid tobacco   Vaping Use    Vaping Use: Never used   Substance Use Topics    Alcohol use: No     Alcohol/week: 0.0 standard drinks    Drug use: No       SCREENINGS        Alex Coma Scale  Eye Opening: Spontaneous  Best Verbal Response: Oriented  Best Motor Response: Obeys commands  Alex Coma Scale Score: 15                CIWA Assessment  BP: 132/85  Heart Rate: 83           PHYSICAL EXAM  1 or more Elements     ED Triage Vitals [02/27/23 1516]   BP Temp Temp Source Heart Rate Resp SpO2 Height Weight   132/85 99.3 °F (37.4 °C) Oral 83 18 96 % 4' 11\" (1.499 m) 169 lb (76.7 kg)       Physical Exam  Vitals and nursing note reviewed. Constitutional:       Appearance: She is well-developed. She is not diaphoretic. HENT:      Head: Atraumatic. Nose: Nose normal.   Eyes:      General:         Right eye: No discharge. Left eye: No discharge. Cardiovascular:      Pulses: Normal pulses. Comments: 1+ posterior tibial pulse in right foot. Musculoskeletal:         General: Tenderness present. Cervical back: Normal range of motion. Comments: Generalized tenderness around the right knee still without any joint warmth or erythema. Decreased range of motion still with flexion secondary to pain. Sensation light touch distally intact with still some mild pitting edema distally. Skin:     General: Skin is warm and dry. Findings: No erythema or rash. Neurological:      Mental Status: She is alert and oriented to person, place, and time. Cranial Nerves: No cranial nerve deficit.    Psychiatric:         Behavior: Behavior normal.           DIAGNOSTIC RESULTS   LABS:    Labs Reviewed   CBC WITH AUTO DIFFERENTIAL - Abnormal; Notable for the following components:       Result Value    WBC 12.8 (*)     All other components within normal limits   COMPREHENSIVE METABOLIC PANEL W/ REFLEX TO MG FOR LOW K - Abnormal; Notable for the following components: Total Bilirubin 1.3 (*)     All other components within normal limits   C-REACTIVE PROTEIN - Abnormal; Notable for the following components:    .4 (*)     All other components within normal limits   CK   SPECIMEN REJECTION    Narrative:     CALL  [facility]  ER tel. [phone],  Rejected Test Name ESR /Called to:Janae Cox, 02/27/2023 16:42, by [address] [place] [address]       When ordered only abnormal lab results are displayed. All other labs were within normal range or not returned as of this dictation. EKG: When ordered, EKG's are interpreted by the Emergency Department Physician in the absence of a cardiologist.  Please see their note for interpretation of EKG. RADIOLOGY:   Non-plain film images such as CT, Ultrasound and MRI are read by the radiologist. Plain radiographic images are visualized and preliminarily interpreted by the ED Provider with the below findings:        Interpretation per the Radiologist below, if available at the time of this note:    No orders to display     XR KNEE RIGHT (MIN 4 VIEWS)    Result Date: 2/27/2023  EXAMINATION: FOUR XRAY VIEWS OF THE RIGHT KNEE 2/27/2023 11:44 am COMPARISON: None. HISTORY: ORDERING SYSTEM PROVIDED HISTORY: pain TECHNOLOGIST PROVIDED HISTORY: Reason for exam:->pain Reason for Exam: Pain FINDINGS: There is a total knee arthroplasty with cemented components. No acute fracture or dislocation. No acute hardware failure or loosening. Grossly normal alignment. There is a joint effusion. Total knee arthropasty without acute hardware complication.      VL Extremity Venous Right    Result Date: 2/27/2023  Vascular Lower Extremities DVT Study Procedure -- PRELIMINARY SONOGRAPHER REPORT --   Demographics   Patient Name      Ty Lint   Date of Study     02/27/2023        Gender             Female   Patient Number    [id number] Date of Birth      09/14/1933   Visit Number      [id number]         Age                80 year(s)   Accession Number  [id number]        Room Number           Corporate ID      [id number]           Cecelia Sue RVT   Ordering          Leidy Veras, Interpreting       Darya Montemayor MD  Physician         CNA               Physician  Procedure Type of Study:   Veins:Lower Extremities DVT Study, VL EXTREMITY VENOUS DUPLEX RIGHT. Tech Comments Right No evidence of deep vein or superficial vein thrombosis involving the right lower extremity and the left common femoral vein. Calf and ankle edema noted. Patient unable to withstand compression, and augmentation, due to pain tolerance. Incidental finding on the right: pocket of fluid surrounding the right lateral knee. .      No results found. 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      EMERGENCY DEPARTMENT COURSE and DIFFERENTIAL DIAGNOSIS/MDM:   Vitals:    Vitals:    02/27/23 1516   BP: 132/85   Pulse: 83   Resp: 18   Temp: 99.3 °F (37.4 °C)   TempSrc: Oral   SpO2: 96%   Weight: 169 lb (76.7 kg)   Height: 4' 11\" (1.499 m)       Patient was given the following medications:  Medications - No data to display          Is this patient to be included in the SEP-1 Core Measure due to severe sepsis or septic shock? No   Exclusion criteria - the patient is NOT to be included for SEP-1 Core Measure due to: Infection is not suspected    Chronic Conditions affecting care:    has a past medical history of Breast cancer ([facility] (2004), Cellulitis of left leg, Cellulitis of wrist (06/06/2018), Chronic kidney disease, stage III (moderate) ([facility] (2005), Depression, Diverticulitis of colon (01/01/1979), DVT (deep venous thrombosis) ([facility] (2004), Gout (05/13/2011), Hematoma of left thigh (12/02/2017), Herpes zoster (01/04/2018), Hypertension, Hypoproteinemia ([facility] (07/27/2019), Hypothyroidism (11/2011), Intervertebral lumbar disc disorder with myelopathy, lumbar region, OA (osteoarthritis) (2000), Primary osteoarthritis of left hip greater than right (05/10/2017), Primary osteoarthritis of right wrist, Pure hypercholesterolemia, Seborrheic dermatitis (11/2014), Subdural hematoma, post-traumatic (05/26/2016), and 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 CONSULTS: (Who and What was discussed)  Jacy Duke    Social Determinants Significantly Affecting Health : None    Records Reviewed (External and Source)     CC/HPI Summary, DDx, ED Course, and Reassessment: Patient presented with continual right knee pain and concern that she is unable to take care of herself at home. She initially wanted to go home earlier today and was able to ambulate with a walker but apparently was doing worse and hence presented back to the emergency department. X-ray imaging and ultrasound was performed earlier today which revealed no acute abnormalities besides some fluid collection over the right lateral knee. There is no obvious joint warmth or evidence of septic arthritis.   She had similar episode in her right arm and reviewing records he was here last summer due to inability to take care of herself at home and had elevated white blood cell count and CRP at that time but no evidence of septic arthritis or septic bursitis. Given her total arthroplasty do not believe arthrocentesis is warranted at this time. Discussed with internal medicine who will admit for ambulatory dysfunction and unable to take care of herself at home to be seen by orthopedics as an inpatient to most likely require rehab placement. Patient was stable at time of admission. Disposition Considerations (tests considered but not done, Admit vs D/C, Shared Decision Making, Pt Expectation of Test or Tx.):        I am the Primary Clinician of Record. FINAL IMPRESSION      1. Acute pain of right knee          DISPOSITION/PLAN     DISPOSITION Decision To Admit 02/27/2023 04:58:23 PM      PATIENT REFERRED TO:  No follow-up provider specified.     DISCHARGE MEDICATIONS:  New Prescriptions    No medications on file       DISCONTINUED MEDICATIONS:  Discontinued Medications    No medications on file              (Please note that portions of this note were completed with a voice recognition program.  Efforts were made to edit the dictations but occasionally words are mis-transcribed.)    Bobbi Hussein PA-C (electronically signed)       Bobbi Hussein PA-C  02/27/23 [id number]

## 2023-02-27 NOTE — H&P
History and Physical  Dr. Jeanne Brantley  2/27/2023    PCP: Merle Segura DO    Cc:   Chief Complaint   Patient presents with    Knee Pain     Arrived per son d/t continued right knee pain that started approx 4 days ago and progressively worse; pt states unable to get up from toilet and chair at home so son return pt to the ER         HPI:  Jad Gutierrez is a 80 y.o. female who has a past medical history of Breast cancer ([address] Cellulitis of left leg, Cellulitis of wrist, Chronic kidney disease, stage III (moderate) ([address] Depression, Diverticulitis of colon, DVT (deep venous thrombosis) ([address] Gout, Hematoma of left thigh, Herpes zoster, Hypertension, Hypoproteinemia ([address] Hypothyroidism, Intervertebral lumbar disc disorder with myelopathy, lumbar region, OA (osteoarthritis), Primary osteoarthritis of left hip greater than right, Primary osteoarthritis of right wrist, Pure hypercholesterolemia, Seborrheic dermatitis, Subdural hematoma, post-traumatic, and Tongue dysplasia. Patient presents with Infected prosthetic knee joint ([address] HPI  (1-3 for expanded problem focused, ?4 for detailed/comprehensive)     80 y.o. female who presents to the emergency department with a chief complaint of persistent right knee pain. Was seen twice in ER today. After first discharge, she was having difficulty standing up from her chair and from the toilet . Has had right knee pain for the past 3 to 4 days. States it began in the back of her knee and now is generally around her leg. Denies any injury or trauma. Has total knee replacement history in this knee. There is some warmth and tenderness to R knee joint    X-ray imaging and ultrasound performed earlier today were reviewed by self; I see some fluid collection over the right lateral knee. Labs in ER show elevated white blood cell count and CRP at that time but no evidence of septic arthritis or septic bursitis.     As she is unable to ambulate, to be admitted for workup  Ortho to see to determine whether she has a septic joint       Problem list of hospitalization thus far: Active Hospital Problems    Diagnosis     Pure hypercholesterolemia [E78.00]      Priority: High    Infected prosthetic knee joint ([facility] [T84.59XA, Z96.659]      Priority: Medium    Hypothyroidism [E03.9]      Priority: Low    Recurrent major depressive disorder, in partial remission (HCC) [F33.41]     HTN (hypertension), benign [I10]          Review of Systems: (1 system for EPF, 2-9 for detailed, 10+ for comprehensive)  Constitutional: Negative for chills and fever. HENT: Negative for dental problem, nosebleeds and rhinorrhea. Eyes: Negative for photophobia and visual disturbance. Respiratory: Negative for cough, chest tightness and shortness of breath. Cardiovascular: Negative for chest pain and leg swelling. Gastrointestinal: Negative for diarrhea, nausea and vomiting. Endocrine: Negative for polydipsia and polyphagia. Genitourinary: Negative for frequency, hematuria and urgency. Musculoskeletal: Negative for back pain and myalgias. Positive for knee pain  Skin: Negative for rash. Allergic/Immunologic: Negative for food allergies. Neurological: Negative for dizziness, seizures, syncope and facial asymmetry. Hematological: Negative for adenopathy. Psychiatric/Behavioral: Negative for dysphoric mood. The patient is not nervous/anxious. Past Medical History:   Past Medical History:   Diagnosis Date    Breast cancer ([facility] 2004    ductal vs lobular, not clear from pathologist    Cellulitis of left leg     Cellulitis of wrist 06/06/2018    RIGHT    Chronic kidney disease, stage III (moderate) ([facility] 2005    Resolved creatinine 1.4 1/14/16. Since then 0.9 or less.     Depression     Diverticulitis of colon 01/01/1979    DVT (deep venous thrombosis) ([facility] 2004    after first knee surgery    Gout 05/13/2011    Hematoma of left thigh 12/02/2017    Herpes zoster 2018    right ant shoulder    Hypertension     Hypoproteinemia ([address] 2019    Hypothyroidism 2011    Intervertebral lumbar disc disorder with myelopathy, lumbar region     OA (osteoarthritis) 2000    severe in knees, left ankle, left >right hip    Primary osteoarthritis of left hip greater than right 05/10/2017    Pelvis x-ray 5/10/2017 FINDINGS: Enthesophytes identified along the periphery of the innominate bones most pronounced along lateral border of the left iliac bone. Finding in large part likely degenerative in nature; however, changes associated with remote trauma particularly along the lateral margin of the left iliac bone not excluded. There are degenerative changes about both hips. There is de    Primary osteoarthritis of right wrist     Severe arthritic change within carpal - 1st metacarpal joint, Extensive chondrocalcinosis throughout wrist    Pure hypercholesterolemia     Seborrheic dermatitis 2014    Subdural hematoma, post-traumatic 2016    Tongue dysplasia 2014    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       Past Surgical History:   Past Surgical History:   Procedure Laterality Date    BREAST SURGERY      right mastectomy for cancer     SECTION  1970    COLON SURGERY  1970    right partial colectomy for ruptured diverticulitis    INCISIONAL HERNIA REPAIR  2016    with mesh, laparoscopic, was incarcerated.   Amaury Mckoy MD. [facility]    JOINT REPLACEMENT      right knee and left knee in     MOUTH SURGERY  2015    verrous hyperplasia left ventral tongue, [facility] T1N0M0 [facility], Dr Krissy Arora  1970       Social History:   Social History       Tobacco History       Smoking Status  Never      Smokeless Tobacco Use  Never      Tobacco Comments  avoid tobacco              Alcohol History       Alcohol Use Status  No              Drug Use       Drug Use Status  No Sexual Activity       Sexually Active  Not Currently Birth Control/Protection  Post-menopausal                    Fam History:   Family History   Problem Relation Age of Onset    Cancer Mother         bone    Heart Disease Father         CAD    Diabetes Father     Diabetes Sister     Cancer Sister 54        leukemia    Arrhythmia Brother         A fib    Stroke Brother 80        ? Other Brother         THR after fx.  bilateral pneumonia, better    No Known Problems Daughter     High Blood Pressure Daughter     Other Son         Spinal fusion with rods/screws    High Blood Pressure Son     Other Son         rotator cuff tear       PFSH: The above PMHx, PSHx, SocHx, FamHx has been reviewed by myself. (1 area for detailed, 2-3 for comprehensive)      Code Status: Prior    [facility]  Prior to Admission medications    Medication Sig Start Date End Date Taking?  Authorizing Provider   lidocaine (LIDODERM) 5 % Place 1 patch onto the skin daily 12 hours on, 12 hours off. 2/27/23   Fili Sandesr PA-C   atorvastatin (LIPITOR) 40 MG tablet TAKE 1 TABLET BY MOUTH AT  NIGHT 2/16/23   Sidonie Espitia, DO   potassium chloride (KLOR-CON M) 10 MEQ extended release tablet TAKE 1 TABLET BY MOUTH  DAILY 1/11/23   Sidonie Espitia, DO   levothyroxine (SYNTHROID) 100 MCG tablet TAKE 1 TABLET BY MOUTH DAILY 1/5/23   Sidonie Espitia, DO   meloxicam (MOBIC) 7.5 MG tablet Take 1 tablet by mouth daily as needed for Pain 10/3/22   Adella Denver, MD   buPROPion [facility] SR) 150 MG extended release tablet TAKE 1 TABLET BY MOUTH  TWICE DAILY 9/6/22   Sidonie Espitia, DO   lisinopril (PRINIVIL;ZESTRIL) 2.5 MG tablet TAKE 1 TABLET BY MOUTH  DAILY 7/18/22   Sidonie Espitia, DO   GLUCOSAMINE-CHONDROITIN-MSM PO Take 1 tablet by mouth daily Indications: Joint Damage causing Pain and Loss of Function    Historical Provider, MD   niacin (SLO-NIACIN) 500 MG extended release tablet Take 500 mg by mouth nightly    Historical Provider, MD   aspirin 81 MG tablet Take 81 mg by mouth daily    Historical Provider, MD   Cholecalciferol (VITAMIN D3) 2000 UNITS CAPS One daily for Vit D supplement 11/23/15   Ray MD Rosemarie   melatonin 3 MG TABS tablet Take 3 mg by mouth nightly One at bedtime for sleep assist  6/5/13   Historical Provider, MD   magnesium chloride (MAG DELAY) 535 (64 MG) MG TBCR CR tablet Take 64 mg by mouth 2 times daily     Historical Provider, MD   acetaminophen (TYLENOL) 500 MG tablet Take 500 mg by mouth daily. Historical Provider, MD   Multiple Vitamin (MULTIVITAMIN PO) Take 1 tablet by mouth daily. Historical Provider, MD         No Known Allergies          EXAM: (2-7 system for EPF/Detailed, ?8 for Comprehensive)  /85   Pulse 83   Temp 99.3 °F (37.4 °C) (Oral)   Resp 18   Ht 4' 11\" (1.499 m)   Wt 169 lb (76.7 kg)   LMP 01/01/1970 (Exact Date)   SpO2 96%   BMI 34.13 kg/m²   Constitutional: vitals as above: alert, appears stated age and cooperative    Psychiatric: normal insight and judgment, oriented to person, place, time, and general circumstances    Head: Normocephalic, without obvious abnormality, atraumatic    Eyes:lids and lashes normal, conjunctivae and sclerae normal and pupils equal, round, reactive to light and accomodation    EMNT: external ears normal, nares midline    Neck: no carotid bruit, supple, symmetrical, trachea midline and thyroid not enlarged, symmetric, no tenderness/mass/nodules     Respiratory: clear to auscultation and percussion bilaterally with normal respiratory effort    Cardiovascular: normal rate, regular rhythm, normal S1 and S2 and no murmurs    Gastrointestinal: soft, non-tender, non-distended, normal bowel sounds, no masses or organomegaly    Extremities: no clubbing, no edema ; tenderness to R knee, mild warmth  Skin:No rashes or nodules noted.     Neurologic:negative         LABS:  Labs Reviewed   CBC WITH AUTO DIFFERENTIAL - Abnormal; Notable for the following components:       Result Value    WBC 12.8 (*)     All other components within normal limits   COMPREHENSIVE METABOLIC PANEL W/ REFLEX TO MG FOR LOW K - Abnormal; Notable for the following components: Total Bilirubin 1.3 (*)     All other components within normal limits   C-REACTIVE PROTEIN - Abnormal; Notable for the following components:    .4 (*)     All other components within normal limits   CK   SPECIMEN REJECTION    Narrative:     CALL  [facility] tel. [phone],  Rejected Test Name ESR /Called to:Janae Cox, 02/27/2023 16:42, by Abbie Johnson   SEDIMENTATION RATE         IMAGING:  Imaging results from computerized chart. Any imaging that has been independently reviewed as noted elsewhere in chart. XR KNEE RIGHT (MIN 4 VIEWS)    Result Date: 2/27/2023  EXAMINATION: FOUR XRAY VIEWS OF THE RIGHT KNEE 2/27/2023 11:44 am COMPARISON: None. HISTORY: ORDERING SYSTEM PROVIDED HISTORY: pain TECHNOLOGIST PROVIDED HISTORY: Reason for exam:->pain Reason for Exam: Pain FINDINGS: There is a total knee arthroplasty with cemented components. No acute fracture or dislocation. No acute hardware failure or loosening. Grossly normal alignment. There is a joint effusion. Total knee arthropasty without acute hardware complication.      VL Extremity Venous Right    Result Date: 2/27/2023  Vascular Lower Extremities DVT Study Procedure -- PRELIMINARY SONOGRAPHER REPORT --   Demographics   Patient Name      Laura Red   Date of Study     02/27/2023        Gender             Female   Patient Number    [id number]        Date of Birth      09/14/1933   Visit Number      [id number]         Age                80 year(s)   Accession Number  [id number]        Room Number           Corporate ID      [id number]           Wendy Hall RVT   Ordering          Marty Moura MD  Physician         BHAVNA Physician  Procedure Type of Study:   Veins:Lower Extremities DVT Study, VL EXTREMITY VENOUS DUPLEX RIGHT. Tech Comments Right No evidence of deep vein or superficial vein thrombosis involving the right lower extremity and the left common femoral vein. Calf and ankle edema noted. Patient unable to withstand compression, and augmentation, due to pain tolerance. Incidental finding on the right: pocket of fluid surrounding the right lateral knee. .        EKG:     Lab Results   Component Value Date/Time    GLUCOSE 92 02/27/2023 03:39 PM     Lab Results   Component Value Date/Time    POCGLU 83 05/27/2016 08:22 PM     /85   Pulse 83   Temp 99.3 °F (37.4 °C) (Oral)   Resp 18   Ht 4' 11\" (1.499 m)   Wt 169 lb (76.7 kg)   LMP 01/01/1970 (Exact Date)   SpO2 96%   BMI 34.13 kg/m²     MEDICAL DECISION MAKING:    Principal Problem:    Infected prosthetic knee joint (HCC) -New Problem to me. Poss infection. Pt unable to walk  Plan: admit, ask ortho to see. Trend wbc/crp. Pt/ot to see  Active Problems:    Pure hypercholesterolemia -Established problem. Stable. Plan: cont home meds    Hypothyroidism -Established problem. Stable. Denies sx like hair loss, cold intolerance3  Plan: cont thyroid replceament    HTN (hypertension), benign -Established problem. Stable. 132/85  Plan: Pt home BP meds reviewed and will be continued. IV Hydralazine ordered for control of extremely high blood pressures. Will monitor labs to assess Creat/K for possible complications of medications. Recurrent major depressive disorder, in partial remission ([address] -Established problem. Stable. Plan: stay on meds          Diagnoses as listed above, designated as new or established and plan outlined for each.       Case discussed with: ALEN alaniz    MDM Determination    PROBLEM  Moderate: new problem w/uncertain prognosis -    PLUS  moderate: 3+ tests reviewed/ordered, external notes reviewed, independent historian - cbc, bmp, crp    OR TIME BASED  N/A - see problem based determination above           The patient is being placed in inpatient status with the expectation of requiring a hospital stay spanning at least two midnights for care and treatment of the problems noted in the problem list.  This determination is also based on thepatients comorbidities and past medical history, the severity and timing of the signs and symptoms upon presentation.     (Please note that portions of this note were completed with a voice recognition program.  Efforts were made to edit the dictations but occasionally words are mis-transcribed.)      Electronically signed by: Sheryl Fountain MD 2/27/2023

## 2023-02-27 NOTE — ED PROVIDER NOTES
[facility]        Pt Name: Paula Perry  MRN: [id number]  [place] 1933  Date of evaluation: 2023  Provider: John Iniguez PA-C  PCP: Lorraine Bonilla DO  Note Started: 10:19 AM EST 23      ALICIA. I have evaluated this patient. My supervising physician was available for consultation. CHIEF COMPLAINT       Chief Complaint   Patient presents with    Knee Pain     Right knee pain and swelling x3-4 days, no known injury. Pt states too painful to ambulate this morning. HISTORY OF PRESENT ILLNESS: 1 or more Elements     History From: patient  Limitations to history : None    Paula Perry is a 80 y.o. female who presents to the emergency department with a chief complaint of some right knee pain for the past 3 to 4 days. States it began in the back of her knee and now is generally around her leg. Denies any injury or trauma. Has total knee replacement history in this knee. Denies chest pain, shortness of breath, nausea, vomiting, fevers, color change. Nursing Notes were all reviewed and agreed with or any disagreements were addressed in the HPI. REVIEW OF SYSTEMS :      Review of Systems    Positives and Pertinent negatives as per HPI. SURGICAL HISTORY     Past Surgical History:   Procedure Laterality Date    BREAST SURGERY      right mastectomy for cancer     SECTION  1970    COLON SURGERY  1970    right partial colectomy for ruptured diverticulitis    INCISIONAL HERNIA REPAIR  2016    with mesh, laparoscopic, was incarcerated.   Amaury Mckoy MD. MFF    JOINT REPLACEMENT      right knee and left knee in     MOUTH SURGERY  2015    verrous hyperplasia left ventral tongue, WLE T1N0M0 [facility], Dr Krissy Arora  1970       CURRENTMEDICATIONS       Discharge Medication List as of 2023  1:00 PM        CONTINUE these medications which have NOT CHANGED    Details   atorvastatin (LIPITOR) 40 MG tablet TAKE 1 TABLET BY MOUTH AT  NIGHT, Disp-90 tablet, R-0Requesting 1 year supplyNormal      potassium chloride (KLOR-CON M) 10 MEQ extended release tablet TAKE 1 TABLET BY MOUTH  DAILY, Disp-90 tablet, R-1Requesting 1 year supplyNormal      levothyroxine (SYNTHROID) 100 MCG tablet TAKE 1 TABLET BY MOUTH DAILY, Disp-90 tablet, R-0Requesting 1 year supplyNormal      meloxicam (MOBIC) 7.5 MG tablet Take 1 tablet by mouth daily as needed for Pain, Disp-30 tablet, R-0Normal      buPROPion (WELLBUTRIN SR) 150 MG extended release tablet TAKE 1 TABLET BY MOUTH  TWICE DAILY, Disp-180 tablet, R-1Requesting 1 year supplyNormal      lisinopril (PRINIVIL;ZESTRIL) 2.5 MG tablet TAKE 1 TABLET BY MOUTH  DAILY, Disp-90 tablet, R-3Requesting 1 year supplyNormal      GLUCOSAMINE-CHONDROITIN-MSM PO Take 1 tablet by mouth daily Indications: Joint Damage causing Pain and Loss of FunctionHistorical Med      niacin (SLO-NIACIN) 500 MG extended release tablet Take 500 mg by mouth nightlyHistorical Med      aspirin 81 MG tablet Take 81 mg by mouth dailyHistorical Med      Cholecalciferol (VITAMIN D3) 2000 UNITS CAPS One daily for Vit D supplement, Disp-30 capsuleOTC      melatonin 3 MG TABS tablet Take 3 mg by mouth nightly One at bedtime for sleep assist Historical Med      magnesium chloride (MAG DELAY) 535 (64 MG) MG TBCR CR tablet Take 64 mg by mouth 2 times daily Historical Med      acetaminophen (TYLENOL) 500 MG tablet Take 500 mg by mouth daily. Historical Med      Multiple Vitamin (MULTIVITAMIN PO) Take 1 tablet by mouth daily. Historical Med             ALLERGIES     Patient has no known allergies. FAMILYHISTORY       Family History   Problem Relation Age of Onset    Cancer Mother         bone    Heart Disease Father         CAD    Diabetes Father     Diabetes Sister     Cancer Sister 54        leukemia    Arrhythmia Brother         A fib    Stroke Brother 80        ? Other Brother         THR after fx.  bilateral pneumonia, better    No Known Problems Daughter     High Blood Pressure Daughter     Other Son         Spinal fusion with rods/screws    High Blood Pressure Son     Other Son         rotator cuff tear        SOCIAL HISTORY       Social History     Tobacco Use    Smoking status: Never    Smokeless tobacco: Never    Tobacco comments:     avoid tobacco   Vaping Use    Vaping Use: Never used   Substance Use Topics    Alcohol use: No     Alcohol/week: 0.0 standard drinks    Drug use: No       SCREENINGS        [place] Coma Scale  Eye Opening: Spontaneous  Best Verbal Response: Oriented  Best Motor Response: Obeys commands  [place] Coma Scale Score: 15                CIWA Assessment  BP: (!) 167/68  Heart Rate: 81           PHYSICAL EXAM  1 or more Elements     ED Triage Vitals [02/27/23 0931]   BP Temp Temp Source Heart Rate Resp SpO2 Height Weight   (!) 167/68 98.8 °F (37.1 °C) Oral 81 16 92 % 4' 11\" (1.499 m) 169 lb (76.7 kg)       Physical Exam  Vitals and nursing note reviewed. Constitutional:       Appearance: She is well-developed. She is not diaphoretic. HENT:      Head: Atraumatic. Nose: Nose normal.   Eyes:      General:         Right eye: No discharge. Left eye: No discharge. Cardiovascular:      Pulses: Normal pulses. Comments: 2+ dorsal pedal pulse in right foot  Musculoskeletal:         General: Tenderness present. Cervical back: Normal range of motion. Comments: There are some general tenderness around the right knee and swelling of the right lower leg without erythema, joint warmth. Full range of motion actively with flexion extension of right hip and ankle. Decreased range of motion of right knee secondary to pain. Skin:     General: Skin is warm and dry. Findings: No erythema or rash. Neurological:      Mental Status: She is alert and oriented to person, place, and time.       Cranial Nerves: No cranial nerve deficit. Psychiatric:         Behavior: Behavior normal.           DIAGNOSTIC RESULTS   LABS:    Labs Reviewed - No data to display    When ordered only abnormal lab results are displayed. All other labs were within normal range or not returned as of this dictation. EKG: When ordered, EKG's are interpreted by the Emergency Department Physician in the absence of a cardiologist.  Please see their note for interpretation of EKG. RADIOLOGY:   Non-plain film images such as CT, Ultrasound and MRI are read by the radiologist. Plain radiographic images are visualized and preliminarily interpreted by the ED Provider with the below findings:        Interpretation per the Radiologist below, if available at the time of this note:    XR KNEE RIGHT (MIN 4 VIEWS)   Final Result   Total knee arthropasty without acute hardware complication. VL Extremity Venous Right           No results found. No results found. 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      EMERGENCY DEPARTMENT COURSE and DIFFERENTIAL DIAGNOSIS/MDM:   Vitals:    Vitals:    02/27/23 0931   BP: (!) 167/68 Pulse: 81   Resp: 16   Temp: 98.8 °F (37.1 °C)   TempSrc: Oral   SpO2: 92%   Weight: 169 lb (76.7 kg)   Height: 4' 11\" (1.499 m)       Patient was given the following medications:  Medications - No data to display            Is this patient to be included in the SEP-1 Core Measure due to severe sepsis or septic shock? No   Exclusion criteria - the patient is NOT to be included for SEP-1 Core Measure due to: Infection is not suspected    Chronic Conditions affecting care:    has a past medical history of Breast cancer ([facility] (2004), Cellulitis of left leg, Cellulitis of wrist (06/06/2018), Chronic kidney disease, stage III (moderate) ([facility] (2005), Depression, Diverticulitis of colon (01/01/1979), [facility]) [facility] (2004), Gout (05/13/2011), Hematoma of left thigh (12/02/2017), Herpes zoster (01/04/2018), Hypertension, Hypoproteinemia ([facility] (07/27/2019), Hypothyroidism (11/2011), Intervertebral lumbar disc disorder with myelopathy, lumbar region, OA (osteoarthritis) (2000), Primary osteoarthritis of left hip greater than right (05/10/2017), Primary osteoarthritis of right wrist, Pure hypercholesterolemia, Seborrheic dermatitis (11/2014), Subdural hematoma, post-traumatic (05/26/2016), and 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 CONSULTS: (Who and What was discussed)  None      Social Determinants Significantly Affecting Health : None    Records Reviewed (External and Source)     CC/HPI Summary, DDx, ED Course, and Reassessment: Patient presented with some right knee pain. This started a few days ago without injury or trauma. She is now stating that yesterday she walked a lot around the house to see if this would help more so than she normally would. She has previous history of total knee arthroplasty and cannot remember who did the surgery and does not have an orthopedic doctor anymore. Concern was for possible DVT given that it started in the back of the knee.   There is only a small amount of fluid noted on the right lateral aspect of the knee. I do not visualize anything in the area. There is no redness or warmth. She is able to walk with a walker here and has a walker at home. She is placed in Ace wrap. She will follow-up with orthopedics as an outpatient. Return here for any worsening of symptoms or problems at home. Disposition Considerations (tests considered but not done, Admit vs D/C, Shared Decision Making, Pt Expectation of Test or Tx.):        I am the Primary Clinician of Record. FINAL IMPRESSION      1.  Acute pain of right knee          DISPOSITION/PLAN     DISPOSITION Decision To Discharge 02/27/2023 12:49:32 PM      PATIENT REFERRED TO:  Rhett Rich MD  [facility] [employer] [address] [place] [address]  [phone]    Schedule an appointment as soon as possible for a visit in 3 days  For re-check    [facility] Emergency Department  [address] [place] [address]  [phone]    As needed    DISCHARGE MEDICATIONS:  Discharge Medication List as of 2/27/2023  1:00 PM        START taking these medications    Details   lidocaine (LIDODERM) 5 % Place 1 patch onto the skin daily 12 hours on, 12 hours off., Disp-30 patch, R-0Normal             DISCONTINUED MEDICATIONS:  Discharge Medication List as of 2/27/2023  1:00 PM                 (Please note that portions of this note were completed with a voice recognition program.  Efforts were made to edit the dictations but occasionally words are mis-transcribed.)    Oksana Johnson PA-C (electronically signed)        Oksana Johnson PA-C  02/27/23 9023

## 2023-02-27 NOTE — ED NOTES
Discharge and education instructions reviewed. Patient verbalized understanding, teach-back successful. Patient denied questions at this time. No acute distress noted. Patient instructed to follow-up as noted - return to emergency department if symptoms worsen. Patient verbalized understanding. Discharged per EDMD with discharged instructions. Patient wheeled to lobby by family.       Alyssia Martínez RN  02/27/23 3069

## 2023-02-28 LAB
A/G RATIO: 1.3 (ref 1.1–2.2)
ALBUMIN SERPL-MCNC: 3.1 G/DL (ref 3.4–5)
ALP BLD-CCNC: 79 U/L (ref 40–129)
ALT SERPL-CCNC: 12 U/L (ref 10–40)
ANION GAP SERPL CALCULATED.3IONS-SCNC: 9 MMOL/L (ref 3–16)
AST SERPL-CCNC: 18 U/L (ref 15–37)
BASOPHILS ABSOLUTE: 0 K/UL (ref 0–0.2)
BASOPHILS RELATIVE PERCENT: 0.3 %
BILIRUB SERPL-MCNC: 1.2 MG/DL (ref 0–1)
BUN BLDV-MCNC: 18 MG/DL (ref 7–20)
C-REACTIVE PROTEIN: 209.1 MG/L (ref 0–5.1)
CALCIUM SERPL-MCNC: 8.6 MG/DL (ref 8.3–10.6)
CHLORIDE BLD-SCNC: 105 MMOL/L (ref 99–110)
CO2: 24 MMOL/L (ref 21–32)
CREAT SERPL-MCNC: 0.6 MG/DL (ref 0.6–1.2)
EOSINOPHILS ABSOLUTE: 0 K/UL (ref 0–0.6)
EOSINOPHILS RELATIVE PERCENT: 0.2 %
GFR SERPL CREATININE-BSD FRML MDRD: >60 ML/MIN/{1.73_M2}
GLUCOSE BLD-MCNC: 90 MG/DL (ref 70–99)
HCT VFR BLD CALC: 37.5 % (ref 36–48)
HEMOGLOBIN: 12.6 G/DL (ref 12–16)
LYMPHOCYTES ABSOLUTE: 0.7 K/UL (ref 1–5.1)
LYMPHOCYTES RELATIVE PERCENT: 8.1 %
MCH RBC QN AUTO: 32 PG (ref 26–34)
MCHC RBC AUTO-ENTMCNC: 33.6 G/DL (ref 31–36)
MCV RBC AUTO: 95.2 FL (ref 80–100)
MONOCYTES ABSOLUTE: 1.5 K/UL (ref 0–1.3)
MONOCYTES RELATIVE PERCENT: 17.2 %
NEUTROPHILS ABSOLUTE: 6.4 K/UL (ref 1.7–7.7)
NEUTROPHILS RELATIVE PERCENT: 74.2 %
PDW BLD-RTO: 13.6 % (ref 12.4–15.4)
PLATELET # BLD: 178 K/UL (ref 135–450)
PMV BLD AUTO: 8.1 FL (ref 5–10.5)
POTASSIUM REFLEX MAGNESIUM: 4 MMOL/L (ref 3.5–5.1)
PROCALCITONIN: 0.14 NG/ML (ref 0–0.15)
RBC # BLD: 3.94 M/UL (ref 4–5.2)
SEDIMENTATION RATE, ERYTHROCYTE: 18 MM/HR (ref 0–30)
SODIUM BLD-SCNC: 138 MMOL/L (ref 136–145)
TOTAL PROTEIN: 5.4 G/DL (ref 6.4–8.2)
URIC ACID, SERUM: 3.9 MG/DL (ref 2.6–6)
WBC # BLD: 8.6 K/UL (ref 4–11)

## 2023-02-28 PROCEDURE — 84145 PROCALCITONIN (PCT): CPT

## 2023-02-28 PROCEDURE — 86140 C-REACTIVE PROTEIN: CPT

## 2023-02-28 PROCEDURE — 36415 COLL VENOUS BLD VENIPUNCTURE: CPT

## 2023-02-28 PROCEDURE — 6370000000 HC RX 637 (ALT 250 FOR IP): Performed by: NURSE PRACTITIONER

## 2023-02-28 PROCEDURE — 6360000002 HC RX W HCPCS: Performed by: NURSE PRACTITIONER

## 2023-02-28 PROCEDURE — 85025 COMPLETE CBC W/AUTO DIFF WBC: CPT

## 2023-02-28 PROCEDURE — 80053 COMPREHEN METABOLIC PANEL: CPT

## 2023-02-28 PROCEDURE — 6370000000 HC RX 637 (ALT 250 FOR IP): Performed by: INTERNAL MEDICINE

## 2023-02-28 PROCEDURE — 94760 N-INVAS EAR/PLS OXIMETRY 1: CPT

## 2023-02-28 PROCEDURE — 84550 ASSAY OF BLOOD/URIC ACID: CPT

## 2023-02-28 PROCEDURE — 1200000000 HC SEMI PRIVATE

## 2023-02-28 PROCEDURE — 2580000003 HC RX 258: Performed by: INTERNAL MEDICINE

## 2023-02-28 PROCEDURE — 85652 RBC SED RATE AUTOMATED: CPT

## 2023-02-28 PROCEDURE — 6360000002 HC RX W HCPCS: Performed by: INTERNAL MEDICINE

## 2023-02-28 PROCEDURE — 99222 1ST HOSP IP/OBS MODERATE 55: CPT | Performed by: NURSE PRACTITIONER

## 2023-02-28 RX ORDER — METHYLPREDNISOLONE SODIUM SUCCINATE 40 MG/ML
40 INJECTION, POWDER, LYOPHILIZED, FOR SOLUTION INTRAMUSCULAR; INTRAVENOUS 2 TIMES DAILY
Status: DISCONTINUED | OUTPATIENT
Start: 2023-02-28 | End: 2023-03-02

## 2023-02-28 RX ORDER — ACETAMINOPHEN 500 MG
500 TABLET ORAL 3 TIMES DAILY
Status: DISCONTINUED | OUTPATIENT
Start: 2023-02-28 | End: 2023-03-03 | Stop reason: HOSPADM

## 2023-02-28 RX ADMIN — LEVOTHYROXINE SODIUM 100 MCG: 100 TABLET ORAL at 08:20

## 2023-02-28 RX ADMIN — METHYLPREDNISOLONE SODIUM SUCCINATE 40 MG: 40 INJECTION, POWDER, FOR SOLUTION INTRAMUSCULAR; INTRAVENOUS at 21:22

## 2023-02-28 RX ADMIN — BUPROPION HYDROCHLORIDE 150 MG: 150 TABLET, FILM COATED, EXTENDED RELEASE ORAL at 08:19

## 2023-02-28 RX ADMIN — ACETAMINOPHEN 500 MG: 500 TABLET ORAL at 21:23

## 2023-02-28 RX ADMIN — BUPROPION HYDROCHLORIDE 150 MG: 150 TABLET, FILM COATED, EXTENDED RELEASE ORAL at 21:23

## 2023-02-28 RX ADMIN — MELATONIN TAB 3 MG 3 MG: 3 TAB at 21:23

## 2023-02-28 RX ADMIN — ACETAMINOPHEN 500 MG: 500 TABLET ORAL at 08:19

## 2023-02-28 RX ADMIN — POTASSIUM CHLORIDE 10 MEQ: 1500 TABLET, EXTENDED RELEASE ORAL at 08:19

## 2023-02-28 RX ADMIN — Medication 10 ML: at 08:25

## 2023-02-28 RX ADMIN — NIACIN 500 MG: 500 TABLET, EXTENDED RELEASE ORAL at 21:24

## 2023-02-28 RX ADMIN — ASPIRIN 81 MG: 81 TABLET, CHEWABLE ORAL at 08:19

## 2023-02-28 RX ADMIN — METHYLPREDNISOLONE SODIUM SUCCINATE 40 MG: 40 INJECTION, POWDER, FOR SOLUTION INTRAMUSCULAR; INTRAVENOUS at 15:32

## 2023-02-28 RX ADMIN — ENOXAPARIN SODIUM 40 MG: 100 INJECTION SUBCUTANEOUS at 08:20

## 2023-02-28 RX ADMIN — ATORVASTATIN CALCIUM 20 MG: 20 TABLET, FILM COATED ORAL at 21:23

## 2023-02-28 RX ADMIN — ACETAMINOPHEN 500 MG: 500 TABLET ORAL at 15:32

## 2023-02-28 RX ADMIN — LISINOPRIL 2.5 MG: 5 TABLET ORAL at 08:20

## 2023-02-28 NOTE — CONSULTS
[address] [facility] Orthopedic Surgery  Consult Note    Patient: Gia Biswas  Admit Date: 2/27/2023  Requesting Physician: Peter Acuña MD  Room: [address] [place], [place] [id number]-    Chief complaint: Right knee pain    HPI: Gia Biswas is a 80 y.o. female who presented to [facility] yesterday with twice with complaints of right knee pain. Patient denies any recent trauma or specific inciting event or injury. She underwent a right total knee arthroplasty with an outside provider back in 2004 or 2006, she cannot recall exactly when. I cannot find any results in care everywhere regarding this. Describes pain in the right knee mainly with weightbearing and palpation only of moderate intensity and of aching nature since 3 days ago which is relieved by rest. Denies new numbness/tingling. She was also having some left knee pain a few months back and was seen by Dr. Emma Farley. She underwent a left hip injection which reportedly resolved with some left hip pain and some left knee pain. She has a history of gout and CKD, not on maintenance medications. Independent imaging review of right knee via plain films yesterday demonstrated: No fracture, malalignment, large effusion, or soft tissue gas, or new foreign body. She has stable appearing cemented total knee arthroplasty without acute complication. Patient lives at home and uses a walker to ambulate. WBC 12.8 on arrival, now normal  PCT 0.14    ESR 18  Oral temp 99.3 on arrival, now normal  She is not on any antibiotics    Relevant notes, labs and other tests reviewed.   Medical History:  Past Medical History:   Diagnosis Date    Breast cancer ([facility] 2004    ductal vs lobular, not clear from pathologist    Breast cancer [facility])     right lump removed; recently informed by physician since surgery so long ago okay for b/p and IV sticks    Cellulitis of left leg     Cellulitis of wrist 06/06/2018    RIGHT    Chronic kidney disease, stage III (moderate) ([address]     Resolved creatinine 1.4 16. Since then 0.9 or less. Depression     Diverticulitis of colon 1979    DVT (deep venous thrombosis) ([facility]     after first knee surgery    Gout 2011    Hematoma of left thigh 2017    Herpes zoster 2018    right ant shoulder    Hypertension     Hypoproteinemia ([address] 2019    Hypothyroidism 2011    Intervertebral lumbar disc disorder with myelopathy, lumbar region     OA (osteoarthritis) 2000    severe in knees, left ankle, left >right hip    Primary osteoarthritis of left hip greater than right 05/10/2017    Pelvis x-ray 5/10/2017 FINDINGS: Enthesophytes identified along the periphery of the innominate bones most pronounced along lateral border of the left iliac bone. Finding in large part likely degenerative in nature; however, changes associated with remote trauma particularly along the lateral margin of the left iliac bone not excluded. There are degenerative changes about both hips. There is de    Primary osteoarthritis of right wrist     Severe arthritic change within carpal - 1st metacarpal joint, Extensive chondrocalcinosis throughout wrist    Pure hypercholesterolemia     Seborrheic dermatitis 2014    Subdural hematoma, post-traumatic 2016    Tongue dysplasia 2014    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     Past Surgical History:   Procedure Laterality Date    BREAST SURGERY Right     right mastectomy for cancer: okay to do IV sticks and B/P due to being so long ago per patient (states this was told to her by her physician)     SECTION  1970    COLON SURGERY  1970    right partial colectomy for ruptured diverticulitis    [address]  2016    with mesh, laparoscopic, was incarcerated.   Yana Zhang MD. MFF    JOINT REPLACEMENT      right knee and left knee in     MOUTH SURGERY  2015    verrous hyperplasia left ventral tongue, WLE T1N0M0 [facility], Dr Soha Garcia  07/01/1970       Social History:    reports that she has never smoked. She has never used smokeless tobacco.    Family History:        Problem Relation Age of Onset    Cancer Mother         bone    Heart Disease Father         CAD    Diabetes Father     Diabetes Sister     Cancer Sister 54        leukemia    Arrhythmia Brother         A fib    Stroke Brother 80        ? Other Brother         THR after fx.  bilateral pneumonia, better    No Known Problems Daughter     High Blood Pressure Daughter     Other Son         Spinal fusion with rods/screws    High Blood Pressure Son     Other Son         rotator cuff tear       Medications:  ALL MEDICATIONS HAVE BEEN REVIEWED:  Scheduled:   acetaminophen  500 mg Oral Daily    melatonin  3 mg Oral Nightly    aspirin  81 mg Oral Daily    niacin  500 mg Oral Nightly    levothyroxine  100 mcg Oral Daily    potassium chloride  10 mEq Oral Daily    lisinopril  2.5 mg Oral Daily    buPROPion  150 mg Oral BID    sodium chloride flush  5-40 mL IntraVENous 2 times per day    enoxaparin  40 mg SubCUTAneous Daily    atorvastatin  20 mg Oral Nightly     Continuous:   sodium chloride 75 mL/hr at 02/27/23 2226    sodium chloride       PRN:meloxicam, sodium chloride flush, sodium chloride, ondansetron **OR** ondansetron, magnesium hydroxide, acetaminophen **OR** acetaminophen, hydrALAZINE, sodium chloride, potassium chloride **OR** potassium alternative oral replacement **OR** potassium chloride    Allergies: No Known Allergies    Review of Systems:  Constitutional: Negative for fever, chills, fatigue. Skin:  Negative for pruritis, rash  Eyes: Negative for photophobia and visual disturbance. ENT:  Negative for rhinorrhea, epistaxis, sore throat  Respiratory:  Negative for cough and shortness of breath. Cardiovascular: Negative for chest pain.    Gastrointestinal: Negative for nausea, vomiting, diarrhea. Genitourinary: Negative for dysuria and difficulty urinating. Neurological: Negative for confusion, dysarthria, tremors, seizures. Psychiatric:  Negative for depression or anxiety  Musculoskeletal:  Positive for right knee pain. Objective:  Vitals:    02/28/23 0815   BP: 127/75   Pulse: 73   Resp: 16   Temp: 98 °F (36.7 °C)   SpO2: 98%      Physical Examination:  GENERAL: No apparent distress, well-nourished  SKIN:  Warm and dry  EYES: Nonicteric. ENT: Mucous membranes moist  HEAD: Normocephalic, atraumatic  RESPIRATORY: Resp easy and unlabored  CARDIOVASCULAR: Regular rate and rhythm  GI: Abdomen soft, nontender  NEURO: Awake and alert. No speech defect  PSYCHIATRIC: Appropriate affect; not agitated  MUSCULOSKELETAL: Right knee  Inspection: On exam there are no ulcerations, rashes or lesions about the right knee. There is pain to palpation of the medial lateral joint lines of the right knee. There is no warmth or erythema. There is no effusion noted on palpation. She has no pain with active or passive ROM 0-90. She has no instability with varus or valgus stress. Motor: Intact DF/PF bilaterally. Sensation: Grossly intact to light touch throughout the bilateral lower extremities. Vascular: 2+ bilateral DP pulse. Labs reviewed:  Recent Labs     02/27/23  1539 02/28/23  0628   WBC 12.8* 8.6   HGB 14.9 12.6   HCT 47.2 37.5    178     Recent Labs     02/27/23  1539 02/28/23  0628    138   K 3.6 4.0   CL 99 105   CO2 23 24   BUN 16 18   CREATININE 0.6 0.6   GLUCOSE 92 90   CALCIUM 9.2 8.6     No results for input(s): INR, PROTIME in the last 72 hours.     Lab Results   Component Value Date    COLORU YELLOW 12/04/2019    CLARITYU CLEAR 12/04/2019    PHUR 7.0 12/04/2019    GLUCOSEU NEG 12/04/2019    BLOODU TRACE-INTACT 12/04/2019    LEUKOCYTESUR SMALL 12/04/2019    NITRITE NEG 12/04/2019    BILIRUBINUR NEG 12/04/2019    UROBILINOGEN 1.0 05/29/2016    RBCUA 28 (H) 05/29/2016 WBCUA 44 (H) 05/29/2016    BACTERIA 1+ (A) 05/29/2016    AMORPHOUS 2+ (A) 01/15/2016       Imaging:  No orders to display       IMPRESSION:  S/p right total knee arthroplasty 2004 at outside hospital  Possible gout flare right knee  CKD  Principal Problem:    Infected prosthetic knee joint ([facility]  Active Problems:    Pure hypercholesterolemia    Infection and inflammatory reaction due to internal left knee prosthesis, initial encounter ([facility]    Hypothyroidism    HTN (hypertension), benign    Recurrent major depressive disorder, [facility]  Resolved Problems:    * No resolved hospital problems. *      RECOMMENDATIONS:  Patient's overall clinical picture is not consistent with an infectious etiology. - We will get uric acid level and consider starting on systemic steroids.  - WBAT  - PT/OT  - Pain control  - DVT prophylaxis: per primary team  - Dispo: Likely SNF once medically stable    I have reviewed imaging and plan with Dr. Glo Welch.       Case discussed with Dr. Osorio Hernandez, APRN - CNP  2/28/2023  8:29 AM

## 2023-02-28 NOTE — PROGRESS NOTES
Patient seen in ED, room 08. Admission completed with the following exceptions:  4 Eyes Assessment, Immunizations, Covid Vaccines, Rights and Responsibilities, Orientation to room, Plan of Care, Education/Learning Assessment and Education Plan, white board, height and weight, pain assessment and head to toe assessment. Patient is alert and oriented X 4. Patient lives home alone in a two story house and is being admitted for infected prosthetic knee joint. Home Medications as well as Outside Sources has been verbally reviewed with patient and updated as appropriate and is now Completed. Plan of care updated if indicated. All questions answered. Son is at bedside.

## 2023-02-28 NOTE — ED PROVIDER NOTES
Emergency Department Provider Note     Location: [facility] Emergency Department  2/27/2023    I independently performed a history and physical on Corinna Abreu. All diagnostic, treatment, and disposition decisions were made by myself in conjunction with the mid-level provider. Corinna Abreu is a 80 y.o. female here for right knee pain, swelling, inability to walk. Patient was seen in our ED earlier today for the same knee pain. She had unremarkable vascular ultrasound of the right leg and x-ray and discharged home. Patient came back because she has difficulty ambulating at home with the pain. Denies focal weakness. No numbness distally. Denies fall. ED Triage Vitals [02/27/23 1516]   BP Temp Temp Source Heart Rate Resp SpO2 Height Weight   132/85 99.3 °F (37.4 °C) Oral 83 18 96 % 4' 11\" (1.499 m) 169 lb (76.7 kg)        Exam showed WDWN female awake, alert, no facial droop, no slurred speech, dopplerable pedal pulses. Normal color of the feet. Not cool to touch. Right knee tender to palpation with slight effusion. Patient has active ROM to about 50-60 degrees. Right knee is slightly warm to touch but not erythematous. Large incision scare noted but well healed. ED course/MDM  Patient seen and examined in room 9    Radiology  XR KNEE RIGHT (MIN 4 VIEWS)    Result Date: 2/27/2023  EXAMINATION: FOUR XRAY VIEWS OF THE RIGHT KNEE 2/27/2023 11:44 am COMPARISON: None. HISTORY: ORDERING SYSTEM PROVIDED HISTORY: pain TECHNOLOGIST PROVIDED HISTORY: Reason for exam:->pain Reason for Exam: Pain FINDINGS: There is a total knee arthroplasty with cemented components. No acute fracture or dislocation. No acute hardware failure or loosening. Grossly normal alignment. There is a joint effusion. Total knee arthropasty without acute hardware complication.      VL Extremity Venous Right    Result Date: 2/27/2023  Vascular Lower Extremities DVT Study Procedure -- PRELIMINARY SONOGRAPHER REPORT -- Demographics   Patient Name      Fiorella Cortez   Date of Study     02/27/2023        Gender             Female   Patient Number    [id number]        Date of Birth      09/14/1933   Visit Number      [id number]         Age                80 year(s)   Accession Number  [id number]        Room Number           Corporate ID      [id number]           Sam Winn RVT   Ordering          Radha Hutchinson, Interpreting       Lilo Friedman MD  Physician         CNA               Physician  Procedure Type of Study:   Veins:Lower Extremities DVT Study, VL EXTREMITY VENOUS DUPLEX RIGHT. Tech Comments Right No evidence of deep vein or superficial vein thrombosis involving the right lower extremity and the left common femoral vein. Calf and ankle edema noted. Patient unable to withstand compression, and augmentation, due to pain tolerance. Incidental finding on the right: pocket of fluid surrounding the right lateral knee. .       Labs  Results for orders placed or performed during the hospital encounter of 02/27/23   CBC with Auto Differential   Result Value Ref Range    WBC 12.8 (H) 4.0 - 11.0 K/uL    RBC 4.84 4.00 - 5.20 M/uL    Hemoglobin 14.9 12.0 - 16.0 g/dL    Hematocrit 47.2 36.0 - 48.0 %    MCV 97.5 80.0 - 100.0 fL    MCH 30.7 26.0 - 34.0 pg    MCHC 31.5 31.0 - 36.0 g/dL    RDW 14.1 12.4 - 15.4 %    Platelets 227 062 - 289 K/uL    MPV 8.3 5.0 - 10.5 fL    Neutrophils % 85.0 %    Lymphocytes % 7.0 %    Monocytes % 8.0 %    Eosinophils % 0.0 %    Basophils % 0.0 %    Neutrophils Absolute 10.9 (H) 1.7 - 7.7 K/uL    Lymphocytes Absolute 0.9 (L) 1.0 - 5.1 K/uL    Monocytes Absolute 1.0 0.0 - 1.3 K/uL    Eosinophils Absolute 0.0 0.0 - 0.6 K/uL    Basophils Absolute 0.0 0.0 - 0.2 K/uL    Anisocytosis Occasional (A)     Tear Drop Cells Occasional (A)    CMP w/ Reflex to MG   Result Value Ref Range    Sodium 136 136 - 145 mmol/L    Potassium reflex Magnesium 3.6 3.5 - 5.1 mmol/L    Chloride 99 99 - 110 mmol/L    CO2 23 21 - 32 mmol/L    Anion Gap 14 3 - 16    Glucose 92 70 - 99 mg/dL    BUN 16 7 - 20 mg/dL    Creatinine 0.6 0.6 - 1.2 mg/dL    Est, Glom Filt Rate >60 >60    Calcium 9.2 8.3 - 10.6 mg/dL    Total Protein 6.6 6.4 - 8.2 g/dL    Albumin 3.8 3.4 - 5.0 g/dL    Albumin/Globulin Ratio 1.4 1.1 - 2.2    Total Bilirubin 1.3 (H) 0.0 - 1.0 mg/dL    Alkaline Phosphatase 101 40 - 129 U/L    ALT 17 10 - 40 U/L    AST 24 15 - 37 U/L   C-Reactive Protein   Result Value Ref Range    .4 (H) 0.0 - 5.1 mg/L   CK   Result Value Ref Range    Total  26 - 192 U/L   SPECIMEN REJECTION   Result Value Ref Range    Rejected Test esr     Reason for Rejection see below        30-year-old female returned to our ED after evaluation for a right knee pain earlier today. She returns due to inability to walk. She is neuro vastly intact distally on exam.  Compartments of her leg soft and no concern for compartment syndrome. X-ray and ultrasound from earlier today also reviewed. Patient does not have acute ischemic leg. No concern for DVT. There is no overwhelming evidence on exam that this is a septic joint and given that she has hardware, we elected not to perform arthrocentesis as the risk may not outweigh the benefit at this point. We will plan on admission for observation, PT/OT eval, and Ortho consult if needed. CRP elevated but prior CRP also elevated. WBC elevated but prior WBC also elevated. Social determinants of health: Patient is elderly, lives at home and walks with a walker. If she is not ambulatory, she is not safe to be discharged home. - Is this patient to be included in the SEP-1 Core Measure due to severe sepsis or septic shock? No   Exclusion criteria - the patient is NOT to be included for SEP-1 Core Measure due to: Infection is not suspected    Clinical Impression:  1.  Acute pain of right knee        For further details of [address] emergency department encounter, please see SOFIA Martinez's documentation. I, Drake eRn, am the primary attending of record and performed a substantive portion of the visit including all aspects of the medical decision making. I personally saw the patient and independently provided 0 minutes of non-concurrent critical care out of the total shared critical care time provided. The critical care time excludes separately billable procedures and updating family. Time spent is specifically for management of the presenting complaint and symptoms initially, direct bedside care, reevaluation, review of records, and consultation. There was a high probability of clinically significant life-threatening deterioration in the patient's condition, which required my urgent intervention. This chart was generated in part by using Dragon Dictation system and may contain errors related to that system including errors in grammar, punctuation, and spelling, as well as words and phrases that may be inappropriate. If there are any questions or concerns please feel free to contact the dictating provider for clarification.           Dali Buitrago MD  02/27/23 8799

## 2023-02-28 NOTE — PROGRESS NOTES
Progress Note - Dr. Kehinde Lara - Internal Medicine  PCP: Rachel Salinas,  [email or web address] / [place] [id number] [address] Day: 1  Code Status: Full Code  Current Diet: ADULT DIET; Regular        CC: follow up on medical issues    Subjective:   Curt Savage is a 80 y.o. female. Pt seen and examined  Chart reviewed since last visit, labs and imaging below      Doing ok  Still with knee pain  Case d/w ortho, to check labs, considering starting steroids      Review of Systems: (1 system for EPF, 2-9 for detailed, 10+ for comprehensive)  Constitutional: Negative for chills and fever. HENT: Negative for dental problem, nosebleeds and rhinorrhea. Eyes: Negative for photophobia and visual disturbance. Respiratory: Negative for cough, chest tightness and shortness of breath. Cardiovascular: Negative for chest pain and leg swelling. Gastrointestinal: Negative for diarrhea, nausea and vomiting. Endocrine: Negative for polydipsia and polyphagia. Genitourinary: Negative for frequency, hematuria and urgency. Musculoskeletal: Negative for back pain and myalgias. Positive for knee pain  Skin: Negative for rash. Allergic/Immunologic: Negative for food allergies. Neurological: Negative for dizziness, seizures, syncope and facial asymmetry. Hematological: Negative for adenopathy. Psychiatric/Behavioral: Negative for dysphoric mood. The patient is not nervous/anxious. I have reviewed the patient's medical and social history in detail and updated the computerized patient record.   To recap: She  has a past medical history of Breast cancer ([address] Breast cancer ([address] Cellulitis of left leg, Cellulitis of wrist, Chronic kidney disease, stage III (moderate) ([address] Depression, Diverticulitis of colon, DVT (deep venous thrombosis) ([address] Gout, Hematoma of left thigh, Herpes zoster, Hypertension, Hypoproteinemia ([address] Hypothyroidism, Intervertebral lumbar disc disorder with myelopathy, lumbar region, OA (osteoarthritis), Primary osteoarthritis of left hip greater than right, Primary osteoarthritis of right wrist, Pure hypercholesterolemia, Seborrheic dermatitis, Subdural hematoma, post-traumatic, and Tongue dysplasia. . She  has a past surgical history that includes Colon surgery (1970); Breast surgery (Right, ); joint replacement (); Mouth surgery (2015); Incisional hernia repair (2016);  section (1970); and Revision Colostomy (1970). . She  reports that she has never smoked. She has never used smokeless tobacco. She reports that she does not drink alcohol and does not use drugs. .        Active Hospital Problems    Diagnosis Date Noted    Pure hypercholesterolemia [E78.00]      Priority: High    Infected prosthetic knee joint ([facility] [T84.59XA, Z96.659] 2023     Priority: Medium    Infection and inflammatory reaction due to internal left knee prosthesis, initial encounter [facility]) Constanza Monk 2023     Priority: Medium    Hypothyroidism [E03.9] 2011     Priority: Low    Recurrent major depressive disorder, in partial remission ([facility] [F33.41] 2018    HTN (hypertension), benign [I10]        Current Facility-Administered Medications: acetaminophen (TYLENOL) tablet 500 mg, 500 mg, Oral, Daily  melatonin tablet 3 mg, 3 mg, Oral, Nightly  aspirin chewable tablet 81 mg, 81 mg, Oral, Daily  niacin (SLO-NIACIN) extended release tablet 500 mg, 500 mg, Oral, Nightly  meloxicam (MOBIC) tablet 7.5 mg, 7.5 mg, Oral, Daily PRN  levothyroxine (SYNTHROID) tablet 100 mcg, 100 mcg, Oral, Daily  potassium chloride (KLOR-CON M) extended release tablet 10 mEq, 10 mEq, Oral, Daily  lisinopril (PRINIVIL;ZESTRIL) tablet 2.5 mg, 2.5 mg, Oral, Daily  buPROPion [facility]) extended release tablet 150 mg, 150 mg, Oral, BID  0.9 % sodium chloride infusion, , IntraVENous, Continuous  sodium chloride flush 0.9 % injection 5-40 mL, 5-40 mL, IntraVENous, 2 times per day  sodium chloride flush 0.9 % injection 5-40 mL, 5-40 mL, IntraVENous, PRN  0.9 % sodium chloride infusion, 25 mL, IntraVENous, PRN  enoxaparin (LOVENOX) injection 40 mg, 40 mg, SubCUTAneous, Daily  ondansetron (ZOFRAN-ODT) disintegrating tablet 4 mg, 4 mg, Oral, Q8H PRN **OR** ondansetron (ZOFRAN) injection 4 mg, 4 mg, IntraVENous, Q6H PRN  magnesium hydroxide (MILK OF MAGNESIA) 400 MG/5ML suspension 30 mL, 30 mL, Oral, Daily PRN  acetaminophen (TYLENOL) tablet 650 mg, 650 mg, Oral, Q6H PRN **OR** acetaminophen (TYLENOL) suppository 650 mg, 650 mg, Rectal, Q6H PRN  hydrALAZINE (APRESOLINE) injection 10 mg, 10 mg, IntraVENous, Q6H PRN  0.9 % sodium chloride bolus, 500 mL, IntraVENous, PRN  potassium chloride (KLOR-CON M) extended release tablet 40 mEq, 40 mEq, Oral, PRN **OR** potassium bicarb-citric acid (EFFER-K) effervescent tablet 40 mEq, 40 mEq, Oral, PRN **OR** potassium chloride 10 mEq/100 mL IVPB (Peripheral Line), 10 mEq, IntraVENous, PRN  atorvastatin (LIPITOR) tablet 20 mg, 20 mg, Oral, Nightly         Objective:  /75   Pulse 73   Temp 98 °F (36.7 °C) (Oral)   Resp 16   Ht 4' 11\" (1.499 m)   Wt 169 lb (76.7 kg)   LMP 01/01/1970 (Exact Date)   SpO2 98%   BMI 34.13 kg/m²      Patient Vitals for the past 24 hrs:   BP Temp Temp src Pulse Resp SpO2 Height Weight   02/28/23 0815 127/75 98 °F (36.7 °C) Oral 73 16 98 % -- --   02/27/23 2150 117/74 98 °F (36.7 °C) Oral 78 18 97 % -- --   02/27/23 1930 (!) 155/113 -- -- -- -- -- -- --   02/27/23 1921 (!) 152/96 -- -- -- -- -- -- --   02/27/23 1727 -- -- -- 79 -- -- -- --   02/27/23 1716 125/74 -- -- -- -- -- -- --   02/27/23 1700 (!) 143/74 -- -- -- -- 95 % -- --   02/27/23 1645 139/78 -- -- -- -- -- -- --   02/27/23 1630 136/76 -- -- -- -- 98 % -- --   02/27/23 1615 (!) 137/103 -- -- -- -- 100 % -- --   02/27/23 1516 132/85 99.3 °F (37.4 °C) Oral 83 18 96 % 4' 11\" (1.499 m) 169 lb (76.7 kg)     Patient Vitals for the past 96 hrs (Last 3 readings):   Weight   02/27/23 1516 169 lb (76.7 kg)           Intake/Output Summary (Last 24 hours) at 2/28/2023 0858  Last data filed at 2/28/2023 0827  Gross per 24 hour   Intake 791.01 ml   Output 0 ml   Net 791.01 ml         Physical Exam: (2-7 system for EPF/Detailed, ?8 for Comprehensive)  /75   Pulse 73   Temp 98 °F (36.7 °C) (Oral)   Resp 16   Ht 4' 11\" (1.499 m)   Wt 169 lb (76.7 kg)   LMP 01/01/1970 (Exact Date)   SpO2 98%   BMI 34.13 kg/m²   Constitutional: vitals as above: alert, appears stated age and cooperative     Psychiatric: normal insight and judgment, oriented to person, place, time, and general circumstances    Head: Normocephalic, without obvious abnormality, atraumatic    Eyes:lids and lashes normal, conjunctivae and sclerae normal and pupils equal, round, reactive to light and accomodation    EMNT: external ears normal, nares midline    Neck: no carotid bruit, supple, symmetrical, trachea midline and thyroid not enlarged, symmetric, no tenderness/mass/nodules     Respiratory: clear to auscultation and percussion bilaterally with normal respiratory effort    Cardiovascular: normal rate, regular rhythm, normal S1 and S2 and no murmurs    Gastrointestinal: soft, non-tender, non-distended, normal bowel sounds, no masses or organomegaly    Extremities: no clubbing, no edema    Skin:No rashes or nodules noted.     Neurologic:negative         Labs:  Lab Results   Component Value Date    WBC 8.6 02/28/2023    HGB 12.6 02/28/2023    HCT 37.5 02/28/2023     02/28/2023    CHOL 121 08/06/2021    TRIG 65 08/06/2021    HDL 39 (L) 08/06/2021    ALT 12 02/28/2023    AST 18 02/28/2023     02/28/2023    K 4.0 02/28/2023     02/28/2023    CREATININE 0.6 02/28/2023    BUN 18 02/28/2023    CO2 24 02/28/2023    TSH 3.49 11/19/2015    INR 0.93 05/13/2016    LABA1C 5.3 12/06/2022    LABMICR <1.20 02/21/2017     Lab Results   Component Value Date    CKTOTAL 170 02/27/2023    TROPONINI 0.00 07/10/2015       Recent Imaging Results are Reviewed:  XR KNEE RIGHT (MIN 4 VIEWS)    Result Date: 2/27/2023  EXAMINATION: FOUR XRAY VIEWS OF THE RIGHT KNEE 2/27/2023 11:44 am COMPARISON: None. HISTORY: ORDERING SYSTEM PROVIDED HISTORY: pain TECHNOLOGIST PROVIDED HISTORY: Reason for exam:->pain Reason for Exam: Pain FINDINGS: There is a total knee arthroplasty with cemented components. No acute fracture or dislocation. No acute hardware failure or loosening. Grossly normal alignment. There is a joint effusion. Total knee arthropasty without acute hardware complication. VL Extremity Venous Right    Result Date: 2/28/2023  Vascular Lower Extremities DVT Study Procedure  Demographics   Patient Name      Get Moses   Date of Study     02/27/2023        Gender             Female   Patient Number    [id number]        Date of Birth      09/14/1933   Visit Number      [id number]         Age                80 year(s)   Accession Number  [id number]        Room Number        [facility]   Corporate ID      [id number]           Marvella Moritz, FRANCES   Ordering          Fran Sue MD  Physician         CNA               Physician  Procedure Type of Study:   Veins:Lower Extremities DVT Study, VL EXTREMITY VENOUS DUPLEX RIGHT. Vascular Sonographer Report  Additional Indications:right leg pain and swelling. Impressions Right Impression No evidence of deep vein or superficial vein thrombosis involving the right lower extremity and the left common femoral vein. Calf and ankle edema noted. Patient unable to withstand compression, and augmentation, due to pain tolerance. Incidental finding on the right: pocket of fluid surrounding the right lateral knee. . Conclusions   Summary   There is no evidence of deep or superficial vein thrombosis of the right  lower extremity or the left common femoral vein.   3.7 x 1 cm fluid over right lateral knee   Signature   ------------------------------------------------------------------  Electronically signed by Carley Buerger, MD (Interpreting  physician) on 02/28/2023 at 07:27 AM  ------------------------------------------------------------------  Patient Status:ER. [facility]. Technical Quality:Limited visualization due to patients inability to tolerate compression maneuvers. Risk Factors History +---------+----+-------------------+ ! Diagnosis! Date! Comments           ! +---------+----+-------------------+ ! Other    ! ! Right calf DVT 2004! +---------+----+-------------------+ Velocities are measured in cm/s ; Diameters are measured in mm Right Lower Extremities DVT Study Measurements Right 2D Measurements +------------------------+----------+---------------+----------+ ! Location                ! Visualized! Compressibility! Thrombosis! +------------------------+----------+---------------+----------+ ! Sapheno Femoral Junction! Yes       ! Yes            ! None      ! +------------------------+----------+---------------+----------+ ! GSV Thigh               ! Yes       ! Yes            ! None      ! +------------------------+----------+---------------+----------+ ! Common Femoral          !Yes       ! Yes            ! None      ! +------------------------+----------+---------------+----------+ ! Femoral                 !Yes       ! Yes            ! None      ! +------------------------+----------+---------------+----------+ ! Prox Femoral            !Yes       ! Yes            ! None      ! +------------------------+----------+---------------+----------+ ! Mid Femoral             !Yes       ! !None      ! +------------------------+----------+---------------+----------+ ! Dist Femoral            !Yes       ! !None      ! +------------------------+----------+---------------+----------+ ! Deep Femoral            !Yes       ! Yes            ! None      ! +------------------------+----------+---------------+----------+ ! Popliteal               !Yes       ! Yes            ! None      ! +------------------------+----------+---------------+----------+ ! GSV Below Knee          ! Yes       ! Yes            ! None      ! +------------------------+----------+---------------+----------+ ! Gastroc                 ! Yes       ! Yes            ! None      ! +------------------------+----------+---------------+----------+ ! Soleal                  !Yes       ! Yes            ! None      ! +------------------------+----------+---------------+----------+ ! PTV                     ! Yes       ! Yes            ! None      ! +------------------------+----------+---------------+----------+ ! Peroneal                !Yes       ! Yes            ! None      ! +------------------------+----------+---------------+----------+ ! GSV Calf                ! Yes       ! Yes            ! None      ! +------------------------+----------+---------------+----------+ ! SSV                     ! Yes       ! Yes            ! None      ! +------------------------+----------+---------------+----------+ Right Doppler Measurements +------------------------+------+------+------------+ ! Location                ! Signal!Reflux! Reflux (sec)! +------------------------+------+------+------------+ ! Sapheno Femoral Junction! Phasic! No    !            ! +------------------------+------+------+------------+ ! Common Femoral          !Phasic! No    !            ! +------------------------+------+------+------------+ ! Femoral                 !Phasic! No    !            ! +------------------------+------+------+------------+ ! Prox Femoral            !Phasic! No    !            ! +------------------------+------+------+------------+ ! Mid Femoral             !Phasic! No    !            ! +------------------------+------+------+------------+ ! Dist Femoral            !Phasic! No    !            ! +------------------------+------+------+------------+ ! Deep Femoral            !Phasic! No    !            ! +------------------------+------+------+------------+ ! Popliteal               !Phasic! No    !            ! +------------------------+------+------+------------+ Left Lower Extremities DVT Study Measurements Left 2D Measurements +------------------------+----------+---------------+----------+ ! Location                ! Visualized! Compressibility! Thrombosis! +------------------------+----------+---------------+----------+ ! Sapheno Femoral Junction! Yes       ! Yes            ! None      ! +------------------------+----------+---------------+----------+ ! GSV Thigh               ! Yes       ! Yes            ! None      ! +------------------------+----------+---------------+----------+ ! Common Femoral          !Yes       ! Yes            ! None      ! +------------------------+----------+---------------+----------+ Left Doppler Measurements +--------------+------+------+------------+ ! Location      ! Signal!Reflux! Reflux (sec)! +--------------+------+------+------------+ ! Common Femoral!Phasic!      !            ! +--------------+------+------+------------+      Lab Results   Component Value Date/Time    GLUCOSE 90 02/28/2023 06:28 AM     Lab Results   Component Value Date/Time    POCGLU 83 05/27/2016 08:22 PM     /75   Pulse 73   Temp 98 °F (36.7 °C) (Oral)   Resp 16   Ht 4' 11\" (1.499 m)   Wt 169 lb (76.7 kg)   LMP 01/01/1970 (Exact Date)   SpO2 98%   BMI 34.13 kg/m²     Assessment and Plan:  Principal Problem:    [facility] -Established problem. Stable. After d/w ortho, they do not think joint is infected/septic  Plan: await their recs  Active Problems:    Pure hypercholesterolemia  Plan: Continue present orders/plan. Infection and inflammatory reaction due to internal left knee prosthesis, initial encounter [facility]    Hypothyroidism -Established problem. Stable.     Plan: cont on synthroid    HTN (hypertension), benign -Established problem. Stable. 127/75  Plan: Continue medications. Continue to check BP q shift. CBC and BMP ordered to monitor for disease progression, medication side effect.      Recurrent major depressive disorder, in partial remission ([facility]  Plan: cont meds      Case discussed with: ortho  Tests ordered/reviewed: cbc, bmp, plain films          (Please note that portions of this note were completed with a voice recognition program.  Efforts were made to edit the dictations but occasionally words are mis-transcribed.)        Yara Valero MD  2/28/2023

## 2023-03-01 LAB
ANION GAP SERPL CALCULATED.3IONS-SCNC: 6 MMOL/L (ref 3–16)
BASOPHILS ABSOLUTE: 0 K/UL (ref 0–0.2)
BASOPHILS RELATIVE PERCENT: 0.1 %
BUN BLDV-MCNC: 26 MG/DL (ref 7–20)
CALCIUM SERPL-MCNC: 8.9 MG/DL (ref 8.3–10.6)
CHLORIDE BLD-SCNC: 110 MMOL/L (ref 99–110)
CO2: 24 MMOL/L (ref 21–32)
CREAT SERPL-MCNC: 0.6 MG/DL (ref 0.6–1.2)
EOSINOPHILS ABSOLUTE: 0 K/UL (ref 0–0.6)
EOSINOPHILS RELATIVE PERCENT: 0 %
GFR SERPL CREATININE-BSD FRML MDRD: >60 ML/MIN/{1.73_M2}
GLUCOSE BLD-MCNC: 184 MG/DL (ref 70–99)
HCT VFR BLD CALC: 36.7 % (ref 36–48)
HEMOGLOBIN: 12.4 G/DL (ref 12–16)
LYMPHOCYTES ABSOLUTE: 0.3 K/UL (ref 1–5.1)
LYMPHOCYTES RELATIVE PERCENT: 5 %
MCH RBC QN AUTO: 32.2 PG (ref 26–34)
MCHC RBC AUTO-ENTMCNC: 33.7 G/DL (ref 31–36)
MCV RBC AUTO: 95.7 FL (ref 80–100)
MONOCYTES ABSOLUTE: 0.2 K/UL (ref 0–1.3)
MONOCYTES RELATIVE PERCENT: 3.3 %
NEUTROPHILS ABSOLUTE: 5.4 K/UL (ref 1.7–7.7)
NEUTROPHILS RELATIVE PERCENT: 91.6 %
PDW BLD-RTO: 13.5 % (ref 12.4–15.4)
PLATELET # BLD: 208 K/UL (ref 135–450)
PMV BLD AUTO: 8.1 FL (ref 5–10.5)
POTASSIUM SERPL-SCNC: 4.3 MMOL/L (ref 3.5–5.1)
RBC # BLD: 3.83 M/UL (ref 4–5.2)
SODIUM BLD-SCNC: 140 MMOL/L (ref 136–145)
WBC # BLD: 5.9 K/UL (ref 4–11)

## 2023-03-01 PROCEDURE — 2580000003 HC RX 258: Performed by: INTERNAL MEDICINE

## 2023-03-01 PROCEDURE — 97162 PT EVAL MOD COMPLEX 30 MIN: CPT

## 2023-03-01 PROCEDURE — 85025 COMPLETE CBC W/AUTO DIFF WBC: CPT

## 2023-03-01 PROCEDURE — 6370000000 HC RX 637 (ALT 250 FOR IP): Performed by: NURSE PRACTITIONER

## 2023-03-01 PROCEDURE — 6360000002 HC RX W HCPCS: Performed by: NURSE PRACTITIONER

## 2023-03-01 PROCEDURE — 80048 BASIC METABOLIC PNL TOTAL CA: CPT

## 2023-03-01 PROCEDURE — 1200000000 HC SEMI PRIVATE

## 2023-03-01 PROCEDURE — 97535 SELF CARE MNGMENT TRAINING: CPT

## 2023-03-01 PROCEDURE — 36415 COLL VENOUS BLD VENIPUNCTURE: CPT

## 2023-03-01 PROCEDURE — 6370000000 HC RX 637 (ALT 250 FOR IP): Performed by: INTERNAL MEDICINE

## 2023-03-01 PROCEDURE — 97116 GAIT TRAINING THERAPY: CPT

## 2023-03-01 PROCEDURE — 6360000002 HC RX W HCPCS: Performed by: INTERNAL MEDICINE

## 2023-03-01 PROCEDURE — 97166 OT EVAL MOD COMPLEX 45 MIN: CPT

## 2023-03-01 PROCEDURE — 97530 THERAPEUTIC ACTIVITIES: CPT

## 2023-03-01 PROCEDURE — 99233 SBSQ HOSP IP/OBS HIGH 50: CPT | Performed by: NURSE PRACTITIONER

## 2023-03-01 RX ADMIN — BUPROPION HYDROCHLORIDE 150 MG: 150 TABLET, FILM COATED, EXTENDED RELEASE ORAL at 09:48

## 2023-03-01 RX ADMIN — Medication 10 ML: at 09:49

## 2023-03-01 RX ADMIN — MELOXICAM 7.5 MG: 7.5 TABLET ORAL at 21:51

## 2023-03-01 RX ADMIN — METHYLPREDNISOLONE SODIUM SUCCINATE 40 MG: 40 INJECTION, POWDER, FOR SOLUTION INTRAMUSCULAR; INTRAVENOUS at 21:51

## 2023-03-01 RX ADMIN — LEVOTHYROXINE SODIUM 100 MCG: 100 TABLET ORAL at 09:48

## 2023-03-01 RX ADMIN — ATORVASTATIN CALCIUM 20 MG: 20 TABLET, FILM COATED ORAL at 21:51

## 2023-03-01 RX ADMIN — ASPIRIN 81 MG: 81 TABLET, CHEWABLE ORAL at 09:44

## 2023-03-01 RX ADMIN — ACETAMINOPHEN 500 MG: 500 TABLET ORAL at 09:47

## 2023-03-01 RX ADMIN — METHYLPREDNISOLONE SODIUM SUCCINATE 40 MG: 40 INJECTION, POWDER, FOR SOLUTION INTRAMUSCULAR; INTRAVENOUS at 09:43

## 2023-03-01 RX ADMIN — LISINOPRIL 2.5 MG: 5 TABLET ORAL at 09:43

## 2023-03-01 RX ADMIN — MELATONIN TAB 3 MG 3 MG: 3 TAB at 21:51

## 2023-03-01 RX ADMIN — POTASSIUM CHLORIDE 10 MEQ: 1500 TABLET, EXTENDED RELEASE ORAL at 09:44

## 2023-03-01 RX ADMIN — BUPROPION HYDROCHLORIDE 150 MG: 150 TABLET, FILM COATED, EXTENDED RELEASE ORAL at 21:51

## 2023-03-01 RX ADMIN — ENOXAPARIN SODIUM 40 MG: 100 INJECTION SUBCUTANEOUS at 09:43

## 2023-03-01 RX ADMIN — ACETAMINOPHEN 500 MG: 500 TABLET ORAL at 17:44

## 2023-03-01 RX ADMIN — ACETAMINOPHEN 500 MG: 500 TABLET ORAL at 21:51

## 2023-03-01 RX ADMIN — Medication 10 ML: at 21:51

## 2023-03-01 ASSESSMENT — PAIN SCALES - GENERAL
PAINLEVEL_OUTOF10: 0

## 2023-03-01 NOTE — PROGRESS NOTES
A&Ox4, VSS on RA. Denies pain at this time. IVF infusing per orders. Tolerating PO diet and medications without difficulty. PT/OT evaluation completed today, recommending d/c to Home w/C svcs. Denies any needs at this time. Call light is within reach, chair alarm in place.

## 2023-03-01 NOTE — PROGRESS NOTES
[facility] [address]   Phone: [phone]    Occupational Therapy    [x] Initial Evaluation            [] Daily Treatment Note         [] Discharge Summary      Patient: Kiersten Molina   : 1933   MRN: [id number]   Date of Service:  3/1/2023    Admitting Diagnosis:  Infected prosthetic knee joint [facility])  Current Admission Summary: 80 y.o. female who presents to the emergency department with a chief complaint of persistent right knee pain. Was seen twice in ER today. After first discharge, she was having difficulty standing up from her chair and from the toilet . Has had right knee pain for the past 3 to 4 days. States it began in the back of her knee and now is generally around her leg. Denies any injury or trauma. Has total knee replacement history in this knee. There is some warmth and tenderness to R knee joint     X-ray imaging and ultrasound performed earlier today were reviewed by self; I see some fluid collection over the right lateral knee. Labs in ER show elevated white blood cell count and CRP at that time but no evidence of septic arthritis or septic bursitis. Past Medical History:  has a past medical history of Breast cancer ([address] Breast cancer ([address] Cellulitis of left leg, Cellulitis of wrist, Chronic kidney disease, stage III (moderate) ([address] Depression, Diverticulitis of colon, DVT (deep venous thrombosis) ([address] Gout, Hematoma of left thigh, Herpes zoster, Hypertension, Hypoproteinemia ([address] Hypothyroidism, Intervertebral lumbar disc disorder with myelopathy, lumbar region, OA (osteoarthritis), Primary osteoarthritis of left hip greater than right, Primary osteoarthritis of right wrist, Pure hypercholesterolemia, Seborrheic dermatitis, Subdural hematoma, post-traumatic, and Tongue dysplasia. Past Surgical History:  has a past surgical history that includes Colon surgery (1970);  Breast surgery (Right, ); joint replacement (); Mouth surgery (2015); Incisional hernia repair (2016);  section (1970); and Revision Colostomy (1970). Discharge Recommendations: Juan Vasquez scored a 18/24 on the AM-PAC ADL Inpatient form. Current research shows that an AM-PAC score of 18 or greater is typically associated with a discharge to the patient's home setting. Based on the patient's AM-PAC score, and their current ADL deficits, it is recommended that the patient have 2-3 sessions per week of Occupational Therapy at d/c to increase the patient's independence. At this time, this patient demonstrates the endurance and safety to discharge home with home (home vs OP services) and a follow up treatment frequency of 2-3x/wk. Please see assessment section for further patient specific details. If patient discharges prior to next session this note will serve as a discharge summary. Please see below for the latest assessment towards goals.    HOME HEALTH CARE: LEVEL 3 SAFETY     - Initial home health evaluation to occur within 24-48 hours, in patient home   - Therapy evaluations in home within 24-48 hours of discharge; including DME and home safety   - Frontload therapy 5 days, then 3x a week   - Therapy to evaluate if patient has [address] needs for personal care   -  evaluation within 24-48 hours, includes evaluation of resources and insurance to determine [place], [place], LTC, and Medicaid options       DME Required For Discharge: patient has all required DME for discharge    Precautions/Restrictions: high fall risk  Weight Bearing Restrictions: no restrictions  [] Right Upper Extremity  [] Left Upper Extremity [] Right Lower Extremity  [] Left Lower Extremity     Required Braces/Orthotics: no braces required   [] Right  [] Left  Positional Restrictions:no positional restrictions    Pre-Admission Information   Lives With: alone                     Type of Home: house  Home Layout: two level, able to live on main level  Home Access: ramped entry  Bathroom Layout: tub/shower unit  Bathroom Equipment: shower chair, grab bar on side of tub  Toilet Height: standard height  Home Equipment: rolling walker, manual wheelchair  Transfer Assistance: modified independent with use of MOD I  Ambulation Assistance:modified independent with use of MOD I  ADL Assistance: independent with all ADL's  IADL Assistance: requires assistance with all homemaking tasks  Active :        [x] Yes                 [] No  Hand Dominance:  Left                     [] Right  Current Employment: retired. Hobbies:   Recent Falls: Denies any recent falls. Examination   Vision:   Vision Gross Assessment: Impaired and Vision Corrective Device: wears glasses at all times  Hearing:   hard of hearing, left hearing aid, right hearing aid  Posture: Forward flexed, kyphotic T spine    Tone:   Normotonic  Coordination Testing:   B hand arthritic changes  ROM:   (B) UE AROM WFL  Strength:   (B) UE strength grossly WFL    Therapist Clinical Decision Making (Complexity): medium complexity  Clinical Presentation: stable      Subjective  General: patient supine in bed on arrival, agreeable to OT/PT evaluation  Pain: 0/10  Pain Interventions: not applicable        Activities of Daily Living  Basic Activities of Daily Living  Grooming: stand by assistance contact guard assistance Comment: stance at sink for combing hair and oral care, CGA for safety d/t R knee weakness/pain  Lower Extremity Dressing: contact guard assistance Comment: increased time to thread BLE into brief, able to don over hips in stance. Requires verbal cues to sequence   Toileting: moderate assistance Comment: mod a for thoroughness with hygiene, verbal cues to sequence. Instrumental Activities of Daily Living  No IADL completed on this date.     Functional Mobility  Bed Mobility  Supine to Sit: stand by assistance  Scooting: stand by assistance  Comments: HOB elevated  Transfers  Sit to stand transfer:contact guard assistance  Stand to sit transfer: contact guard assistance  Toilet transfer: moderate assistance  Toilet transfer equipment: standard toilet, grab bars, walker  Comments: EOB to FWW CGA, toilet to [address] with grab bar Mod A, educated pt on used of RTS or BSC over toilet to improve independence with transfer at home, pt reports good understanding. Functional Mobility:  Standing Balance: stand by assistance.     Functional Mobility: .  contact guard assistance  Functional Mobility Activity: to/from bathroom, 200  Functional Mobility Device Use: rolling walker  Functional Mobility Comment: CGA for safety d/t R knee weakness and pain, no LOB, pt froward flexed, pt requires cues for walker proximity and postural correction    Other Therapeutic Interventions    Functional Outcomes  AM-PAC Inpatient Daily Activity Raw Score: 18    Cognition  Overall Cognitive Status: WFL  Problem Solving: decreased awareness of errors, assistance required to identify errors made, assistance required to correct errors made  Insights: decreased awareness of deficits  Initiation: does not require cues  Sequencing: requires cues for some  Comments: off topic and would not answer questions appropriately (e.g. mentioning fall last year when asked about reason for current admission)  Orientation:    alert and oriented x 4  Command Following:   accurately follows one step commands     Education  Barriers To Learning: cognition  Patient Education: patient educated on OT role and benefits, plan of care, proper use of assistive device/equipment, transfer training, discharge recommendations  Learning Assessment:  patient verbalizes understanding, would benefit from continued reinforcement    Assessment  Activity Tolerance: tolerates well  Impairments Requiring Therapeutic Intervention: decreased functional mobility, decreased ADL status, decreased strength, decreased safety awareness, decreased cognition, decreased endurance, decreased balance, decreased IADL, decreased posture  Prognosis: fair  Clinical Assessment: patient presents with above deficits and is appears close to baseline. Increased assistance provided this date d/t R knee weakness and pain. Educated pt on elevated toilet seat to assist with toilet transfers at home. Recommend 24/7 supervision/assist and continued HHOT to maximize safety and independence at home.    Safety Interventions: patient left in chair, chair alarm in place, call light within reach, gait belt, and nurse notified    Plan  Frequency: 3-5 x/per week  Current Treatment Recommendations: strengthening, balance training, functional mobility training, transfer training, endurance training, patient/caregiver education, ADL/self-care training, and equipment evaluation/education    Goals  Patient Goals: to return home   Short Term Goals:  Time Frame: discharge  Patient will complete lower body ADL at modified independent   Patient will complete toileting at modified independent   Patient will complete functional transfers at modified independent   Patient will complete functional mobility at modified independent     Therapy Session Time     Individual Group Co-treatment   Time In    0830   Time Out    0940   Minutes    70        Timed Code Treatment Minutes:   55  Total Treatment Minutes:  70       Electronically Signed By: Eleazar Hope OT

## 2023-03-01 NOTE — PROGRESS NOTES
[facility] [address]   Phone: [phone]    Physical Therapy    [x] Initial Evaluation            [] Daily Treatment Note         [] Discharge Summary      Patient: Chance Vick   : 1933   MRN: [id number]   Date of Service:  3/1/2023  Admitting Diagnosis: Infected prosthetic knee joint [facility])    Current Admission Summary: Chance Vick is a 80 y.o. female who presents to the emergency department with a chief complaint of some right knee pain for the past 3 to 4 days. States it began in the back of her knee and now is generally around her leg. Denies any injury or trauma. Has total knee replacement history in this knee. Denies chest pain, shortness of breath, nausea, vomiting, fevers, color change. Past Medical History:  has a past medical history of Breast cancer ([address] Breast cancer ([address] Cellulitis of left leg, Cellulitis of wrist, Chronic kidney disease, stage III (moderate) ([address] Depression, Diverticulitis of colon, DVT (deep venous thrombosis) ([address] Gout, Hematoma of left thigh, Herpes zoster, Hypertension, Hypoproteinemia ([address] Hypothyroidism, Intervertebral lumbar disc disorder with myelopathy, lumbar region, OA (osteoarthritis), Primary osteoarthritis of left hip greater than right, Primary osteoarthritis of right wrist, Pure hypercholesterolemia, Seborrheic dermatitis, Subdural hematoma, post-traumatic, and Tongue dysplasia. Past Surgical History:  has a past surgical history that includes Colon surgery (1970); Breast surgery (Right, ); joint replacement (); Mouth surgery (2015); Incisional hernia repair (2016);  section (1970); and Revision Colostomy (1970). Discharge Recommendations: Chance Vick scored a 21/24 on the AM-PAC short mobility form. Current research shows that an AM-PAC score of 18 or greater is typically associated with a discharge to the patient's home setting. Based on the patient's AM-PAC score and their current functional mobility deficits, it is recommended that the patient have 2-3 sessions per week of Physical Therapy at d/c to increase the patient's independence. At this time, this patient demonstrates the endurance and safety to discharge home with home health PT and a follow up treatment frequency of 2-3x/wk. Please see assessment section for further patient specific details. If patient discharges prior to next session this note will serve as a discharge summary. Please see below for the latest assessment towards goals.         HOME HEALTH CARE: LEVEL 3 SAFETY  - Initial home health evaluation to occur within 24-48 hours, in patient home   - Therapy evaluations in home within 24-48 hours of discharge; including DME and home safety   - Frontload therapy 5 days, then 3x a week   - Therapy to evaluate if patient has [address] needs for personal care   -  evaluation within 24-48 hours, includes evaluation of resources and insurance to determine AL, [place], LTC, and Medicaid options        DME Required For Discharge:  commode with bilateral arm rests  Precautions/Restrictions: high fall risk  Weight Bearing Restrictions: weight bearing as tolerated  [] Right Upper Extremity  [] Left Upper Extremity [] Right Lower Extremity  [] Left Lower Extremity     Required Braces/Orthotics: no braces required   [] Right  [] Left  Positional Restrictions:no positional restrictions    Pre-Admission Information   Lives With: alone    Type of Home: house  Home Layout: two level, able to live on main level  Home Access: ramped entry  Bathroom Layout: tub/shower unit  Bathroom Equipment: shower chair, grab bar on side of tub  Toilet Height: standard height  Home Equipment: rolling walker, manual wheelchair  Transfer Assistance: modified independent with use of MOD I  Ambulation Assistance:modified independent with use of MOD I  ADL Assistance: independent with all ADL's  IADL Assistance: requires assistance with all homemaking tasks  Active :        [x] Yes  [] No  Hand Dominance:  Left  [] Right  Current Employment: retired. Occupation: N/A  Hobbies: N/A  Recent Falls: Denies any recent falls. Examination   Vision:   Vision Gross Assessment: Impaired and Vision Corrective Device: wears glasses at all times  Hearing:   hard of hearing, left hearing aid, right hearing aid  Observation:   General Observation:  Patient supine in bed w/ HOB elevated, IV left arm, Pure[place] in place. Posture: Forward flexion, cervical flexion. Sensation:   accurately detects gross touch to all extremities and denies numbness and tingling  Proprioception:    WFL  Tone:   Normotonic  Coordination Testing:   WFL    ROM:   (B) LE AROM WFL  B knee flexion L 90 degrees, R 100 degrees  Strength:   (B) LE strength grossly WFL  Therapist Clinical Decision Making (Complexity): medium complexity  Clinical Presentation: stable      Subjective  General: Patient reports being MOD I at home w/ RW. Pain: 0/10  Pain Interventions: RN notified and repositioned        Functional Mobility  Bed Mobility  Supine to Sit: stand by assistance  Scooting: stand by assistance  Comments:  HOB elevated, SBA for safety. Transfers  Sit to stand transfer: contact guard assistance  Stand to sit transfer: minimal assistance  Bed to chair transfer: contact guard assistance  Toilet transfer: moderate assistance  Comments:  Gait belt donned. Patient stood from higher bed height w/ CGA for safety given recent knee issues. From lower toilet height, patient required MOD assist to stand. Due to eccentric quad weakness and knee ROM deficits patient requires MIN assist to sit in lower height seats.   Ambulation  Surface:level surface  Assistive Device: rolling walker  Assistance: contact guard assistance  Distance: 200'  Gait Mechanics: dec'd lee, forward flexed posture  Comments:  Verbal cues to stay inside walker base.  No LOB, appears steady. Stair Mobility  Stair mobility not completed on this date. Comments:  Wheelchair Mobility:  No w/c mobility completed on this date. Comments:  Balance  Static Sitting Balance: good: independent with functional balance in unsupported position  Dynamic Sitting Balance: good: independent with functional balance in unsupported position  Static Standing Balance: fair (-): maintains balance at SBA with use of UE support  Dynamic Standing Balance: fair (-): maintains balance at SBA with use of UE support  Comments:    Other Therapeutic Interventions:  Supine and seated HEP handout provided. Functional Outcomes  AM-PAC Inpatient Mobility Raw Score : 24              Cognition  WFL - patient noted to digress often during conversation with tangential topics, could not remember to take underwear off before sitting on toilet, cognition is a concern  Orientation:    alert and oriented x 4  Command Following:   [facility]    Education  Barriers To Learning: cognition and hearing  Patient Education: patient educated on goals, PT role and benefits, plan of care, HEP, functional mobility training, proper use of assistive device/equipment, transfer training, discharge recommendations  Learning Assessment:  patient verbalizes and demonstrates understanding, patient will require reinforcement due to cognitive deficits    Assessment  Activity Tolerance: Mild SOB with ambulation, does not limit functional mobility except for long distances. Impairments Requiring Therapeutic Intervention: decreased functional mobility, decreased ADL status, decreased ROM, decreased strength, decreased endurance, decreased balance, decreased posture  Prognosis: fair  Clinical Assessment: Patient was MOD I w/ RW prior to admission. Presently, she is able to perform all transfers and ambulation essentially as she was at baseline.   CGA was maintained for safety but patient never required physical assistance except to stand from low toilet height. Discussed discharge options and patient reports she feels she can go home in her current physical state. She does not wish to pursue SNF placement at this time. Given her current functional status, she appears safe to return home. Did briefly discuss increased assistance at home as she gets older and patient verbalized understanding. Ideally, recommend 24 hour assist at d/c. Safety Interventions: patient left in chair, chair alarm in place, call light within reach, gait belt, patient at risk for falls, and nurse notified    Plan  Frequency: 3-5 x/per week  Current Treatment Recommendations: strengthening, ROM, balance training, functional mobility training, transfer training, gait training, endurance training, patient/caregiver education, home exercise program, and safety education    Goals  Patient Goals: Return home. Short Term Goals:  Time Frame: Discharge.   Patient will complete bed mobility at modified independent   Patient will complete transfers at [facility]   Patient will ambulate 150 ft with use of rolling walker at modified independent    Therapy Session Time      Individual Group Co-treatment   Time In     0830   Time Out     0940   Minutes     70     Timed Code Treatment Minutes:  55 Minutes  Total Treatment Minutes:  70       Electronically Signed By: Sebas Alston PT, DPT, [id number] [id number]

## 2023-03-01 NOTE — PROGRESS NOTES
Progress Note - Dr. Pa Perrin - Internal Medicine  PCP: DO Camden Soto 15 / [address] [address] Professor Regan Hyde Day: 2  Code Status: Full Code  Current Diet: ADULT DIET; Regular        CC: follow up on medical issues    Subjective:   Shabbir Ovalle is a 80 y.o. female. Pt seen and examined  Chart reviewed since last visit, labs and imaging below      Doing ok  Still with knee pain  Awaiting ortho re-eval  Therapy in progress      Review of Systems: (1 system for EPF, 2-9 for detailed, 10+ for comprehensive)  Constitutional: Negative for chills and fever. HENT: Negative for dental problem, nosebleeds and rhinorrhea. Eyes: Negative for photophobia and visual disturbance. Respiratory: Negative for cough, chest tightness and shortness of breath. Cardiovascular: Negative for chest pain and leg swelling. Gastrointestinal: Negative for diarrhea, nausea and vomiting. Endocrine: Negative for polydipsia and polyphagia. Genitourinary: Negative for frequency, hematuria and urgency. Musculoskeletal: Negative for back pain and myalgias. Positive for knee pain  Skin: Negative for rash. Allergic/Immunologic: Negative for food allergies. Neurological: Negative for dizziness, seizures, syncope and facial asymmetry. Hematological: Negative for adenopathy. Psychiatric/Behavioral: Negative for dysphoric mood. The patient is not nervous/anxious. I have reviewed the patient's medical and social history in detail and updated the computerized patient record.   To recap: She  has a past medical history of Breast cancer ([address] Breast cancer ([address] Cellulitis of left leg, Cellulitis of wrist, Chronic kidney disease, stage III (moderate) ([address] Depression, Diverticulitis of colon, DVT (deep venous thrombosis) ([address] Gout, Hematoma of left thigh, Herpes zoster, Hypertension, Hypoproteinemia ([address] Hypothyroidism, Intervertebral lumbar disc disorder with myelopathy, lumbar region, OA (osteoarthritis), Primary osteoarthritis of left hip greater than right, Primary osteoarthritis of right wrist, Pure hypercholesterolemia, Seborrheic dermatitis, Subdural hematoma, post-traumatic, and Tongue dysplasia. . She  has a past surgical history that includes Colon surgery (1970); Breast surgery (Right, ); joint replacement (); Mouth surgery (2015); Incisional hernia repair (2016);  section (1970); and Revision Colostomy (1970). . She  reports that she has never smoked. She has never used smokeless tobacco. She reports that she does not drink alcohol and does not use drugs. .        Active Hospital Problems    Diagnosis Date Noted    Pure hypercholesterolemia [E78.00]      Priority: High    Infected prosthetic knee joint ([facility] [T84.59XA, Z96.659] 2023     Priority: Medium    Infection and inflammatory reaction [facility] internal [facility]     Priority: Medium    Hypothyroidism [E03.9] 2011     Priority: Low    Recurrent major depressive disorder, in partial remission ([facility] [F33.41] 2018    HTN (hypertension), benign [I10]        Current Facility-Administered Medications: acetaminophen (TYLENOL) tablet 500 mg, 500 mg, Oral, TID  methylPREDNISolone sodium (SOLU-MEDROL) injection 40 mg, 40 mg, IntraVENous, BID  melatonin tablet 3 mg, 3 mg, Oral, Nightly  aspirin chewable tablet 81 mg, 81 mg, Oral, Daily  niacin (SLO-NIACIN) extended release tablet 500 mg, 500 mg, Oral, Nightly  meloxicam (MOBIC) tablet 7.5 mg, 7.5 mg, Oral, Daily PRN  levothyroxine (SYNTHROID) tablet 100 mcg, 100 mcg, Oral, Daily  potassium chloride (KLOR-CON M) extended release tablet 10 mEq, 10 mEq, Oral, Daily  lisinopril (PRINIVIL;ZESTRIL) tablet 2.5 mg, 2.5 mg, Oral, Daily  buPROPion [facility]) extended release tablet 150 mg, 150 mg, Oral, BID  0.9 % sodium chloride infusion, , IntraVENous, Continuous  sodium chloride flush 0.9 % injection 5-40 mL, 5-40 mL, IntraVENous, 2 times per day  sodium chloride flush 0.9 % injection 5-40 mL, 5-40 mL, IntraVENous, PRN  0.9 % sodium chloride infusion, 25 mL, IntraVENous, PRN  enoxaparin (LOVENOX) injection 40 mg, 40 mg, SubCUTAneous, Daily  ondansetron (ZOFRAN-ODT) disintegrating tablet 4 mg, 4 mg, Oral, Q8H PRN **OR** ondansetron (ZOFRAN) injection 4 mg, 4 mg, IntraVENous, Q6H PRN  magnesium hydroxide (MILK OF MAGNESIA) 400 MG/5ML suspension 30 mL, 30 mL, Oral, Daily PRN  acetaminophen (TYLENOL) tablet 650 mg, 650 mg, Oral, Q6H PRN **OR** acetaminophen (TYLENOL) suppository 650 mg, 650 mg, Rectal, Q6H PRN  hydrALAZINE (APRESOLINE) injection 10 mg, 10 mg, IntraVENous, Q6H PRN  0.9 % sodium chloride bolus, 500 mL, IntraVENous, PRN  potassium chloride (KLOR-CON M) extended release tablet 40 mEq, 40 mEq, Oral, PRN **OR** potassium bicarb-citric acid (EFFER-K) effervescent tablet 40 mEq, 40 mEq, Oral, PRN **OR** potassium chloride 10 mEq/100 mL IVPB (Peripheral Line), 10 mEq, IntraVENous, PRN  atorvastatin (LIPITOR) tablet 20 mg, 20 mg, Oral, Nightly         Objective:  /80   Pulse 67   Temp 97.5 °F (36.4 °C) (Oral)   Resp 17   Ht 4' 11\" (1.499 m)   Wt 171 lb 1.6 oz (77.6 kg)   LMP 01/01/1970 (Exact Date)   SpO2 97%   BMI 34.56 kg/m²      Patient Vitals for the past 24 hrs:   BP Temp Temp src Pulse Resp SpO2 Weight   03/01/23 0745 135/80 97.5 °F (36.4 °C) Oral 67 17 97 % --   03/01/23 0643 -- -- -- -- -- -- 171 lb 1.6 oz (77.6 kg)   03/01/23 0500 110/76 97.6 °F (36.4 °C) Oral 68 18 95 % --   03/01/23 0027 115/68 97.8 °F (36.6 °C) Oral 70 18 93 % --   02/28/23 2111 (!) 141/78 97.7 °F (36.5 °C) Oral 75 18 96 % --   02/28/23 1632 110/73 98 °F (36.7 °C) Oral 71 16 97 % --   02/28/23 1229 105/64 98.2 °F (36.8 °C) Oral 77 16 97 % --   02/28/23 1017 -- -- -- -- 16 98 % --       Patient Vitals for the past 96 hrs (Last 3 readings):   Weight   03/01/23 0643 171 lb 1.6 oz (77.6 kg)   02/27/23 1516 169 lb (76.7 kg)             Intake/Output Summary (Last 24 hours) at 3/1/2023 0835  Last data filed at 3/1/2023 0500  Gross per 24 hour   Intake 120 ml   Output 400 ml   Net -280 ml           Physical Exam: (2-7 system for EPF/Detailed, ?8 for Comprehensive)  /80   Pulse 67   Temp 97.5 °F (36.4 °C) (Oral)   Resp 17   Ht 4' 11\" (1.499 m)   Wt 171 lb 1.6 oz (77.6 kg)   LMP 01/01/1970 (Exact Date)   SpO2 97%   BMI 34.56 kg/m²   Constitutional: vitals as above: alert, appears stated age and cooperative     Psychiatric: normal insight and judgment, oriented to person, place, time, and general circumstances    Head: Normocephalic, without obvious abnormality, atraumatic    Eyes:lids and lashes normal, conjunctivae and sclerae normal and pupils equal, round, reactive to light and accomodation    EMNT: external ears normal, nares midline    Neck: no carotid bruit, supple, symmetrical, trachea midline and thyroid not enlarged, symmetric, no tenderness/mass/nodules     Respiratory: clear to auscultation and percussion bilaterally with normal respiratory effort    Cardiovascular: normal rate, regular rhythm, normal S1 and S2 and no murmurs    Gastrointestinal: soft, non-tender, non-distended, normal bowel sounds, no masses or organomegaly    Extremities: no clubbing, no edema    Skin:No rashes or nodules noted.     Neurologic:negative         Labs:  Lab Results   Component Value Date    WBC 5.9 03/01/2023    HGB 12.4 03/01/2023    HCT 36.7 03/01/2023     03/01/2023    CHOL 121 08/06/2021    TRIG 65 08/06/2021    HDL 39 (L) 08/06/2021    ALT 12 02/28/2023    AST 18 02/28/2023     03/01/2023    K 4.3 03/01/2023     03/01/2023    CREATININE 0.6 03/01/2023    BUN 26 (H) 03/01/2023    CO2 24 03/01/2023    TSH 3.49 11/19/2015    INR 0.93 05/13/2016    LABA1C 5.3 12/06/2022    LABMICR <1.20 02/21/2017     Lab Results   Component Value Date    CKTOTAL 170 02/27/2023 TROPONINI 0.00 07/10/2015       Recent Imaging Results are Reviewed:  XR KNEE RIGHT (MIN 4 VIEWS)    Result Date: 2/27/2023  EXAMINATION: FOUR XRAY VIEWS OF THE RIGHT KNEE 2/27/2023 11:44 am COMPARISON: None. HISTORY: ORDERING SYSTEM PROVIDED HISTORY: pain TECHNOLOGIST PROVIDED HISTORY: Reason for exam:->pain Reason for Exam: Pain FINDINGS: There is a total knee arthroplasty with cemented components. No acute fracture or dislocation. No acute hardware failure or loosening. Grossly normal alignment. There is a joint effusion. Total knee arthropasty without acute hardware complication. VL Extremity Venous Right    Result Date: 2/28/2023  Vascular Lower Extremities DVT Study Procedure  Demographics   Patient Name      Yocasta Neff   Date of Study     02/27/2023        Gender             Female   Patient Number    [id number]        Date of Birth      09/14/1933   Visit Number      [id number]         Age                80 year(s)   Accession Number  [id number]        Room Number        [facility]   Corporate ID      [id number]           Quique Hernandez FRANCES   Ordering          Vannessa Burton MD  Physician         CNA               Physician  Procedure Type of Study:   Veins:Lower Extremities DVT Study, VL EXTREMITY VENOUS DUPLEX RIGHT. Vascular Sonographer Report  Additional Indications:right leg pain and swelling. Impressions Right Impression No evidence of deep vein or superficial vein thrombosis involving the right lower extremity and the left common femoral vein. Calf and ankle edema noted. Patient unable to withstand compression, and augmentation, due to pain tolerance. Incidental finding on the right: pocket of fluid surrounding the right lateral knee. . Conclusions   Summary   There is no evidence of deep or superficial vein thrombosis of the right  lower extremity or the left common femoral vein.   3.7 x 1 cm fluid over right lateral knee Signature   ------------------------------------------------------------------  Electronically signed by Nilson Dunham MD (Interpreting  physician) on 02/28/2023 at 07:27 AM  ------------------------------------------------------------------  Patient Status:ER. [facility]. Technical Quality:Limited visualization due to patients inability to tolerate compression maneuvers. Risk Factors History +---------+----+-------------------+ ! Diagnosis! Date! Comments           ! +---------+----+-------------------+ ! Other    ! ! Right calf DVT 2004! +---------+----+-------------------+ Velocities are measured in cm/s ; Diameters are measured in mm Right Lower Extremities DVT Study Measurements Right 2D Measurements +------------------------+----------+---------------+----------+ ! Location                ! Visualized! Compressibility! Thrombosis! +------------------------+----------+---------------+----------+ ! Sapheno Femoral Junction! Yes       ! Yes            ! None      ! +------------------------+----------+---------------+----------+ ! GSV Thigh               ! Yes       ! Yes            ! None      ! +------------------------+----------+---------------+----------+ ! Common Femoral          !Yes       ! Yes            ! None      ! +------------------------+----------+---------------+----------+ ! Femoral                 !Yes       ! Yes            ! None      ! +------------------------+----------+---------------+----------+ ! Prox Femoral            !Yes       ! Yes            ! None      ! +------------------------+----------+---------------+----------+ ! Mid Femoral             !Yes       ! !None      ! +------------------------+----------+---------------+----------+ ! Dist Femoral            !Yes       ! !None      ! +------------------------+----------+---------------+----------+ ! Deep Femoral            !Yes       ! Yes            ! None      ! +------------------------+----------+---------------+----------+ ! Popliteal               !Yes       ! Yes            ! None      ! +------------------------+----------+---------------+----------+ ! GSV Below Knee          ! Yes       ! Yes            ! None      ! +------------------------+----------+---------------+----------+ ! Gastroc                 ! Yes       ! Yes            ! None      ! +------------------------+----------+---------------+----------+ ! Soleal                  !Yes       ! Yes            ! None      ! +------------------------+----------+---------------+----------+ ! PTV                     ! Yes       ! Yes            ! None      ! +------------------------+----------+---------------+----------+ ! Peroneal                !Yes       ! Yes            ! None      ! +------------------------+----------+---------------+----------+ ! GSV Calf                ! Yes       ! Yes            ! None      ! +------------------------+----------+---------------+----------+ ! SSV                     ! Yes       ! Yes            ! None      ! +------------------------+----------+---------------+----------+ Right Doppler Measurements +------------------------+------+------+------------+ ! Location                ! Signal!Reflux! Reflux (sec)! +------------------------+------+------+------------+ ! Sapheno Femoral Junction! Phasic! No    !            ! +------------------------+------+------+------------+ ! Common Femoral          !Phasic! No    !            ! +------------------------+------+------+------------+ ! Femoral                 !Phasic! No    !            ! +------------------------+------+------+------------+ ! Prox Femoral            !Phasic! No    !            ! +------------------------+------+------+------------+ ! Mid Femoral             !Phasic! No    !            ! +------------------------+------+------+------------+ ! Dist Femoral            !Phasic! No    !            ! +------------------------+------+------+------------+ ! Deep Femoral            !Phasic! No    !            ! +------------------------+------+------+------------+ ! Popliteal               !Phasic! No    !            ! +------------------------+------+------+------------+ Left Lower Extremities DVT Study Measurements Left 2D Measurements +------------------------+----------+---------------+----------+ ! Location                ! Visualized! Compressibility! Thrombosis! +------------------------+----------+---------------+----------+ ! Sapheno Femoral Junction! Yes       ! Yes            ! None      ! +------------------------+----------+---------------+----------+ ! GSV Thigh               ! Yes       ! Yes            ! None      ! +------------------------+----------+---------------+----------+ ! Common Femoral          !Yes       ! Yes            ! None      ! +------------------------+----------+---------------+----------+ Left Doppler Measurements +--------------+------+------+------------+ ! Location      ! Signal!Reflux! Reflux (sec)! +--------------+------+------+------------+ ! Common Femoral!Phasic!      !            ! +--------------+------+------+------------+      Lab Results   Component Value Date/Time    GLUCOSE 184 03/01/2023 05:51 AM     Lab Results   Component Value Date/Time    POCGLU 83 05/27/2016 08:22 PM     /80   Pulse 67   Temp 97.5 °F (36.4 °C) (Oral)   Resp 17   Ht 4' 11\" (1.499 m)   Wt 171 lb 1.6 oz (77.6 kg)   LMP 01/01/1970 (Exact Date)   SpO2 97%   BMI 34.56 kg/m²     Assessment and Plan:  Principal Problem:    Infected prosthetic knee joint ([address] -Established problem. Stable. After d/w ortho, they do not think joint is infected/septic - though CRP is elevated, over 200  Plan: await their recs  Active Problems:    Pure hypercholesterolemia  Plan: Continue present orders/plan. Infection and inflammatory reaction due to internal left knee prosthesis, initial encounter ([address]    Hypothyroidism -Established problem. Stable.     Plan: cont on synthroid HTN (hypertension), benign -Established problem. Stable. 135/80  Plan: Continue medications. Continue to check BP q shift. CBC and BMP ordered to monitor for disease progression, medication side effect.      Recurrent major depressive disorder, in partial remission ([facility]  Plan: cont meds      Case discussed with: ortho  Tests ordered/reviewed: cbc, bmp, crp    Disp- awaiting pt/ot      (Please note that portions of this note were completed with a voice recognition program.  Efforts were made to edit the dictations but occasionally words are mis-transcribed.)        Victor Manuel Culver MD  3/1/2023

## 2023-03-01 NOTE — PROGRESS NOTES
[facility] Orthopedic Surgery  Progress Note      Chief complaint: Right knee pain    Subjective: The patient is sitting up in the chair. She noted moderate improvement in her knee symptoms since yesterday. She ambulated to the nurses station and round the piña with therapy this morning which is significant improvement. Denies new issues. Patient lives at home and uses a walker to ambulate. She is not on any antibiotics. Afebrile, no leukocytosis. Relevant notes, labs and other tests reviewed. Review of Systems:  Constitutional: Negative for fever, chills, fatigue. Skin:  Negative for pruritis, rash  Eyes: Negative for photophobia and visual disturbance. ENT:  Negative for rhinorrhea, epistaxis, sore throat  Respiratory:  Negative for cough and shortness of breath. Cardiovascular: Negative for chest pain. Gastrointestinal: Negative for nausea, vomiting, diarrhea. Genitourinary: Negative for dysuria and difficulty urinating. Neurological: Negative for confusion, dysarthria, tremors, seizures. Psychiatric:  Negative for depression or anxiety  Musculoskeletal:  Positive for right knee pain. Objective:  Vitals:    03/01/23 0745   BP: 135/80   Pulse: 67   Resp: 17   Temp: 97.5 °F (36.4 °C)   SpO2: 97%      Physical Examination:  GENERAL: No apparent distress, well-nourished  SKIN:  Warm and dry  EYES: Nonicteric. ENT: Mucous membranes moist  HEAD: Normocephalic, atraumatic  RESPIRATORY: Resp easy and unlabored  CARDIOVASCULAR: Regular rate and rhythm  GI: Abdomen soft, nontender  NEURO: Awake and alert. No speech defect  PSYCHIATRIC: Appropriate affect; not agitated  MUSCULOSKELETAL: Right knee  Inspection: On exam there are no ulcerations, rashes or lesions about the right knee. There is decreased pain to palpation of the medial lateral joint lines of the right knee. There is no warmth or erythema. There is no effusion noted on palpation.   She has no pain with active or passive ROM 0-90.  She has no instability with varus or valgus stress. Motor: Intact DF/PF bilaterally. Sensation: Grossly intact to light touch throughout the bilateral lower extremities. Vascular: 2+ bilateral DP pulse. Labs reviewed:  Recent Labs     02/27/23  1539 02/28/23 0628 03/01/23  0551   WBC 12.8* 8.6 5.9   HGB 14.9 12.6 12.4   HCT 47.2 37.5 36.7    178 208     Recent Labs     02/27/23  1539 02/28/23 0628 03/01/23  0551    138 140   K 3.6 4.0 4.3   CL 99 105 110   CO2 23 24 24   BUN 16 18 26*   CREATININE 0.6 0.6 0.6   GLUCOSE 92 90 184*   CALCIUM 9.2 8.6 8.9     No results for input(s): INR, PROTIME in the last 72 hours. Lab Results   Component Value Date    COLORU YELLOW 12/04/2019    CLARITYU CLEAR 12/04/2019    PHUR 7.0 12/04/2019    GLUCOSEU NEG 12/04/2019    BLOODU TRACE-INTACT 12/04/2019    LEUKOCYTESUR SMALL 12/04/2019    NITRITE NEG 12/04/2019    BILIRUBINUR NEG 12/04/2019    UROBILINOGEN 1.0 05/29/2016    RBCUA 28 (H) 05/29/2016    WBCUA 44 (H) 05/29/2016    BACTERIA 1+ (A) 05/29/2016    AMORPHOUS 2+ (A) 01/15/2016       Imaging:  No orders to display       IMPRESSION:  S/p right total knee arthroplasty 2004 at outside hospital  Possible gout flare right knee  CKD  Principal Problem:    Infected prosthetic knee joint ([facility]  Active Problems:    Pure hypercholesterolemia    Infection and inflammatory reaction due to internal left knee prosthesis, initial encounter ([facility] [address]    Hypothyroidism    HTN (hypertension), benign    Recurrent major depressive disorder, in partial remission ([address]  Resolved Problems:    * No resolved hospital problems. *      RECOMMENDATIONS:  Patient's overall clinical picture is still most suggestive of gout flare, not infectious etiology.   - Continue solu-medrol one more day then transition to 5 day course prednisone.   - WBAT  - PT/OT  - Pain control  - DVT prophylaxis: per primary team  - Dispo: Likely SNF once medically stable    Case discussed with ARTURO Dawson, APRN - CNP  3/1/2023  9:55 AM

## 2023-03-02 LAB
ANION GAP SERPL CALCULATED.3IONS-SCNC: 8 MMOL/L (ref 3–16)
BASOPHILS ABSOLUTE: 0 K/UL (ref 0–0.2)
BASOPHILS RELATIVE PERCENT: 0 %
BUN BLDV-MCNC: 22 MG/DL (ref 7–20)
CALCIUM SERPL-MCNC: 9.1 MG/DL (ref 8.3–10.6)
CHLORIDE BLD-SCNC: 109 MMOL/L (ref 99–110)
CO2: 24 MMOL/L (ref 21–32)
CREAT SERPL-MCNC: 0.6 MG/DL (ref 0.6–1.2)
EOSINOPHILS ABSOLUTE: 0 K/UL (ref 0–0.6)
EOSINOPHILS RELATIVE PERCENT: 0 %
GFR SERPL CREATININE-BSD FRML MDRD: >60 ML/MIN/{1.73_M2}
GLUCOSE BLD-MCNC: 141 MG/DL (ref 70–99)
HCT VFR BLD CALC: 39.7 % (ref 36–48)
HEMOGLOBIN: 13.3 G/DL (ref 12–16)
LYMPHOCYTES ABSOLUTE: 0.4 K/UL (ref 1–5.1)
LYMPHOCYTES RELATIVE PERCENT: 4 %
MCH RBC QN AUTO: 31.8 PG (ref 26–34)
MCHC RBC AUTO-ENTMCNC: 33.6 G/DL (ref 31–36)
MCV RBC AUTO: 94.6 FL (ref 80–100)
MONOCYTES ABSOLUTE: 0.3 K/UL (ref 0–1.3)
MONOCYTES RELATIVE PERCENT: 3.5 %
NEUTROPHILS ABSOLUTE: 8.9 K/UL (ref 1.7–7.7)
NEUTROPHILS RELATIVE PERCENT: 92.5 %
PDW BLD-RTO: 13.3 % (ref 12.4–15.4)
PLATELET # BLD: 253 K/UL (ref 135–450)
PMV BLD AUTO: 8 FL (ref 5–10.5)
POTASSIUM SERPL-SCNC: 4.5 MMOL/L (ref 3.5–5.1)
RBC # BLD: 4.19 M/UL (ref 4–5.2)
SODIUM BLD-SCNC: 141 MMOL/L (ref 136–145)
WBC # BLD: 9.6 K/UL (ref 4–11)

## 2023-03-02 PROCEDURE — 97116 GAIT TRAINING THERAPY: CPT

## 2023-03-02 PROCEDURE — 99233 SBSQ HOSP IP/OBS HIGH 50: CPT | Performed by: NURSE PRACTITIONER

## 2023-03-02 PROCEDURE — 1200000000 HC SEMI PRIVATE

## 2023-03-02 PROCEDURE — 36415 COLL VENOUS BLD VENIPUNCTURE: CPT

## 2023-03-02 PROCEDURE — 97530 THERAPEUTIC ACTIVITIES: CPT

## 2023-03-02 PROCEDURE — 85025 COMPLETE CBC W/AUTO DIFF WBC: CPT

## 2023-03-02 PROCEDURE — 94760 N-INVAS EAR/PLS OXIMETRY 1: CPT

## 2023-03-02 PROCEDURE — 6360000002 HC RX W HCPCS: Performed by: INTERNAL MEDICINE

## 2023-03-02 PROCEDURE — 6370000000 HC RX 637 (ALT 250 FOR IP): Performed by: NURSE PRACTITIONER

## 2023-03-02 PROCEDURE — 6370000000 HC RX 637 (ALT 250 FOR IP): Performed by: INTERNAL MEDICINE

## 2023-03-02 PROCEDURE — 6360000002 HC RX W HCPCS: Performed by: NURSE PRACTITIONER

## 2023-03-02 PROCEDURE — 2580000003 HC RX 258: Performed by: INTERNAL MEDICINE

## 2023-03-02 PROCEDURE — 80048 BASIC METABOLIC PNL TOTAL CA: CPT

## 2023-03-02 RX ORDER — PREDNISONE 20 MG/1
40 TABLET ORAL DAILY
Status: DISCONTINUED | OUTPATIENT
Start: 2023-03-03 | End: 2023-03-03 | Stop reason: HOSPADM

## 2023-03-02 RX ORDER — PREDNISONE 10 MG/1
TABLET ORAL
Qty: 40 TABLET | Refills: 0 | Status: SHIPPED | OUTPATIENT
Start: 2023-03-02 | End: 2023-03-12

## 2023-03-02 RX ADMIN — Medication 10 ML: at 21:27

## 2023-03-02 RX ADMIN — ACETAMINOPHEN 500 MG: 500 TABLET ORAL at 09:47

## 2023-03-02 RX ADMIN — NIACIN 500 MG: 500 TABLET, EXTENDED RELEASE ORAL at 21:24

## 2023-03-02 RX ADMIN — LISINOPRIL 2.5 MG: 5 TABLET ORAL at 09:47

## 2023-03-02 RX ADMIN — LEVOTHYROXINE SODIUM 100 MCG: 100 TABLET ORAL at 09:47

## 2023-03-02 RX ADMIN — POTASSIUM CHLORIDE 10 MEQ: 1500 TABLET, EXTENDED RELEASE ORAL at 09:46

## 2023-03-02 RX ADMIN — ASPIRIN 81 MG: 81 TABLET, CHEWABLE ORAL at 09:47

## 2023-03-02 RX ADMIN — BUPROPION HYDROCHLORIDE 150 MG: 150 TABLET, FILM COATED, EXTENDED RELEASE ORAL at 09:47

## 2023-03-02 RX ADMIN — NIACIN 500 MG: 500 TABLET, EXTENDED RELEASE ORAL at 00:40

## 2023-03-02 RX ADMIN — Medication 10 ML: at 09:00

## 2023-03-02 RX ADMIN — ACETAMINOPHEN 500 MG: 500 TABLET ORAL at 17:09

## 2023-03-02 RX ADMIN — ENOXAPARIN SODIUM 40 MG: 100 INJECTION SUBCUTANEOUS at 09:48

## 2023-03-02 RX ADMIN — BUPROPION HYDROCHLORIDE 150 MG: 150 TABLET, FILM COATED, EXTENDED RELEASE ORAL at 21:24

## 2023-03-02 RX ADMIN — METHYLPREDNISOLONE SODIUM SUCCINATE 40 MG: 40 INJECTION, POWDER, FOR SOLUTION INTRAMUSCULAR; INTRAVENOUS at 09:48

## 2023-03-02 RX ADMIN — ATORVASTATIN CALCIUM 20 MG: 20 TABLET, FILM COATED ORAL at 21:24

## 2023-03-02 RX ADMIN — MELATONIN TAB 3 MG 3 MG: 3 TAB at 21:24

## 2023-03-02 RX ADMIN — ACETAMINOPHEN 500 MG: 500 TABLET ORAL at 21:24

## 2023-03-02 ASSESSMENT — PAIN SCALES - GENERAL
PAINLEVEL_OUTOF10: 0

## 2023-03-02 NOTE — PROGRESS NOTES
[facility] [address]   Phone: [phone]    Occupational Therapy    [] Initial Evaluation            [x] Daily Treatment Note         [] Discharge Summary      Patient: Karely Tucker   : 1933   MRN: [id number]   Date of Service:  3/2/2023    Admitting Diagnosis:  Infected prosthetic knee joint [facility])  Current Admission Summary: 80 y.o. female who presents to the emergency department with a chief complaint of persistent right knee pain. Was seen twice in ER today. After first discharge, she was having difficulty standing up from her chair and from the toilet . Has had right knee pain for the past 3 to 4 days. States it began in the back of her knee and now is generally around her leg. Denies any injury or trauma. Has total knee replacement history in this knee. There is some warmth and tenderness to R knee joint     X-ray imaging and ultrasound performed earlier today were reviewed by self; I see some fluid collection over the right lateral knee. Labs in ER show elevated white blood cell count and CRP at that time but no evidence of septic arthritis or septic bursitis. Past Medical History:  has a past medical history of Breast cancer ([address] Breast cancer ([address] Cellulitis of left leg, Cellulitis of wrist, Chronic kidney disease, stage III (moderate) ([address] Depression, Diverticulitis of colon, DVT (deep venous thrombosis) ([address] Gout, Hematoma of left thigh, Herpes zoster, Hypertension, Hypoproteinemia ([address] Hypothyroidism, Intervertebral lumbar disc disorder with myelopathy, lumbar region, OA (osteoarthritis), Primary osteoarthritis of left hip greater than right, Primary osteoarthritis of right wrist, Pure hypercholesterolemia, Seborrheic dermatitis, Subdural hematoma, post-traumatic, and Tongue dysplasia. Past Surgical History:  has a past surgical history that includes Colon surgery (1970);  Breast surgery (Right, ); joint replacement (); Mouth surgery (2015); Incisional hernia repair (2016);  section (1970); and Revision Colostomy (1970). Discharge Recommendations: Faviola Mota scored a 19/24 on the AM-PAC ADL Inpatient form. Current research shows that an AM-PAC score of 18 or greater is typically associated with a discharge to the patient's home setting. Based on the patient's AM-PAC score, and their current ADL deficits, it is recommended that the patient have 2-3 sessions per week of Occupational Therapy at d/c to increase the patient's independence. At this time, this patient demonstrates the endurance and safety to discharge home with home (home vs OP services) and a follow up treatment frequency of 2-3x/wk. Please see assessment section for further patient specific details. If patient discharges prior to next session this note will serve as a discharge summary. Please see below for the latest assessment towards goals.    HOME HEALTH CARE: LEVEL 3 SAFETY     - Initial home health evaluation to occur within 24-48 hours, in patient home   - Therapy evaluations in home within 24-48 hours of discharge; including DME and home safety   - Frontload therapy 5 days, then 3x a week   - Therapy to evaluate if patient has [address] needs for personal care   -  evaluation within 24-48 hours, includes evaluation of resources and insurance to determine [place], [place], LTC, and Medicaid options       DME Required For Discharge: patient has all required DME for discharge    Precautions/Restrictions: high fall risk  Weight Bearing Restrictions: no restrictions  [] Right Upper Extremity  [] Left Upper Extremity [] Right Lower Extremity  [] Left Lower Extremity     Required Braces/Orthotics: no braces required   [] Right  [] Left  Positional Restrictions:no positional restrictions    Pre-Admission Information   Lives With: alone                     Type of Home: house  Home Layout: two level, able to live on main level  Home Access: ramped entry  Bathroom Layout: tub/shower unit  Bathroom Equipment: shower chair, grab bar on side of tub  Toilet Height: standard height  Home Equipment: rolling walker, manual wheelchair  Transfer Assistance: modified independent with use of MOD I  Ambulation Assistance:modified independent with use of MOD I  ADL Assistance: independent with all ADL's  IADL Assistance: requires assistance with all homemaking tasks  Active :        [x] Yes                 [] No  Hand Dominance:  Left                     [] Right  Current Employment: retired. Hobbies:   Recent Falls: Denies any recent falls. Subjective  General: patient supine in bed on arrival, agreeable to therapy session. Pt pleasantly confused, concerned about going home reporting she was unable to stand or ambulate when she came in, however was able to ambulate 200ft with therapy yesterday. Pt reports remembers seeing therapy but forgets it was yesterday, thinks she went home and couldn't get up and came back to hospital.  Patient had to be reoriented to situation throughout session and became tearful but still appears confused. Updated case management, pt requesting her son speak with case management to further discuss situation  Pain: 0/10  Pain Interventions: not applicable        Activities of Daily Living  Basic Activities of Daily Living  Lower Extremity Dressing: minimal assistance Comment: A to start socks over toes, pt able to complete task, pt reports she typically dons socks from a lower surface   General Comments: declines further ADLs  Instrumental Activities of Daily Living  No IADL completed on this date.     Functional Mobility  Bed Mobility  Supine to Sit: modified independent  Sit to Supine: modified independent  Scooting: modified independent  Comments: no bed rail use, transfers to sitting x2 to practice with Hob flat  Transfers  Sit to stand transfer:stand by assistance  Stand to sit transfer: stand by assistance  Comments: EOB to FWW  SBA. Functional Mobility:  Standing Balance: stand by assistance. Functional Mobility: .  stand by assistance  Functional Mobility Activity: 200  Functional Mobility Device Use: rolling walker  Functional Mobility Comment: SBA no LOB, pt froward flexed, pt requires cues for walker proximity    Other Therapeutic Interventions    Functional Outcomes  AM-PAC Inpatient Daily Activity Raw Score: 19    Cognition  Overall Cognitive Status: WFL  Problem Solving: decreased awareness of errors, assistance required to identify errors made, assistance required to correct errors made  Insights: decreased awareness of deficits  Initiation: does not require cues  Sequencing: requires cues for some  Comments: off topic and would not answer questions appropriately (e.g. mentioning fall last year when asked about reason for current admission)  Orientation:    alert and oriented x 4  Command Following:   accurately follows one step commands     Education  Barriers To Learning: cognition  Patient Education: patient educated on OT role and benefits, plan of care, proper use of assistive device/equipment, transfer training, discharge recommendations  Learning Assessment:  patient verbalizes understanding, would benefit from continued reinforcement    Assessment  Activity Tolerance: tolerates well  Impairments Requiring Therapeutic Intervention: decreased functional mobility, decreased ADL status, decreased strength, decreased safety awareness, decreased cognition, decreased endurance, decreased balance, decreased IADL, decreased posture  Prognosis: fair  Clinical Assessment: patient presents with above deficits and is appears close to baseline, now requiring SBA for functional mobility/transfers. Recommend 24/7 supervision/assist and continued HHOT to maximize safety and independence at home.    Safety Interventions: patient left in chair, chair alarm in place, call light within reach, gait belt, and nurse notified    Plan  Frequency: 3-5 x/per week  Current Treatment Recommendations: strengthening, balance training, functional mobility training, transfer training, endurance training, patient/caregiver education, ADL/self-care training, and equipment evaluation/education    Goals  Patient Goals: to return home   Short Term Goals:  Time Frame: discharge  Patient will complete lower body ADL at [facility] independent   Patient will complete toileting at modified independent   Patient will complete functional transfers at [facility] independent   Patient will complete functional mobility at [facility] independent   Ongoing 3/2    Therapy Session Time     Individual Group Co-treatment   Time In    1015   Time Out    1053   Minutes    38        Timed Code Treatment Minutes:   38  Total Treatment Minutes:  38       Electronically Signed By: Pricila Banerjee, OTR/L [id number]

## 2023-03-02 NOTE — PROGRESS NOTES
[facility] [address]   Phone: [phone]    Physical Therapy    [] Initial Evaluation            [x] Daily Treatment Note         [] Discharge Summary      Patient: Sara Jon   : 1933   MRN: [id number]   Date of Service:  3/2/2023  Admitting Diagnosis: Infected prosthetic knee joint [facility])    Current Admission Summary: Sara Jon is a 80 y.o. female who presents to the emergency department with a chief complaint of some right knee pain for the past 3 to 4 days. States it began in the back of her knee and now is generally around her leg. Denies any injury or trauma. Has total knee replacement history in this knee. Denies chest pain, shortness of breath, nausea, vomiting, fevers, color change. Past Medical History:  has a past medical history of Breast cancer ([address] Breast cancer ([address] Cellulitis of left leg, Cellulitis of wrist, Chronic kidney disease, stage III (moderate) ([address] Depression, Diverticulitis of colon, DVT (deep venous thrombosis) ([address] Gout, Hematoma of left thigh, Herpes zoster, Hypertension, Hypoproteinemia ([address] Hypothyroidism, Intervertebral lumbar disc disorder with myelopathy, lumbar region, OA (osteoarthritis), Primary osteoarthritis of left hip greater than right, Primary osteoarthritis of right wrist, Pure hypercholesterolemia, Seborrheic dermatitis, Subdural hematoma, post-traumatic, and Tongue dysplasia. Past Surgical History:  has a past surgical history that includes Colon surgery (1970); Breast surgery (Right, ); joint replacement (); Mouth surgery (2015); Incisional hernia repair (2016);  section (1970); and Revision Colostomy (1970). Discharge Recommendations: Sara Jon scored a 23/24 on the AM-PAC short mobility form. Current research shows that an AM-PAC score of 18 or greater is typically associated with a discharge to the patient's home setting. Based on the patient's AM-PAC score and their current functional mobility deficits, it is recommended that the patient have 2-3 sessions per week of Physical Therapy at d/c to increase the patient's independence. At this time, this patient demonstrates the endurance and safety to discharge home with home health PT and a follow up treatment frequency of 2-3x/wk. Please see assessment section for further patient specific details. If patient discharges prior to next session this note will serve as a discharge summary. Please see below for the latest assessment towards goals.         HOME HEALTH CARE: LEVEL 3 SAFETY  - Initial home health evaluation to occur within 24-48 hours, in patient home   - Therapy evaluations in home within 24-48 hours of discharge; including DME and home safety   - Frontload therapy 5 days, then 3x a week   - Therapy to evaluate if patient has [address] needs for personal care   -  evaluation within 24-48 hours, includes evaluation of resources and insurance to determine AL, [place], LTC, and Medicaid options        DME Required For Discharge:  commode with bilateral arm rests  Precautions/Restrictions: high fall risk  Weight Bearing Restrictions: weight bearing as tolerated  [] Right Upper Extremity  [] Left Upper Extremity [] Right Lower Extremity  [] Left Lower Extremity     Required Braces/Orthotics: no braces required   [] Right  [] Left  Positional Restrictions:no positional restrictions    Pre-Admission Information   Lives With: alone    Type of Home: house  Home Layout: two level, able to live on main level  Home Access: ramped entry  Bathroom Layout: tub/shower unit  Bathroom Equipment: shower chair, grab bar on side of tub  Toilet Height: standard height  Home Equipment: rolling walker, manual wheelchair  Transfer Assistance: modified independent with use of MOD I  Ambulation Assistance:modified independent with use of MOD I  ADL Assistance: independent with all ADL's  IADL Assistance: requires assistance with all homemaking tasks  Active :        [x] Yes  [] No  Hand Dominance:  Left  [] Right  Current Employment: retired. Occupation: N/A  Hobbies: N/A  Recent Falls: Denies any recent falls. Examination   Vision:   Vision Gross Assessment: Impaired and Vision Corrective Device: wears glasses at all times  Hearing:   hard of hearing, left hearing aid, right hearing aid  Observation:   General Observation:  Patient supine in bed w/ HOB elevated, IV left arm, Purewick in place. Posture: Forward flexion, cervical flexion. Subjective  General: Patient reports she does not feel safe to go home because she can't get off the toilet. Patient is confused. Remembers seeing therapy but forgets it was yesterday, thinks she went home and couldn't get up and came back to hospital.  Patient had to be reoriented to situation but still appears confused. Pain: 0/10  Pain Interventions: RN notified and repositioned        Functional Mobility  Bed Mobility  Supine to Sit: modified independent  Sit to Supine: modified independent  Scooting: modified independent  Comments:  Bed flat, able to perform without assistance. Sat up twice unassisted. Transfers  Sit to stand transfer: stand by assistance  Stand to sit transfer: stand by assistance  Bed to chair transfer: stand by assistance  Comments:  Gait belt donned. SBA for safety but no physical contact provided. Ambulation  Surface:level surface  Assistive Device: rolling walker  Assistance: stand by assistance  Distance: 200'  Gait Mechanics: dec'd lee, forward flexed posture  Comments:  Verbal cues to stay inside walker base. No LOB, appears steady. Stair Mobility  Stair mobility not completed on this date. Comments:  Patient has a ramp, no stairs at home. Wheelchair Mobility:  No w/c mobility completed on this date.   Comments:  Balance  Static Sitting Balance: good: independent with functional balance in unsupported position  Dynamic Sitting Balance: good: independent with functional balance in unsupported position  Static Standing Balance: fair (-): maintains balance at SBA with use of UE support  Dynamic Standing Balance: fair (-): maintains balance at SBA with use of UE support  Comments:    Other Therapeutic Interventions    Functional Outcomes  AM-PAC Inpatient Mobility Raw Score : 23              Cognition  Overall Cognitive Status: Impaired  Arousal/Alterness: appropriate responses to stimuli  Following Commands: follows one step commands with repetition, follows one step commands with increased time  Attention Span: difficulty dividing attention, unable to maintain attention  Memory: decreased recall of recent events, decreased short term memory  Patient could not operate her phone to call her son, holding phone to ear for several seconds without phone ringing. Orientation:    alert and oriented x 4  Command Following:   [facility]    Education  Barriers To Learning: cognition and hearing  Patient Education: patient educated on goals, PT role and benefits, plan of care, HEP, functional mobility training, proper use of assistive device/equipment, transfer training, discharge recommendations  Learning Assessment:  patient verbalizes and demonstrates understanding, patient will require reinforcement due to cognitive deficits    Assessment  Activity Tolerance: No SOB with ambulation this session, no physical assistance provided, no apparent limitations to activity. Impairments Requiring Therapeutic Intervention: decreased functional mobility, decreased ADL status, decreased ROM, decreased strength, decreased endurance, decreased balance, decreased posture  Prognosis: fair  Clinical Assessment: Patient reports today she does not feel safe going home despite being nearly independent with therapy yesterday.   With continued conversation, it became clear patient was confused about the timeline of events an was basing her decision on not wanting to go home on her belief she had returned home from hospital, could not stand up, and returned to hospital again. Patient had to be reoriented to situation and reminded of therapy session yesterday when discharge planning was talked about. Patient became tearful and stated, \"I just want to go home and have therapy. \"  Therapist informed patient that was the original recommendation. Patient actually performed better physically today, no CGA provided, SBA only. She is safe to return home from a physical standpoint with a commode to be place over the toilet to help her on/off. That said, patient is nearly 80years old, living alone, having increased difficulty with mobility and verbalizes she does not feel safe returning home alone. This was related to social work and social work with be discussing with patient's son today. Long term, patient should not be living home alone and should have 24 hour assist and supervision for safety, especially given her cognitive deficits. At this time, for discharge purposes, recommend 24 hour assist/supv for safety and continued therapy. Safety Interventions: patient left in chair, chair alarm in place, call light within reach, gait belt, patient at risk for falls, and nurse notified    Plan  Frequency: 3-5 x/per week  Current Treatment Recommendations: strengthening, ROM, balance training, functional mobility training, transfer training, gait training, endurance training, patient/caregiver education, home exercise program, and safety education    Goals  Patient Goals: Return home. Short Term Goals:  Time Frame: Discharge.   Patient will complete bed mobility at MOD I.  (Goal met)  Patient will complete transfers at MOD I.  (Not met)  Patient will ambulate 150 ft with use of RW and MOD I.  (Not met)        Therapy Session Time      Individual Group Co-treatment   Time In      1015   Time Out      1053   Minutes      38     Timed Code Treatment Minutes: 38  Total Treatment Minutes:  38       Electronically Signed By: Suzanne Ruano PT, DPT, ATC-R [id number]

## 2023-03-02 NOTE — PROGRESS NOTES
[facility] Orthopedic Surgery  Progress Note      Chief complaint: Right knee pain    Subjective: The patient is sitting up in the bed. She is a little confused about seeing therapy yesterday. Her knee pain remains improved, none at rest - only mild with WB/activity. She denies new issues. Patient lives at home and uses a walker to ambulate. She is not on any antibiotics. Afebrile, no leukocytosis. Relevant notes, labs and other tests reviewed. Review of Systems:  Constitutional: Negative for fever, chills, fatigue. Skin:  Negative for pruritis, rash  Eyes: Negative for photophobia and visual disturbance. ENT:  Negative for rhinorrhea, epistaxis, sore throat  Respiratory:  Negative for cough and shortness of breath. Cardiovascular: Negative for chest pain. Gastrointestinal: Negative for nausea, vomiting, diarrhea. Genitourinary: Negative for dysuria and difficulty urinating. Neurological: Negative for confusion, dysarthria, tremors, seizures. Psychiatric:  Negative for depression or anxiety  Musculoskeletal:  Positive for right knee pain. Objective:  Vitals:    03/02/23 0300   BP: 136/78   Pulse: 78   Resp: 18   Temp: 97.3 °F (36.3 °C)   SpO2: 98%      Physical Examination:  GENERAL: No apparent distress, well-nourished  SKIN:  Warm and dry  EYES: Nonicteric. ENT: Mucous membranes moist  HEAD: Normocephalic, atraumatic  RESPIRATORY: Resp easy and unlabored  CARDIOVASCULAR: Regular rate and rhythm  GI: Abdomen soft, nontender  NEURO: Awake and alert. No speech defect  PSYCHIATRIC: Appropriate affect; not agitated  MUSCULOSKELETAL: Right knee  Inspection: On exam there are no ulcerations, rashes or lesions about the right knee. There is no pain to palpation of the medial lateral joint lines of the right knee. There is no warmth or erythema. There is no effusion noted on palpation. She has no pain with active or passive ROM 0-90.   She has no instability with varus or valgus stress. Motor: Intact DF/PF bilaterally. Sensation: Grossly intact to light touch throughout the bilateral lower extremities. Vascular: 2+ bilateral DP pulse. Labs reviewed:  Recent Labs     02/28/23 0628 03/01/23  0551 03/02/23  0639   WBC 8.6 5.9 9.6   HGB 12.6 12.4 13.3   HCT 37.5 36.7 39.7    208 253     Recent Labs     02/28/23 0628 03/01/23  0551 03/02/23  0639    140 141   K 4.0 4.3 4.5    110 109   CO2 24 24 24   BUN 18 26* 22*   CREATININE 0.6 0.6 0.6   GLUCOSE 90 184* 141*   CALCIUM 8.6 8.9 9.1     No results for input(s): INR, PROTIME in the last 72 hours. Lab Results   Component Value Date    COLORU YELLOW 12/04/2019    CLARITYU CLEAR 12/04/2019    PHUR 7.0 12/04/2019    GLUCOSEU NEG 12/04/2019    BLOODU TRACE-INTACT 12/04/2019    LEUKOCYTESUR SMALL 12/04/2019    NITRITE NEG 12/04/2019    BILIRUBINUR NEG 12/04/2019    UROBILINOGEN 1.0 05/29/2016    RBCUA 28 (H) 05/29/2016    WBCUA 44 (H) 05/29/2016    BACTERIA 1+ (A) 05/29/2016    AMORPHOUS 2+ (A) 01/15/2016       Imaging:  No orders to display       IMPRESSION:  S/p right total knee arthroplasty 2004 at outside hospital  Possible gout flare right knee  CKD  Principal Problem:    Infected prosthetic knee joint ([facility]  Active Problems:    Pure hypercholesterolemia    Infection and inflammatory reaction due to internal left knee prosthesis, initial encounter ([facility]    Hypothyroidism    HTN (hypertension), benign    Recurrent major depressive disorder, in partial remission ([facility]  Resolved Problems:    * No resolved hospital problems. *      RECOMMENDATIONS:  Solu-medrol transitioned to prednisone.   - WBAT  - PT/OT  - Pain control  - DVT prophylaxis: per primary team  - Dispo: Per primary team; we will sign off - please call with questions.     Case discussed with ARTURO Stevens, SANYA - CNP  3/2/2023  9:01 AM

## 2023-03-02 NOTE — DISCHARGE SUMMARY
[facility] -- Physician Discharge Summary     Francisco Raman  9/14/1933  MRN: [id number]    Admit Date: 2/27/2023  Discharge Date: No discharge date for patient encounter. Attending MD: Juan Francisco Hirsch MD  Discharging MD: Juan Francisco Hirsch MD  PCP: DO [address] / [place] [address] [phone]    Admission Diagnosis: Acute pain of right knee [M25.561]  Infection and inflammatory reaction due to internal left knee prosthesis, initial encounter [facility]) Yajaira Lanier  DISCHARGE DIAGNOSIS: same    Full Hospital Problem List:  Active Hospital Problems    Diagnosis Date Noted    Pure hypercholesterolemia [E78.00]      Priority: High    Infected prosthetic knee joint ([facility] Duane Boozer, Z96.659] 02/27/2023     Priority: Medium    Infection and inflammatory reaction due to internal left knee prosthesis, initial encounter [facility]) Yajaira Lanier 02/27/2023     Priority: Medium    Hypothyroidism [E03.9] 11/01/2011     Priority: Low    Recurrent major depressive disorder, in partial remission ([facility] [address] [F33.41] 07/07/2018    HTN (hypertension), benign [I10]            Hospital Course:  80 y.o. female who presents to the emergency department with a chief complaint of persistent right knee pain. Was seen twice in ER today. After first discharge, she was having difficulty standing up from her chair and from the toilet . Has had right knee pain for the past 3 to 4 days. States it began in the back of her knee and now is generally around her leg. Denies any injury or trauma. Has total knee replacement history in this knee. There is some warmth and tenderness to R knee joint     X-ray imaging and ultrasound performed earlier today were reviewed by self; I see some fluid collection over the right lateral knee. Labs in ER show elevated white blood cell count and CRP at that time but no evidence of septic arthritis or septic bursitis.      As she is unable to ambulate, to be admitted for workup  Ortho to see to determine whether she has a septic joint  Per their eval, they feel this is an acute process, poss gout (though uric acid only 3.8)  They do not think it is infected  Placed on iv steroids which help; so she is converted to po steroid on d./c    Pt/ot see pt, they recommend home d/c  To be set up with home care and continued therapy  To see ortho or pcp in 1 wk for re-eval of knee    Consults made during Hospitalization:  IP CONSULT TO INTERNAL MEDICINE  IP CONSULT TO [address] [place] [address]    Treatment team at time of Discharge: Treatment Team: Attending Provider: Myke Dickson MD; Consulting Physician: Myke Dickson MD; Consulting Physician: Maria Isabel Pham MD; Utilization Reviewer: Yony Jang RN; Respiratory Therapist (Day): Shereen Epley, RCP; Registered Nurse: Vinod Metzger RN    Imaging Results:  XR KNEE RIGHT (MIN 4 VIEWS)    Result Date: 2/27/2023  EXAMINATION: FOUR XRAY VIEWS OF THE RIGHT KNEE 2/27/2023 11:44 am COMPARISON: None. HISTORY: ORDERING SYSTEM PROVIDED HISTORY: pain TECHNOLOGIST PROVIDED HISTORY: Reason for exam:->pain Reason for Exam: Pain FINDINGS: There is a total knee arthroplasty with cemented components. No acute fracture or dislocation. No acute hardware failure or loosening. Grossly normal alignment. There is a joint effusion. Total knee arthropasty without acute hardware complication.      VL Extremity Venous Right    Result Date: 2/28/2023  Vascular Lower Extremities DVT Study Procedure  Demographics   Patient Name      Gillie Runner   Date of Study     02/27/2023        Gender             Female   Patient Number    [id number]        Date of Birth      09/14/1933   Visit Number      [id number]         Age                80 year(s)   Accession Number  [id number]        Room Number        [facility]   Corporate ID      [id number]           Sonographer        Leah Collins RVT   Ordering          Tra Ware, Interpreting Nilson Dunham MD  Physician         CNA               Physician  Procedure Type of Study:   Veins:Lower Extremities DVT Study, VL EXTREMITY VENOUS DUPLEX RIGHT. Vascular Sonographer Report  Additional Indications:right leg pain and swelling. Impressions Right Impression No evidence of deep vein or superficial vein thrombosis involving the right lower extremity and the left common femoral vein. Calf and ankle edema noted. Patient unable to withstand compression, and augmentation, due to pain tolerance. Incidental finding on the right: pocket of fluid surrounding the right lateral knee. . Conclusions   Summary   There is no evidence of deep or superficial vein thrombosis of the right  lower extremity or the left common femoral vein. 3.7 x 1 cm fluid over right lateral knee   Signature   ------------------------------------------------------------------  Electronically signed by Nilson Dunham MD (Interpreting  physician) on 02/28/2023 at 07:27 AM  ------------------------------------------------------------------  Patient Status:ER. [address] [place] [address] Vascular Lab. Technical Quality:Limited visualization due to patients inability to tolerate compression maneuvers. Risk Factors History +---------+----+-------------------+ ! Diagnosis! Date! Comments           ! +---------+----+-------------------+ ! Other    ! ! Right calf DVT 2004! +---------+----+-------------------+ Velocities are measured in cm/s ; Diameters are measured in mm Right Lower Extremities DVT Study Measurements Right 2D Measurements +------------------------+----------+---------------+----------+ ! Location                ! Visualized! Compressibility! Thrombosis! +------------------------+----------+---------------+----------+ ! Sapheno Femoral Junction! Yes       ! Yes            ! None      ! +------------------------+----------+---------------+----------+ ! GSV Thigh               ! Yes       ! Yes            ! None      ! +------------------------+----------+---------------+----------+ ! Common Femoral          !Yes       ! Yes            ! None      ! +------------------------+----------+---------------+----------+ ! Femoral                 !Yes       ! Yes            ! None      ! +------------------------+----------+---------------+----------+ ! Prox Femoral            !Yes       ! Yes            ! None      ! +------------------------+----------+---------------+----------+ ! Mid Femoral             !Yes       ! !None      ! +------------------------+----------+---------------+----------+ ! Dist Femoral            !Yes       ! !None      ! +------------------------+----------+---------------+----------+ ! Deep Femoral            !Yes       ! Yes            ! None      ! +------------------------+----------+---------------+----------+ ! Popliteal               !Yes       ! Yes            ! None      ! +------------------------+----------+---------------+----------+ ! GSV Below Knee          ! Yes       ! Yes            ! None      ! +------------------------+----------+---------------+----------+ ! Gastroc                 ! Yes       ! Yes            ! None      ! +------------------------+----------+---------------+----------+ ! Soleal                  !Yes       ! Yes            ! None      ! +------------------------+----------+---------------+----------+ ! PTV                     ! Yes       ! Yes            ! None      ! +------------------------+----------+---------------+----------+ ! Peroneal                !Yes       ! Yes            ! None      ! +------------------------+----------+---------------+----------+ ! GSV Calf                ! Yes       ! Yes            ! None      ! +------------------------+----------+---------------+----------+ ! SSV                     ! Yes       ! Yes            ! None      ! +------------------------+----------+---------------+----------+ Right Doppler Measurements +------------------------+------+------+------------+ ! Location                ! Signal!Reflux! Reflux (sec)! +------------------------+------+------+------------+ ! Sapheno Femoral Junction! Phasic! No    !            ! +------------------------+------+------+------------+ ! Common Femoral          !Phasic! No    !            ! +------------------------+------+------+------------+ ! Femoral                 !Phasic! No    !            ! +------------------------+------+------+------------+ ! Prox Femoral            !Phasic! No    !            ! +------------------------+------+------+------------+ ! Mid Femoral             !Phasic! No    !            ! +------------------------+------+------+------------+ ! Dist Femoral            !Phasic! No    !            ! +------------------------+------+------+------------+ ! Deep Femoral            !Phasic! No    !            ! +------------------------+------+------+------------+ ! Popliteal               !Phasic! No    !            ! +------------------------+------+------+------------+ Left Lower Extremities DVT Study Measurements Left 2D Measurements +------------------------+----------+---------------+----------+ ! Location                ! Visualized! Compressibility! Thrombosis! +------------------------+----------+---------------+----------+ ! Sapheno Femoral Junction! Yes       ! Yes            ! None      ! +------------------------+----------+---------------+----------+ ! GSV Thigh               ! Yes       ! Yes            ! None      ! +------------------------+----------+---------------+----------+ ! Common Femoral          !Yes       ! Yes            ! None      ! +------------------------+----------+---------------+----------+ Left Doppler Measurements +--------------+------+------+------------+ ! Location      ! Signal!Reflux! Reflux (sec)! +--------------+------+------+------------+ ! Common Femoral!Phasic!      !            ! +--------------+------+------+------------+        Discharge Exam: (2-7 system for EPF/Detailed, ?8 for Comprehensive)  /78   Pulse 78   Temp 97.3 °F (36.3 °C) (Oral)   Resp 18   Ht 4' 11\" (1.499 m)   Wt 171 lb 1.6 oz (77.6 kg)   LMP 01/01/1970 (Exact Date)   SpO2 98%   BMI 34.56 kg/m²   Constitutional: vitals as above: alert, appears stated age and cooperative    Psychiatric: normal insight and judgment, oriented to person, place, time, and general circumstances    Head: Normocephalic, without obvious abnormality, atraumatic    Eyes:lids and lashes normal, conjunctivae and sclerae normal and pupils equal, round, reactive to light and accomodation    EMNT: external ears normal, nares midline    Neck: no carotid bruit, supple, symmetrical, trachea midline and thyroid not enlarged, symmetric, no tenderness/mass/nodules     Respiratory: clear to auscultation and percussion bilaterally with normal respiratory effort    Cardiovascular: normal rate, regular rhythm, normal S1 and S2 and no murmurs    Gastrointestinal: soft, non-tender, non-distended, normal bowel sounds, no masses or organomegaly    Extremities: no clubbing, no edema    Skin:No rashes or nodules noted. Neurologic:negative             Disposition: home    Condition: stable    Discharge Medications:  [unfilled]       Medication List      START taking these medications     predniSONE 10 MG tablet; Commonly known as: Savanah Coad; 4 tab daily x 4d,   then 3 tab daily x 4d, then 2 tab daily x 4d, then 1 tab daily x 4d     CHANGE how you take these medications     atorvastatin 40 MG tablet; Commonly known as: LIPITOR; TAKE 1 TABLET BY   MOUTH AT  NIGHT; What changed: See the new instructions.      CONTINUE taking these medications     acetaminophen 500 MG tablet; Commonly known as: TYLENOL   aspirin 81 MG tablet   buPROPion 150 MG extended release tablet; Commonly known as: WELLBUTRIN   SR; TAKE 1 TABLET BY MOUTH  TWICE DAILY   GLUCOSAMINE-CHONDROITIN-MSM PO   levothyroxine 100 MCG tablet; Commonly known as: SYNTHROID; TAKE 1   TABLET BY MOUTH DAILY   lidocaine 5 %; Commonly known as: Lidoderm; Place 1 patch onto the skin   daily 12 hours on, 12 hours off.   lisinopril 2.5 MG tablet; Commonly known as: PRINIVIL;ZESTRIL; TAKE 1   TABLET BY MOUTH  DAILY   magnesium chloride 535 (64 Mg) MG Tbcr extended release tablet; Commonly   known as: MAG DELAY   melatonin 3 MG Tabs tablet   meloxicam 7.5 MG tablet; Commonly known as: MOBIC; Take 1 tablet by   mouth daily as needed for Pain   MULTIVITAMIN PO   niacin 500 MG extended release tablet; Commonly known as: SLO-NIACIN   potassium chloride 10 MEQ extended release tablet; Commonly known as:   KLOR-CON M; TAKE 1 TABLET BY MOUTH  DAILY   Vitamin D3 50 MCG (2000 UT) Caps         Allergies:  No Known Allergies    Follow up Instructions:  Follow-up with PCP: Walt Mendoza DO in 2 wk .      Total time spent on day of discharge including face-to-face visit, examination, documentation, counseling, preparation of discharge plans and followup, and discharge medicine reconciliation and presciptions is 33 minutes      ---       Subjective from day of d/c:   Blanquita Weston is a 89 y.o. female.    Pt seen and examined  Chart reviewed since last visit, labs and imaging below      Doing ok  Knee is better  Continues on steroids    Review of Systems: (1 system for EPF, 2-9 for detailed, 10+ for comprehensive)  Constitutional: Negative for chills and fever.     HENT: Negative for dental problem, nosebleeds and rhinorrhea.      Eyes: Negative for photophobia and visual disturbance.     Respiratory: Negative for cough, chest tightness and shortness of breath.      Cardiovascular: Negative for chest pain and leg swelling.     Gastrointestinal: Negative for diarrhea, nausea and vomiting.     Endocrine: Negative for polydipsia and polyphagia.     Genitourinary: Negative for frequency, hematuria and urgency.     Musculoskeletal: Negative for back pain and myalgias.     Skin:  Negative for rash. Allergic/Immunologic: Negative for food allergies. Neurological: Negative for dizziness, seizures, syncope and facial asymmetry. Hematological: Negative for adenopathy. Psychiatric/Behavioral: Negative for dysphoric mood. The patient is not nervous/anxious. I have reviewed the patient's medical and social history in detail and updated the computerized patient record. To recap: She  has a past medical history of Breast cancer ([facility] [address] Breast cancer ([address] Cellulitis of left leg, Cellulitis of wrist, Chronic kidney disease, stage III (moderate) ([address] Depression, Diverticulitis of colon, DVT (deep venous thrombosis) ([facility] [address] Gout, Hematoma of left thigh, Herpes zoster, Hypertension, Hypoproteinemia ([facility] [address] Hypothyroidism, Intervertebral lumbar disc disorder with myelopathy, lumbar region, OA (osteoarthritis), Primary osteoarthritis of left hip greater than right, Primary osteoarthritis of right wrist, Pure hypercholesterolemia, Seborrheic dermatitis, Subdural hematoma, post-traumatic, and Tongue dysplasia. . She  has a past surgical history that includes Colon surgery (1970); Breast surgery (Right, ); joint replacement (); Mouth surgery (2015); Incisional hernia repair (2016);  section (1970); and Revision Colostomy (1970). . She  reports that she has never smoked. She has never used smokeless tobacco. She reports that she does not drink alcohol and does not use drugs. .      Current Facility-Administered Medications: acetaminophen (TYLENOL) tablet 500 mg, 500 mg, Oral, TID  methylPREDNISolone sodium (SOLU-MEDROL) injection 40 mg, 40 mg, IntraVENous, BID  melatonin tablet 3 mg, 3 mg, Oral, Nightly  aspirin chewable tablet 81 mg, 81 mg, Oral, Daily  niacin (SLO-NIACIN) extended release tablet 500 mg, 500 mg, Oral, Nightly  meloxicam (MOBIC) tablet 7.5 mg, 7.5 mg, Oral, Daily PRN  levothyroxine (SYNTHROID) tablet 100 mcg, 100 mcg, Oral, Daily  potassium chloride (KLOR-CON M) extended release tablet 10 mEq, 10 mEq, Oral, Daily  lisinopril (PRINIVIL;ZESTRIL) tablet 2.5 mg, 2.5 mg, Oral, Daily  buPROPion [facility]) extended release tablet 150 mg, 150 mg, Oral, BID  0.9 % sodium chloride infusion, , IntraVENous, Continuous  sodium chloride flush 0.9 % injection 5-40 mL, 5-40 mL, IntraVENous, 2 times per day  sodium chloride flush 0.9 % injection 5-40 mL, 5-40 mL, IntraVENous, PRN  0.9 % sodium chloride infusion, 25 mL, IntraVENous, PRN  enoxaparin (LOVENOX) injection 40 mg, 40 mg, SubCUTAneous, Daily  ondansetron (ZOFRAN-ODT) disintegrating tablet 4 mg, 4 mg, Oral, Q8H PRN **OR** ondansetron (ZOFRAN) injection 4 mg, 4 mg, IntraVENous, Q6H PRN  magnesium hydroxide (MILK OF MAGNESIA) 400 MG/5ML suspension 30 mL, 30 mL, Oral, Daily PRN  acetaminophen (TYLENOL) tablet 650 mg, 650 mg, Oral, Q6H PRN **OR** acetaminophen (TYLENOL) suppository 650 mg, 650 mg, Rectal, Q6H PRN  hydrALAZINE (APRESOLINE) injection 10 mg, 10 mg, IntraVENous, Q6H PRN  0.9 % sodium chloride bolus, 500 mL, IntraVENous, PRN  potassium chloride (KLOR-CON M) extended release tablet 40 mEq, 40 mEq, Oral, PRN **OR** potassium bicarb-citric acid (EFFER-K) effervescent tablet 40 mEq, 40 mEq, Oral, PRN **OR** potassium chloride 10 mEq/100 mL IVPB (Peripheral Line), 10 mEq, IntraVENous, PRN  atorvastatin (LIPITOR) tablet 20 mg, 20 mg, Oral, Nightly         Objective (exam): see above    Labs at time of d/c:  Lab Results   Component Value Date    WBC 9.6 03/02/2023    HGB 13.3 03/02/2023    HCT 39.7 03/02/2023     03/02/2023    CHOL 121 08/06/2021    TRIG 65 08/06/2021    HDL 39 (L) 08/06/2021    ALT 12 02/28/2023    AST 18 02/28/2023     03/02/2023    K 4.5 03/02/2023     03/02/2023    CREATININE 0.6 03/02/2023    BUN 22 (H) 03/02/2023    CO2 24 03/02/2023    TSH 3.49 11/19/2015    INR 0.93 05/13/2016    LABA1C 5.3 12/06/2022    LABMICR <1.20 02/21/2017     Lab Results Component Value Date    CKTOTAL 170 02/27/2023    TROPONINI 0.00 07/10/2015       (Please note that portions of this note were completed with a voice recognition program.  Efforts were made to edit the dictations but occasionally words are mis-transcribed.)      Signed:  Peter Acuña MD  3/2/2023

## 2023-03-02 NOTE — PLAN OF CARE
Problem: ABCDS Injury Assessment  Goal: Absence of physical injury  3/1/2023 2042 by Annette Garcia RN  Outcome: Progressing     Problem: Skin/Tissue Integrity  Goal: Absence of new skin breakdown  Description: 1. Monitor for areas of redness and/or skin breakdown  2. Assess vascular access sites hourly  3. Every 4-6 hours minimum:  Change oxygen saturation probe site  4. Every 4-6 hours:  If on nasal continuous positive airway pressure, respiratory therapy assess nares and determine need for appliance change or resting period.   3/1/2023 2042 by Annette Garcia RN  Outcome: Progressing     Problem: Pain  Goal: Verbalizes/displays adequate comfort level or baseline comfort level  3/1/2023 2042 by Annette Garcia RN  Outcome: Progressing     Problem: Safety - Adult  Goal: Free from fall injury  3/1/2023 2042 by Annette Garcia RN  Outcome: Progressing

## 2023-03-02 NOTE — PROGRESS NOTES
Pt. A&O x4, VSS on RA. Denies pain. Tolerating PO diet, good appetite. Medications administered per MAR, no ASE noted. Denies any needs at this time. Up to chair with chair alarm in place. Call light is within reach.

## 2023-03-03 VITALS
SYSTOLIC BLOOD PRESSURE: 178 MMHG | DIASTOLIC BLOOD PRESSURE: 93 MMHG | BODY MASS INDEX: 34.49 KG/M2 | HEART RATE: 68 BPM | WEIGHT: 171.1 LBS | HEIGHT: 59 IN | TEMPERATURE: 98.1 F | OXYGEN SATURATION: 97 % | RESPIRATION RATE: 18 BRPM

## 2023-03-03 PROCEDURE — 99233 SBSQ HOSP IP/OBS HIGH 50: CPT | Performed by: NURSE PRACTITIONER

## 2023-03-03 PROCEDURE — 6370000000 HC RX 637 (ALT 250 FOR IP): Performed by: INTERNAL MEDICINE

## 2023-03-03 PROCEDURE — 6370000000 HC RX 637 (ALT 250 FOR IP): Performed by: NURSE PRACTITIONER

## 2023-03-03 PROCEDURE — 6360000002 HC RX W HCPCS: Performed by: INTERNAL MEDICINE

## 2023-03-03 RX ADMIN — ASPIRIN 81 MG: 81 TABLET, CHEWABLE ORAL at 08:17

## 2023-03-03 RX ADMIN — BUPROPION HYDROCHLORIDE 150 MG: 150 TABLET, FILM COATED, EXTENDED RELEASE ORAL at 08:20

## 2023-03-03 RX ADMIN — LISINOPRIL 2.5 MG: 5 TABLET ORAL at 08:20

## 2023-03-03 RX ADMIN — PREDNISONE 40 MG: 20 TABLET ORAL at 08:19

## 2023-03-03 RX ADMIN — LEVOTHYROXINE SODIUM 100 MCG: 100 TABLET ORAL at 08:20

## 2023-03-03 RX ADMIN — ACETAMINOPHEN 500 MG: 500 TABLET ORAL at 08:19

## 2023-03-03 RX ADMIN — POTASSIUM CHLORIDE 10 MEQ: 1500 TABLET, EXTENDED RELEASE ORAL at 08:18

## 2023-03-03 RX ADMIN — ENOXAPARIN SODIUM 40 MG: 100 INJECTION SUBCUTANEOUS at 08:17

## 2023-03-03 ASSESSMENT — PAIN SCALES - GENERAL: PAINLEVEL_OUTOF10: 0

## 2023-03-03 NOTE — PROGRESS NOTES
Progress Note - Dr. Vicki Minor - Internal Medicine  PCP: DO Camden Norton 15 / Romayne [place] [id number] [address] Day: 4  Code Status: Full Code  Current Diet: ADULT DIET; Regular        CC: follow up on medical issues    Subjective:   Faviola Mota is a 80 y.o. female. Pt seen and examined  Chart reviewed since last visit, labs and imaging below      Doing ok  Still with knee pain  Working on placement  New pt/ot rec snf      Review of Systems: (1 system for EPF, 2-9 for detailed, 10+ for comprehensive)  Constitutional: Negative for chills and fever. HENT: Negative for dental problem, nosebleeds and rhinorrhea. Eyes: Negative for photophobia and visual disturbance. Respiratory: Negative for cough, chest tightness and shortness of breath. Cardiovascular: Negative for chest pain and leg swelling. Gastrointestinal: Negative for diarrhea, nausea and vomiting. Endocrine: Negative for polydipsia and polyphagia. Genitourinary: Negative for frequency, hematuria and urgency. Musculoskeletal: Negative for back pain and myalgias. Positive for knee pain  Skin: Negative for rash. Allergic/Immunologic: Negative for food allergies. Neurological: Negative for dizziness, seizures, syncope and facial asymmetry. Hematological: Negative for adenopathy. Psychiatric/Behavioral: Negative for dysphoric mood. The patient is not nervous/anxious. I have reviewed the patient's medical and social history in detail and updated the computerized patient record.   To recap: She  has a past medical history of Breast cancer ([address] Breast cancer ([address] Cellulitis of left leg, Cellulitis of wrist, Chronic kidney disease, stage III (moderate) ([address] Depression, Diverticulitis of colon, DVT (deep venous thrombosis) ([address] Gout, Hematoma of left thigh, Herpes zoster, Hypertension, Hypoproteinemia ([address] Hypothyroidism, Intervertebral lumbar disc disorder with myelopathy, lumbar region, OA (osteoarthritis), Primary osteoarthritis of left hip greater than right, Primary osteoarthritis of right wrist, Pure hypercholesterolemia, Seborrheic dermatitis, Subdural hematoma, post-traumatic, and Tongue dysplasia. . She  has a past surgical history that includes Colon surgery (1970); Breast surgery (Right, ); joint replacement (); Mouth surgery (2015); Incisional hernia repair (2016);  section (1970); and Revision Colostomy (1970). . She  reports that she has never smoked. She has never used smokeless tobacco. She reports that she does not drink alcohol and does not use drugs. .        Active Hospital Problems    Diagnosis Date Noted    Pure hypercholesterolemia [E78.00]      Priority: High    Infected prosthetic knee joint ([facility] [T84.59XA, Z96.659] 2023     Priority: Medium    Infection and inflammatory reaction due [facility] internal [facility]     Priority: Medium    Hypothyroidism [E03.9] 2011     Priority: Low    Recurrent major depressive disorder, in partial remission ([facility] [address] [F33.41] 2018    HTN (hypertension), benign [I10]        Current Facility-Administered Medications: predniSONE (DELTASONE) tablet 40 mg, 40 mg, Oral, Daily  acetaminophen (TYLENOL) tablet 500 mg, 500 mg, Oral, TID  melatonin tablet 3 mg, 3 mg, Oral, Nightly  aspirin chewable tablet 81 mg, 81 mg, Oral, Daily  niacin (SLO-NIACIN) extended release tablet 500 mg, 500 mg, Oral, Nightly  meloxicam (MOBIC) tablet 7.5 mg, 7.5 mg, Oral, Daily PRN  levothyroxine (SYNTHROID) tablet 100 mcg, 100 mcg, Oral, Daily  potassium chloride (KLOR-CON M) extended release tablet 10 mEq, 10 mEq, Oral, Daily  lisinopril (PRINIVIL;ZESTRIL) tablet 2.5 mg, 2.5 mg, Oral, Daily  buPROPion [facility]) extended release tablet 150 mg, 150 mg, Oral, BID  0.9 % sodium chloride infusion, , IntraVENous, Continuous  sodium chloride flush 0.9 % injection 5-40 mL, 5-40 mL, IntraVENous, 2 times per day  sodium chloride flush 0.9 % injection 5-40 mL, 5-40 mL, IntraVENous, PRN  0.9 % sodium chloride infusion, 25 mL, IntraVENous, PRN  enoxaparin (LOVENOX) injection 40 mg, 40 mg, SubCUTAneous, Daily  ondansetron (ZOFRAN-ODT) disintegrating tablet 4 mg, 4 mg, Oral, Q8H PRN **OR** ondansetron (ZOFRAN) injection 4 mg, 4 mg, IntraVENous, Q6H PRN  magnesium hydroxide (MILK OF MAGNESIA) 400 MG/5ML suspension 30 mL, 30 mL, Oral, Daily PRN  acetaminophen (TYLENOL) tablet 650 mg, 650 mg, Oral, Q6H PRN **OR** acetaminophen (TYLENOL) suppository 650 mg, 650 mg, Rectal, Q6H PRN  hydrALAZINE (APRESOLINE) injection 10 mg, 10 mg, IntraVENous, Q6H PRN  0.9 % sodium chloride bolus, 500 mL, IntraVENous, PRN  potassium chloride (KLOR-CON M) extended release tablet 40 mEq, 40 mEq, Oral, PRN **OR** potassium bicarb-citric acid (EFFER-K) effervescent tablet 40 mEq, 40 mEq, Oral, PRN **OR** potassium chloride 10 mEq/100 mL IVPB (Peripheral Line), 10 mEq, IntraVENous, PRN  atorvastatin (LIPITOR) tablet 20 mg, 20 mg, Oral, Nightly         Objective:  BP (!) 178/93   Pulse 68   Temp 98.1 °F (36.7 °C) (Oral)   Resp 18   Ht 4' 11\" (1.499 m)   Wt 171 lb 1.6 oz (77.6 kg)   LMP 01/01/1970 (Exact Date)   SpO2 97%   BMI 34.56 kg/m²      Patient Vitals for the past 24 hrs:   BP Temp Temp src Pulse Resp SpO2   03/03/23 0813 (!) 178/93 -- -- -- -- 97 %   03/03/23 0800 (!) 151/111 98.1 °F (36.7 °C) Oral 68 18 97 %   03/03/23 0500 (!) 147/79 97.2 °F (36.2 °C) Oral 62 20 97 %   03/02/23 2302 (!) 154/51 97.4 °F (36.3 °C) Oral 67 20 97 %   03/02/23 1948 (!) 147/74 98.5 °F (36.9 °C) Oral 85 18 95 %   03/02/23 1554 -- -- -- 78 18 98 %       Patient Vitals for the past 96 hrs (Last 3 readings):   Weight   03/01/23 0643 171 lb 1.6 oz (77.6 kg)   02/27/23 1516 169 lb (76.7 kg)             Intake/Output Summary (Last 24 hours) at 3/3/2023 2988  Last data filed at 3/2/2023 1931  Gross per 24 hour Intake 1280 ml   Output --   Net 1280 ml           Physical Exam: (2-7 system for EPF/Detailed, ?8 for Comprehensive)  BP (!) 178/93   Pulse 68   Temp 98.1 °F (36.7 °C) (Oral)   Resp 18   Ht 4' 11\" (1.499 m)   Wt 171 lb 1.6 oz (77.6 kg)   LMP 01/01/1970 (Exact Date)   SpO2 97%   BMI 34.56 kg/m²   Constitutional: vitals as above: alert, appears stated age and cooperative     Psychiatric: normal insight and judgment, oriented to person, place, time, and general circumstances    Head: Normocephalic, without obvious abnormality, atraumatic    Eyes:lids and lashes normal, conjunctivae and sclerae normal and pupils equal, round, reactive to light and accomodation    EMNT: external ears normal, nares midline    Neck: no carotid bruit, supple, symmetrical, trachea midline and thyroid not enlarged, symmetric, no tenderness/mass/nodules     Respiratory: clear to auscultation and percussion bilaterally with normal respiratory effort    Cardiovascular: normal rate, regular rhythm, normal S1 and S2 and no murmurs    Gastrointestinal: soft, non-tender, non-distended, normal bowel sounds, no masses or organomegaly    Extremities: no clubbing, no edema    Skin:No rashes or nodules noted.     Neurologic:negative         Labs:  Lab Results   Component Value Date    WBC 9.6 03/02/2023    HGB 13.3 03/02/2023    HCT 39.7 03/02/2023     03/02/2023    CHOL 121 08/06/2021    TRIG 65 08/06/2021    HDL 39 (L) 08/06/2021    ALT 12 02/28/2023    AST 18 02/28/2023     03/02/2023    K 4.5 03/02/2023     03/02/2023    CREATININE 0.6 03/02/2023    BUN 22 (H) 03/02/2023    CO2 24 03/02/2023    TSH 3.49 11/19/2015    INR 0.93 05/13/2016    LABA1C 5.3 12/06/2022    LABMICR <1.20 02/21/2017     Lab Results   Component Value Date    CKTOTAL 170 02/27/2023    TROPONINI 0.00 07/10/2015       Recent Imaging Results are Reviewed:  XR KNEE RIGHT (MIN 4 VIEWS)    Result Date: 2/27/2023  EXAMINATION: FOUR XRAY VIEWS OF THE RIGHT KNEE 2/27/2023 11:44 am COMPARISON: None. HISTORY: ORDERING SYSTEM PROVIDED HISTORY: pain TECHNOLOGIST PROVIDED HISTORY: Reason for exam:->pain Reason for Exam: Pain FINDINGS: There is a total knee arthroplasty with cemented components. No acute fracture or dislocation. No acute hardware failure or loosening. Grossly normal alignment. There is a joint effusion. Total knee arthropasty without acute hardware complication. VL Extremity Venous Right    Result Date: 2/28/2023  Vascular Lower Extremities DVT Study Procedure  Demographics   Patient Name      Lupe Marking   Date of Study     02/27/2023        Gender             Female   Patient Number    [id number]        Date of Birth      09/14/1933   Visit Number      [id number]         Age                80 year(s)   Accession Number  [id number]        Room Number        [facility]   Corporate ID      [id number]           Frida Chan RVT   Ordering          Clementina Martell MD  Physician         CNA               Physician  Procedure Type of Study:   Veins:Lower Extremities DVT Study, VL EXTREMITY VENOUS DUPLEX RIGHT. Vascular Sonographer Report  Additional Indications:right leg pain and swelling. Impressions Right Impression No evidence of deep vein or superficial vein thrombosis involving the right lower extremity and the left common femoral vein. Calf and ankle edema noted. Patient unable to withstand compression, and augmentation, due to pain tolerance. Incidental finding on the right: pocket of fluid surrounding the right lateral knee. . Conclusions   Summary   There is no evidence of deep or superficial vein thrombosis of the right  lower extremity or the left common femoral vein.   3.7 x 1 cm fluid over right lateral knee   Signature   ------------------------------------------------------------------  Electronically signed by Ioana Cristina MD (Interpreting  physician) on 02/28/2023 at 07:27 AM ------------------------------------------------------------------  Patient Status:[facility] - V[facility] Lab. Technical Quality:Limited visualization due to patients inability to tolerate compression maneuvers. Risk Factors History +---------+----+-------------------+ ! Diagnosis! Date! Comments           ! +---------+----+-------------------+ ! Other    ! ! Right calf DVT 2004! +---------+----+-------------------+ Velocities are measured in cm/s ; Diameters are measured in mm Right Lower Extremities DVT Study Measurements Right 2D Measurements +------------------------+----------+---------------+----------+ ! Location                ! Visualized! Compressibility! Thrombosis! +------------------------+----------+---------------+----------+ ! Sapheno Femoral Junction! Yes       ! Yes            ! None      ! +------------------------+----------+---------------+----------+ ! GSV Thigh               ! Yes       ! Yes            ! None      ! +------------------------+----------+---------------+----------+ ! Common Femoral          !Yes       ! Yes            ! None      ! +------------------------+----------+---------------+----------+ ! Femoral                 !Yes       ! Yes            ! None      ! +------------------------+----------+---------------+----------+ ! Prox Femoral            !Yes       ! Yes            ! None      ! +------------------------+----------+---------------+----------+ ! Mid Femoral             !Yes       ! !None      ! +------------------------+----------+---------------+----------+ ! Dist Femoral            !Yes       ! !None      ! +------------------------+----------+---------------+----------+ ! Deep Femoral            !Yes       ! Yes            ! None      ! +------------------------+----------+---------------+----------+ ! Popliteal               !Yes       ! Yes            ! None      ! +------------------------+----------+---------------+----------+ ! JENNIFERV Below Knee          ! Yes       ! Yes            ! None      ! +------------------------+----------+---------------+----------+ ! Gastroc                 ! Yes       ! Yes            ! None      ! +------------------------+----------+---------------+----------+ ! Soleal                  !Yes       ! Yes            ! None      ! +------------------------+----------+---------------+----------+ ! PTV                     ! Yes       ! Yes            ! None      ! +------------------------+----------+---------------+----------+ ! Peroneal                !Yes       ! Yes            ! None      ! +------------------------+----------+---------------+----------+ ! GSV Calf                ! Yes       ! Yes            ! None      ! +------------------------+----------+---------------+----------+ ! SSV                     ! Yes       ! Yes            ! None      ! +------------------------+----------+---------------+----------+ Right Doppler Measurements +------------------------+------+------+------------+ ! Location                ! Signal!Reflux! Reflux (sec)! +------------------------+------+------+------------+ ! Sapheno Femoral Junction! Phasic! No    !            ! +------------------------+------+------+------------+ ! Common Femoral          !Phasic! No    !            ! +------------------------+------+------+------------+ ! Femoral                 !Phasic! No    !            ! +------------------------+------+------+------------+ ! Prox Femoral            !Phasic! No    !            ! +------------------------+------+------+------------+ ! Mid Femoral             !Phasic! No    !            ! +------------------------+------+------+------------+ ! Dist Femoral            !Phasic! No    !            ! +------------------------+------+------+------------+ ! Deep Femoral            !Phasic! No    !            ! +------------------------+------+------+------------+ ! Popliteal               !Phasic! No    !            ! +------------------------+------+------+------------+ Left Lower Extremities DVT Study Measurements Left 2D Measurements +------------------------+----------+---------------+----------+ ! Location                ! Visualized! Compressibility! Thrombosis! +------------------------+----------+---------------+----------+ ! Sapheno Femoral Junction! Yes       ! Yes            ! None      ! +------------------------+----------+---------------+----------+ ! GSV Thigh               ! Yes       ! Yes            ! None      ! +------------------------+----------+---------------+----------+ ! Common Femoral          !Yes       ! Yes            ! None      ! +------------------------+----------+---------------+----------+ Left Doppler Measurements +--------------+------+------+------------+ ! Location      ! Signal!Reflux! Reflux (sec)! +--------------+------+------+------------+ ! Common Femoral!Phasic!      !            ! +--------------+------+------+------------+      Lab Results   Component Value Date/Time    GLUCOSE 141 03/02/2023 06:39 AM     Lab Results   Component Value Date/Time    POCGLU 83 05/27/2016 08:22 PM     BP (!) 178/93   Pulse 68   Temp 98.1 °F (36.7 °C) (Oral)   Resp 18   Ht 4' 11\" (1.499 m)   Wt 171 lb 1.6 oz (77.6 kg)   LMP 01/01/1970 (Exact Date)   SpO2 97%   BMI 34.56 kg/m²     Assessment and Plan:  Principal Problem:    Infected prosthetic knee joint ([facility] [address] -Established problem. Stable. After d/w ortho, they do not think joint is infected/septic - though CRP is elevated, over 200  Plan: await their recs  Active Problems:    Pure hypercholesterolemia  Plan: Continue present orders/plan. Infection and inflammatory reaction due to internal left knee prosthesis, initial encounter ([address]    Hypothyroidism -Established problem. Stable. Plan: cont on synthroid    HTN (hypertension), benign -Established problem. Stable. 135/80  Plan: Continue medications. Continue to check BP q shift.  CBC and BMP ordered to monitor for disease progression, medication side effect. Recurrent major depressive disorder, in partial remission ([facility]  Plan: cont meds      Case discussed with: ortho  Tests ordered/reviewed: cbc, bmp, crp    Disp- awaiting placement.   Medically stable for d/c      (Please note that portions of this note were completed with a voice recognition program.  Efforts were made to edit the dictations but occasionally words are mis-transcribed.)        Enrique Dey MD  3/3/2023

## 2023-03-03 NOTE — CARE COORDINATION
Discharge note:      CM/SW has been notified of discharge. Patient noted to have the following needs at discharge. CM/SW has coordinated the following services:      Discharge Destination:   [place]  [facility] [address] [place], [address]  Phone: [phone]  Fax: [phone]       Transportation:   Family-son         Comment:      All CM/SW needs met, will sign off.     Electronically signed by Jonnie Villar RN on 3/3/2023 at 3:36 PM

## 2023-03-03 NOTE — PROGRESS NOTES
Patient being discharged to [facility]. Left peripheral IV removed without complication. Dressing applied. Patient was picked up by her son for transport to [place].

## 2023-03-03 NOTE — PROGRESS NOTES
[facility] Orthopedic Surgery  Progress Note      Chief complaint: Right knee pain    Subjective: The patient is sitting up in the chair. She is planning on discharging home with son today. Denies new issues. Reports no knee pain. Patient lives at home and uses a walker to ambulate. She is not on any antibiotics. Afebrile, no leukocytosis. Relevant notes, labs and other tests reviewed. Review of Systems:  Constitutional: Negative for fever, chills, fatigue. Skin:  Negative for pruritis, rash  Eyes: Negative for photophobia and visual disturbance. ENT:  Negative for rhinorrhea, epistaxis, sore throat  Respiratory:  Negative for cough and shortness of breath. Cardiovascular: Negative for chest pain. Gastrointestinal: Negative for nausea, vomiting, diarrhea. Genitourinary: Negative for dysuria and difficulty urinating. Neurological: Negative for confusion, dysarthria, tremors, seizures. Psychiatric:  Negative for depression or anxiety  Musculoskeletal:  Negative for right knee pain. Objective:  Vitals:    03/03/23 0813   BP: (!) 178/93   Pulse:    Resp:    Temp:    SpO2: 97%      Physical Examination:  GENERAL: No apparent distress, well-nourished  SKIN:  Warm and dry  EYES: Nonicteric. ENT: Mucous membranes moist  HEAD: Normocephalic, atraumatic  RESPIRATORY: Resp easy and unlabored  CARDIOVASCULAR: Regular rate and rhythm  GI: Abdomen soft, nontender  NEURO: Awake and alert. No speech defect  PSYCHIATRIC: Appropriate affect; not agitated  MUSCULOSKELETAL: Right knee  Inspection: On exam there are no ulcerations, rashes or lesions about the right knee. There is no pain to palpation of the medial lateral joint lines of the right knee. There is no warmth or erythema. There is no effusion noted on palpation. She has no pain with active or passive ROM 0-90. She has no instability with varus or valgus stress. Motor: Intact DF/PF bilaterally.   Sensation: Grossly intact to light touch throughout the bilateral lower extremities. Vascular: 2+ bilateral DP pulse. Labs reviewed:  Recent Labs     03/01/23  0551 03/02/23  0639   WBC 5.9 9.6   HGB 12.4 13.3   HCT 36.7 39.7    253     Recent Labs     03/01/23  0551 03/02/23  0639    141   K 4.3 4.5    109   CO2 24 24   BUN 26* 22*   CREATININE 0.6 0.6   GLUCOSE 184* 141*   CALCIUM 8.9 9.1     No results for input(s): INR, PROTIME in the last 72 hours. Lab Results   Component Value Date    COLORU YELLOW 12/04/2019    CLARITYU CLEAR 12/04/2019    PHUR 7.0 12/04/2019    GLUCOSEU NEG 12/04/2019    BLOODU TRACE-INTACT 12/04/2019    LEUKOCYTESUR SMALL 12/04/2019    NITRITE NEG 12/04/2019    BILIRUBINUR NEG 12/04/2019    UROBILINOGEN 1.0 05/29/2016    RBCUA 28 (H) 05/29/2016    WBCUA 44 (H) 05/29/2016    BACTERIA 1+ (A) 05/29/2016    AMORPHOUS 2+ (A) 01/15/2016       Imaging:  No orders to display       IMPRESSION:  S/p right total knee arthroplasty 2004 at outside hospital  Possible gout flare right knee  CKD  Principal Problem:    Infected prosthetic knee joint ([facility] [address]  Active Problems:    Pure hypercholesterolemia    Infection and inflammatory reaction due to internal left knee prosthesis, initial encounter ([facility] [address]    Hypothyroidism    HTN (hypertension), benign    Recurrent major depressive disorder, in partial remission ([facility] [address] [facility]  Resolved Problems:    * No resolved hospital problems. *      RECOMMENDATIONS:  Prednisone taper as ordered per Dr. Alanna Peralta for d/c.  - WBAT  - PT/OT  - Pain control  - DVT prophylaxis: per primary team  - Dispo: Per primary team; we will sign off - please call with questions.     SANYA Franco - CNP  3/3/2023  10:36 AM

## 2023-03-03 NOTE — CARE COORDINATION
03/03/23 1535   IMM Letter   IMM Letter given to Patient/Family/Significant other/Guardian/POA/by: 3/3 Gave pt a copy of letter they have not questions agree with DC plan [address]   IMM Letter date given: 03/03/23   IMM Letter time given: 1528       Electronically signed by Hernán Vizcarra RN on 3/3/2023 at 3:36 PM

## 2023-03-03 NOTE — DISCHARGE INSTR - COC
Continuity of Care Form    Patient Name: Corinna Abreu   :  1933  MRN:  [id number]    Admit date:  2023  Discharge date:  3/3/2023    Code Status Order: Full Code   Advance Directives:     Admitting Physician:  Adina Jackson MD  PCP: Mairzol Amezquita DO    Discharging Nurse: Marylee Randy, [address] Unit/Room#: 4AR-7701/8848-20  Discharging Unit Phone Number: [phone]    Emergency Contact:   Extended Emergency Contact Information  Primary Emergency Contact: [address] [facility] of [address] [place] [address] Phone: [phone]  Relation: Child    Past Surgical History:  Past Surgical History:   Procedure Laterality Date    BREAST SURGERY Right     right mastectomy for cancer: okay to do IV sticks and B/P due to being so long ago per patient (states this was told to her by her physician)     SECTION  1970    COLON SURGERY  1970    right partial colectomy for ruptured diverticulitis    [address]  2016    with mesh, laparoscopic, was incarcerated.   Mc Tomlinson MD. [facility]    JOINT REPLACEMENT      right knee and left knee in     MOUTH SURGERY  2015    verrous hyperplasia left ventral tongue, WLE T1N0M0 [facility], Dr Padma Moore  1970       Immunization History:   Immunization History   Administered Date(s) Administered    COVID-19, MODERNA BLUE border, Primary or Immunocompromised, (age 12y+), IM, 100 mcg/0.5mL 2021, 2021, 2021, 2022    COVID-19, MODERNA Bivalent BOOSTER, (age 12y+), IM, 48 mcg/0.5 mL 2022    Influenza Virus Vaccine 10/08/2008, 10/02/2009, 2010, 10/28/2010    Influenza, FLUAD, (age 72 y+), Adjuvanted, 0.5mL 10/05/2021    Influenza, FLUZONE (age 72 y+), High Dose, 0.7mL 10/30/2020, 10/11/2022    Influenza, High Dose (Fluzone 65 yrs and older) 2011, 2012, 2013, 2014, 10/13/2015, 11/15/2016, 2017, 10/02/2018    Influenza, Triv, inactivated, subunit, adjuvanted, IM (Fluad 65 yrs and older) 10/22/2019    Pneumococcal Conjugate 13-valent (Robhofp51) 10/13/2015    Pneumococcal Conjugate Vaccine 03/22/1999    Pneumococcal Polysaccharide ([id number]) 03/22/1999    Td, unspecified formulation 01/01/1999, 11/01/2009    Tdap (Boostrix, Adacel) 04/21/2018    Zoster Live (Zostavax) 03/21/2013    Zoster Recombinant (Shingrix) 07/19/2019, 09/25/2019       Active Problems:  Patient Active Problem List   Diagnosis Code    Gout M10.9    History of breast cancer Z85.3    Pure hypercholesterolemia E78.00    Depression F32. 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    Unable to care for self Z78.9    Arthralgia of right elbow M25.521    Unable to ambulate R26.2    Primary osteoarthritis of left hip greater than right M16.12    Weakness generalized R53.1    Infected prosthetic knee joint ([facility] [address] T84.59XA, Z96.659    Infection and inflammatory reaction due to internal left knee prosthesis, initial encounter ([facility] [address] T84.54XA       Isolation/Infection:   Isolation            No Isolation          Patient Infection Status       None to display            Nurse Assessment:  Last Vital Signs: BP (!) 178/93   Pulse 68   Temp 98.1 °F (36.7 °C) (Oral)   Resp 18   Ht 4' 11\" (1.499 m)   Wt 171 lb 1.6 oz (77.6 kg)   LMP 01/01/1970 (Exact Date)   SpO2 97%   BMI 34.56 kg/m²     Last documented pain score (0-10 scale): Pain Level: 0  Last Weight:   Wt Readings from Last 1 Encounters:   03/01/23 171 lb 1.6 oz (77.6 kg)     Mental Status:  oriented, alert, and coherent    IV Access:  - None    Nursing Mobility/ADLs:  Walking   Assisted  Transfer  Assisted  Bathing  Assisted  Dressing  Assisted  Toileting  Assisted  Feeding  Assisted  Med Admin  Assisted  Med Delivery   whole    Wound Care Documentation and Therapy:  Incision 05/27/16 Abdomen (Active)   Number of days: 3591        Elimination:  Continence: Bowel: No  Bladder: Yes  Urinary Catheter: None   Colostomy/Ileostomy/Ileal Conduit: No       Date of Last BM: 3/3/2023      Intake/Output Summary (Last 24 hours) at 3/3/2023 5002  Last data filed at 3/2/2023 1931  Gross per 24 hour   Intake 1280 ml   Output --   Net 1280 ml     I/O last 3 completed shifts: In: 1280 [P.O.:1280]  Out: 400 [Urine:400]    Safety Concerns:     History of Falls (last 30 days) and At Risk for Falls    Impairments/Disabilities:      None    Nutrition Therapy:  Current Nutrition Therapy:   - Oral Diet:  General    Routes of Feeding: Oral  Liquids: No Restrictions  Daily Fluid Restriction: no  Last Modified Barium Swallow with Video (Video Swallowing Test): Not done    Treatments at the Time of Hospital Discharge:   Respiratory Treatments: n/a    Oxygen Therapy:  is not on home oxygen therapy.   Ventilator:    - No ventilator support    Rehab Therapies: Physical Therapy and Occupational Therapy  Weight Bearing Status/Restrictions: No weight bearing restrictions  Other Medical Equipment (for information only, NOT a DME order):  walker  Other Treatments: n/a    Patient's personal belongings (please select all that are sent with patient):  Glasses    RN SIGNATURE:  Electronically signed by Arturo Mckenna RN on 3/3/23 at 3:42 PM EST    CASE MANAGEMENT/SOCIAL WORK SECTION    Inpatient Status Date: 2/27/23    Readmission Risk Assessment Score:11  Readmission Risk              Risk of Unplanned Readmission:  9           Discharging to Facility/ [address] [facility] [address] [place], [address]  Phone: [phone]  Fax: [phone]             / signature: Electronically signed by Skylar Yusuf RN on 3/3/23 at 3:35 PM EST    PHYSICIAN SECTION    Prognosis: Guarded    Condition at Discharge: Stable    Rehab Potential (if transferring to Rehab): Guarded    Recommended Labs or Other Treatments After Discharge: ***    Physician Certification: I certify the above information and transfer of Sanjay Moore  is necessary for the continuing treatment of the diagnosis listed and that she requires [place] for greater 30 days.      Update Admission H&P: No change in H&P    PHYSICIAN SIGNATURE:  Electronically signed by Beverley Knowles MD on 3/3/23 at 8:22 AM EST

## 2023-03-16 ENCOUNTER — CARE COORDINATION (OUTPATIENT)
Dept: CARE COORDINATION | Age: 88
End: 2023-03-16

## 2023-03-26 ENCOUNTER — TELEPHONE (OUTPATIENT)
Dept: FAMILY MEDICINE CLINIC | Age: 88
End: 2023-03-26

## 2023-03-27 NOTE — TELEPHONE ENCOUNTER
She was prescribed prednisone on 4 March. By the fourth or fifth day she has all the side effects of prednisone severely. Last dose of prednisone was on the 20th. After she got off the medicine she never got back to her baseline. Reviewed all patient's labs at [facility]. She has a borderline procalcitonin at 0.25 which is considered unlikely to be infection but borderline. However anything greater than 0.25 is considered likely to be an infection. All other checks of infection are negative. She has been depressed. Discussed the possibility of pseudodementia. She is also going to be at risk for sleep apnea. Pulse ox would be normal when awake and alert but her oxygen could be low when sleeping. She could have paroxysmal atrial fibrillation which can cause recurrent small strokes with decline in function. None of her medicines are severely problematic. (We should consider stopping her cholesterol medicine since she has low proteins.)  She has had a slow decline in mental function noticed by her son which he attributed to age. After the prednisone she has had a downward trend. He can also check her TSH and make sure she has not forgotten to take her thyroid medication. That can actually cause a thyroid coma. Most of her labs look extremely normal.  Her proteins look higher than they have in our office. This could indicate a tendency towards dehydration.

## 2023-03-27 NOTE — TELEPHONE ENCOUNTER
Patients son called and would like to talk to dr. Mica Perez regarding Mor Galloway and to see what he thinks is going on .

## 2023-03-27 NOTE — TELEPHONE ENCOUNTER
Called on call for mental status changes. Admitted to [address] [facility] end of February, then transferred to rehab facility for gout with weakness for strengthening. She was discharged from rehab with 16 days of prednisone. She self admitted to a respite stay at a nursing home because she was worried to go home due to strength. On March 3rd, brought into [facility]. March 4th, started prednisone. Around March 9th began cognitive decline. Around day 11 they discovered prednisone was prescribed at [address] [facility]. Waited med out to see if cognitive decline improved after completing prescription, but 5 days after discontinuation mental status has not. Lots of unspecified side effects from prednisone per son. There was subtle improvements in cognitive declines, but now no longer getting better. Cannot work TV remove, can't answer phone, forgetting how to do basic things from entire life. Wont wear glasses, hearing aids in and out. Moments of paranoia, seeing things, occasionally happening over the last few months. When discussing prednisone with [facility], he did testing with no significant issues appearing. MMSE score was 13. They state she is significantly altered versus her baseline about a month ago. I am referring there to go there ER for further evaluation of possible stroke, UTI, or other infection. Discussed this may be delirium which can improve at home, but with possible of stroke or infection I am recommending ER. Son agrees to plan.      -- SANYA Little - CNP

## 2023-03-28 PROBLEM — R26.2 UNABLE TO AMBULATE: Chronic | Status: ACTIVE | Noted: 2022-07-22

## 2023-03-28 PROBLEM — R53.1 WEAKNESS GENERALIZED: Chronic | Status: ACTIVE | Noted: 2022-12-08

## 2023-03-28 PROBLEM — Z78.9 UNABLE TO CARE FOR SELF: Chronic | Status: ACTIVE | Noted: 2022-07-22

## 2023-03-28 NOTE — TELEPHONE ENCOUNTER
Spoke with son. He is running about how to get a diagnosis now. Some options check TSH (not done in the ER because it would not come back for days), check sedimentation rate and CRP. Recommend a overnight pulse oximetry if she would be willing to set up either oxygen or CPAP if needed. Oxygen does not work very well but would be some help. The oxygen and her blood drops overnight she will get brain damage which can lead to progressive dementia. If he wishes to he can check with Curt Isabel who is a neuropsychologist who is good with making diagnoses of people with questionable diagnoses.

## 2023-03-28 NOTE — TELEPHONE ENCOUNTER
Patients son called and said he would like to talk with dr. Torie Ziegler again regarding his mom     He would like to know the next step in figuring out why this is happening after all the tests come back

## 2023-04-03 ENCOUNTER — TELEPHONE (OUTPATIENT)
Dept: FAMILY MEDICINE CLINIC | Age: 88
End: 2023-04-03

## 2023-04-04 NOTE — TELEPHONE ENCOUNTER
Spoke with Brother over the phone. Forms in fax bin to be sent to brother. He will take forms to the facility. Per DRK.   Maria

## 2023-04-04 NOTE — TELEPHONE ENCOUNTER
Spoke with Dr. Welford Holstein while driving home last night. The patient had hospitalization for mental status changes. She has had work-up at the hospital and by Dr. Welford Holstein for delirium and nothing has been found. He is considering sending her to a neurologist for final work-up. I mentioned the possibility of sleep apnea as a reversible cause of dementia. We will fax patient's medical summary together with previous CAT scans when she had a head injury. Call patient's son Max Michele [phone] and get the name and phone number (and fax number if possible) of the facility his mom is at. Then call the facility get their fax number if the patient does not have it and fax printed papers.   Thanks

## 2023-04-17 ENCOUNTER — TELEPHONE (OUTPATIENT)
Dept: FAMILY MEDICINE CLINIC | Age: 88
End: 2023-04-17

## 2023-04-17 NOTE — TELEPHONE ENCOUNTER
Will refer to Dr Valorie Olivera [facility] neurophysiologist who does types of mental status exams that can give a diagnosis when imaging does not. Phone [phone]. [address]. Contact him and if they are able to get in we will refer him.

## 2023-04-17 NOTE — TELEPHONE ENCOUNTER
Please call patient's son Shlomo Scott and make sure patient actually needs this. The last thing I heard she was in a rehab facility. She would have a doctor there that would prescribe her medicines.

## 2023-04-17 NOTE — TELEPHONE ENCOUNTER
LAST VISIT 12/06/2022 WITH DR Cristobal Escoto, NEXT VISIT NONE.   [address] [facility]

## 2023-04-17 NOTE — TELEPHONE ENCOUNTER
----- Message from Elza Sandy sent at 4/17/2023  1:19 PM EDT -----  Subject: Referral Request    Reason for referral request? Neurologist  Provider patient wants to be referred to(if known):     Provider Phone Number(if known): Additional Information for Provider? Patient's son called in wanting to   see if his mom Dilshad Yun could get her in the next month with the   neurologist. They cannot get in until August with the referral. Jacqueline Pham is   having cognitive issues per son. She is currently at [facility].  Please   call son back asap   ---------------------------------------------------------------------------  --------------  [address] [facility]    [phone]; OK to leave message on voicemail  ---------------------------------------------------------------------------  --------------

## 2023-04-18 RX ORDER — LEVOTHYROXINE SODIUM 0.1 MG/1
100 TABLET ORAL DAILY
Qty: 90 TABLET | OUTPATIENT
Start: 2023-04-18

## 2023-04-20 DIAGNOSIS — E78.00 PURE HYPERCHOLESTEROLEMIA: ICD-10-CM

## 2023-04-20 DIAGNOSIS — F33.41 RECURRENT MAJOR DEPRESSIVE DISORDER, IN PARTIAL REMISSION (HCC): Chronic | ICD-10-CM

## 2023-04-20 RX ORDER — ATORVASTATIN CALCIUM 40 MG/1
TABLET, FILM COATED ORAL
Qty: 90 TABLET | Refills: 3 | OUTPATIENT
Start: 2023-04-20

## 2023-04-20 RX ORDER — BUPROPION HYDROCHLORIDE 150 MG/1
TABLET, EXTENDED RELEASE ORAL
Qty: 180 TABLET | Refills: 3 | OUTPATIENT
Start: 2023-04-20

## 2023-08-02 ENCOUNTER — TELEPHONE (OUTPATIENT)
Dept: PHARMACY | Facility: CLINIC | Age: 88
End: 2023-08-02

## 2023-08-02 NOTE — TELEPHONE ENCOUNTER
[facility] CLINICAL PHARMACY: ADHERENCE REVIEW  Identified care gap per Vania Patten. Per insurer report, LIS-0 - co-pays are based on tiers and patient is subject to coverage gap.    fills with  complete pharmacy solutions  Pharmacy: ACE/ARB and Statin adherence    Patient also appears to be prescribed:No Others in the metric at this time      [facility] Records claims through 07/24/2023 (Prior Year [address] = not reported; YTD [address] = 100%; Potential Fail Date: 11.04.23):   Lisinopril last filled on 05.31.23 for 30 day supply. Next refill due: 06.30.23    Per Insurer Portal: last filled on  06.29.23 for 30 day supply. BP Readings from Last 3 Encounters:   03/03/23 (!) 178/93   02/27/23 (!) 167/68   12/06/22 130/84     Estimated Creatinine Clearance: 61 mL/min (based on SCr of 0.6 mg/dL). Lab Results   Component Value Date    CREATININE 0.6 03/02/2023     Lab Results   Component Value Date    K 4.5 03/02/2023        STATIN ADHERENCE    Insurance Records claims through 07/24/2023 (Prior Year [address] = not reported; YTD [address] = 72%; Potential Fail Date: 08.09.23): Atorvastatin last filled on 03.17.23 for 14 day supply. Next refill due: 03.31.23    Per Insurer Portal: last filled on (same as above). .    Lab Results   Component Value Date    CHOL 121 08/06/2021    TRIG 65 08/06/2021    HDL 39 (L) 08/06/2021    LDLCALC 69 08/06/2021     ALT   Date Value Ref Range Status   02/28/2023 12 10 - 40 U/L Final     AST   Date Value Ref Range Status   02/28/2023 18 15 - 37 U/L Final     The ASCVD Risk score (Seda DK, et al., 2019) failed to calculate for the following reasons: The 2019 ASCVD risk score is only valid for ages 36 to 78        PLAN  The following are interventions that have been identified:   Patient overdue refilling lisinopril & atorvastatin and active on home medication list.   Appears patient may be residing in LTCF.     Outreach:  Attempting to reach patient to review -